# Patient Record
Sex: FEMALE | Race: WHITE | Employment: OTHER | URBAN - METROPOLITAN AREA
[De-identification: names, ages, dates, MRNs, and addresses within clinical notes are randomized per-mention and may not be internally consistent; named-entity substitution may affect disease eponyms.]

---

## 2017-01-19 ENCOUNTER — APPOINTMENT (OUTPATIENT)
Dept: PHYSICAL THERAPY | Age: 67
End: 2017-01-19

## 2017-01-19 ENCOUNTER — HOSPITAL ENCOUNTER (OUTPATIENT)
Dept: PHYSICAL THERAPY | Age: 67
Discharge: HOME | End: 2017-01-19
Payer: MEDICARE

## 2017-01-19 PROCEDURE — 97162 PT EVAL MOD COMPLEX 30 MIN: CPT | Performed by: PHYSICAL THERAPIST

## 2017-01-19 PROCEDURE — G8985 CARRY GOAL STATUS: HCPCS | Performed by: PHYSICAL THERAPIST

## 2017-01-19 PROCEDURE — G8984 CARRY CURRENT STATUS: HCPCS | Performed by: PHYSICAL THERAPIST

## 2017-01-19 PROCEDURE — 97140 MANUAL THERAPY 1/> REGIONS: CPT | Performed by: PHYSICAL THERAPIST

## 2017-01-19 NOTE — PROGRESS NOTES
PT INITIAL EVALUATION NOTE - [facility] 2-15    Patient Name: Jessica Milner  Date:2017  : 1950  [x]  Patient  Verified  Payor: [place] MEDICARE / Plan: [place] MEDICARE PART A & B / Product Type: Medicare /    In time:500  Out time:600  Total Treatment Time (min):60  Total Timed Codes (min): 60 (35 eval,25 timed see below)  1:1 Treatment Time ( [address] only): 61  Visit #: 1    Treatment Area: Lower back pain [M54.5]     SUBJECTIVE  Any medication changes, allergies to medications, adverse drug reactions, diagnosis change, or new procedure performed?: [] No    [x] Yes (see summary sheet for update)  Date of onset/injury: about 2 mo ago. Pain got so bad she had to go to ER where they did a CT scan, US, x-ray. All came back neg for sig pathology. ARELIS: likely from working out. Started with  in 2015, developed p! in Nov.   Pain:   10/10 max 3/10 min 3/10 now     Location of symptoms: mid- back pain reva  (pt points to thoracic region), headaches (have subsided some, now only getting them 2x/mo)  Description of symptoms: worse as day goes on secondary to job duties (see below)  Aggravated by: raising reva arms above head  Eased by: bending over touching floor stretching out the thoracic and lumbar region, aleve (\"i live off that right now\")  Prior tests/injections: after trip to ER. Helped greatly to dec pain. 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 Prior level of function/activity level: was going to the gym-doing elliptical and lifting weights. Started in  working with a  at [facility]. Weight lifting routine: upright rows, chest press, bent over rows, tricep kickback, planks (elbows).   Stopped in Dec secondary to pain. Patient goal: \"to find out what i can do to relieve pain\"  Living Situation: pt lives alone  Barriers: pt is a \"work-a-holic\" who always feels like she needs to be working  Fear-avoidance belief about physical activity: 50(13) -elevated    OBJECTIVE    Observation: no scoliotic curve noted. Rounded head forward shoulder posture. Increased upper cervical extension. Kyphosis  [x] Inc    [] Dec    AROM t-spine  ]WNL unless noted below   % decreased   Flexion  Relieves p! Extension  Relieves p! Right Sidebending    Left Sidebending    Right Rotation 10% friend, p! Left Rotation      AROM reva shoulders WNL and pain-free    Strength  3+/5 middle trap, lower trap, rhomboids  5/5 reva shoulders all planes    Tenderness to palpation:  reva UT, middle trap, rhomboid minor/major, thoracic paraspinals. Special Tests: all thoracic special tests neg for sig pathology  (-) hawkin's lia and empty can reva sh    Flexibility Deficits: dec pec major and minor, lats    Joint mobility:hypomobility noted t1-t9, slight p! With testing         Outcome Measure: Using standardized self-reported disability survey (Focus on Therapeutic Outcomes) the patient's perceived disability score is 50- zero is the most disabled and 100 is the least disabled. OBJECTIVE    [x] Skin assessment post-treatment:  [x]intact []redness- no adverse reaction    []redness  adverse reaction:     25 min Manual Therapy: MFR reva middle trap, rhomboid minor/major, thoracic paraspinals.  t1-t9 grade II-III. Rationale: decrease pain, increase ROM, increase tissue extensibility and decrease trigger points to improve the patients ability to work for prolonged periods of time.            With   [] TE   [] TA   [] neuro   [x] manual: Patient Education: [x] Review HEP    [] Progressed/Changed HEP based on:   [] positioning   [] body mechanics   [] transfers   [x] Ice application- pt advised to ice 10-15 min 1-2 x/day to area in order to dec inflammation  [x] other:  re: mechanism of injury/condition, role of physical therapy, prognosis for recovery, heat vs ice, activity modifications. Education re: proper posture in relation to low back pain. Demonstrated using visual and tactile cues. Advised pt to get fitted for properly fitting bra to facilitate proper posture and dec strain at neck and back. Pain Level (0-10 scale) post treatment: 0    ASSESSMENT/Changes in Function:     [x]  See Plan of Jarod.  STAN Holloway, UDAYT, CMTPT  PT License Number: [id number]   1/19/2017  4:55 PM

## 2017-01-19 NOTE — PROGRESS NOTES
[facility]  [address]  [place], [address]  Phone: [phone]  Fax: [phone]    Plan of Care/Statement of Necessity for Physical Therapy Services  2-15    Patient name: Leobardo Shine  : 1950  Provider#: [id number]  Referral source: Siobhan León MD      Medical/Treatment Diagnosis: Lower back pain [M54.5]     Prior Hospitalization: see medical history     Comorbidities: see evaluation  Prior Level of Function:see evaluation  Medications: Verified on Patient Summary List  Start of Care: 2017     Onset Date:see evaluation   The Plan of Care and following information is based on the information from the initial evaluation. Assessment/ key information: Patient present with signs and symptoms consistent with reva thoracic strain and will benefit from physical therapy to address deficits noted below in problem list.     Evaluation Complexity History MEDIUM  Complexity : 1-2 comorbidities / personal factors will impact the outcome/ POC ; Examination MEDIUM Complexity : 3 Standardized tests and measures addressing body structure, function, activity limitation and / or participation in recreation  ;Presentation MEDIUM Complexity : Evolving with changing characteristics  ; Clinical Decision Making MEDIUM Complexity : FOTO score of 26-74  Overall Complexity Rating: MEDIUM    Problem List: pain affecting function, decrease strength, decrease ADL/ functional abilitiies, decrease activity tolerance and decrease flexibility/ joint mobility   Treatment Plan may include any combination of the following: Therapeutic exercise, Therapeutic activities, Neuromuscular re-education, Physical agent/modality, Manual therapy, Patient education and Self Care training  Patient / Family readiness to learn indicated by: asking questions, trying to perform skills and interest  Persons(s) to be included in education: patient (P)  Barriers to Learning/Limitations: yes;  other job duties. Also pt lives alone so no help at home with heavy housework  Patient Goal (s): please see evaluation in Connect Care  Patient Self Reported Health Status: please see paper chart  Rehabilitation Potential: good    Short Term Goals: To be accomplished in 5 treatments:  -Independent in HEP as evidenced on ability to perform at least 5 exercises from HEP using proper form without verbal cuing.   -Pain less than or equal to 6/10 at worst to allow patient to perform ADL's with greater ease  -Demostrate proper posture in order to decrease thoracic pain    Long Term Goals: To be accomplished in 10 treatments:  -MMT 4+/5 all planes to allow patient to perform ADL's  -Pain 0/10 to allow patient to participate in work duties reaching overhead  -FOTO score greater than or equal to 60 to allow patient to perform a greater amount of ADLs. Frequency / Duration: Patient to be seen 2 times per week for 8-10 weeks. Patient/ Caregiver education and instruction: self care, activity modification and exercises    [x]  Plan of care has been reviewed with PTA    G-Codes (GP)  Carry   Current  CJ= 20-39%    Goal  CI= 1-19%    The severity rating is based on clinical judgment and the FOTO Score. Certification Period: 1/19/2017 -  4/17/17    Summer Ashford. Amie PT, DPT, CMTPT      5/75/2494 3:28 PM  PT License Number: [id number]  _____________________________________________________________________    I certify that the above Therapy Services are being furnished while the patient is under my care. I agree with the treatment plan and certify that this therapy is necessary.     [de-identified] Signature:____________________  Date:____________Time:_________

## 2017-01-24 ENCOUNTER — HOSPITAL ENCOUNTER (OUTPATIENT)
Dept: PHYSICAL THERAPY | Age: 67
Discharge: HOME | End: 2017-01-24
Payer: MEDICARE

## 2017-01-24 PROCEDURE — 97110 THERAPEUTIC EXERCISES: CPT | Performed by: PHYSICAL THERAPIST

## 2017-01-24 PROCEDURE — 97140 MANUAL THERAPY 1/> REGIONS: CPT | Performed by: PHYSICAL THERAPIST

## 2017-01-24 NOTE — PROGRESS NOTES
PT DAILY TREATMENT NOTE - [facility] 2-15    Patient Name: Gonzalo Montiel  Date:2017  : 1950  [x]  Patient  Verified  Payor: Delgado Colon / Plan: VA MEDICARE PART A & B / Product Type: Medicare /    In time:105  Out time:205  Total Treatment Time (min): 60  Total Timed Codes (min): 50  1:1 Treatment Time ( [address] only): 50   Visit #: 2     Treatment Area: Lower back pain [M54.5]    SUBJECTIVE  Pain Level (0-10 scale): 1  Any medication changes, allergies to medications, adverse drug reactions, diagnosis change, or new procedure performed?: [x] No    [] Yes (see summary sheet for update)  Subjective functional status/changes:     \"Much better after last time. I wasn't sore at all and I have been trying to watch my posture at work. \"    OBJECTIVE    Modality rationale: decrease edema, decrease inflammation, decrease pain and reduce soreness post therapy in order to improve the patients ability to perform ADL's. Min Type Additional Details    [x]  Ice     []  Heat   Position: supine, reva LE's supported    Location: thoracic spine     [x] Skin assessment post-treatment:  [x]intact []redness- no adverse reaction    []redness  adverse reaction:     35 min Therapeutic Exercise:  [x] See flow sheet :   Rationale: increase ROM, increase strength and improve functional mobility to improve the patients ability to stand for prolonged periods of time    15 min Manual Therapy: MFR reva thoracic paraspinals, rhomboids, middle trap. CPA grade III-IV.      Rationale: decrease pain, increase ROM, increase tissue extensibility, decrease trigger points and improve joint mobility to improve the patients ability to reach overhead    With   [x] TE   [] TA   [] neuro   [] manual: Patient Education: [x] Review HEP    [] Progressed/Changed HEP based on:   [] positioning   [] body mechanics   [] transfers   [] heat/ice application    [x] other: Reminded pt about getting fitted for proper  bra     Other Objective/Functional Measures: Pain Level (0-10 scale) post treatment: 0    ASSESSMENT    Patient will continue to benefit from skilled PT services to modify and progress therapeutic interventions, address functional mobility deficits, address ROM deficits, address strength deficits and analyze and address soft tissue restrictions to attain remaining goals. Progress towards goals / Updated goals: Yanira addition of postural ex's well. PLAN  []  Upgrade activities as tolerated     [x]  Continue plan of care  []  Update interventions per flow sheet       []  Discharge due to:_  []  Other:_      Inga Wray.  Amie PT, DPT, CMTPT    2/82/3190    PT License Number: [id number]

## 2017-01-31 ENCOUNTER — APPOINTMENT (OUTPATIENT)
Dept: PHYSICAL THERAPY | Age: 67
End: 2017-01-31
Payer: MEDICARE

## 2017-02-02 ENCOUNTER — HOSPITAL ENCOUNTER (OUTPATIENT)
Dept: PHYSICAL THERAPY | Age: 67
Discharge: HOME | End: 2017-02-02
Payer: MEDICARE

## 2017-02-02 PROCEDURE — 97140 MANUAL THERAPY 1/> REGIONS: CPT | Performed by: PHYSICAL THERAPIST

## 2017-02-02 NOTE — PROGRESS NOTES
PT DAILY TREATMENT NOTE - [facility] -15    Patient Name: David August  Date:2017  : 1950  [x]  Patient  Verified  Payor: Beth Veronica / Plan: [place] MEDICARE PART A & B / Product Type: Medicare /    In time:100  Out time:140  Total Treatment Time (min): 40  Total Timed Codes (min): 30  1:1 Treatment Time [facility] only):30  Visit #: 3    Treatment Area: Lower back pain [M54.5]    SUBJECTIVE  Pain Level (0-10 scale): 3  Any medication changes, allergies to medications, adverse drug reactions, diagnosis change, or new procedure performed?: [x] No    [] Yes (see summary sheet for update)  Subjective functional status/changes:     \"I've been doing much better overall. Not too good today just because I'm sick and don't feel well. The exercises really help. \"    OBJECTIVE    Modality rationale: decrease edema, decrease inflammation, decrease pain and reduce soreness post therapy in order to improve the patients ability to perform ADL's. Min Type Additional Details    [x]  Ice     []  Heat   Position: supine, reva LE's supported    Location: thoracic spine     [x] Skin assessment post-treatment:  [x]intact []redness- no adverse reaction    []redness  adverse reaction:     0 min Therapeutic Exercise:  [x] See flow sheet :held today as pt is sick and wants to go home   Rationale: increase ROM, increase strength and improve functional mobility to improve the patients ability to stand for prolonged periods of time    30 min Manual Therapy: MFR reva thoracic paraspinals, middle trap, levator, rhomboids, middle trap.   CPA and UPA reva grade III-IV t1-t9   Rationale: decrease pain, increase ROM, increase tissue extensibility, decrease trigger points and improve joint mobility to improve the patients ability to reach overhead    With   [x] TE   [] TA   [] neuro   [] manual: Patient Education: [x] Review HEP    [] Progressed/Changed HEP based on:   [] positioning   [] body mechanics   [] transfers   [] heat/ice application [x] other: Discussion about trying to have good posture when reading and sitting     Other Objective/Functional Measures:     Pain Level (0-10 scale) post treatment: 0    ASSESSMENT    Patient will continue to benefit from skilled PT services to modify and progress therapeutic interventions, address functional mobility deficits, address ROM deficits, address strength deficits and analyze and address soft tissue restrictions to attain remaining goals. Progress towards goals / Updated goals:      PLAN  []  Upgrade activities as tolerated     [x]  Continue plan of care  []  Update interventions per flow sheet       []  Discharge due to:_  []  Other:_      Tana Done.  Amie, PT, DPT, CMTPT    4/6/7846    PT License Number: [id number]

## 2017-02-09 ENCOUNTER — APPOINTMENT (OUTPATIENT)
Dept: PHYSICAL THERAPY | Age: 67
End: 2017-02-09
Payer: MEDICARE

## 2017-02-10 ENCOUNTER — HOSPITAL ENCOUNTER (OUTPATIENT)
Dept: PHYSICAL THERAPY | Age: 67
Discharge: HOME | End: 2017-02-10
Payer: MEDICARE

## 2017-02-10 PROCEDURE — 97140 MANUAL THERAPY 1/> REGIONS: CPT | Performed by: PHYSICAL THERAPIST

## 2017-02-10 PROCEDURE — G8986 CARRY D/C STATUS: HCPCS | Performed by: PHYSICAL THERAPIST

## 2017-02-10 PROCEDURE — G8985 CARRY GOAL STATUS: HCPCS | Performed by: PHYSICAL THERAPIST

## 2017-02-10 NOTE — ANCILLARY DISCHARGE INSTRUCTIONS
[facility]   [address] [facility], [address] [place] [address]  Phone: [phone] Fax: [phone]      Discharge Summary 2-15      Patient name: Niecy Wilson  : 1950    Provider#: [id number]  Referral source: Domenic Jones MD      Medical/Treatment Diagnosis: Lower back pain [M54.5]     Prior Hospitalization: see medical history     Comorbidities: see evaluation  Prior Level of Function:see evaluation  Medications: Verified on Patient Summary List    Start of Care: 17    Onset Date:see evaluation   Visits from Start of Care: 4   Missed Visits:see connect care  Reporting Period : 17  to 2/10/17    Goal Status     Short Term Goals: To be accomplished in 5 treatments:  -Independent in HEP as evidenced on ability to perform at least 5 exercises from HEP using proper form without verbal cuing. -MET  -Pain less than or equal to 6/10 at worst to allow patient to perform ADL's with greater ease -MET  -Demostrate proper posture in order to decrease thoracic pain -MET     Long Term Goals: To be accomplished in 10 treatments:  -MMT 4+/5 all planes to allow patient to perform ADL's -MET  -Pain 0/10 to allow patient to participate in work duties reaching overhead -MET  -FOTO score greater than or equal to 60 to allow patient to perform a greater amount of ADLs. -MET    Assessment/Summary of care:   Pt with 4 skilled PT visits at this time.  Pt has shown improvement in ROM, strength and ability to return to work activities as reviewed in clinic.  Pt has met 100% of goals.   Pt ready for D/C to HEP. G-Codes (GP)   Carry    Goal  CI= 1-19%   D/C  CI= 1-19%      RECOMMENDATIONS:  [x]Discontinue therapy:[x]Patient has reached or is progressing toward set goals     []Patient is non-compliant or has abdicated     []Due to lack of appreciable progress towards set goals    Valiant Sames.  Amie, PT, DPT, CMTPT  2/10/2017 8:34 AM      [

## 2017-02-10 NOTE — PROGRESS NOTES
PT DAILY TREATMENT NOTE - [facility] 2-15    Patient Name: Srinivasan Hawley  Date:2/10/2017  : 1950  [x]  Patient  Verified  Payor: Zahc Dutta / Plan: [place] MEDICARE PART A & B / Product Type: Medicare /    In time:805Out time 840  Total Treatment Time (min): 35  Total Timed Codes (min):25  1:1 Treatment Time [facility] only):25  Visit #: 4    Treatment Area: Lower back pain [M54.5]    SUBJECTIVE  Pain Level (0-10 scale):0  Any medication changes, allergies to medications, adverse drug reactions, diagnosis change, or new procedure performed?: [x] No    [] Yes (see summary sheet for update)  Subjective functional status/changes:     \"I feel you've helped me a lot and can self manage on my own. \"    OBJECTIVE    Modality rationale: decrease edema, decrease inflammation, decrease pain and reduce soreness post therapy in order to improve the patients ability to perform ADL's. Min Type Additional Details    [x]  Ice     []  Heat   Position: supine, reva LE's supported    Location: thoracic spine     [x] Skin assessment post-treatment:  [x]intact []redness- no adverse reaction    []redness  adverse reaction:     0 min Therapeutic Exercise:  [x] See flow sheet :   Rationale: increase ROM, increase strength and improve functional mobility to improve the patients ability to stand for prolonged periods of time    25 min Manual Therapy: MFR reva thoracic paraspinals, middle trap, levator, rhomboids, middle trap. CPA and UPA reva grade III-IV t1-t9   Rationale: decrease pain, increase ROM, increase tissue extensibility, decrease trigger points and improve joint mobility to improve the patients ability to reach overhead    With   [] TE   [] TA   [] neuro   [x] manual: Patient Education: [x] Review HEP    [] Progressed/Changed HEP based on:   [] positioning   [] body mechanics   [] transfers   [] heat/ice application    [x] other: HEP reviewed.   Pt given this therapist's contact phone and e-mail and advised to call with any questions or concerns in the future. Other Objective/Functional Measures:     Pain Level (0-10 scale) post treatment: 0    ASSESSMENT    Patient will continue to benefit from skilled PT services to modify and progress therapeutic interventions, address functional mobility deficits, address ROM deficits, address strength deficits and analyze and address soft tissue restrictions to attain remaining goals. Progress towards goals / Updated goals:      PLAN  []  Upgrade activities as tolerated     [x]  Continue plan of care  []  Update interventions per flow sheet       []  Discharge due to:_  []  Other:_      Tana Done.  Amie PT, DPT, CMTPT    7/37/2023    PT License Number: [id number]

## 2017-02-14 ENCOUNTER — APPOINTMENT (OUTPATIENT)
Dept: PHYSICAL THERAPY | Age: 67
End: 2017-02-14
Payer: MEDICARE

## 2017-02-16 ENCOUNTER — APPOINTMENT (OUTPATIENT)
Dept: PHYSICAL THERAPY | Age: 67
End: 2017-02-16
Payer: MEDICARE

## 2017-02-28 ENCOUNTER — APPOINTMENT (OUTPATIENT)
Dept: PHYSICAL THERAPY | Age: 67
End: 2017-02-28
Payer: MEDICARE

## 2017-03-02 ENCOUNTER — APPOINTMENT (OUTPATIENT)
Dept: PHYSICAL THERAPY | Age: 67
End: 2017-03-02

## 2017-03-15 RX ORDER — LORAZEPAM 1 MG/1
TABLET ORAL
Qty: 135 TAB | Refills: 0 | OUTPATIENT
Start: 2017-03-15 | End: 2017-08-01 | Stop reason: SDUPTHER

## 2017-04-20 RX ORDER — LEVOTHYROXINE SODIUM 200 UG/1
TABLET ORAL
Qty: 90 TAB | Refills: 3 | Status: SHIPPED | OUTPATIENT
Start: 2017-04-20 | End: 2017-08-01 | Stop reason: SDUPTHER

## 2017-08-01 RX ORDER — LORAZEPAM 1 MG/1
TABLET ORAL
Qty: 135 TAB | Refills: 0 | Status: SHIPPED | OUTPATIENT
Start: 2017-08-01 | End: 2017-10-30 | Stop reason: SDUPTHER

## 2017-08-01 RX ORDER — LEVOTHYROXINE SODIUM 200 UG/1
TABLET ORAL
Qty: 90 TAB | Refills: 3 | Status: SHIPPED | OUTPATIENT
Start: 2017-08-01 | End: 2020-01-31 | Stop reason: SDUPTHER

## 2017-08-01 NOTE — TELEPHONE ENCOUNTER
Last seen: 12/29/2016    Requested Prescriptions     Pending Prescriptions Disp Refills    levothyroxine (SYNTHROID) 200 mcg tablet 90 Tab 3    LORazepam (ATIVAN) 1 mg tablet 135 Tab 0     Sig: TAKE ONE AND ONE-HALF TABLETS BY MOUTH EVERY DAY AT NIGHT

## 2017-09-12 ENCOUNTER — OFFICE VISIT (OUTPATIENT)
Dept: INTERNAL MEDICINE CLINIC | Age: 67
End: 2017-09-12

## 2017-09-12 VITALS
OXYGEN SATURATION: 98 % | BODY MASS INDEX: 30.22 KG/M2 | TEMPERATURE: 97.8 F | SYSTOLIC BLOOD PRESSURE: 139 MMHG | HEART RATE: 61 BPM | RESPIRATION RATE: 20 BRPM | DIASTOLIC BLOOD PRESSURE: 62 MMHG | HEIGHT: 64 IN | WEIGHT: 177 LBS

## 2017-09-12 DIAGNOSIS — R51.9 SEVERE HEADACHE: Primary | ICD-10-CM

## 2017-09-12 RX ORDER — BUTALBITAL, ACETAMINOPHEN AND CAFFEINE 300; 40; 50 MG/1; MG/1; MG/1
1 CAPSULE ORAL
Qty: 12 CAP | Refills: 0 | Status: SHIPPED | OUTPATIENT
Start: 2017-09-12 | End: 2018-10-04

## 2017-09-12 NOTE — PROGRESS NOTES
HISTORY OF PRESENT ILLNESS  Paul Arango is a 78 y.o. female. HPI  Patient presents to the office for evaluation of headache. She reports Friday she started with an extremely bad headache 10 out of 10. She tried taking Excedrin but it did not help. She has been having some light sensitivity and some mild nausea. She denies noise sensitivity. She reports on Friday she kept trying to massage her headache away. This did seem to help some but once she stopped the massage the intensity returned. She has had headaches in the past but never this severe or for this length of time. She reports Saturday she felt like she had some neck stiffness as well. That has since resolved. She did have some increase stress at work and she was not able to go on her vacation this week. Review of Systems   Constitutional: Positive for malaise/fatigue. Eyes: Positive for photophobia. Negative for blurred vision and double vision. Gastrointestinal: Positive for heartburn and nausea. Negative for vomiting. Musculoskeletal: Positive for myalgias. Blood pressure 139/62, pulse 61, temperature 97.8 °F (36.6 °C), temperature source Oral, resp. rate 20, height 5' 3.5\" (1.613 m), weight 177 lb (80.3 kg), SpO2 98 %. Physical Exam   Constitutional:   Patient in tears/crying. Eyes: Pupils are equal, round, and reactive to light. Musculoskeletal: She exhibits tenderness. Patient is tender to palpation over the occipital area. Neurological: A cranial nerve deficit is present. Coordination normal.       ASSESSMENT and PLAN  Diagnoses and all orders for this visit:    1. Severe headache  -     butalbital-acetaminophen-caff (FIORICET) -40 mg per capsule; Take 1 Cap by mouth every four (4) hours as needed for Pain. Max Daily Amount: 6 Caps. -     CT HEAD WO CONT; Future    I would like for the patient to get a CT scan.  Although she has a history of headaches, this headache is the worse ever and it does not seem to be responding to medication. She has been given a trial of Fioricet to try. Advised her to try a thera cane to massage her neck over the areas that she seems to carry tension. I will contact her with the results of the CT. She may benefit from physical therapy if not able to get home thera- cane. All this was discussed with the patient and she understands and agrees.

## 2017-09-12 NOTE — PATIENT INSTRUCTIONS
Tetherball Activation    Thank you for requesting access to Tetherball. Please follow the instructions below to securely access and download your online medical record. Tetherball allows you to send messages to your doctor, view your test results, renew your prescriptions, schedule appointments, and more. How Do I Sign Up? 1. In your internet browser, go to [email or web address]  2. Click on the First Time User? Click Here link in the Sign In box. You will be redirect to the New Member Sign Up page. 3. Enter your Tetherball Access Code exactly as it appears below. You will not need to use this code after youve completed the sign-up process. If you do not sign up before the expiration date, you must request a new code. Tetherball Access Code: [email or web address]  Expires: 2017  1:52 PM (This is the date your Tetherball access code will )    4. Enter the last four digits of your Social Security Number (xxxx) and Date of Birth (mm/dd/yyyy) as indicated and click Submit. You will be taken to the next sign-up page. 5. Create a Tetherball ID. This will be your Tetherball login ID and cannot be changed, so think of one that is secure and easy to remember. 6. Create a Tetherball password. You can change your password at any time. 7. Enter your Password Reset Question and Answer. This can be used at a later time if you forget your password. 8. Enter your e-mail address. You will receive e-mail notification when new information is available in [address]. 9. Click Sign Up. You can now view and download portions of your medical record. 10. Click the Download Summary menu link to download a portable copy of your medical information. Additional Information    If you have questions, please visit the Frequently Asked Questions section of the Tetherball website at [email or web address] Remember, Tetherball is NOT to be used for urgent needs. For medical emergencies, dial 911.

## 2017-09-12 NOTE — PROGRESS NOTES
Reviewed record in preparation for visit and have obtained necessary documentation. Identified pt with two pt identifiers(name and ). Health Maintenance Due   Topic    Hepatitis C Screening     DTaP/Tdap/Td series (1 - Tdap)    ZOSTER VACCINE AGE 60>     GLAUCOMA SCREENING Q2Y     Pneumococcal 65+ Low/Medium Risk (2 of 2 - PCV13)    MEDICARE YEARLY EXAM     BREAST CANCER SCRN MAMMOGRAM     INFLUENZA AGE 9 TO ADULT          No chief complaint on file. Wt Readings from Last 3 Encounters:   17 177 lb (80.3 kg)   16 177 lb 9.6 oz (80.6 kg)   16 176 lb 8 oz (80.1 kg)     Temp Readings from Last 3 Encounters:   17 97.8 °F (36.6 °C) (Oral)   16 98 °F (36.7 °C) (Oral)   16 98.4 °F (36.9 °C)     BP Readings from Last 3 Encounters:   16 138/57   16 128/65   16 147/66     Pulse Readings from Last 3 Encounters:   16 67   16 (!) 56   16 62           Learning Assessment:  :     Learning Assessment 2016   PRIMARY LEARNER Patient Patient Patient   HIGHEST LEVEL OF EDUCATION - PRIMARY LEARNER  - GRADUATED HIGH SCHOOL OR GED -   BARRIERS PRIMARY LEARNER - NONE -   CO-LEARNER CAREGIVER - No -   PRIMARY LANGUAGE ENGLISH ENGLISH ENGLISH    NEED - No -   LEARNER PREFERENCE PRIMARY DEMONSTRATION PICTURES VIDEOS   ANSWERED BY self patient patient   RELATIONSHIP SELF SELF SELF       Depression Screening:  :     PHQ over the last two weeks 2016   Little interest or pleasure in doing things Not at all   Feeling down, depressed or hopeless Not at all   Total Score PHQ 2 0       Fall Risk Assessment:  :     Fall Risk Assessment, last 12 mths 2016   Able to walk? Yes   Fall in past 12 months? No   Fall with injury? -   Number of falls in past 12 months -   Fall Risk Score -       Abuse Screening:  :     Abuse Screening Questionnaire 2016 2015 3/4/2014   Do you ever feel afraid of your partner?  N N N Are you in a relationship with someone who physically or mentally threatens you? N N N   Is it safe for you to go home? Y Y Y       Coordination of Care Questionnaire:  :     1) Have you been to an emergency room, urgent care clinic since your last visit? no   Hospitalized since your last visit? no             2) Have you seen or consulted any other health care providers outside of [employer] since your last visit? no  (Include any pap smears or colon screenings in this section.)    3) Do you have an Advance Directive on file? yes    4) Are you interested in receiving information on Advance Directives? NO      Patient is accompanied by self I have received verbal consent from Steve He to discuss any/all medical information while they are present in the room.

## 2017-09-13 ENCOUNTER — HOSPITAL ENCOUNTER (OUTPATIENT)
Dept: CT IMAGING | Age: 67
Discharge: HOME | End: 2017-09-13
Attending: PHYSICIAN ASSISTANT
Payer: MEDICARE

## 2017-09-13 DIAGNOSIS — R51.9 SEVERE HEADACHE: ICD-10-CM

## 2017-09-13 PROCEDURE — 70450 CT HEAD/BRAIN W/O DYE: CPT

## 2017-09-14 NOTE — PROGRESS NOTES
Writer spoke with patient and informed her Per NIKKY Rios No acute process noted on CT, Patient is pleased with results

## 2017-09-25 RX ORDER — LISINOPRIL AND HYDROCHLOROTHIAZIDE 10; 12.5 MG/1; MG/1
TABLET ORAL
Qty: 90 TAB | Refills: 3 | Status: SHIPPED | OUTPATIENT
Start: 2017-09-25 | End: 2018-10-04

## 2018-09-17 DIAGNOSIS — F51.01 PRIMARY INSOMNIA: ICD-10-CM

## 2018-09-17 RX ORDER — LORAZEPAM 1 MG/1
TABLET ORAL
Qty: 45 TAB | Refills: 0 | Status: SHIPPED | OUTPATIENT
Start: 2018-09-17 | End: 2018-10-31 | Stop reason: SDUPTHER

## 2018-09-18 NOTE — TELEPHONE ENCOUNTER
Zhen Ruvalcaba and spoke with pt and informed her that her script has been phoned in to her pharmacy on file. Also informed pt that an office visit would be needed prior to any further refills. Pt verbalized understanding of this. Script has been called into pt's pharmacy.

## 2018-10-01 ENCOUNTER — OFFICE VISIT (OUTPATIENT)
Dept: INTERNAL MEDICINE CLINIC | Age: 68
End: 2018-10-01

## 2018-10-01 ENCOUNTER — TELEPHONE (OUTPATIENT)
Dept: INTERNAL MEDICINE CLINIC | Age: 68
End: 2018-10-01

## 2018-10-01 VITALS
BODY MASS INDEX: 34.15 KG/M2 | DIASTOLIC BLOOD PRESSURE: 83 MMHG | RESPIRATION RATE: 20 BRPM | TEMPERATURE: 97.5 F | HEART RATE: 65 BPM | WEIGHT: 200 LBS | SYSTOLIC BLOOD PRESSURE: 194 MMHG | HEIGHT: 64 IN | OXYGEN SATURATION: 97 %

## 2018-10-01 DIAGNOSIS — M25.50 MULTIPLE JOINT PAIN: ICD-10-CM

## 2018-10-01 DIAGNOSIS — F32.A DEPRESSION, UNSPECIFIED DEPRESSION TYPE: ICD-10-CM

## 2018-10-01 DIAGNOSIS — F43.21 GRIEVING: Primary | ICD-10-CM

## 2018-10-01 PROBLEM — F32.1 MODERATE MAJOR DEPRESSION (HCC): Status: ACTIVE | Noted: 2018-10-01

## 2018-10-01 NOTE — MR AVS SNAPSHOT
[address] [place] [address] [address] [facility] 
[phone] Patient: Arti Vale MRN: [id number] :1950 Visit Information Date & Time Provider Department Dept. Phone Encounter #  
 10/1/2018 10:30 AM Roz Farah [facility] [phone] [id number] Your Appointments 10/15/2018 11:20 AM  
ROUTINE CARE with Charlene Reed MD  
[facility]) Appt Note: medication refill and discuss a referrell ldm  
 [place] [address] [facility] [address]  
[address]  
  
    
 2018 10:40 AM  
COMPLETE 40 with Charlene Reed MD  
[facility]) Appt Note: cpe ldm 18  
 [place] [address] [facility] [address]  
[facility] [address] [facility] [address] [facility] [address] [address] Upcoming Health Maintenance Date Due Hepatitis C Screening 1950 DTaP/Tdap/Td series (1 - Tdap) 1970 Shingrix Vaccine Age 50> (1 of 2) 1999 GLAUCOMA SCREENING Q2Y 2014 Pneumococcal 65+ Low/Medium Risk (2 of 2 - PCV13) 2016 BREAST CANCER SCRN MAMMOGRAM 2017 MEDICARE YEARLY EXAM 3/14/2018 Influenza Age 5 to Adult 2018 COLONOSCOPY 2019 Allergies as of 10/1/2018  Review Complete On: 2017 By: Brian Calvillo, RT, R Severity Noted Reaction Type Reactions Keflex [Cephalexin]  2013    Rash Monistat 1 [Tioconazole]  2014    Swelling Sulfa (Sulfonamide Antibiotics)  2011    Rash Current Immunizations  Reviewed on 2015 Name Date Influenza Vaccine 10/14/2015, 10/10/2013 Influenza Vaccine Split 2012 Influenza Vaccine Whole 10/22/2011 Pneumococcal Polysaccharide (PPSV-23) 2015 Not reviewed this visit You Were Diagnosed With   
  
 Codes Comments Grieving    -  Primary ICD-10-CM: W11.45 ICD-9-CM: 309.0 Depression, unspecified depression type     ICD-10-CM: F32.9 ICD-9-CM: 406 Multiple joint pain     ICD-10-CM: M25.50 ICD-9-CM: 719.49 Vitals BP Pulse Temp Resp Height(growth percentile) Weight(growth percentile) 194/83 65 97.5 °F (36.4 °C) (Oral) 20 5' 3.5\" (1.613 m) 200 lb (90.7 kg) SpO2 BMI OB Status Smoking Status 97% 34.87 kg/m2 Postmenopausal Never Smoker Vitals History BMI and BSA Data Body Mass Index Body Surface Area 34.87 kg/m 2 2.02 m 2 Preferred Pharmacy Pharmacy Name Phone [address] [phone] Your Updated Medication List  
  
   
This list is accurate as of 10/1/18 11:40 AM.  Always use your most recent med list.  
  
  
  
  
 butalbital-acetaminophen-caff -40 mg per capsule Commonly known as:  [employer] Take 1 Cap by mouth every four (4) hours as needed for Pain. Max Daily Amount: 6 Caps. citalopram 40 mg tablet Commonly known as:  CELEXA  
TAKE ONE TABLET BY MOUTH DAILY FISH OIL 1,000 mg Cap Generic drug:  omega-3 fatty acids-vitamin e Take  by mouth daily. IMODIUM PO Take  by mouth. 2 QD  
  
 levothyroxine 200 mcg tablet Commonly known as:  SYNTHROID  
TAKE ONE TABLET BY MOUTH ONCE DAILY BEFORE BREAKFAST  
  
 lisinopril-hydroCHLOROthiazide 10-12.5 mg per tablet Commonly known as:  PRINZIDE, ZESTORETIC  
TAKE ONE TABLET BY MOUTH ONCE DAILY LORazepam 1 mg tablet Commonly known as:  ATIVAN  
TAKE 1 & 1/2 (ONE & ONE-HALF) TABLETS BY MOUTH ONCE DAILY AT NIGHT  
  
 medroxyPROGESTERone 5 mg tablet Commonly known as:  PROVERA TAKE ONE TABLET BY MOUTH ONCE DAILY potassium 99 mg tablet Take 99 mg by mouth daily. We Performed the Following REFERRAL TO PSYCHIATRY [REF91 Custom] Comments:  
 Please evaluate patient for depression Kylie Shah REFERRAL TO RHEUMATOLOGY [RTX47 Custom] Referral Information Referral ID Referred By Referred To  
  
 [id number] Isabela Crane MD   
   [address], [address] Phone: [phone] Fax: [phone] Visits Status Start Date End Date 1 New Request 10/1/18 10/1/19 If your referral has a status of pending review or denied, additional information will be sent to support the outcome of this decision. Referral ID Referred By Referred To  
 [id number] Jacqui ALTAMIRANO Not Available Visits Status Start Date End Date 1 New Request 10/1/18 10/1/19 If your referral has a status of pending review or denied, additional information will be sent to support the outcome of this decision. Patient Instructions careersmoret Activation Thank you for requesting access to [employer]. Please follow the instructions below to securely access and download your online medical record. [employer] allows you to send messages to your doctor, view your test results, renew your prescriptions, schedule appointments, and more. How Do I Sign Up? 1. In your internet browser, go to [email or web address] 
2. Click on the First Time User? Click Here link in the Sign In box. You will be redirect to the New Member Sign Up page. 3. Enter your Nexway Access Code exactly as it appears below. You will not need to use this code after youve completed the sign-up process. If you do not sign up before the expiration date, you must request a new code. Nexway Access Code: [email or web address] Expires: 2018 10:45 AM (This is the date your [employer]way access code will ) 4. Enter the last four digits of your Social Security Number (xxxx) and Date of Birth (mm/dd/yyyy) as indicated and click Submit. You will be taken to the next sign-up page. 5. Create a Bdayt ID. This will be your Liveset login ID and cannot be changed, so think of one that is secure and easy to remember. 6. Create a Liveset password. You can change your password at any time. 7. Enter your Password Reset Question and Answer. This can be used at a later time if you forget your password. 8. Enter your e-mail address. You will receive e-mail notification when new information is available in [address] 9. Click Sign Up. You can now view and download portions of your medical record. 10. Click the Download Summary menu link to download a portable copy of your medical information. Additional Information If you have questions, please visit the Frequently Asked Questions section of the Liveset website at [email or web address] Remember, Liveset is NOT to be used for urgent needs. For medical emergencies, dial 911. Introducing hospitals & HEALTH SERVICES! Wallace Tan introduces Liveset patient portal. Now you can access parts of your medical record, email your doctor's office, and request medication refills online. 1. In your internet browser, go to [email or web address] 2. Click on the First Time User? Click Here link in the Sign In box. You will see the New Member Sign Up page. 3. Enter your Liveset Access Code exactly as it appears below. You will not need to use this code after youve completed the sign-up process. If you do not sign up before the expiration date, you must request a new code. · Liveset Access Code: [email or web address] Expires: 12/30/2018 10:45 AM 
 
4. Enter the last four digits of your Social Security Number (xxxx) and Date of Birth (mm/dd/yyyy) as indicated and click Submit. You will be taken to the next sign-up page. 5. Create a Bdayt ID. This will be your Bdayt login ID and cannot be changed, so think of one that is secure and easy to remember. 6. Create a [facility] password. You can change your password at any time. 7. Enter your Password Reset Question and Answer. This can be used at a later time if you forget your password. 8. Enter your e-mail address. You will receive e-mail notification when new information is available in [address] 9. Click Sign Up. You can now view and download portions of your medical record. 10. Click the Download Summary menu link to download a portable copy of your medical information. If you have questions, please visit the Frequently Asked Questions section of the [facility] website. Remember, [facility] is NOT to be used for urgent needs. For medical emergencies, dial 911. Now available from your iPhone and Android! Please provide this summary of care documentation to your next provider. Your primary care clinician is listed as CASIMIRO DONAHUE. If you have any questions after today's visit, please call [phone].

## 2018-10-01 NOTE — PATIENT INSTRUCTIONS
[employer] Activation    Thank you for requesting access to [employer]. Please follow the instructions below to securely access and download your online medical record. [employer] allows you to send messages to your doctor, view your test results, renew your prescriptions, schedule appointments, and more. How Do I Sign Up? 1. In your internet browser, go to [email or web address]  2. Click on the First Time User? Click Here link in the Sign In box. You will be redirect to the New Member Sign Up page. 3. Enter your [employer] Access Code exactly as it appears below. You will not need to use this code after youve completed the sign-up process. If you do not sign up before the expiration date, you must request a new code. [employer] Access Code: [email or web address]  Expires: 2018 10:45 AM (This is the date your [employer] access code will )    4. Enter the last four digits of your Social Security Number (xxxx) and Date of Birth (mm/dd/yyyy) as indicated and click Submit. You will be taken to the next sign-up page. 5. Create a [employer] ID. This will be your [employer] login ID and cannot be changed, so think of one that is secure and easy to remember. 6. Create a [employer] password. You can change your password at any time. 7. Enter your Password Reset Question and Answer. This can be used at a later time if you forget your password. 8. Enter your e-mail address. You will receive e-mail notification when new information is available in [address] 9. Click Sign Up. You can now view and download portions of your medical record. 10. Click the Download Summary menu link to download a portable copy of your medical information. Additional Information    If you have questions, please visit the Frequently Asked Questions section of the [employer] website at [email or web address] Remember, [employer] is NOT to be used for urgent needs. For medical emergencies, dial 911.

## 2018-10-01 NOTE — PROGRESS NOTES
HISTORY OF PRESENT ILLNESS  Stella Duarte is a 75 y.o. female. HPI  Patient presents to the office for evaluation of depression and arthritic pain. She reports she recently lost her significant other in July. She states she she took care of him until the end of his life. This past weekend was the first time she has been out with her friends in over a year. She states she went to the [facility] this past weekend and she lost her significant other's ring. She states this was straw that broke the camel's back. She states the ring was not expensive but it had a lot of sentimental value. She states next week she is suppose to travel to spread his ashes. She reports she has been medicating with wine nightly for the past year. She first started drinking it because of the pain she has been having of her joints. She reports the joint pain is so severe that she was taking motrin and wine at night. Once her significant other passed she states she started drinking a little more wine. (4 glasses) She would like to see Dr. Rich Magallon for her joint pain. She has a friend that has seen him and it has made so much of a difference. She reports with the pain and her recent loss she has really been having a difficult time. She states she is not suicidal.   Review of Systems   Musculoskeletal: Positive for joint pain. Psychiatric/Behavioral: Positive for depression. Negative for suicidal ideas. Drinking wine nightly. Eating more than normal. Has gained back a significant amount of weight. Blood pressure 194/83, pulse 65, temperature 97.5 °F (36.4 °C), temperature source Oral, resp. rate 20, height 5' 3.5\" (1.613 m), weight 200 lb (90.7 kg), SpO2 97 %. Physical Exam   Psychiatric: She has a normal mood and affect. Her behavior is normal. Judgment and thought content normal.   Not suicidal or homicidal.       ASSESSMENT and PLAN  Diagnoses and all orders for this visit:    1.  Grieving  -     REFERRAL TO PSYCHIATRY    2. Depression, unspecified depression type  -     REFERRAL TO PSYCHIATRY    3. Multiple joint pain  -     REFERRAL TO RHEUMATOLOGY    I have spoken to Dr. Godwin Chamorro about this patient. Today I will not be refilling her lorazepam. I am concerned that she has been mixing this with her evening wine. We talked about having her to see a therapist. She was given the name of a company to call to make this appointment. [facility]) I am going to reach out to Dr. Ervin Holguin office to see if I can get her a sooner about than January. All this was discussed with the patient and she understands and agrees.

## 2018-10-01 NOTE — PROGRESS NOTES
Chief Complaint   Patient presents with    Depression     Depression Screening started (6), Patient feeling this way since July, loss of a loved one(SO), combination of things, work is a factor, patient is a     Joint Pain     needs referral to RA, taking all kinds of medication and drinking alcohol about 1 year ago, to help with the pain,      Reviewed record in preparation for visit and have obtained necessary documentation. Identified pt with two pt identifiers(name and ).       Health Maintenance Due   Topic    Hepatitis C Screening     DTaP/Tdap/Td series (1 - Tdap)    Shingrix Vaccine Age 49> (1 of 2)    GLAUCOMA SCREENING Q2Y     Pneumococcal 65+ Low/Medium Risk (2 of 2 - PCV13)    BREAST CANCER SCRN MAMMOGRAM     MEDICARE YEARLY EXAM     Influenza Age 5 to Adult          Chief Complaint   Patient presents with    Depression     Depression Screening started (6), Patient feeling this way since July, loss of a loved one(SO), combination of things, work is a factor, patient is a     Joint Pain     needs referral to RA, taking all kinds of medication and drinking alcohol about 1 year ago, to help with the pain,         Wt Readings from Last 3 Encounters:   10/01/18 200 lb (90.7 kg)   17 177 lb (80.3 kg)   16 177 lb 9.6 oz (80.6 kg)     Temp Readings from Last 3 Encounters:   10/01/18 97.5 °F (36.4 °C) (Oral)   17 97.8 °F (36.6 °C) (Oral)   16 98 °F (36.7 °C) (Oral)     BP Readings from Last 3 Encounters:   10/01/18 194/83   17 139/62   16 138/57     Pulse Readings from Last 3 Encounters:   10/01/18 65   17 61   16 67           Learning Assessment:  :     Learning Assessment 2016   PRIMARY LEARNER Patient Patient Patient   HIGHEST LEVEL OF EDUCATION - PRIMARY LEARNER  - GRADUATED HIGH SCHOOL OR GED -   BARRIERS PRIMARY LEARNER - NONE -   [address] CAREGIVER - No -   PRIMARY LANGUAGE ENGLISH ENGLISH ENGLISH    NEED - No -   LEARNER PREFERENCE PRIMARY DEMONSTRATION PICTURES VIDEOS   ANSWERED BY self patient patient   RELATIONSHIP SELF SELF SELF       Depression Screening:  :     PHQ over the last two weeks 10/1/2018   Little interest or pleasure in doing things Nearly every day   Feeling down, depressed, irritable, or hopeless Nearly every day   Total Score PHQ 2 6       Fall Risk Assessment:  :     Fall Risk Assessment, last 12 mths 9/12/2017   Able to walk? Yes   Fall in past 12 months? No   Fall with injury? -   Number of falls in past 12 months -   Fall Risk Score -       Abuse Screening:  :     Abuse Screening Questionnaire 6/16/2016 5/5/2015 3/4/2014   Do you ever feel afraid of your partner? N N N   Are you in a relationship with someone who physically or mentally threatens you? N N N   Is it safe for you to go home? Y Y Y       Coordination of Care Questionnaire:  :     1) Have you been to an emergency room, urgent care clinic since your last visit? no   Hospitalized since your last visit? no             2) Have you seen or consulted any other health care providers outside of [address] [place] [address] since your last visit? no  (Include any pap smears or colon screenings in this section.)    3) Do you have an Advance Directive on file? no    4) Are you interested in receiving information on Advance Directives? YES      Patient is accompanied by self I have received verbal consent from Arlys Najjar to discuss any/all medical information while they are present in the room.

## 2018-10-01 NOTE — TELEPHONE ENCOUNTER
Spoke with patient. Patient has an appointment today at 10:30am. Patient states that she is depressed and she is crying stating that she just need to speak to the doctor.  Patient is on the way and that she is 15 minutes away from the office

## 2018-10-02 ENCOUNTER — TELEPHONE (OUTPATIENT)
Dept: INTERNAL MEDICINE CLINIC | Age: 68
End: 2018-10-02

## 2018-10-02 NOTE — TELEPHONE ENCOUNTER
I have reached out to Dr. Ware Rising office and he actually has a cancellation for this week October 4 th at 11:00. I have made the patient and appointment. She is to arrive 30 minutes before for paperwork. I have left her a message on her cell phone to contact our office about this. As of yet I have not been able to reach her with this information.  thanks

## 2018-10-02 NOTE — TELEPHONE ENCOUNTER
Please refer to previous not from [facility]. [facility] has spoken with this patient and information has been given for appointment made.

## 2018-10-02 NOTE — TELEPHONE ENCOUNTER
The patient called me back and she was given the information about her appointment to see Dr. Adrián Rick on Thursday at 11:00. She is still in the process of getting an appointment with the counselor.  She has been advised to make a follow up appointment with with Dr. Ada Cannon to follow up everything/

## 2018-10-04 ENCOUNTER — HOSPITAL ENCOUNTER (OUTPATIENT)
Dept: LAB | Age: 68
Discharge: HOME | End: 2018-10-04
Payer: MEDICARE

## 2018-10-04 ENCOUNTER — OFFICE VISIT (OUTPATIENT)
Dept: RHEUMATOLOGY | Age: 68
End: 2018-10-04

## 2018-10-04 VITALS
HEART RATE: 80 BPM | BODY MASS INDEX: 34.91 KG/M2 | RESPIRATION RATE: 18 BRPM | TEMPERATURE: 98.4 F | DIASTOLIC BLOOD PRESSURE: 74 MMHG | SYSTOLIC BLOOD PRESSURE: 147 MMHG | WEIGHT: 197 LBS | HEIGHT: 63 IN

## 2018-10-04 DIAGNOSIS — M76.31 ILIOTIBIAL BAND SYNDROME OF BOTH SIDES: ICD-10-CM

## 2018-10-04 DIAGNOSIS — M89.9 DISORDER OF BONE: ICD-10-CM

## 2018-10-04 DIAGNOSIS — Z79.1 LONG TERM CURRENT USE OF NON-STEROIDAL ANTI-INFLAMMATORIES (NSAID): ICD-10-CM

## 2018-10-04 DIAGNOSIS — M06.9 RHEUMATOID ARTHRITIS WITH UNKNOWN RHEUMATOID FACTOR STATUS (HCC): Primary | ICD-10-CM

## 2018-10-04 DIAGNOSIS — M76.32 ILIOTIBIAL BAND SYNDROME OF BOTH SIDES: ICD-10-CM

## 2018-10-04 DIAGNOSIS — M35.3 PMR (POLYMYALGIA RHEUMATICA) (HCC): ICD-10-CM

## 2018-10-04 PROCEDURE — 85651 RBC SED RATE NONAUTOMATED: CPT

## 2018-10-04 PROCEDURE — 80053 COMPREHEN METABOLIC PANEL: CPT

## 2018-10-04 PROCEDURE — 85025 COMPLETE CBC W/AUTO DIFF WBC: CPT

## 2018-10-04 PROCEDURE — 82306 VITAMIN D 25 HYDROXY: CPT

## 2018-10-04 PROCEDURE — 84165 PROTEIN E-PHORESIS SERUM: CPT

## 2018-10-04 PROCEDURE — 86140 C-REACTIVE PROTEIN: CPT

## 2018-10-04 PROCEDURE — 84550 ASSAY OF BLOOD/URIC ACID: CPT

## 2018-10-04 PROCEDURE — 86480 TB TEST CELL IMMUN MEASURE: CPT

## 2018-10-04 PROCEDURE — 86334 IMMUNOFIX E-PHORESIS SERUM: CPT

## 2018-10-04 PROCEDURE — 86200 CCP ANTIBODY: CPT

## 2018-10-04 PROCEDURE — 86803 HEPATITIS C AB TEST: CPT

## 2018-10-04 PROCEDURE — 86431 RHEUMATOID FACTOR QUANT: CPT

## 2018-10-04 RX ORDER — LEVOTHYROXINE SODIUM 200 UG/1
TABLET ORAL
Qty: 90 TAB | Refills: 3 | Status: SHIPPED | OUTPATIENT
Start: 2018-10-04 | End: 2018-10-04 | Stop reason: SDUPTHER

## 2018-10-04 RX ORDER — PREDNISONE 5 MG/1
TABLET ORAL
Qty: 91 TAB | Refills: 0 | Status: SHIPPED | OUTPATIENT
Start: 2018-10-04 | End: 2018-11-03

## 2018-10-04 RX ORDER — IBUPROFEN 800 MG/1
800 TABLET ORAL 2 TIMES DAILY
COMMUNITY
End: 2019-01-29

## 2018-10-04 NOTE — PROGRESS NOTES
REASON FOR VISIT    This is the initial evaluation for Ms. Kimberly Jaimes a 75 y.o.  female for question of an inflammatory arthritis. The patient is referred to the [facility] at the request of her friend and my patient. HISTORY OF PRESENT ILLNESS      I have reviewed and summarized old records from [facility]    She complains of a long standing history of years of hand pain with difficulty using them, such as buttoning buttons. She also had pain in her groin when walking and pain in her shoulder when lifting. Today, she complains of pain in her hands mostly on the radial aspect, dull shoulder and outer hip pain that is chronic. 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 In the morning, she has pain a dull pain in her hands. Her pain improves with ibuprofen 800 mg and a bottle of wine night. There is swelling in her hands and stiffness lasting 15 minutes. She also feels that her shoulder and hip pain is worse in the afternoon but is present in the morning. She also has stiffness lasting around 15 to 30 minutes. Therapy History includes:    Current DMARD therapy includes: none  Prior DMARD therapy includes: none  The following DMARDs have been ineffective: none  The following DMARDs were stopped because of side effects: none    REVIEW OF SYSTEMS    A 15 point review of systems was performed and summarized below. The questionnaire was reviewed with the patient and scanned into the patient's medical record.     General: endorses 40 lbs recent weight gain over a year, fatigue, weakness, denies recent weight loss, fever, night sweats  Musculoskeletal: endorses joint pain, joint swelling, morning stiffness (lasting 15-30 minutes), muscle pain  Ears: endorses loss of hearing, denies ringing in ears, deafness  Eyes: endorses dryness, denies pain, redness, loss of vision, double vision, blurred vision, foreign body sensation  Mouth: denies sore tongue, oral ulcers, bleeding gums, loss of taste, dryness, increased dental caries  Nose: denies nosebleeds, loss of smell, nasal ulcers  Throat: denies frequent sore throats, hoarseness, difficulty in swallowing, pain in jaw while chewing  Neck: denies swollen glands, tender glands  Cardiopulmonary: denies pain in chest, irregular heart beat, sudden changes in heart beat, shortness of breath, difficulty breathing at night, dry cough, productive cough, coughing of blood, wheezing  Gastrointestinal: endorses persistent diarrhea, denies nausea, heartburn, stomach pain relieved by food, vomiting of blood/\"coffee grounds\", jaundice, increasing constipation, blood in stools, black stools  Genitourinary: endorses nocturia, frequent urination, denies difficult urination, pain or burning on urination, blood in urine, cloudy urine, pus in urine, genital discharge, vaginal dryness, rash/ulcers, sexual difficulties   Hematologic: denies anemia, bleeding tendency, blood clots  Skin: endorses easy bruising, hives, hair loss, denies sun sensitive, rash, redness, skin tightness, nodules/bumps,  color changes of hands or feet in the cold (Raynaud's)  Neurologic: endorses headaches, muscle weakness, memory loss, denies dizziness, numbness or tingling in hands/feet  Psychiatric: endorses depression, denies excessive worries, PTSD, Bipolar  Sleep: denies poor sleep (7 hours), snoring, apnea, daytime somnolence, difficulty falling asleep, difficulty staying asleep     PAST MEDICAL HISTORY    She has a past medical history of Depression; Goiter; Hearing loss; Hypertension; Hypothyroid; and IBS (irritable bowel syndrome). FAMILY HISTORY    Her family history includes Alcohol abuse in her brother; COPD in her mother; Cancer in her father and mother; Diabetes in her brother and brother; Heart Disease in her brother, brother, paternal grandmother, and paternal uncle; Liver Disease in her brother.     SOCIAL HISTORY    She reports that she has never smoked. She has never used smokeless tobacco. She reports that she drinks about 2.4 - 3.0 oz of alcohol per week  She reports that she does not use illicit drugs. GYNECOLOGIC HISTORY     2, Para 1, Living 1, Miscarriage 1 @ 8 weeks    She denies severe pre-eclampsia, eclampsia or placental insufficiency    HEALTH MAINTENANCE    Immunizations  Immunization History   Administered Date(s) Administered    Influenza Vaccine 10/10/2013, 10/14/2015    Influenza Vaccine Split 2012    Influenza Vaccine Whole 10/22/2011    Pneumococcal Polysaccharide (PPSV-23) 2015     MEDICATIONS    Current Outpatient Prescriptions   Medication Sig Dispense Refill    ibuprofen (MOTRIN) 800 mg tablet Take 800 mg by mouth two (2) times a day.  predniSONE (DELTASONE) 5 mg tablet 4 tabs daily for 7 days, 3 tabs for 7 days, 2 tabs for 14 days, 1 tab for 14 days 91 Tab 0    LORazepam (ATIVAN) 1 mg tablet TAKE 1 & 1/2 (ONE & ONE-HALF) TABLETS BY MOUTH ONCE DAILY AT NIGHT 45 Tab 0    citalopram (CELEXA) 40 mg tablet TAKE ONE TABLET BY MOUTH DAILY 90 Tab 3    levothyroxine (SYNTHROID) 200 mcg tablet TAKE ONE TABLET BY MOUTH ONCE DAILY BEFORE BREAKFAST 90 Tab 3       ALLERGIES    Allergies   Allergen Reactions    Keflex [Cephalexin] Rash    Monistat 1 [Tioconazole] Swelling    Sulfa (Sulfonamide Antibiotics) Rash       PHYSICAL EXAMINATION    Visit Vitals    /74    Pulse 80    Temp 98.4 °F (36.9 °C)    Resp 18    Ht 5' 3\" (1.6 m)    Wt 197 lb (89.4 kg)    BMI 34.9 kg/m2     Body mass index is 34.9 kg/(m^2). General: Patient is alert, oriented x 3, not in acute distress    HEENT:   Conjunctiva are not injected and appear moist, oral mucous membranes are moist, there are no ulcers present, there is no alopecia, neck is supple, there is no lymphadenopathy.  Salivary glands are normal    Cardiovascular:  Heart is regular rate and rhythm, no murmurs. Chest:  Lungs are clear to auscultation bilaterally. Extremities:  Free of clubbing, cyanosis, edema, extremities well perfused. Neurological exam:  No focal sensory deficits, muscle strength is full in upper and lower extremities. Skin exam:  There are no rashes, no tophi, no psoriasis, no active Raynaud's, no livedo reticularis, no periungual erythema. Musculoskeletal exam:  A comprehensive musculoskeletal exam was performed for all joints of each upper and lower extremity and assessed for swelling, tenderness and range of motion. Pertinent results are documented as below:    Bilateral Sebastián and [place] nodes. Bilateral trochanteric bursa tenderness. Bilateral iliotibial band syndrome.     Z-Deformities:   no  [place] Neck Deformities:  no  Boutonierre's Deformities:  no  Ulnar Deviation:   no  MCP Subluxation:  no    Joint Count 10/4/2018   Patient pain (0-100) 75   MHAQ 1   Left shoulder - Tender 1   Left shoulder - Swollen 0   Left 1st MCP - Tender 1   Left 1st MCP - Swollen 1   Left 2nd MCP - Tender 1   Left 2nd MCP - Swollen 1   Left 3rd MCP - Tender 1   Left 3rd MCP - Swollen 1   Left 5th MCP - Tender 1   Left 5th MCP - Swollen 1   Left 2nd PIP - Tender 1   Left 2nd PIP - Swollen 1   Left 3rd PIP - Tender 1   Left 3rd PIP - Swollen 1   Right shoulder - Tender 1   Right shoulder - Swollen 0   Right wrist- Tender 1   Right wrist- Swollen 1   Right 1st MCP - Tender 1   Right 1st MCP - Swollen 1   Right 2nd MCP - Tender 1   Right 2nd MCP - Swollen 1   Right thumb IP - Tender 1   Right thumb IP - Swollen 0   Right 2nd PIP - Tender 1   Right 2nd PIP - Swollen 1   Right 3rd PIP - Tender 1   Right 3rd PIP - Swollen 1   Right 4th PIP - Tender 1   Right 4th PIP - Swollen 1   Right 5th PIP - Tender 1   Right 5th PIP - Swollen 1   Tender Joint Count (Total) 16   Swollen Joint Count (Total) 13   Physician Assessment (0-10) 5   Patient Assessment (0-10) 7.5   CDAI Total (calculated) 41.5 DATA REVIEW    Prior medical records were reviewed and are summarized as below:    Laboratory data: summarized in the HPI    Imaging: summarized in the HPI. ASSESSMENT AND PLAN    1) Rheumatoid Arthritis with secondary Polymyalgia Rheumatica. She appears to have a long standing of inflammatory arthritis. She has been medicating herself with ibuprofen and wine. Her CDAI was 41.5 with 16 tender and 13 swollen joints, consistent with high disease activity. I ordered labs and radiographs today. I will start her on a short course of prednisone, called a prednisone taper, with 5 mg tablets. This regimen is to be taken as follows: 4 tabs (20 mg) for 7 days, 3 tabs (15 mg) for 7 days, 2 tabs (10 mg) for 14 days and then 1 tab (5 mg) for 14 days, and then stop. I asked her not to take NSAIDs while on prednisone. I will have her follow up in 2 weeks to discuss. 2) Polymyalgia Rheumatica. She has bilateral shoulder and pelvic girdle symptoms. See #1. 3) Bilateral iliotibial Band Syndrome. I will refer her to physical therapy after she improves from #1, 2. 4) Long Term Use of NSAIDs She is on ibuprofen chronically. The patient voiced understanding of the aforementioned assessment and plan. Summary of plan was provided in the After Visit Summary patient instructions. I also provided education about MyChart setup and utility.     TODAY'S ORDERS    Orders Placed This Encounter    QUANTIFERON-TB GOLD PLUS    XR FOOT LT MIN 3 V    XR FOOT RT MIN 3 V    XR HAND LT MIN 3 V    XR HAND RT MIN 3 V    XR SHOULDER LT AP/LAT MIN 2 V    XR SHOULDER RT AP/LAT MIN 2 V    CYCLIC CITRUL PEPTIDE AB, IGG    CBC WITH AUTOMATED DIFF    CHRONIC HEPATITIS PANEL    METABOLIC PANEL, COMPREHENSIVE    C REACTIVE PROTEIN, QT    SED RATE (ESR)    RHEUMATOID FACTOR, QL    PROTEIN ELECTROPHORESIS W/ REFLX ELIEL    URIC ACID    VITAMIN D, 25 HYDROXY    predniSONE (DELTASONE) 5 mg tablet     Future Appointments  Date Time Provider Department Center   10/15/2018 2:20 PM Ayo Nation MD [address] [facility] [place]   12/12/2018 10:40 AM Sarah Turner MD [address], [place], [address] [facility]    Adult Rheumatology   Rheumatology Ultrasound Certified  [facility]  A Part of [facility]  [address] [place] [address] Road   Phone [phone]  Fax [phone]

## 2018-10-04 NOTE — MR AVS SNAPSHOT
[address] [place] [address] Jhon G[facility] [address] 
[phone] Patient: Kermit Luke MRN: [id number] :1950 Visit Information Date & Time Provider Department Dept. Phone Encounter #  
 10/4/2018 11:00 AM Kalyani Marie, [facility] [phone] [id number] Follow-up Instructions Return in about 2 weeks (around 10/18/2018). Your Appointments 2018 10:40 AM  
COMPLETE 40 with Madonna Woody MD  
Formerly [facility]) Appt Note: cpe ldm 18  
 [place] [address] [facility] [id number] [place] [address] [address] [facility] [address] [phone] Upcoming Health Maintenance Date Due Hepatitis C Screening 1950 DTaP/Tdap/Td series (1 - Tdap) 1970 Shingrix Vaccine Age 50> (1 of 2) 1999 GLAUCOMA SCREENING Q2Y 2014 Pneumococcal 65+ Low/Medium Risk (2 of 2 - PCV13) 2016 BREAST CANCER SCRN MAMMOGRAM 2017 MEDICARE YEARLY EXAM 3/14/2018 Influenza Age 5 to Adult 2018 COLONOSCOPY 2019 Allergies as of 10/4/2018  Review Complete On: 10/4/2018 By: Kalyani Marie MD  
  
 Severity Noted Reaction Type Reactions Keflex [Cephalexin]  2013    Rash Monistat 1 [Tioconazole]  2014    Swelling Sulfa (Sulfonamide Antibiotics)  2011    Rash Current Immunizations  Reviewed on 2015 Name Date Influenza Vaccine 10/14/2015, 10/10/2013 Influenza Vaccine Split 2012 Influenza Vaccine Whole 10/22/2011 Pneumococcal Polysaccharide (PPSV-23) 2015 Not reviewed this visit You Were Diagnosed With   
  
 Codes Comments Rheumatoid arthritis with unknown rheumatoid factor status ([facility] [address]    -  Primary ICD-10-CM: M06.9 ICD-9-CM: 714.0  Iliotibial band syndrome of both sides     ICD-10-CM: M76.31, M76.32 
ICD-9-CM: 728.89   
 Disorder of bone     ICD-10-CM: M89.9 ICD-9-CM: 733.90 Vitals BP Pulse Temp Resp Height(growth percentile) Weight(growth percentile) 147/74 80 98.4 °F (36.9 °C) 18 5' 3\" (1.6 m) 197 lb (89.4 kg) BMI OB Status Smoking Status 34.9 kg/m2 Postmenopausal Never Smoker BMI and BSA Data Body Mass Index Body Surface Area 34.9 kg/m 2 1.99 m 2 Preferred Pharmacy Pharmacy Name Phone Job [address] [phone] Your Updated Medication List  
  
   
This list is accurate as of 10/4/18 11:31 AM.  Always use your most recent med list.  
  
  
  
  
 citalopram 40 mg tablet Commonly known as:  CELEXA  
TAKE ONE TABLET BY MOUTH DAILY  
  
 ibuprofen 800 mg tablet Commonly known as:  MOTRIN Take 800 mg by mouth two (2) times a day. levothyroxine 200 mcg tablet Commonly known as:  SYNTHROID  
TAKE ONE TABLET BY MOUTH ONCE DAILY BEFORE BREAKFAST LORazepam 1 mg tablet Commonly known as:  ATIVAN  
TAKE 1 & 1/2 (ONE & ONE-HALF) TABLETS BY MOUTH ONCE DAILY AT NIGHT  
  
 predniSONE 5 mg tablet Commonly known as:  DELTASONE  
4 tabs daily for 7 days, 3 tabs for 7 days, 2 tabs for 14 days, 1 tab for 14 days Prescriptions Sent to Pharmacy Refills  
 predniSONE (DELTASONE) 5 mg tablet 0 Si tabs daily for 7 days, 3 tabs for 7 days, 2 tabs for 14 days, 1 tab for 14 days Class: Normal  
 Pharmacy: [facility] DR YESSY BULLOCK [facility] [address], [address] [facility] Ph #: [phone] We Performed the Following C REACTIVE PROTEIN, QT [40765 CPT(R)] CBC WITH AUTOMATED DIFF [56343 CPT(R)] CHRONIC HEPATITIS PANEL [[id number] Custom] [address], IGG [id number] CPT(R)] METABOLIC PANEL, COMPREHENSIVE [69556 CPT(R)] PROTEIN ELECTROPHORESIS W/ REFLX ELIEL [[id number] Custom] QUANTIFERON-TB GOLD PLUS [id number] Custom] RHEUMATOID FACTOR, QL [id number] CPT(R)] SED RATE (ESR) [id number] CPT(R)] URIC ACID [id number] CPT(R)] VITAMIN D, 25 HYDROXY [id number] CPT(R)] Follow-up Instructions Return in about 2 weeks (around 10/18/2018). To-Do List   
 10/04/2018 Imaging:  XR FOOT LT MIN 3 V   
  
 10/04/2018 Imaging:  XR FOOT RT MIN 3 V   
  
 10/04/2018 Imaging:  XR HAND LT MIN 3 V   
  
 10/04/2018 Imaging:  XR HAND RT MIN 3 V Patient Instructions I will start you on a short course of prednisone, called a prednisone taper, with 5 mg tablets. This regimen is to be taken as follows:  
 
 - 4 tablets (20 mg), all at once, daily for 7 days - 3 tablets (15 mg), all at once, daily for 7 days - 2 tablets (10 mg), all at once, daily for 14 days - 1 tablet (5 mg), daily for 14 days - then STOP 
 
DO NOT take ibuprofen or naproxen while on prednisone Introducing [facility] SERVICES! [facility] introduces Lang-[employer] patient portal. Now you can access parts of your medical record, email your doctor's office, and request medication refills online. 1. In your internet browser, go to [email or web address] 2. Click on the First Time User? Click Here link in the Sign In box. You will see the New Member Sign Up page. 3. Enter your Lang-8 Access Code exactly as it appears below. You will not need to use this code after youve completed the sign-up process. If you do not sign up before the expiration date, you must request a new code. · Lang-8 Access Code: [email or web address] Expires: 12/30/2018 10:45 AM 
 
4. Enter the last four digits of your Social Security Number (xxxx) and Date of Birth (mm/dd/yyyy) as indicated and click Submit. You will be taken to the next sign-up page. 5. Create a Lang-8 ID. This will be your Lang-8 login ID and cannot be changed, so think of one that is secure and easy to remember. 6. Create a [facility] password. You can change your password at any time. 7. Enter your Password Reset Question and Answer. This can be used at a later time if you forget your password. 8. Enter your e-mail address. You will receive e-mail notification when new information is available in [address] 9. Click Sign Up. You can now view and download portions of your medical record. 10. Click the Download Summary menu link to download a portable copy of your medical information. If you have questions, please visit the Frequently Asked Questions section of the [facility] website. Remember, [facility] is NOT to be used for urgent needs. For medical emergencies, dial 911. Now available from your iPhone and Android! Please provide this summary of care documentation to your next provider. Your primary care clinician is listed as CASIMIRO DONAHUE. If you have any questions after today's visit, please call [phone].

## 2018-10-04 NOTE — PATIENT INSTRUCTIONS
I will start you on a short course of prednisone, called a prednisone taper, with 5 mg tablets.      This regimen is to be taken as follows:      - 4 tablets (20 mg), all at once, daily for 7 days   - 3 tablets (15 mg), all at once, daily for 7 days   - 2 tablets (10 mg), all at once, daily for 14 days   - 1 tablet (5 mg), daily for 14 days   - then STOP    DO NOT take ibuprofen or naproxen while on prednisone

## 2018-10-11 LAB
25(OH)D3+25(OH)D2 SERPL-MCNC: 21.7 NG/ML (ref 30–100)
ALBUMIN SERPL ELPH-MCNC: 3.8 G/DL (ref 2.9–4.4)
ALBUMIN SERPL-MCNC: 4.3 G/DL (ref 3.6–4.8)
ALBUMIN/GLOB SERPL: 1 {RATIO} (ref 0.7–1.7)
ALBUMIN/GLOB SERPL: 1.3 {RATIO} (ref 1.2–2.2)
ALP SERPL-CCNC: 118 IU/L (ref 39–117)
ALPHA1 GLOB SERPL ELPH-MCNC: 0.2 G/DL (ref 0–0.4)
ALPHA2 GLOB SERPL ELPH-MCNC: 0.6 G/DL (ref 0.4–1)
ALT SERPL-CCNC: 29 IU/L (ref 0–32)
AST SERPL-CCNC: 19 IU/L (ref 0–40)
B-GLOBULIN SERPL ELPH-MCNC: 1.4 G/DL (ref 0.7–1.3)
BASOPHILS # BLD AUTO: 0.1 X10E3/UL (ref 0–0.2)
BASOPHILS NFR BLD AUTO: 1 %
BILIRUB SERPL-MCNC: 0.3 MG/DL (ref 0–1.2)
BUN SERPL-MCNC: 18 MG/DL (ref 8–27)
BUN/CREAT SERPL: 22 (ref 12–28)
CALCIUM SERPL-MCNC: 9.4 MG/DL (ref 8.7–10.3)
CCP IGA+IGG SERPL IA-ACNC: 13 UNITS (ref 0–19)
CHLORIDE SERPL-SCNC: 103 MMOL/L (ref 96–106)
CO2 SERPL-SCNC: 23 MMOL/L (ref 20–29)
COMMENT, 144067: NORMAL
CREAT SERPL-MCNC: 0.83 MG/DL (ref 0.57–1)
CRP SERPL-MCNC: 1.8 MG/L (ref 0–4.9)
EOSINOPHIL # BLD AUTO: 0.1 X10E3/UL (ref 0–0.4)
EOSINOPHIL NFR BLD AUTO: 2 %
ERYTHROCYTE [DISTWIDTH] IN BLOOD BY AUTOMATED COUNT: 13.7 % (ref 12.3–15.4)
ERYTHROCYTE [SEDIMENTATION RATE] IN BLOOD BY WESTERGREN METHOD: 3 MM/HR (ref 0–40)
GAMMA GLOB SERPL ELPH-MCNC: 1.5 G/DL (ref 0.4–1.8)
GLOBULIN SER CALC-MCNC: 3.2 G/DL (ref 1.5–4.5)
GLOBULIN SER CALC-MCNC: 3.7 G/DL (ref 2.2–3.9)
GLUCOSE SERPL-MCNC: 91 MG/DL (ref 65–99)
HBV CORE AB SERPL QL IA: NEGATIVE
HBV CORE IGM SERPL QL IA: NEGATIVE
HBV E AB SERPL QL IA: NEGATIVE
HBV E AG SERPL QL IA: NEGATIVE
HBV SURFACE AB SER QL: REACTIVE
HBV SURFACE AG SERPL QL IA: NEGATIVE
HCT VFR BLD AUTO: 41.6 % (ref 34–46.6)
HCV AB S/CO SERPL IA: <0.1 S/CO RATIO (ref 0–0.9)
HGB BLD-MCNC: 14.1 G/DL (ref 11.1–15.9)
IGA SERPL-MCNC: 416 MG/DL (ref 87–352)
IGG SERPL-MCNC: 1561 MG/DL (ref 700–1600)
IGM SERPL-MCNC: 70 MG/DL (ref 26–217)
IMM GRANULOCYTES # BLD: 0.1 X10E3/UL (ref 0–0.1)
IMM GRANULOCYTES NFR BLD: 1 %
INTERPRETATION SERPL IEP-IMP: ABNORMAL
LYMPHOCYTES # BLD AUTO: 1.3 X10E3/UL (ref 0.7–3.1)
LYMPHOCYTES NFR BLD AUTO: 24 %
M PROTEIN SERPL ELPH-MCNC: 0.3 G/DL
MCH RBC QN AUTO: 30.7 PG (ref 26.6–33)
MCHC RBC AUTO-ENTMCNC: 33.9 G/DL (ref 31.5–35.7)
MCV RBC AUTO: 91 FL (ref 79–97)
MONOCYTES # BLD AUTO: 0.5 X10E3/UL (ref 0.1–0.9)
MONOCYTES NFR BLD AUTO: 10 %
NEUTROPHILS # BLD AUTO: 3.4 X10E3/UL (ref 1.4–7)
NEUTROPHILS NFR BLD AUTO: 62 %
PLATELET # BLD AUTO: 301 X10E3/UL (ref 150–379)
PLEASE NOTE, 011150: ABNORMAL
POTASSIUM SERPL-SCNC: 4.8 MMOL/L (ref 3.5–5.2)
PROT PATTERN SERPL ELPH-IMP: ABNORMAL
PROT SERPL-MCNC: 7.5 G/DL (ref 6–8.5)
RBC # BLD AUTO: 4.59 X10E6/UL (ref 3.77–5.28)
RHEUMATOID FACT SERPL-ACNC: <10 IU/ML (ref 0–13.9)
SODIUM SERPL-SCNC: 142 MMOL/L (ref 134–144)
URATE SERPL-MCNC: 6.9 MG/DL (ref 2.5–7.1)
WBC # BLD AUTO: 5.4 X10E3/UL (ref 3.4–10.8)

## 2018-10-14 LAB
GAMMA INTERFERON BACKGROUND BLD IA-ACNC: 0.17 IU/ML
M TB IFN-G BLD-IMP: NEGATIVE
M TB IFN-G CD4+ T-CELLS BLD-ACNC: 0.18 IU/ML
M TB IFN-G CD4+ T-CELLS BLD-ACNC: 0.33 IU/ML
MITOGEN IGNF BLD-ACNC: >10 IU/ML
QUANTIFERON INCUBATION, QF1T: NORMAL
SERVICE CMNT-IMP: NORMAL

## 2018-10-14 NOTE — PROGRESS NOTES
The results were reviewed and a letter was sent. 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 M-spike 0.3. All remaining labs are normal/negative.

## 2018-10-15 ENCOUNTER — OFFICE VISIT (OUTPATIENT)
Dept: RHEUMATOLOGY | Age: 68
End: 2018-10-15

## 2018-10-15 VITALS
TEMPERATURE: 98.4 F | SYSTOLIC BLOOD PRESSURE: 125 MMHG | RESPIRATION RATE: 18 BRPM | WEIGHT: 195 LBS | DIASTOLIC BLOOD PRESSURE: 70 MMHG | HEART RATE: 85 BPM | BODY MASS INDEX: 34.55 KG/M2 | HEIGHT: 63 IN

## 2018-10-15 DIAGNOSIS — M35.3 PMR (POLYMYALGIA RHEUMATICA) (HCC): ICD-10-CM

## 2018-10-15 DIAGNOSIS — E55.9 VITAMIN D DEFICIENCY: ICD-10-CM

## 2018-10-15 DIAGNOSIS — D47.2 MGUS (MONOCLONAL GAMMOPATHY OF UNKNOWN SIGNIFICANCE): ICD-10-CM

## 2018-10-15 DIAGNOSIS — M06.09 SERONEGATIVE RHEUMATOID ARTHRITIS OF MULTIPLE SITES (HCC): Primary | ICD-10-CM

## 2018-10-15 RX ORDER — METHOTREXATE 2.5 MG/1
15 TABLET ORAL
Qty: 72 TAB | Refills: 0 | Status: SHIPPED | OUTPATIENT
Start: 2018-10-16 | End: 2018-11-28 | Stop reason: SDUPTHER

## 2018-10-15 RX ORDER — ERGOCALCIFEROL 1.25 MG/1
50000 CAPSULE ORAL
Qty: 12 CAP | Refills: 3 | Status: SHIPPED | OUTPATIENT
Start: 2018-10-16 | End: 2020-02-18 | Stop reason: ALTCHOICE

## 2018-10-15 RX ORDER — FOLIC ACID 1 MG/1
1 TABLET ORAL DAILY
Qty: 90 TAB | Refills: 0 | Status: SHIPPED | OUTPATIENT
Start: 2018-10-15 | End: 2018-11-28 | Stop reason: SDUPTHER

## 2018-10-15 NOTE — MR AVS SNAPSHOT
[address] [facility] [place] [id number] [phone] Patient: Jennie Carter MRN: [id number] :1950 Visit Information Date & Time Provider Department Dept. Phone Encounter #  
 10/15/2018  2:20 PM Fior Starkey [facility] [phone] [id number] Follow-up Instructions Return in about 4 weeks (around 2018). Your Appointments 2018 10:40 AM  
COMPLETE 40 with Chad Camacho MD  
[facility] [address] [facility]) Appt Note: cpe ldm 18  
 [facility] [address] 66. [address]304 [address] 7 [address] Upcoming Health Maintenance Date Due DTaP/Tdap/Td series (1 - Tdap) 1970 Shingrix Vaccine Age 50> (1 of 2) 1999 GLAUCOMA SCREENING Q2Y 2014 Pneumococcal 65+ Low/Medium Risk (2 of 2 - PCV13) 2016 BREAST CANCER SCRN MAMMOGRAM 2017 MEDICARE YEARLY EXAM 3/14/2018 Influenza Age 5 to Adult 2018 COLONOSCOPY 2019 Allergies as of 10/15/2018  Review Complete On: 10/15/2018 By: Emelyn Malik RN Severity Noted Reaction Type Reactions Keflex [Cephalexin]  2013    Rash Monistat 1 [Tioconazole]  2014    Swelling Sulfa (Sulfonamide Antibiotics)  2011    Rash Current Immunizations  Reviewed on 2015 Name Date Influenza Vaccine 10/14/2015, 10/10/2013 Influenza Vaccine Split 2012 Influenza Vaccine Whole 10/22/2011 Pneumococcal Polysaccharide (PPSV-23) 2015 Not reviewed this visit You Were Diagnosed With   
  
 Codes Comments Seronegative rheumatoid arthritis of multiple sites [facility])    -  Primary ICD-10-CM: M06.09 
ICD-9-CM: 714.0 MGUS (monoclonal gammopathy of unknown significance)     ICD-10-CM: M84.9 ICD-9-CM: 273.1 Vitals BP Pulse Temp Resp Height(growth percentile) Weight(growth percentile) 125/70 85 98.4 °F (36.9 °C) 18 5' 3\" (1.6 m) 195 lb (88.5 kg) BMI OB Status Smoking Status 34.54 kg/m2 Postmenopausal Never Smoker BMI and BSA Data Body Mass Index Body Surface Area 34.54 kg/m 2 1.98 m 2 Preferred Pharmacy Pharmacy Name Phone [address] [place] [address] Rd. [phone] Your Updated Medication List  
  
   
This list is accurate as of 10/15/18  3:02 PM.  Always use your most recent med list.  
  
  
  
  
 citalopram 40 mg tablet Commonly known as:  CELEXA  
TAKE ONE TABLET BY MOUTH DAILY  
  
 ergocalciferol 50,000 unit capsule Commonly known as:  ERGOCALCIFEROL Take 1 Cap by mouth every Tuesday. Start taking on:  15/11/8810  
  
 folic acid 1 mg tablet Commonly known as:  Google Take 1 Tab by mouth daily. ibuprofen 800 mg tablet Commonly known as:  MOTRIN Take 800 mg by mouth two (2) times a day. levothyroxine 200 mcg tablet Commonly known as:  SYNTHROID  
TAKE ONE TABLET BY MOUTH ONCE DAILY BEFORE BREAKFAST LORazepam 1 mg tablet Commonly known as:  ATIVAN  
TAKE 1 & 1/2 (ONE & ONE-HALF) TABLETS BY MOUTH ONCE DAILY AT NIGHT  
  
 methotrexate 2.5 mg tablet Commonly known as:  Sharonda Peper Take 6 Tabs by mouth every Tuesday. Start taking on:  10/16/2018  
  
 predniSONE 5 mg tablet Commonly known as:  DELTASONE  
4 tabs daily for 7 days, 3 tabs for 7 days, 2 tabs for 14 days, 1 tab for 14 days Prescriptions Sent to Pharmacy Refills  
 methotrexate (RHEUMATREX) 2.5 mg tablet 0 Starting on: 10/16/2018 Sig: Take 6 Tabs by mouth every Tuesday. Class: Normal  
 Pharmacy: [address] [place] [address], [address] [facility] #: [phone] Route: Oral  
 folic acid (FOLVITE) 1 mg tablet 0 Sig: Take 1 Tab by mouth daily.   
 Class: Normal  
 Pharmacy: [facility] [address] [place] , [address] [place] [address] Ph #: [phone] Route: Oral  
 ergocalciferol (ERGOCALCIFEROL) 50,000 unit capsule 3 Starting on: 10/16/2018 Sig: Take 1 Cap by mouth every Tuesday. Class: Normal  
 Pharmacy: [facility] [address] [place] [address], [address] [place] [address] Ph #: [phone] Route: Oral  
  
We Performed the Following REFERRAL TO HEMATOLOGY [FUY93 Custom] Follow-up Instructions Return in about 4 weeks (around 11/12/2018). Referral Information Referral ID Referred By Referred To  
  
 [id number] Merla Pallas, MD   
   [address] [place] [address] Phone: [phone] Fax: [phone] Visits Status Start Date End Date 1 New Request 10/15/18 10/15/19 If your referral has a status of pending review or denied, additional information will be sent to support the outcome of this decision. Patient Instructions METHOTREXATE Methotrexate is a weekly regimen that is supplemented with daily folic acid to help counteract potential side effects. Potential Adverse Affects 
 
- Nausea - Vomiting - Upset stomach 
- Oral ulcers 
- Hair thinning - Infection - Liver function abnormalities - Blood count abnormalities - Rarely pneumonitis Medication Monitoring You will need routine blood counts and kidney and liver testing as a measure of monitoring for long term use of immunosuppressants. Things You Should Do You should avoid ill contacts You should get annual influenza vaccines and the pneumonia vaccines. You should apply SPF 50 sunscreen when out in the sun. You should avoid alcohol as it may hasten hepatotoxicity. You should avoid pregnancy due to risk for birth defects. You should contact me if you are sick. You should hold methotrexate if you are sick and resume after your sickness resolves. If you have any problems or side effects with this medication, please contact me immediately to inform me. Plan I prescribed methotrexate 15 mg (6 tablets) every TUESDAY at bedtime with DAILY folic acid 1 mg. If the folic acid 1 mg is not covered, then you may take two tablets of the over the counter Nature's Made folic acid 968 mcg (total of 800 mcg daily). Methotrexate may take 6 to 12 weeks to be effective. Introducing [facility]! [facility] introduces [employer] patient portal. Now you can access parts of your medical record, email your doctor's office, and request medication refills online. 1. In your internet browser, go to [email or web address] 2. Click on the First Time User? Click Here link in the Sign In box. You will see the New Member Sign Up page. 3. Enter your [employer] Access Code exactly as it appears below. You will not need to use this code after youve completed the sign-up process. If you do not sign up before the expiration date, you must request a new code. · [employer] Access Code: [email or web address] Expires: 12/30/2018 10:45 AM 
 
4. Enter the last four digits of your Social Security Number (xxxx) and Date of Birth (mm/dd/yyyy) as indicated and click Submit. You will be taken to the next sign-up page. 5. Create a [employer] ID. This will be your [employer] login ID and cannot be changed, so think of one that is secure and easy to remember. 6. Create a [employer] password. You can change your password at any time. 7. Enter your Password Reset Question and Answer. This can be used at a later time if you forget your password. 8. Enter your e-mail address. You will receive e-mail notification when new information is available in [address] 9. Click Sign Up. You can now view and download portions of your medical record. 10. Click the Download Summary menu link to download a portable copy of your medical information. If you have questions, please visit the Frequently Asked Questions section of the [employer] ([email or web address])t website. Remember, [employer] is NOT to be used for urgent needs. For medical emergencies, dial 911. Now available from your iPhone and Android! Please provide this summary of care documentation to your next provider. Your primary care clinician is listed as CASIMIRO DONAHUE. If you have any questions after today's visit, please call [phone].

## 2018-10-15 NOTE — PATIENT INSTRUCTIONS
METHOTREXATE Methotrexate is a weekly regimen that is supplemented with daily folic acid to help counteract potential side effects. Potential Adverse Affects 
 
- Nausea - Vomiting - Upset stomach 
- Oral ulcers 
- Hair thinning - Infection - Liver function abnormalities - Blood count abnormalities - Rarely pneumonitis Medication Monitoring You will need routine blood counts and kidney and liver testing as a measure of monitoring for long term use of immunosuppressants. Things You Should Do You should avoid ill contacts You should get annual influenza vaccines and the pneumonia vaccines. You should apply SPF 50 sunscreen when out in the sun. You should avoid alcohol as it may hasten hepatotoxicity. You should avoid pregnancy due to risk for birth defects. You should contact me if you are sick. You should hold methotrexate if you are sick and resume after your sickness resolves. If you have any problems or side effects with this medication, please contact me immediately to inform me. Plan I prescribed methotrexate 15 mg (6 tablets) every TUESDAY at bedtime with DAILY folic acid 1 mg. If the folic acid 1 mg is not covered, then you may take two tablets of the over the counter Nature's Made folic acid 300 mcg (total of 800 mcg daily). Methotrexate may take 6 to 12 weeks to be effective.

## 2018-10-15 NOTE — PROGRESS NOTES
REASON FOR VISIT This is a follow-up visit for Ms. Cortez for Seronegative Rheumatoid Arthritis. Inflammatory arthritis phenotype includes: Anti-CCP positive: no 
Rheumatoid factor positive: no 
Erosive disease: no 
Extra-articular manifestations include: MGUS Immunosuppression Screening (10/04/2018): Quantiferon TB: negative PPD:  Not performed Hepatitis B: negative Hepatitis C: negative Therapy History includes: 
Current DMARD therapy include: none Prior DMARD therapy include: none Discontinued DMARDs because of inefficacy: None Discontinued DMARDs because of side effects: None Immunization History Administered Date(s) Administered  Influenza Vaccine 10/10/2013, 10/14/2015  Influenza Vaccine Split 09/26/2012  Influenza Vaccine Whole 10/22/2011  Pneumococcal Polysaccharide (PPSV-23) 05/05/2015 Patient Active Problem List  
Diagnosis Code  Hypothyroidism E03.9  
 IBS (irritable bowel syndrome) K58.9  
 HTN (hypertension) I10  
 Graves disease E05.00  Moderate major depression (HCC) F32.1  PMR (polymyalgia rheumatica) (HCC) M35.3  Iliotibial band syndrome of both sides M76.31, M76.32  
 Long term current use of non-steroidal anti-inflammatories (NSAID) Z79.1 Lonnie Bernstein 
 
Ms. Ann Epps returns for a follow-up. On her last visit, I ordered labs and radiographs and prescribed a prednisone taper. I reviewed her labs with her which were essentially normal except for an MGUS. Today, she feels better by decreasing the pain and stiffness in her hands and shoulders from the prednisone. She has stiffness lasting 5 minutes. She is currently on 15 mg. She reports worsening pain in her hands with use after use. Ms. Ann Epps has continued her medications for arthritis and reports good tolerance without significant side effects.   
 
Last toxicity monitoring by blood work was done on 10/04/2018 and did not reveal any significant adverse effects, except . Most recent inflammatory markers from 10/04/2018 revealed a ESR 3 mm/hr (previously N/A mm/hr) and CRP 1.8 mg/L (previously N/A mg/L). The patient has not had any interval hospital admissions, infections, or surgeries. REVIEW OF SYSTEMS A comprehensive review of systems was performed and pertinent results are documented in the HPI, review of systems is otherwise non-contributory. PAST MEDICAL HISTORY She has a past medical history of Depression; Goiter; Hearing loss; Hypertension; Hypothyroid; and IBS (irritable bowel syndrome). FAMILY HISTORY Her family history includes Alcohol abuse in her brother; COPD in her mother; Cancer in her father and mother; Diabetes in her brother and brother; Heart Disease in her brother, brother, paternal grandmother, and paternal uncle; Liver Disease in her brother. SOCIAL HISTORY She reports that she has never smoked. She has never used smokeless tobacco. She reports that she drinks about 2.4 - 3.0 oz of alcohol per week  She reports that she does not use illicit drugs. MEDICATIONS Current Outpatient Prescriptions Medication Sig Dispense Refill  [START ON 10/16/2018] methotrexate (RHEUMATREX) 2.5 mg tablet Take 6 Tabs by mouth every Tuesday. 72 Tab 0  
 folic acid (FOLVITE) 1 mg tablet Take 1 Tab by mouth daily. 90 Tab 0  
 [START ON 10/16/2018] ergocalciferol (ERGOCALCIFEROL) 50,000 unit capsule Take 1 Cap by mouth every Tuesday. 12 Cap 3  predniSONE (DELTASONE) 5 mg tablet 4 tabs daily for 7 days, 3 tabs for 7 days, 2 tabs for 14 days, 1 tab for 14 days 91 Tab 0  
 LORazepam (ATIVAN) 1 mg tablet TAKE 1 & 1/2 (ONE & ONE-HALF) TABLETS BY MOUTH ONCE DAILY AT NIGHT 45 Tab 0  
 citalopram (CELEXA) 40 mg tablet TAKE ONE TABLET BY MOUTH DAILY 90 Tab 3  
 levothyroxine (SYNTHROID) 200 mcg tablet TAKE ONE TABLET BY MOUTH ONCE DAILY BEFORE BREAKFAST 90 Tab 3  
  ibuprofen (MOTRIN) 800 mg tablet Take 800 mg by mouth two (2) times a day. ALLERGIES Allergies Allergen Reactions  Keflex [Cephalexin] Rash  Monistat 1 [Tioconazole] Swelling  Sulfa (Sulfonamide Antibiotics) Rash PHYSICAL EXAMINATION Visit Vitals  /70  Pulse 85  Temp 98.4 °F (36.9 °C)  Resp 18  Ht 5' 3\" (1.6 m)  Wt 195 lb (88.5 kg)  BMI 34.54 kg/m2 Body mass index is 34.54 kg/(m^2). General: Patient is alert, oriented x 3, not in acute distress HEENT:  
Sclerae are not injected and appear moist. 
There is no alopecia. Neck is supple Chest: 
Breathing comfortably at room air. Extremities: 
Free of clubbing, cyanosis, edema Neurological exam: Muscle strength is full in upper and lower extremities Skin exam: 
There are no rashes, no alopecia, no discoid lesions, no active Raynaud's, no livedo reticularis, no periungual erythema. Musculoskeletal exam: A comprehensive musculoskeletal exam was NOT performed for all joints of each upper and lower extremity and assessed for swelling, tenderness and range of motion. Positive results are documented as below: 
 
Previous Exam 
 
Bilateral Sebastián and Heberden nodes. Bilateral trochanteric bursa tenderness. Bilateral iliotibial band syndrome. Z-Deformities:   no 
Felicity Neck Deformities:  no 
Boutonierre's Deformities:  no 
Ulnar Deviation:   no 
MCP Subluxation:  no 
  
Joint Count 10/4/2018 Patient pain (0-100) 75 MHAQ 1 Left shoulder - Tender 1 Left shoulder - Swollen 0 Left 1st MCP - Tender 1 Left 1st MCP - Swollen 1 Left 2nd MCP - Tender 1 Left 2nd MCP - Swollen 1 Left 3rd MCP - Tender 1 Left 3rd MCP - Swollen 1 Left 5th MCP - Tender 1 Left 5th MCP - Swollen 1 Left 2nd PIP - Tender 1 Left 2nd PIP - Swollen 1 Left 3rd PIP - Tender 1 Left 3rd PIP - Swollen 1 Right shoulder - Tender 1 Right shoulder - Swollen 0 Right wrist- Tender 1  
 Right wrist- Swollen 1 Right 1st MCP - Tender 1 Right 1st MCP - Swollen 1 Right 2nd MCP - Tender 1 Right 2nd MCP - Swollen 1 Right thumb IP - Tender 1 Right thumb IP - Swollen 0 Right 2nd PIP - Tender 1 Right 2nd PIP - Swollen 1 Right 3rd PIP - Tender 1 Right 3rd PIP - Swollen 1 Right 4th PIP - Tender 1 Right 4th PIP - Swollen 1 Right 5th PIP - Tender 1 Right 5th PIP - Swollen 1 Tender Joint Count (Total) 16 Swollen Joint Count (Total) 13 Physician Assessment (0-10) 5 Patient Assessment (0-10) 7.5 CDAI Total (calculated) 41.5 DATA REVIEW Laboratory Recent laboratory results were reviewed, summarized, and discussed with the patient. Imaging Musculoskeletal Ultrasound None Radiographs Bilateral Shoulder 10/04/2018: RIGHT: There is prominent AC joint degeneration with spurring and loss of joint space. There is some mild deformity, chronic in nature of the tuberosity. No fracture or dislocation, no bony erosion, the bone is normal in mineral contents. No soft tissue calcifications. LEFT: The bone is normal in mineral contents. There is some mild chronic deformity of the tuberosity and AC joint degeneration. There are no soft tissue calcification bony erosion fracture or dislocation. Bilateral Hand 10/04/2018: RIGHT: no acute fracture or dislocation. Alignment is anatomic. Mild degenerative changes are seen in the interphalangeal joint of the thumb. No erosions are seen. No abnormality seen in the soft tissues. LEFT: no acute fracture or dislocation. Alignment is anatomic. Moderate to severe degenerative changes are present in the first [facility] joint. A cystic lucency in the ace of the first metacarpal likely represents a subchondral cyst. No clear erosions are seen. No abnormality seen in the soft tissues. Bilateral Foot 10/04/2018: RIGHT:  no fracture or other acute osseous or articular abnormality. A small plantar calcaneal heel spur is noted.  No erosions or joint space narrowing are present. The soft tissues are within normal limits. LEFT: no acute fracture or dislocation. Alignment is anatomic. A small plantar calcaneal heel spur is noted. No erosions or joint space narrowing are present. . No abnormality is seen in the soft tissues.  
 
CT Imaging CT Head without contrast 9/13/2017: Prior right occipital craniectomy. Encephalomalacia in the right cerebellar region. . There is no significant white matter disease. There is no intracranial hemorrhage, extra-axial collection, mass, mass effect or midline shift. The basilar cisterns are open. No acute infarct is identified. The visualized portions of the paranasal sinuses and mastoid air cells are clear MR Imaging None DXA DXA 5/12/2015: (excluded distal radius) lumbar spine L1-L4 T score 0.4 (BMD 1.241 g/cm2), left femoral neck T score: 0.5 (1.109 g/cm2), left total hip T score: 1.0 (1.139 g/cm2), right femoral neck T score: 0.2 (1.062 g/cm2), right total hip T score: 1.1 (1.145 g/cm2). FRAX score N/A % probability in 10 years for major osteoporotic fracture and N/A % 10 year probability of hip fracture. ASSESSMENT AND PLAN This is a follow-up visit for Ms. Cortez. 1) Seronegative Rheumatoid Arthritis with secondary Polymyalgia Rheumatica. She appears to have a long standing of inflammatory arthritis. She has been medicating herself with ibuprofen and wine. Her CDAI was 41.5 with 16 tender and 13 swollen joints, consistent with high disease activity. We discussed initiation of DMARD therapy with methotrexate, which is first line therapy in Rheumatoid Arthritis. It is a weekly regimen that is supplemented with daily folic acid to help counteract potential side effects.  I discussed with the patient the potential adverse effects, which include: nausea, vomiting, dyspepsia, oral ulcers, hair thinning, infection, liver function abnormalities, blood count abnormalities, and rarely pneumonitis or melanoma. The patient understood these possible adverse effects. I informed the patient of the need for routine CBC and CMP as a measure for long term use of immunosuppressants. I also instructed the patient to avoid ill contacts and the needs for annual influenza vaccines and the pneumonia vaccines. I asked the patient to apply SPF 50 sunscreen when out in the sun. The patient was also instructed to avoid alcohol as it may hasten hepatotoxicity. In childbearing patients, pregnancy is contra-indicated while on methotrexate due to risk for birth defects and early termination. I prescribed methotrexate 15 mg every Tuesday with daily folic acid 1 mg. I informed the patient that methotrexate may take 6 to 12 weeks to be effective. Patient was informed to contact me if they develop any adverse reaction or infection. 2) Polymyalgia Rheumatica. She has bilateral shoulder and pelvic girdle symptoms. See #1. 3) Bilateral iliotibial Band Syndrome. This was not an active issue today. will refer her to physical therapy after she improves from #1, 2. 4) Long Term Use of NSAIDs She is holding ibuprofen for her back while on prednisone. 5) MGUS. Immunofixation shows IgA monoclonal protein with kappa light chain. M-spike 0.3. I referred her to hematology. 6) Vitamin D Deficiency. Her vitamin D level was 21.7. I prescribed weekly ergocalciferol 50,000. The patient voiced understanding of the aforementioned assessment and plan. Summary of plan was provided in the After Visit Summary patient instructions. TODAY'S ORDERS Orders Placed This Encounter  REFERRAL TO HEMATOLOGY  methotrexate (RHEUMATREX) 2.5 mg tablet  folic acid (FOLVITE) 1 mg tablet  ergocalciferol (ERGOCALCIFEROL) 50,000 unit capsule Future Appointments Date Time Provider Geoff Duggan 11/28/2018 3:40 PM Marybel New MD [id number] [facility]  
 12/12/2018 10:40 AM Sarah Banuelos MD [facility] Marky Ramos MD, [facility] Adult Rheumatology Rheumatology Ultrasound Certified [facility] [employer] [facility], [address] [facility] [address] [facility] [address] Phone [phone] Fax [phone]

## 2018-10-31 DIAGNOSIS — F51.01 PRIMARY INSOMNIA: ICD-10-CM

## 2018-10-31 RX ORDER — LORAZEPAM 1 MG/1
TABLET ORAL
Qty: 45 TAB | Refills: 1 | Status: SHIPPED | OUTPATIENT
Start: 2018-10-31 | End: 2019-02-10 | Stop reason: SDUPTHER

## 2018-10-31 NOTE — TELEPHONE ENCOUNTER
Pt is requesting a call back regarding Rx \"Lorazpam 1mg\".      Best [phone]       chrissy

## 2018-10-31 NOTE — TELEPHONE ENCOUNTER
Boyfriend passed away 3 months ago. Saw TPL on 10/2 and \"a mess\". Taking lorazepam 1.5mg qhs. Told TPL she had also been drinking. Seeing a counselor and working on her grief. Discussed how she cannot drink and talke the lorazepam.  She agrees to not drink.

## 2018-11-14 ENCOUNTER — HOSPITAL ENCOUNTER (OUTPATIENT)
Dept: MAMMOGRAPHY | Age: 68
Discharge: HOME | End: 2018-11-14
Attending: INTERNAL MEDICINE
Payer: MEDICARE

## 2018-11-14 DIAGNOSIS — Z12.31 VISIT FOR SCREENING MAMMOGRAM: ICD-10-CM

## 2018-11-14 PROCEDURE — 77067 SCR MAMMO BI INCL CAD: CPT

## 2018-11-21 ENCOUNTER — OFFICE VISIT (OUTPATIENT)
Dept: ONCOLOGY | Age: 68
End: 2018-11-21

## 2018-11-21 ENCOUNTER — HOSPITAL ENCOUNTER (OUTPATIENT)
Dept: LAB | Age: 68
Discharge: HOME | End: 2018-11-21
Payer: MEDICARE

## 2018-11-21 VITALS
OXYGEN SATURATION: 97 % | TEMPERATURE: 98.4 F | WEIGHT: 199.4 LBS | HEART RATE: 77 BPM | SYSTOLIC BLOOD PRESSURE: 120 MMHG | HEIGHT: 63 IN | DIASTOLIC BLOOD PRESSURE: 76 MMHG | RESPIRATION RATE: 20 BRPM | BODY MASS INDEX: 35.33 KG/M2

## 2018-11-21 DIAGNOSIS — E66.01 SEVERE OBESITY (HCC): ICD-10-CM

## 2018-11-21 DIAGNOSIS — M06.09 SERONEGATIVE RHEUMATOID ARTHRITIS OF MULTIPLE SITES (HCC): ICD-10-CM

## 2018-11-21 DIAGNOSIS — D47.2 MGUS (MONOCLONAL GAMMOPATHY OF UNKNOWN SIGNIFICANCE): Primary | ICD-10-CM

## 2018-11-21 PROCEDURE — 83615 LACTATE (LD) (LDH) ENZYME: CPT

## 2018-11-21 PROCEDURE — 36415 COLL VENOUS BLD VENIPUNCTURE: CPT

## 2018-11-21 PROCEDURE — 82232 ASSAY OF BETA-2 PROTEIN: CPT

## 2018-11-21 PROCEDURE — 84165 PROTEIN E-PHORESIS SERUM: CPT

## 2018-11-21 PROCEDURE — 85025 COMPLETE CBC W/AUTO DIFF WBC: CPT

## 2018-11-21 NOTE — PROGRESS NOTES
[facility] [place] [address] [address], [address]  W: [phone]  F: [phone]    Reason for Visit:   Delmar Jimenez is a 75 y.o. female who is seen in consultation at the request of Dr. Daren Granados for evaluation of Paraproteinemia    History of Present Illness:   Patient is a 75 y.o. with seronegative rheumatoid arthritis and secondary polymyalgia rheumatica seen for evaluation of an abnormal serum protein electrophoresis. On 10/10/18 she was noted to have an abnormal M spike of 0.3 g/dL with immunofixation  Showing a IgA monoclonal protein with kappa light chain   Specificity. No anemia or renal failure noted at the time. Changes now seen for further evaluation. She is in her usual state of health. She has ongoing joint pain from known arthritis. She has had some L breast pain - mammogram was incomplete and being repeated this week. She has had no fevers, chills, she has hot flashes. No lumps or bumps but has some axillary tenderness, no changes in weight or appetite. She has no diarrhea, bleeding, dysuria. No rashes. No HA or falls. Past Medical History:   Diagnosis Date    Depression     Goiter     Hearing loss     bilateral    Hypertension     Hypothyroid     IBS (irritable bowel syndrome)       Past Surgical History:   Procedure Laterality Date    HX CATARACT REMOVAL Bilateral     HX CHOLECYSTECTOMY  1997    NEUROLOGICAL PROCEDURE UNLISTED  7/97    brain lesion removed ? infectious      Social History     Tobacco Use    Smoking status: Never Smoker    Smokeless tobacco: Never Used   Substance Use Topics    Alcohol use:  Yes     Alcohol/week: 2.4 - 3.0 oz     Types: 4 Glasses of wine per week     Comment: 4 glasses of wine per night      Family History   Problem Relation Age of Onset    Cancer Mother         lung    COPD Mother     Cancer Father         lung    Heart Disease Brother         CABGx3    Diabetes Brother     Heart Disease Brother     Diabetes Brother     Liver Disease Brother         cirrhosis    Alcohol abuse Brother     Heart Disease Paternal Uncle         CAD    Heart Disease Paternal Grandmother         CAD     Current Outpatient Medications   Medication Sig    LORazepam (ATIVAN) 1 mg tablet TAKE 1 & 1/2 (ONE & ONE-HALF) TABLETS BY MOUTH ONCE DAILY AT NIGHT    methotrexate (RHEUMATREX) 2.5 mg tablet Take 6 Tabs by mouth every Tuesday.  folic acid (FOLVITE) 1 mg tablet Take 1 Tab by mouth daily.  ergocalciferol (ERGOCALCIFEROL) 50,000 unit capsule Take 1 Cap by mouth every Tuesday.  ibuprofen (MOTRIN) 800 mg tablet Take 800 mg by mouth two (2) times a day.  citalopram (CELEXA) 40 mg tablet TAKE ONE TABLET BY MOUTH DAILY    levothyroxine (SYNTHROID) 200 mcg tablet TAKE ONE TABLET BY MOUTH ONCE DAILY BEFORE BREAKFAST     No current facility-administered medications for this visit. Allergies   Allergen Reactions    Keflex [Cephalexin] Rash    Monistat 1 [Tioconazole] Swelling    Sulfa (Sulfonamide Antibiotics) Rash        Review of Systems: A complete review of systems was obtained, negative except as described above. Physical Exam:     Visit Vitals  /76 (BP 1 Location: Left arm, BP Patient Position: Sitting)   Pulse 77   Temp 98.4 °F (36.9 °C) (Oral)   Resp 20   Ht 5' 3\" (1.6 m)   Wt 199 lb 6.4 oz (90.4 kg)   SpO2 97%   BMI 35.32 kg/m²     ECOG PS: 1  General: No distress  Eyes: PERRLA, anicteric sclerae  HENT: Atraumatic, OP clear  Neck: Supple  Lymphatic: No cervical, supraclavicular, or inguinal adenopathy  Respiratory: CTAB, normal respiratory effort  Breast: No palpable lumps or adenopathy- tender L outer quadrant at 3 O clock  CV: Normal rate, regular rhythm, no murmurs, no peripheral edema  GI: Soft, nontender, nondistended, no masses, no hepatomegaly, no splenomegaly  MS: Normal gait and station. Digits without clubbing or cyanosis. Skin: No rashes, ecchymoses, or petechiae. Normal temperature, turgor, and texture. Psych: Alert, oriented, appropriate affect, normal judgment/insight    Results:     Lab Results   Component Value Date/Time    WBC 5.4 10/04/2018 11:45 AM    HGB 14.1 10/04/2018 11:45 AM    HCT 41.6 10/04/2018 11:45 AM    PLATELET 464 54/12/8860 11:45 AM    MCV 91 10/04/2018 11:45 AM    ABS. NEUTROPHILS 3.4 10/04/2018 11:45 AM     Lab Results   Component Value Date/Time    Sodium 142 10/04/2018 11:45 AM    Potassium 4.8 10/04/2018 11:45 AM    Chloride 103 10/04/2018 11:45 AM    CO2 23 10/04/2018 11:45 AM    Glucose 91 10/04/2018 11:45 AM    BUN 18 10/04/2018 11:45 AM    Creatinine 0.83 10/04/2018 11:45 AM    GFR est AA 84 10/04/2018 11:45 AM    GFR est non-AA 73 10/04/2018 11:45 AM    Calcium 9.4 10/04/2018 11:45 AM     Lab Results   Component Value Date/Time    Bilirubin, total 0.3 10/04/2018 11:45 AM    ALT (SGPT) 29 10/04/2018 11:45 AM    AST (SGOT) 19 10/04/2018 11:45 AM    Alk. phosphatase 118 (H) 10/04/2018 11:45 AM    Protein, total 7.5 10/04/2018 11:45 AM    Albumin 4.3 10/04/2018 11:45 AM    Globulin 3.7 12/23/2016 01:31 PM     Lab Results   Component Value Date/Time    Sed rate (ESR) 3 10/04/2018 11:45 AM    C-Reactive Protein, Qt 1.8 10/04/2018 11:45 AM    TSH 0.860 05/05/2015 12:39 PM    M-Jere 0.3 (H) 10/04/2018 11:45 AM    Lipase 204 12/23/2016 01:31 PM     Lab Results   Component Value Date/Time    D-dimer 0.21 12/23/2016 01:31 PM       Records reviewed and summarized above. Pathology report(s) reviewed above. Radiology report(s) reviewed above. Assessment:   1) Paraproteinemia    Small IgA M spike  No end organ damage  Discussed the spectrum of plasma cell disorders and the rare possibility of Lymphomas    Will proceed with further work up    2) Rheumatoid arthritis    Per Dr. Carmen Henry    3) L breast pain  Repeat mammogram this week.  Breast exam unremarkable    4) Depression  Per Dr. Bc River:     · Cbc diff, cmp, LD, Beta 2 microglobulin, Skeletal survey, CT Bone marrow bx  · Mammogram    RTC 4 weeks    I appreciate the opportunity to participate in Ms. Tomás Cortez's care.     Signed By: Zaid Haywood MD

## 2018-11-21 NOTE — PROGRESS NOTES
Eyad Lopez is a 75 y.o. female here today as a new patient. Patient reports discomfort under left arm, 3/10 at todays visit.

## 2018-11-23 ENCOUNTER — HOSPITAL ENCOUNTER (OUTPATIENT)
Dept: MAMMOGRAPHY | Age: 68
Discharge: HOME | End: 2018-11-23
Attending: INTERNAL MEDICINE
Payer: MEDICARE

## 2018-11-23 DIAGNOSIS — R92.8 ABNORMAL MAMMOGRAM OF LEFT BREAST: ICD-10-CM

## 2018-11-23 PROCEDURE — 76642 ULTRASOUND BREAST LIMITED: CPT

## 2018-11-23 PROCEDURE — 77061 BREAST TOMOSYNTHESIS UNI: CPT

## 2018-11-23 NOTE — PROGRESS NOTES
L/m for Dr. Nae Khanna nurse about left biopsy recommendation. They will call Monday 11/26 when they return to the office. Pt. Scheduled an appt before she left.

## 2018-11-26 LAB
ALBUMIN SERPL ELPH-MCNC: 3.8 G/DL (ref 2.9–4.4)
ALBUMIN/GLOB SERPL: 1.2 {RATIO} (ref 0.7–1.7)
ALPHA1 GLOB SERPL ELPH-MCNC: 0.2 G/DL (ref 0–0.4)
ALPHA2 GLOB SERPL ELPH-MCNC: 0.6 G/DL (ref 0.4–1)
B-GLOBULIN SERPL ELPH-MCNC: 1.4 G/DL (ref 0.7–1.3)
B2 MICROGLOB SERPL-MCNC: 1.8 MG/L (ref 0.6–2.4)
BASOPHILS # BLD AUTO: 0 X10E3/UL (ref 0–0.2)
BASOPHILS NFR BLD AUTO: 1 %
EOSINOPHIL # BLD AUTO: 0.1 X10E3/UL (ref 0–0.4)
EOSINOPHIL NFR BLD AUTO: 2 %
ERYTHROCYTE [DISTWIDTH] IN BLOOD BY AUTOMATED COUNT: 13.9 % (ref 12.3–15.4)
GAMMA GLOB SERPL ELPH-MCNC: 1.3 G/DL (ref 0.4–1.8)
GLOBULIN SER-MCNC: 3.4 G/DL (ref 2.2–3.9)
HCT VFR BLD AUTO: 39.7 % (ref 34–46.6)
HGB BLD-MCNC: 13 G/DL (ref 11.1–15.9)
IGA SERPL-MCNC: 423 MG/DL (ref 87–352)
IGG SERPL-MCNC: 1409 MG/DL (ref 700–1600)
IGM SERPL-MCNC: 66 MG/DL (ref 26–217)
IMM GRANULOCYTES # BLD: 0 X10E3/UL (ref 0–0.1)
IMM GRANULOCYTES NFR BLD: 0 %
INTERPRETATION SERPL IEP-IMP: ABNORMAL
KAPPA LC FREE SER-MCNC: 18.5 MG/L (ref 3.3–19.4)
KAPPA LC FREE/LAMBDA FREE SER: 1.8 {RATIO} (ref 0.26–1.65)
LAMBDA LC FREE SERPL-MCNC: 10.3 MG/L (ref 5.7–26.3)
LDH SERPL-CCNC: 184 IU/L (ref 119–226)
LYMPHOCYTES # BLD AUTO: 1.6 X10E3/UL (ref 0.7–3.1)
LYMPHOCYTES NFR BLD AUTO: 29 %
M PROTEIN SERPL ELPH-MCNC: 0.3 G/DL
MCH RBC QN AUTO: 30.2 PG (ref 26.6–33)
MCHC RBC AUTO-ENTMCNC: 32.7 G/DL (ref 31.5–35.7)
MCV RBC AUTO: 92 FL (ref 79–97)
MONOCYTES # BLD AUTO: 0.4 X10E3/UL (ref 0.1–0.9)
MONOCYTES NFR BLD AUTO: 7 %
NEUTROPHILS # BLD AUTO: 3.3 X10E3/UL (ref 1.4–7)
NEUTROPHILS NFR BLD AUTO: 61 %
PLATELET # BLD AUTO: 325 X10E3/UL (ref 150–379)
PLEASE NOTE:, 149534: ABNORMAL
PROT SERPL-MCNC: 7.2 G/DL (ref 6–8.5)
RBC # BLD AUTO: 4.3 X10E6/UL (ref 3.77–5.28)
WBC # BLD AUTO: 5.4 X10E3/UL (ref 3.4–10.8)

## 2018-11-28 ENCOUNTER — OFFICE VISIT (OUTPATIENT)
Dept: RHEUMATOLOGY | Age: 68
End: 2018-11-28

## 2018-11-28 ENCOUNTER — HOSPITAL ENCOUNTER (OUTPATIENT)
Dept: GENERAL RADIOLOGY | Age: 68
Discharge: HOME | End: 2018-11-28
Attending: INTERNAL MEDICINE
Payer: MEDICARE

## 2018-11-28 VITALS
RESPIRATION RATE: 18 BRPM | HEART RATE: 62 BPM | WEIGHT: 195 LBS | HEIGHT: 63 IN | DIASTOLIC BLOOD PRESSURE: 79 MMHG | BODY MASS INDEX: 34.55 KG/M2 | SYSTOLIC BLOOD PRESSURE: 127 MMHG | TEMPERATURE: 98.1 F

## 2018-11-28 DIAGNOSIS — M35.3 PMR (POLYMYALGIA RHEUMATICA) (HCC): ICD-10-CM

## 2018-11-28 DIAGNOSIS — D47.2 MGUS (MONOCLONAL GAMMOPATHY OF UNKNOWN SIGNIFICANCE): ICD-10-CM

## 2018-11-28 DIAGNOSIS — E55.9 VITAMIN D DEFICIENCY: ICD-10-CM

## 2018-11-28 DIAGNOSIS — Z79.60 LONG-TERM USE OF IMMUNOSUPPRESSANT MEDICATION: ICD-10-CM

## 2018-11-28 DIAGNOSIS — M06.09 SERONEGATIVE RHEUMATOID ARTHRITIS OF MULTIPLE SITES (HCC): Primary | ICD-10-CM

## 2018-11-28 DIAGNOSIS — D47.2 MONOCLONAL GAMMOPATHY OF UNKNOWN SIGNIFICANCE (MGUS): ICD-10-CM

## 2018-11-28 PROCEDURE — 77075 RADEX OSSEOUS SURVEY COMPL: CPT

## 2018-11-28 RX ORDER — FOLIC ACID 1 MG/1
1 TABLET ORAL DAILY
Qty: 90 TAB | Refills: 0 | Status: SHIPPED | OUTPATIENT
Start: 2018-11-28 | End: 2019-05-01 | Stop reason: SDUPTHER

## 2018-11-28 RX ORDER — METHOTREXATE 2.5 MG/1
20 TABLET ORAL
Qty: 96 TAB | Refills: 0 | Status: SHIPPED | OUTPATIENT
Start: 2018-11-28 | End: 2019-03-17 | Stop reason: SDUPTHER

## 2018-11-28 NOTE — PROGRESS NOTES
REASON FOR VISIT This is a follow-up visit for Ms. Cortez for Seronegative Rheumatoid Arthritis. Inflammatory arthritis phenotype includes: Anti-CCP positive: no 
Rheumatoid factor positive: no 
Erosive disease: no 
Extra-articular manifestations include: MGUS Immunosuppression Screening (10/04/2018): Quantiferon TB: negative PPD:  Not performed Hepatitis B: negative Hepatitis C: negative Therapy History includes: 
Current DMARD therapy include: methotrexate 15 mg every Tuesday (10/15/2018 to present) Prior DMARD therapy include: none Discontinued DMARDs because of inefficacy: None Discontinued DMARDs because of side effects: None Immunization History Administered Date(s) Administered  Influenza Vaccine 10/10/2013, 10/14/2015  Influenza Vaccine Split 09/26/2012  Influenza Vaccine Whole 10/22/2011  Pneumococcal Polysaccharide (PPSV-23) 05/05/2015 Patient Active Problem List  
Diagnosis Code  Hypothyroidism E03.9  
 IBS (irritable bowel syndrome) K58.9  
 HTN (hypertension) I10  
 Graves disease E05.00  Moderate major depression ([facility]) F32.1  PMR (polymyalgia rheumatica) ([facility]) M35.3  Iliotibial band syndrome of both sides M76.31, M76.32  
 Long term current use of non-steroidal anti-inflammatories (NSAID) Z79.1  Seronegative rheumatoid arthritis of multiple sites ([facility]) M06.09  
 Monoclonal gammopathy of unknown significance (MGUS) D47.2  Vitamin D deficiency E55.9  Severe obesity ([facility]) E66.01  
 Long-term use of immunosuppressant medication Z79.899 [facility] Session 
 
Ms. Leobardo Galindo returns for a follow-up. On her last visit, I prescribed methotrexate 15 mg every Tuesday with daily folic acid 1 mg, with good tolerance. I also started ergocalciferol. I referred her to hematology, Dr. Jessica Rivrea, who ordered a work up. Bone Scan showed Few small calvarial lucencies.  No fractures or lytic lesions at risk for fracture. Bone marrow biopsy scheduled. Her dog  today. Her brother is in ICU on MV. Today, she feels better on methotrexate. She has pain in her hands after using them but not in the morning. She has morningstiffness lasting minutes. She denies swelling. She has no pain in her shoulders but has morning stiffness lasting minutes. Her lower back pain continues to give her trouble. Ms. Wilma Mckeon has continued her medications for arthritis and reports good tolerance without significant side effects. Last toxicity monitoring by blood work was done on 10/04/2018 and did not reveal any significant adverse effects, except . Most recent inflammatory markers from 10/04/2018 revealed a ESR 3 mm/hr (previously N/A mm/hr) and CRP 1.8 mg/L (previously N/A mg/L). The patient has not had any interval hospital admissions, infections, or surgeries. REVIEW OF SYSTEMS A comprehensive review of systems was performed and pertinent results are documented in the HPI, review of systems is otherwise non-contributory. PAST MEDICAL HISTORY She has a past medical history of Depression, Goiter, Hearing loss, Hypertension, Hypothyroid, and IBS (irritable bowel syndrome). FAMILY HISTORY Her family history includes Alcohol abuse in her brother; COPD in her mother; Cancer in her father and mother; Diabetes in her brother and brother; Heart Disease in her brother, brother, paternal grandmother, and paternal uncle; Liver Disease in her brother. SOCIAL HISTORY She reports that  has never smoked. she has never used smokeless tobacco. She reports that she drinks about 2.4 - 3.0 oz of alcohol per week. She reports that she does not use drugs. MEDICATIONS Current Outpatient Medications Medication Sig Dispense Refill  folic acid (FOLVITE) 1 mg tablet Take 1 Tab by mouth daily. 90 Tab 0  
 methotrexate (RHEUMATREX) 2.5 mg tablet Take 8 Tabs by mouth every Wednesday.  96 Tab 0  
  LORazepam (ATIVAN) 1 mg tablet TAKE 1 & 1/2 (ONE & ONE-HALF) TABLETS BY MOUTH ONCE DAILY AT NIGHT 45 Tab 1  
 ergocalciferol (ERGOCALCIFEROL) 50,000 unit capsule Take 1 Cap by mouth every Tuesday. 12 Cap 3  ibuprofen (MOTRIN) 800 mg tablet Take 800 mg by mouth two (2) times a day.  citalopram (CELEXA) 40 mg tablet TAKE ONE TABLET BY MOUTH DAILY 90 Tab 3  
 levothyroxine (SYNTHROID) 200 mcg tablet TAKE ONE TABLET BY MOUTH ONCE DAILY BEFORE BREAKFAST 90 Tab 3 ALLERGIES Allergies Allergen Reactions  Keflex [Cephalexin] Rash  Monistat 1 [Tioconazole] Swelling  Sulfa (Sulfonamide Antibiotics) Rash PHYSICAL EXAMINATION Visit Vitals /79 Pulse 62 Temp 98.1 °F (36.7 °C) Resp 18 Ht 5' 3\" (1.6 m) Wt 195 lb (88.5 kg) BMI 34.54 kg/m² Body mass index is 34.54 kg/m². General: Patient is alert, oriented x 3, not in acute distress HEENT:  
Sclerae are not injected and appear moist. 
There is no alopecia. Neck is supple Cardiovascular: 
Heart is regular rate and rhythm, no murmurs. Chest: 
Lungs are clear to auscultation bilaterally. Extremities: 
Free of clubbing, cyanosis, edema Neurological exam: Muscle strength is full in upper and lower extremities Skin exam: 
There are no rashes, no alopecia, no discoid lesions, no active Raynaud's, no livedo reticularis, no periungual erythema. Musculoskeletal exam: A comprehensive musculoskeletal exam was performed for all joints of each upper and lower extremity and assessed for swelling, tenderness and range of motion. Positive results are documented as below: 
 
Bilateral Sebastián and Heberden nodes. Bilateral trochanteric bursa tenderness. Bilateral iliotibial band syndrome. Z-Deformities:   no 
Kimberly Neck Deformities:  no 
Boutonierre's Deformities:  no 
Ulnar Deviation:   no 
MCP Subluxation:  no 
  
Joint Count 11/28/2018 10/4/2018 Patient pain (0-100) 70 75 MHAQ 0 1  
 Left shoulder - Tender 1 1 Left shoulder - Swollen 1 0 Left wrist- Tender 1 - Left wrist- Swollen 1 - Left 1st MCP - Tender 1 1 Left 1st MCP - Swollen 1 1 Left 2nd MCP - Tender 1 1 Left 2nd MCP - Swollen 1 1 Left 3rd MCP - Tender - 1 Left 3rd MCP - Swollen - 1 Left 5th MCP - Tender - 1 Left 5th MCP - Swollen - 1 Left 2nd PIP - Tender 1 1 Left 2nd PIP - Swollen 1 1 Left 3rd PIP - Tender 1 1 Left 3rd PIP - Swollen 1 1 Right shoulder - Tender 1 1 Right shoulder - Swollen 0 0 Right wrist- Tender - 1 Right wrist- Swollen - 1 Right 1st MCP - Tender 1 1 Right 1st MCP - Swollen 1 1 Right 2nd MCP - Tender 1 1 Right 2nd MCP - Swollen 1 1 Right thumb IP - Tender 1 1 Right thumb IP - Swollen 1 0 Right 2nd PIP - Tender 1 1 Right 2nd PIP - Swollen 1 1 Right 3rd PIP - Tender - 1 Right 3rd PIP - Swollen - 1 Right 4th PIP - Tender - 1 Right 4th PIP - Swollen - 1 Right 5th PIP - Tender - 1 Right 5th PIP - Swollen - 1 Tender Joint Count (Total) 11 16 Swollen Joint Count (Total) 10 13 Physician Assessment (0-10) - 5 Patient Assessment (0-10) 5 7.5 CDAI Total (calculated) - 41.5 DATA REVIEW Laboratory Recent laboratory results were reviewed, summarized, and discussed with the patient. Imaging Musculoskeletal Ultrasound None Radiographs Skeletal Survey 11/28/2018: Few small calvarial lucencies. No fractures or lytic lesions at risk for fracture. Bilateral Shoulder 10/04/2018: RIGHT: There is prominent AC joint degeneration with spurring and loss of joint space. There is some mild deformity, chronic in nature of the tuberosity. No fracture or dislocation, no bony erosion, the bone is normal in mineral contents. No soft tissue calcifications. LEFT: The bone is normal in mineral contents. There is some mild chronic deformity of the tuberosity and AC joint degeneration.  There are no soft tissue calcification bony erosion fracture or dislocation. Bilateral Hand 10/04/2018: RIGHT: no acute fracture or dislocation. Alignment is anatomic. Mild degenerative changes are seen in the interphalangeal joint of the thumb. No erosions are seen. No abnormality seen in the soft tissues. LEFT: no acute fracture or dislocation. Alignment is anatomic. Moderate to severe degenerative changes are present in the first Aia 16 joint. A cystic lucency in the ace of the first metacarpal likely represents a subchondral cyst. No clear erosions are seen. No abnormality seen in the soft tissues. Bilateral Foot 10/04/2018: RIGHT:  no fracture or other acute osseous or articular abnormality. A small plantar calcaneal heel spur is noted. No erosions or joint space narrowing are present. The soft tissues are within normal limits. LEFT: no acute fracture or dislocation. Alignment is anatomic. A small plantar calcaneal heel spur is noted. No erosions or joint space narrowing are present. . No abnormality is seen in the soft tissues.  
 
CT Imaging CT Head without contrast 9/13/2017: Prior right occipital craniectomy. Encephalomalacia in the right cerebellar region. . There is no significant white matter disease. There is no intracranial hemorrhage, extra-axial collection, mass, mass effect or midline shift. The basilar cisterns are open. No acute infarct is identified. The visualized portions of the paranasal sinuses and mastoid air cells are clear MR Imaging None DXA DXA 5/12/2015: (excluded distal radius) lumbar spine L1-L4 T score 0.4 (BMD 1.241 g/cm2), left femoral neck T score: 0.5 (1.109 g/cm2), left total hip T score: 1.0 (1.139 g/cm2), right femoral neck T score: 0.2 (1.062 g/cm2), right total hip T score: 1.1 (1.145 g/cm2). FRAX score N/A % probability in 10 years for major osteoporotic fracture and N/A % 10 year probability of hip fracture. ASSESSMENT AND PLAN This is a follow-up visit for MsСветлана Dana. 1) Seronegative Rheumatoid Arthritis with secondary Polymyalgia Rheumatica. She appears to have a long standing of inflammatory arthritis. She has been medicating herself with ibuprofen and wine. She had improvement with prednisone and has been tolerating methotrexate 15 mg every Wednesday for the past 4 weeks with some improvement. Her CDAI was 30 (previously 41.5) with 11 tender and 10 swollen joints, consistent with high disease activity, with improvement. I asked her to increase her methotrexate incrementally over the next two doses to 20 mg weekly. Labs today. 2) Polymyalgia Rheumatica. She has bilateral shoulder and pelvic girdle symptoms. See #1. 3) Long Term Use of Immunosuppressants. The patient remains on immunomodulatory medications (methotrexate) and requires frequent toxicity monitoring by blood work. Respective labs were ordered (CBC and CMP). 4) Long Term Use of NSAIDs She is holding ibuprofen for her back while on prednisone. 5) MGUS. Immunofixation shows IgA monoclonal protein with kappa light chain. M-spike 0.3. She was evaluated by Dr. Marianna Daugherty who ordered a skeletal surgery and a bone marrow biopsy 6) Vitamin D Deficiency. Her vitamin D level was 21.7. I prescribed weekly ergocalciferol 50,000. 
 
7) Bilateral iliotibial Band Syndrome. This was not an active issue today. I will refer her to physical therapy after she improves from #1, 2. The patient voiced understanding of the aforementioned assessment and plan. Summary of plan was provided in the After Visit Summary patient instructions. TODAY'S ORDERS Orders Placed This Encounter  METABOLIC PANEL, COMPREHENSIVE  C REACTIVE PROTEIN, QT  
 SED RATE (ESR)  folic acid (FOLVITE) 1 mg tablet  methotrexate (RHEUMATREX) 2.5 mg tablet Future Appointments Date Time Provider Geoff Duggan 12/3/2018  9:00 AM [address] [place] [address] 1 [facility]  
12/5/2018 11:00 AM [address] [place] [address] [address]IN [address] [facility]  
 12/12/2018 10:40 AM MD SWETA SwiftA ODALIS SCHED  
1/10/2019 10:30 AM Acacia Graham MD [facility] [address]  
1/28/2019  8:20 AM Deborah Valle MD [address] [facility] Alfonso Green MD, [facility] Rhe[facility]ology Rheumatology Ultrasound Certified [facility], [address] [place] [address] Road Phone [phone] Fax [phone]

## 2018-11-28 NOTE — PATIENT INSTRUCTIONS
PLEASE INCREASE YOUR METHOTREXATE 1) THIS WEDNESDAY TO 7 tablets (17.5 mg) 2) AND THEN NEXT WEDNESDAY 8 tablets (20 mg) 3) AND THEN CONTINUE 8 TABLETS EVERY WEDNESDAY CONTINUE with daily Folic Acid 1 mg CONTACT ME IF YOU HAVE ANY SIDE EFFECTS OR HAVE AN INFECTION 
 
LABS TODAY FOLLOW UP IN 2 MONTHS

## 2018-11-29 LAB
ALBUMIN 24H MFR UR ELPH: 100 %
ALBUMIN SERPL-MCNC: 4.6 G/DL (ref 3.6–4.8)
ALBUMIN/GLOB SERPL: 1.6 {RATIO} (ref 1.2–2.2)
ALP SERPL-CCNC: 96 IU/L (ref 39–117)
ALPHA1 GLOB 24H MFR UR ELPH: 0 %
ALPHA2 GLOB 24H MFR UR ELPH: 0 %
ALT SERPL-CCNC: 16 IU/L (ref 0–32)
AST SERPL-CCNC: 17 IU/L (ref 0–40)
B-GLOBULIN MFR UR ELPH: 0 %
BILIRUB SERPL-MCNC: 0.3 MG/DL (ref 0–1.2)
BUN SERPL-MCNC: 22 MG/DL (ref 8–27)
BUN/CREAT SERPL: 27 (ref 12–28)
CALCIUM SERPL-MCNC: 9.9 MG/DL (ref 8.7–10.3)
CHLORIDE SERPL-SCNC: 105 MMOL/L (ref 96–106)
CO2 SERPL-SCNC: 26 MMOL/L (ref 20–29)
CREAT SERPL-MCNC: 0.83 MG/DL (ref 0.57–1)
CRP SERPL-MCNC: 2.7 MG/L (ref 0–4.9)
ERYTHROCYTE [SEDIMENTATION RATE] IN BLOOD BY WESTERGREN METHOD: 4 MM/HR (ref 0–40)
GAMMA GLOB 24H MFR UR ELPH: 0 %
GLOBULIN SER CALC-MCNC: 2.9 G/DL (ref 1.5–4.5)
GLUCOSE SERPL-MCNC: 87 MG/DL (ref 65–99)
INTERPRETATION UR IFE-IMP: NORMAL
M PROTEIN 24H MFR UR ELPH: NORMAL %
NOTE, 149533: NORMAL
POTASSIUM SERPL-SCNC: 4.6 MMOL/L (ref 3.5–5.2)
PROT 24H UR-MRATE: 101 MG/24 HR (ref 30–150)
PROT SERPL-MCNC: 7.5 G/DL (ref 6–8.5)
PROT UR-MCNC: 6.3 MG/DL
SODIUM SERPL-SCNC: 144 MMOL/L (ref 134–144)

## 2018-11-29 RX ORDER — LISINOPRIL AND HYDROCHLOROTHIAZIDE 10; 12.5 MG/1; MG/1
TABLET ORAL
Qty: 90 TAB | Refills: 3 | Status: SHIPPED | OUTPATIENT
Start: 2018-11-29 | End: 2020-01-22

## 2018-12-03 ENCOUNTER — HOSPITAL ENCOUNTER (OUTPATIENT)
Dept: MAMMOGRAPHY | Age: 68
Discharge: HOME | End: 2018-12-03
Attending: INTERNAL MEDICINE
Payer: MEDICARE

## 2018-12-03 DIAGNOSIS — R92.8 ABNORMAL MAMMOGRAM OF LEFT BREAST: ICD-10-CM

## 2018-12-03 PROCEDURE — 74011250636 HC RX REV CODE- 250/636: Performed by: RADIOLOGY

## 2018-12-03 PROCEDURE — 74011000250 HC RX REV CODE- 250: Performed by: RADIOLOGY

## 2018-12-03 PROCEDURE — 77065 DX MAMMO INCL CAD UNI: CPT

## 2018-12-03 PROCEDURE — 77030020004 US BX BREAST VAC LT 1ST LESION W/CLIP AND SPECIMEN

## 2018-12-03 PROCEDURE — 88360 TUMOR IMMUNOHISTOCHEM/MANUAL: CPT

## 2018-12-03 PROCEDURE — 88305 TISSUE EXAM BY PATHOLOGIST: CPT

## 2018-12-03 RX ORDER — LIDOCAINE HYDROCHLORIDE AND EPINEPHRINE 10; 10 MG/ML; UG/ML
10 INJECTION, SOLUTION INFILTRATION; PERINEURAL ONCE
Status: COMPLETED | OUTPATIENT
Start: 2018-12-03 | End: 2018-12-03

## 2018-12-03 RX ORDER — LIDOCAINE HYDROCHLORIDE 10 MG/ML
5 INJECTION INFILTRATION; PERINEURAL
Status: COMPLETED | OUTPATIENT
Start: 2018-12-03 | End: 2018-12-03

## 2018-12-03 RX ADMIN — LIDOCAINE HYDROCHLORIDE AND EPINEPHRINE 100 MG: 10; 10 INJECTION, SOLUTION INFILTRATION; PERINEURAL at 10:05

## 2018-12-03 RX ADMIN — LIDOCAINE HYDROCHLORIDE 5 ML: 10 INJECTION, SOLUTION INFILTRATION; PERINEURAL at 10:05

## 2018-12-03 NOTE — DISCHARGE INSTRUCTIONS
Breast Biopsy Discharge Instructions    PAIN CONTROL     You may have mild discomfort; take 1-2 Tylenol every 4-6 hours as needed.  Do not take aspirin containing products or anti-inflammatory medications (Advil, Aleve, Motrin, Ibuprofen, etc.) for 24 hours.  Wearing a comfortable bra for support may help with discomfort. WOUND CARE      A small amount of bleeding or bruising at the biopsy site is normal. Watch for signs and symptoms of infections: skin warm to touch, yellowish drainage from wound, fever or severe pain.  Place an ice pack over the site hourly, 20-30 at a time for a few hours today.  The clear dressing is water resistant (you may shower, but do not allow the dressing to become saturated). You may remove the dressing in 48 hours. The steri-strips (small pieces of tape covering the incision) will fall off on their own in a few days. If the clear dressing irritates your skin or begins to peel off, you may remove it. Remember, if you remove the clear dressing before 48 hours, you should not get the site wet.  If at any point you have EXCESSIVE BLEEDING (saturating the gauze under the clear dressing) OR pain please call:    Daytime 7:30am-5:00pm: [facility] [phone]    After Hours:    [phone] (ask to speak to a radiologist)              or [address] [place] [address] [phone]    ACTIVITY     Do not participate in any strenuous activities for 24 hours (exercise, sports, housework, etc.).  You may resume work (light duty only) for the first 24 hours.  No heavy lifting with the arm on the affected side of your body. ADDITIONAL INSTRUCTIONS    We will call you with results on Wednesday December 5 in the afternoon.        YOUR TREATMENT TEAM TODAY WAS    MD: Declan  RN: Cynthia Chaparro  Radiology Tech: Louis Hall

## 2018-12-03 NOTE — PROGRESS NOTES
Patient tolerated left breast biopsy well with minimal bleeding. Biopsy site was bandaged in the usual fashion and discharge instructions were reviewed with the patient. She was provided with a written copy as well. Advised patient that results should be available by Wednesday December 5 and that she will receive a phone call in the afternoon. Encouraged her to call with any questions or concerns.

## 2018-12-05 ENCOUNTER — TELEPHONE (OUTPATIENT)
Dept: INTERNAL MEDICINE CLINIC | Age: 68
End: 2018-12-05

## 2018-12-05 NOTE — PROGRESS NOTES
Dr. Nikia Noriega spoke with the patient this morning regarding pathology findings. Spoke with Geoff Wright to send to [facility]. Called patient with appointment information. Dr. Guillermina Sage 12/17 at 1130, 11am arrival time. Patient provided with office contact information. Patient does not report any concerns with biopsy site. Encouraged her to call with any questions or concerns.

## 2018-12-05 NOTE — TELEPHONE ENCOUNTER
Ms. Sondra Giles [facility]. John Paul Sampson from the center would like the okay to send her to [facility]. Please give her a call. .... Yojana Reyes Phone # [phone]

## 2018-12-12 ENCOUNTER — HOSPITAL ENCOUNTER (OUTPATIENT)
Dept: LAB | Age: 68
Discharge: HOME | End: 2018-12-12
Payer: MEDICARE

## 2018-12-12 ENCOUNTER — OFFICE VISIT (OUTPATIENT)
Dept: INTERNAL MEDICINE CLINIC | Age: 68
End: 2018-12-12

## 2018-12-12 VITALS
DIASTOLIC BLOOD PRESSURE: 88 MMHG | RESPIRATION RATE: 18 BRPM | SYSTOLIC BLOOD PRESSURE: 170 MMHG | HEART RATE: 62 BPM | HEIGHT: 63 IN | OXYGEN SATURATION: 99 % | TEMPERATURE: 98 F | BODY MASS INDEX: 35.61 KG/M2 | WEIGHT: 201 LBS

## 2018-12-12 DIAGNOSIS — E78.5 BORDERLINE HYPERLIPIDEMIA: ICD-10-CM

## 2018-12-12 DIAGNOSIS — C50.912 MALIGNANT NEOPLASM OF LEFT FEMALE BREAST, UNSPECIFIED ESTROGEN RECEPTOR STATUS, UNSPECIFIED SITE OF BREAST (HCC): ICD-10-CM

## 2018-12-12 DIAGNOSIS — E03.9 ACQUIRED HYPOTHYROIDISM: ICD-10-CM

## 2018-12-12 DIAGNOSIS — I10 ESSENTIAL HYPERTENSION: ICD-10-CM

## 2018-12-12 DIAGNOSIS — F43.21 GRIEVING: ICD-10-CM

## 2018-12-12 DIAGNOSIS — E55.9 VITAMIN D DEFICIENCY: ICD-10-CM

## 2018-12-12 DIAGNOSIS — Z00.00 MEDICARE ANNUAL WELLNESS VISIT, SUBSEQUENT: Primary | ICD-10-CM

## 2018-12-12 DIAGNOSIS — E66.01 SEVERE OBESITY (HCC): ICD-10-CM

## 2018-12-12 DIAGNOSIS — Z71.89 ADVANCE DIRECTIVE DISCUSSED WITH PATIENT: ICD-10-CM

## 2018-12-12 DIAGNOSIS — M06.09 SERONEGATIVE RHEUMATOID ARTHRITIS OF MULTIPLE SITES (HCC): ICD-10-CM

## 2018-12-12 DIAGNOSIS — F32.1 MODERATE MAJOR DEPRESSION (HCC): ICD-10-CM

## 2018-12-12 PROCEDURE — 82306 VITAMIN D 25 HYDROXY: CPT

## 2018-12-12 PROCEDURE — 81001 URINALYSIS AUTO W/SCOPE: CPT

## 2018-12-12 PROCEDURE — 84439 ASSAY OF FREE THYROXINE: CPT

## 2018-12-12 PROCEDURE — 80061 LIPID PANEL: CPT

## 2018-12-12 PROCEDURE — 84443 ASSAY THYROID STIM HORMONE: CPT

## 2018-12-12 NOTE — PATIENT INSTRUCTIONS
Medicare Wellness Visit, Female     The best way to live healthy is to have a lifestyle where you eat a well-balanced diet, exercise regularly, limit alcohol use, and quit all forms of tobacco/nicotine, if applicable. Regular preventive services are another way to keep healthy. Preventive services (vaccines, screening tests, monitoring & exams) can help personalize your care plan, which helps you manage your own care. Screening tests can find health problems at the earliest stages, when they are easiest to treat. [facility] follows the current, evidence-based guidelines published by the [facility] ([facility]) when recommending preventive services for our patients. Because we follow these guidelines, sometimes recommendations change over time as research supports it. (For example, mammograms used to be recommended annually. Even though Medicare will still pay for an annual mammogram, the newer guidelines recommend a mammogram every two years for women of average risk.)  Of course, you and your doctor may decide to screen more often for some diseases, based on your risk and your health status. Preventive services for you include:  - Medicare offers their members a free annual wellness visit, which is time for you and your primary care provider to discuss and plan for your preventive service needs. Take advantage of this benefit every year!  -All adults over the age of 72 should receive the recommended pneumonia vaccines. Current USPSTF guidelines recommend a series of two vaccines for the best pneumonia protection.   -All adults should have a flu vaccine yearly and a tetanus vaccine every 10 years. All adults age 61 and older should receive a shingles vaccine once in their lifetime.    -A bone mass density test is recommended when a woman turns 65 to screen for osteoporosis. This test is only recommended one time, as a screening.  Some providers will use this same test as a disease monitoring tool if you already have osteoporosis. -All adults age 38-68 who are overweight should have a diabetes screening test once every three years.   -Other screening tests and preventive services for persons with diabetes include: an eye exam to screen for diabetic retinopathy, a kidney function test, a foot exam, and stricter control over your cholesterol.   -Cardiovascular screening for adults with routine risk involves an electrocardiogram (ECG) at intervals determined by your doctor.   -Colorectal cancer screenings should be done for adults age 54-65 with no increased risk factors for colorectal cancer. There are a number of acceptable methods of screening for this type of cancer. Each test has its own benefits and drawbacks. Discuss with your doctor what is most appropriate for you during your annual wellness visit. The different tests include: colonoscopy (considered the best screening method), a fecal occult blood test, a fecal DNA test, and sigmoidoscopy. -Breast cancer screenings are recommended every other year for women of normal risk, age 54-69.  -Cervical cancer screenings for women over age 72 are only recommended with certain risk factors.   -All adults born between [facility] should be screened once for Hepatitis C.      Here is a list of your current Health Maintenance items (your personalized list of preventive services) with a due date:    Health Maintenance Due   Topic Date Due    DTaP/Tdap/Td series (1 - Tdap) Prescription given    Shingrix Vaccine Age 49> (1 of 2) Prescription given    GLAUCOMA SCREENING Q2Y  12/17/2014    Pneumococcal 65+ High/Highest Risk (2 of 2 - PCV13) Prescription given    MEDICARE YEARLY EXAM  12/12/2019    Influenza Age 5 to Adult  Up to date

## 2018-12-12 NOTE — PROGRESS NOTES
This is the Subsequent Medicare Annual Wellness Exam, performed 12 months or more after the Initial AWV or the last Subsequent AWV    I have reviewed the patient's medical history in detail and updated the computerized patient record. RA was acting up with increased pain. Saw new rheumatologist who was concerned with increased protien in blood. To have bone marrow biopsy in 1 week . MTX has helped with her joint pains. Taking ibuprofen approx a couple of times a week. Increased depressive symptoms. Denies SI/HI. Multiple deaths in family this year - brother  last week. Boyfriend, aunt and another brother in the last year. Saw therapist twice. Recent diagnosis of breast cancer. Trudy Schlatter, MD to discuss surgical options. History     Past Medical History:   Diagnosis Date    Breast cancer, left ([address]     Depression     Goiter     Hearing loss     bilateral    Hypertension     Hypothyroid     IBS (irritable bowel syndrome)     Rheumatoid arthritis ([address]       Past Surgical History:   Procedure Laterality Date    HX CATARACT REMOVAL Bilateral     HX CHOLECYSTECTOMY      NEUROLOGICAL PROCEDURE UNLISTED      brain lesion removed ? infectious     Current Outpatient Medications   Medication Sig Dispense Refill    pneumococcal 13 rita conj dip (PREVNAR 13, PF,) 0.5 mL syrg injection 0.5 mL by IntraMUSCular route once for 1 dose. 0.5 mL 0    varicella-zoster recombinant, PF, (SHINGRIX, PF,) 50 mcg/0.5 mL susr injection 0.5mL by IntraMUSCular route once now and then repeat in 2-6 months 0.5 mL 1    diph,pertuss,acel,,tetanus vac,PF, (ADACEL) 2 Lf-(2.5-5-3-5 mcg)-5Lf/0.5 mL syrg vaccine 0.5 mL by IntraMUSCular route once for 1 dose. 0.5 mL 0    lisinopril-hydroCHLOROthiazide (PRINZIDE, ZESTORETIC) 10-12.5 mg per tablet TAKE ONE TABLET BY MOUTH ONCE DAILY 90 Tab 3    folic acid (FOLVITE) 1 mg tablet Take 1 Tab by mouth daily.  90 Tab 0    methotrexate (RHEUMATREX) 2.5 mg tablet Take 8 Tabs by mouth every Wednesday. 96 Tab 0    LORazepam (ATIVAN) 1 mg tablet TAKE 1 & 1/2 (ONE & ONE-HALF) TABLETS BY MOUTH ONCE DAILY AT NIGHT 45 Tab 1    ergocalciferol (ERGOCALCIFEROL) 50,000 unit capsule Take 1 Cap by mouth every Tuesday. 12 Cap 3    ibuprofen (MOTRIN) 800 mg tablet Take 800 mg by mouth two (2) times a day.  citalopram (CELEXA) 40 mg tablet TAKE ONE TABLET BY MOUTH DAILY 90 Tab 3    levothyroxine (SYNTHROID) 200 mcg tablet TAKE ONE TABLET BY MOUTH ONCE DAILY BEFORE BREAKFAST 90 Tab 3     Allergies   Allergen Reactions    Keflex [Cephalexin] Rash    Monistat 1 [Tioconazole] Swelling    Sulfa (Sulfonamide Antibiotics) Rash     Family History   Problem Relation Age of Onset    Cancer Mother         lung    COPD Mother     Cancer Father         lung    Heart Disease Brother         CABGx3    Diabetes Brother     Heart Disease Brother     Diabetes Brother     COPD Brother     Liver Disease Brother         cirrhosis    Alcohol abuse Brother     Heart Disease Paternal Uncle         CAD    Heart Disease Paternal Grandmother         CAD     Social History     Tobacco Use    Smoking status: Never Smoker    Smokeless tobacco: Never Used   Substance Use Topics    Alcohol use:  Yes     Alcohol/week: 2.4 - 3.0 oz     Types: 4 Glasses of wine per week     Comment: 4 glasses of wine per night     Patient Active Problem List   Diagnosis Code    Hypothyroidism E03.9    IBS (irritable bowel syndrome) K58.9    HTN (hypertension) I10    Graves disease E05.00    Moderate major depression ([facility]) F32.1    PMR (polymyalgia rheumatica) ([facility]) M35.3    Iliotibial band syndrome of both sides M76.31, M76.32    Long term current use of non-steroidal anti-inflammatories (NSAID) Z79.1    Seronegative rheumatoid arthritis of multiple sites ([facility]) M06.09    Monoclonal gammopathy of unknown significance (MGUS) D47.2    Vitamin D deficiency E55.9    Severe obesity ([facility]) E66.01  Long-term use of immunosuppressant medication Z79.899    Breast cancer, left ([facility] C50.912    Rheumatoid arthritis ([facility] M06.9       Depression Risk Factor Screening:     PHQ over the last two weeks 10/1/2018   Little interest or pleasure in doing things Nearly every day   Feeling down, depressed, irritable, or hopeless Nearly every day   Total Score PHQ 2 6     Alcohol Risk Factor Screening: You average less than 7 drinks a week. Functional Ability and Level of Safety:   Hearing Loss  The patient wears hearing aids. Activities of Daily Living  The home contains: no safety equipment. Patient does total self care    Fall Risk  Fall Risk Assessment, last 12 mths 11/28/2018   Able to walk? Yes   Fall in past 12 months? Yes   Fall with injury? No   Number of falls in past 12 months 1   Fall Risk Score 1       Abuse Screen  Patient is not abused     Review of Systems :- negative except for:  Palpitations and hears heart beat at night  Chronic diarrhea secondary to IBS  Stress incontinence  Hand pain 2-3/10 from 11/10, shoulder and hip pain has improved as well. Migraine headaches - less frequent.       Physical Examination:  Visit Vitals  /88   Pulse 62   Temp 98 °F (36.7 °C) (Oral)   Resp 18   Ht 5' 3\" (1.6 m)   Wt 201 lb (91.2 kg)   SpO2 99%   BMI 35.61 kg/m²        General appearance - alert, well appearing, and in no distress and oriented to person, place, and time  Mental status - alert, oriented to person, place, and time  Eyes - pupils equal and reactive, extraocular eye movements intact  Ears - bilateral TM's and external ear canals normal  Nose - normal and patent, no erythema, discharge or polyps  Mouth - mucous membranes moist, pharynx normal without lesions  Neck - supple, no significant adenopathy, carotids upstroke normal bilaterally, no bruits, thyroid exam: thyroid is normal in size without nodules or tenderness  Lymphatics - no palpable lymphadenopathy, no hepatosplenomegaly  Chest - clear to auscultation, no wheezes, rales or rhonchi, symmetric air entry  Heart - normal rate, regular rhythm, normal S1, S2, no murmurs, rubs, clicks or gallops  Abdomen - soft, nontender, nondistended, no masses or organomegaly  Extremities - peripheral pulses normal, no pedal edema, no clubbing or cyanosis  Skin - normal coloration and turgor, no rashes, no suspicious skin lesions noted      Cognitive Screening   Evaluation of Cognitive Function:  Has your family/caregiver stated any concerns about your memory: no  Normal    Patient Care Team   Patient Care Team:  Zelalem Baird MD as PCP - General (Internal Medicine)  Elvira Egan MD (Gastroenterology)    Assessment/Plan   Education and counseling provided:  Are appropriate based on today's review and evaluation    Diagnoses and all orders for this visit:    1. Medicare annual wellness visit, subsequent  -     pneumococcal 13 rita conj dip (PREVNAR 13, PF,) 0.5 mL syrg injection; 0.5 mL by IntraMUSCular route once for 1 dose.  -     varicella-zoster recombinant, PF, (SHINGRIX, PF,) 50 mcg/0.5 mL susr injection; 0.5mL by IntraMUSCular route once now and then repeat in 2-6 months  -     diph,pertuss,acel,,tetanus vac,PF, (ADACEL) 2 Lf-(2.5-5-3-5 mcg)-5Lf/0.5 mL syrg vaccine; 0.5 mL by IntraMUSCular route once for 1 dose. 2. Moderate major depression ([address]    3. Essential hypertension  -     URINALYSIS W/ RFLX MICROSCOPIC    4. Acquired hypothyroidism  -     T4, FREE  -     TSH 3RD GENERATION    5. Vitamin D deficiency  -     VITAMIN D, 25 HYDROXY    6. Borderline hyperlipidemia  -     LIPID PANEL    7. Severe obesity ([address]    8. Seronegative rheumatoid arthritis of multiple sites ([address]    9. Malignant neoplasm of left female breast, unspecified estrogen receptor status, unspecified site of breast ([address]    10.  Grieving        Health Maintenance Due   Topic Date Due    DTaP/Tdap/Td series (1 - Tdap) Prescription given    Shingrix Vaccine Age 50> (1 of 2) Prescription given    GLAUCOMA SCREENING Q2Y  12/17/2014    Pneumococcal 65+ High/Highest Risk (2 of 2 - PCV13) Prescription given    MEDICARE YEARLY EXAM  12/12/2019    Influenza Age 5 to Adult  Up to date

## 2018-12-12 NOTE — PROGRESS NOTES
Fabrizio Onofre is a 75 y.o. female    Chief Complaint   Patient presents with    Physical     1. Have you been to the ER, urgent care clinic since your last visit? Hospitalized since your last visit? No    2. Have you seen or consulted any other health care providers outside of the [address] [place] [address] since your last visit? Include any pap smears or colon screening.  No     Health Maintenance Due   Topic Date Due    DTaP/Tdap/Td series (1 - Tdap) 12/17/1970    Shingrix Vaccine Age 50> (1 of 2) 12/17/1999    GLAUCOMA SCREENING Q2Y  12/17/2014    Pneumococcal 65+ High/Highest Risk (2 of 2 - PCV13) 05/05/2016    MEDICARE YEARLY EXAM  03/14/2018    Influenza Age 9 to Adult  08/01/2018     Visit Vitals  /82 (BP 1 Location: Right arm, BP Patient Position: Sitting)   Pulse 62   Temp 98 °F (36.7 °C) (Oral)   Resp 18   Ht 5' 3\" (1.6 m)   Wt 201 lb (91.2 kg)   SpO2 99%   BMI 35.61 kg/m²

## 2018-12-13 LAB
25(OH)D3+25(OH)D2 SERPL-MCNC: 32 NG/ML (ref 30–100)
APPEARANCE UR: CLEAR
BACTERIA #/AREA URNS HPF: ABNORMAL /[HPF]
BILIRUB UR QL STRIP: NEGATIVE
CASTS URNS QL MICRO: ABNORMAL /LPF
CHOLEST SERPL-MCNC: 187 MG/DL (ref 100–199)
COLOR UR: YELLOW
EPI CELLS #/AREA URNS HPF: ABNORMAL /HPF
GLUCOSE UR QL: NEGATIVE
HDLC SERPL-MCNC: 55 MG/DL
HGB UR QL STRIP: ABNORMAL
INTERPRETATION, 910389: NORMAL
KETONES UR QL STRIP: NEGATIVE
LDLC SERPL CALC-MCNC: 103 MG/DL (ref 0–99)
LEUKOCYTE ESTERASE UR QL STRIP: ABNORMAL
MICRO URNS: ABNORMAL
MUCOUS THREADS URNS QL MICRO: PRESENT
NITRITE UR QL STRIP: NEGATIVE
PH UR STRIP: 7 [PH] (ref 5–7.5)
PROT UR QL STRIP: NEGATIVE
RBC #/AREA URNS HPF: ABNORMAL /HPF
SP GR UR: 1.02 (ref 1–1.03)
T4 FREE SERPL-MCNC: 1.57 NG/DL (ref 0.82–1.77)
TRIGL SERPL-MCNC: 146 MG/DL (ref 0–149)
TSH SERPL DL<=0.005 MIU/L-ACNC: 0.68 UIU/ML (ref 0.45–4.5)
UROBILINOGEN UR STRIP-MCNC: 0.2 MG/DL (ref 0.2–1)
VLDLC SERPL CALC-MCNC: 29 MG/DL (ref 5–40)
WBC #/AREA URNS HPF: ABNORMAL /HPF

## 2018-12-17 ENCOUNTER — OFFICE VISIT (OUTPATIENT)
Dept: SURGERY | Age: 68
End: 2018-12-17

## 2018-12-17 VITALS
HEIGHT: 63 IN | DIASTOLIC BLOOD PRESSURE: 57 MMHG | HEART RATE: 74 BPM | BODY MASS INDEX: 35.61 KG/M2 | WEIGHT: 201 LBS | SYSTOLIC BLOOD PRESSURE: 149 MMHG

## 2018-12-17 DIAGNOSIS — Z85.3 HISTORY OF LEFT BREAST CANCER: Primary | ICD-10-CM

## 2018-12-17 DIAGNOSIS — C50.412 MALIGNANT NEOPLASM OF UPPER-OUTER QUADRANT OF LEFT BREAST IN FEMALE, ESTROGEN RECEPTOR POSITIVE (HCC): Primary | ICD-10-CM

## 2018-12-17 DIAGNOSIS — Z17.0 MALIGNANT NEOPLASM OF UPPER-OUTER QUADRANT OF LEFT BREAST IN FEMALE, ESTROGEN RECEPTOR POSITIVE (HCC): Primary | ICD-10-CM

## 2018-12-17 NOTE — LETTER
12/18/2018 9:56 AM 
 
Patient:  Selam Tarango YOB: 1950 Date of Visit: 12/17/2018 Dear Vlad Pena MD 
[id number] [id number] VIA In Basket 
 : Thank you for referring Ms. Steve Cortez to me for evaluation/treatment. Below are the relevant portions of my assessment and plan of care. HISTORY OF PRESENT ILLNESS 
Selam Tarango is a 70 y.o. female. HPI  
NEW patient here today referred for consultation at the request of Dr. Ada Cannon for newly diagnosed LEFT breast cancer. She had LEFT breast pain for a few months, which encouraged her to have her mammogram. She denies any breast lumps, nipple discharge/retraction or skin changes. She no longer feels breast pain. Mammogram led to LEFT breast biopsy. 12/3/18- LEFT breast biopsy-  
FINAL PATHOLOGIC DIAGNOSIS Breast, left, needle core biopsy:  
Invasive mammary carcinoma with ductal and lobular features Favor Falun histologic grade 1 Largest glass slide measurement of invasive carcinoma: 8.5 mm ER/WY positive, HER2 negative. No FH of breast or ovarian cancer. Recent imaging- 
 3D LEFT diagnostic mammogram and ultrasound on 11/23/18- BIRADS 4 
FINDINGS: Left digital diagnostic mammography was performed, and is interpreted 
in conjunction with a computer assisted detection (CAD) system. Left 
3-D/tomosynthesis was performed in ML projection. Additionally, 3-D spot 
compression views were performed. There is persistent architectural distortion within the middle third of the left 
breast upper outer quadrant at 1:00. No suspicious calcifications are 
identified. There is no skin thickening or nipple retraction. Left breast ultrasound was performed of the 1:00 location, 5 cm from the nipple. There is a sonographic correlate to the architectural distortion, with 
irregular hypoechoic shadowing tissue, measuring up to 9 mm. Ultrasound-guided 
biopsy is recommended. IMPRESSION: 
1. BI-RADS Assessment Category 4: Suspicious abnormality - Biopsy should be 
performed in the absence of clinical contraindication. Architectural distortion 
in the upper outer left breast, with a sonographic correlate. Ultrasound-guided 
core needle biopsy is recommended. The patient has been notified of these results. We will assist in setting up a 
biopsy appointment. Past Medical History:  
Diagnosis Date  Breast cancer, left ([address]  Depression  Goiter  Hearing loss   
 bilateral  
 Hypertension  Hypothyroid  IBS (irritable bowel syndrome)  Rheumatoid arthritis ([address] Past Surgical History:  
Procedure Laterality Date  HX CATARACT REMOVAL Bilateral   
 HX CHOLECYSTECTOMY  1997  
 NEUROLOGICAL PROCEDURE UNLISTED  7/97  
 brain lesion removed ? infectious Social History Socioeconomic History  Marital status:  Spouse name: Not on file  Number of children: Not on file  Years of education: Not on file  Highest education level: Not on file Social Needs  Financial resource strain: Not on file  Food insecurity - worry: Not on file  Food insecurity - inability: Not on file  Transportation needs - medical: Not on file  Transportation needs - non-medical: Not on file Occupational History  Not on file Tobacco Use  Smoking status: Never Smoker  Smokeless tobacco: Never Used Substance and Sexual Activity  Alcohol use: Yes Alcohol/week: 2.4 - 3.0 oz Types: 4 Glasses of wine per week Comment: 4 glasses of wine per night  Drug use: No  
 Sexual activity: Yes  
  Partners: Male Other Topics Concern  Not on file Social History Narrative  Not on file OB History Obstetric Comments  Menarche 14, LMP 46, # of children 0, no biological children, age of 1st delivery na, Hysterectomy/oophorectomy no/no, Breast bx yes, 12/2018 , history of breast feeding na, BCP no, Hormone therapy no Current Outpatient Medications:  
  lisinopril-hydroCHLOROthiazide (PRINZIDE, ZESTORETIC) 10-12.5 mg per tablet, TAKE ONE TABLET BY MOUTH ONCE DAILY, Disp: 90 Tab, Rfl: 3 
  folic acid (FOLVITE) 1 mg tablet, Take 1 Tab by mouth daily. , Disp: 90 Tab, Rfl: 0 
  methotrexate (RHEUMATREX) 2.5 mg tablet, Take 8 Tabs by mouth every Wednesday. , Disp: 96 Tab, Rfl: 0 
  LORazepam (ATIVAN) 1 mg tablet, TAKE 1 & 1/2 (ONE & ONE-HALF) TABLETS BY MOUTH ONCE DAILY AT NIGHT, Disp: 45 Tab, Rfl: 1   ergocalciferol (ERGOCALCIFEROL) 50,000 unit capsule, Take 1 Cap by mouth every Tuesday. , Disp: 12 Cap, Rfl: 3   citalopram (CELEXA) 40 mg tablet, TAKE ONE TABLET BY MOUTH DAILY, Disp: 90 Tab, Rfl: 3 
  levothyroxine (SYNTHROID) 200 mcg tablet, TAKE ONE TABLET BY MOUTH ONCE DAILY BEFORE BREAKFAST, Disp: 90 Tab, Rfl: 3 
  varicella-zoster recombinant, PF, (SHINGRIX, PF,) 50 mcg/0.5 mL susr injection, 0.5mL by IntraMUSCular route once now and then repeat in 2-6 months, Disp: 0.5 mL, Rfl: 1   ibuprofen (MOTRIN) 800 mg tablet, Take 800 mg by mouth two (2) times a day., Disp: , Rfl:  
Allergies Allergen Reactions  Keflex [Cephalexin] Rash  Monistat 1 [Tioconazole] Swelling  Sulfa (Sulfonamide Antibiotics) Rash Review of Systems Constitutional: Negative. HENT: Negative. Eyes: Negative. Respiratory: Negative. Cardiovascular: Negative. Gastrointestinal: Positive for diarrhea. Genitourinary: Positive for frequency. Musculoskeletal: Positive for back pain and joint pain. Skin: Negative. Neurological: Negative. Endo/Heme/Allergies: Negative. Psychiatric/Behavioral: Negative. All other systems reviewed and are negative. Physical Exam  
Pulmonary/Chest: Right breast exhibits no inverted nipple, no mass, no nipple discharge, no skin change and no tenderness.  Left breast exhibits no inverted nipple, no mass, no nipple discharge, no skin change and no tenderness. Breasts are symmetrical.  
Clip and lesion seen on us left breast 1:00 Lymphadenopathy:  
  She has no cervical adenopathy. She has no axillary adenopathy. Nursing note and vitals reviewed. ASSESSMENT and PLAN 
  ICD-10-CM ICD-9-CM 1. Malignant neoplasm of upper-outer quadrant of left breast in female, estrogen receptor positive ([facility] C50.412 174.4   
 Z17.0 V86.0   
 
71 yo female with left breast T1 N0 IDC Er/Pr+ Her 2 mignon negative. She is here with family I have reviewed the imaging and pathology with her and she was given copies of these reports. 90 minutes were spent face-to-face with the patient during this encounter and 90% of that time was spent on counseling and coordination of care. 1. Discussed lumpectomy and radiation vs mastectomy. Discussed reconstruction. MRI ordered to see if patient is a candidate for a lumpectomy. 2. Discussed sentinel lymph node biopsy. 3. Discussed external beam radiation. 4. Discussed hormone therapy. 5. Discussed the possibility of chemotherapy. She is good candidate for left breast us guided lumpectomy, sln biopsy Will schedule mri and call her with results . If you have questions, please do not hesitate to call me. I look forward to following Ms. Elena Bustos along with you. Sincerely, Cindy Polk MD

## 2018-12-17 NOTE — PATIENT INSTRUCTIONS

## 2018-12-17 NOTE — PROGRESS NOTES
HISTORY OF PRESENT ILLNESS  Yunior Reid is a 70 y.o. female. HPI   NEW patient here today referred for consultation at the request of Dr. Jyoti Silva for newly diagnosed LEFT breast cancer. She had LEFT breast pain for a few months, which encouraged her to have her mammogram. She denies any breast lumps, nipple discharge/retraction or skin changes. She no longer feels breast pain. Mammogram led to LEFT breast biopsy. 12/3/18- LEFT breast biopsy-   FINAL PATHOLOGIC DIAGNOSIS   Breast, left, needle core biopsy:   Invasive mammary carcinoma with ductal and lobular features   Favor [place] histologic grade 1   Largest glass slide measurement of invasive carcinoma: 8.5 mm   ER/MO positive, HER2 negative. No FH of breast or ovarian cancer. Recent imaging-   3D LEFT diagnostic mammogram and ultrasound on 11/23/18- BIRADS 4  FINDINGS: Left digital diagnostic mammography was performed, and is interpreted  in conjunction with a computer assisted detection (CAD) system. Left  3-D/tomosynthesis was performed in ML projection. Additionally, 3-D spot  compression views were performed. There is persistent architectural distortion within the middle third of the left  breast upper outer quadrant at 1:00. No suspicious calcifications are  identified. There is no skin thickening or nipple retraction. Left breast ultrasound was performed of the 1:00 location, 5 cm from the nipple. There is a sonographic correlate to the architectural distortion, with  irregular hypoechoic shadowing tissue, measuring up to 9 mm. Ultrasound-guided  biopsy is recommended. IMPRESSION:  1. BI-RADS Assessment Category 4: Suspicious abnormality - Biopsy should be  performed in the absence of clinical contraindication. Architectural distortion  in the upper outer left breast, with a sonographic correlate. Ultrasound-guided  core needle biopsy is recommended. The patient has been notified of these results.   We will assist in setting up a  biopsy appointment. Past Medical History:   Diagnosis Date    Breast cancer[facility] left ([facility] [address]     Depression     Goiter     Hearing loss     bilateral    Hypertension     Hypothyroid     IBS (irritable bowel syndrome)     Rheumatoid arthritis ([facility] [address]      Past Surgical History:   Procedure Laterality Date    HX CATARACT REMOVAL Bilateral     HX CHOLECYSTECTOMY  1997    NEUROLOGICAL PROCEDURE UNLISTED  7/97    brain lesion removed ? infectious     Social History     Socioeconomic History    Marital status:      Spouse name: Not on file    Number of children: Not on file    Years of education: Not on file    Highest education level: Not on file   Social Needs    Financial resource strain: Not on file    Food insecurity - worry: Not on file    Food insecurity - inability: Not on file    Transportation needs - medical: Not on file   [employer] needs - non-medical: Not on file   Occupational History    Not on file   Tobacco Use    Smoking status: Never Smoker    Smokeless tobacco: Never Used   Substance and Sexual Activity    Alcohol use: Yes     Alcohol/week: 2.4 - 3.0 oz     Types: 4 Glasses of wine per week     Comment: 4 glasses of wine per night    Drug use: No    Sexual activity: Yes     Partners: Male   Other Topics Concern    Not on file   Social History Narrative    Not on file     OB History     Obstetric Comments    Menarche 14, LMP 46, # of children 0, no biological children, age of 1st delivery na, Hysterectomy/oophorectomy no/no, Breast bx yes, 12/2018 , history of breast feeding na, BCP no, Hormone therapy no            Current Outpatient Medications:     lisinopril-hydroCHLOROthiazide (PRINZIDE, ZESTORETIC) 10-12.5 mg per tablet, TAKE ONE TABLET BY MOUTH ONCE DAILY, Disp: 90 Tab, Rfl: 3    folic acid (FOLVITE) 1 mg tablet, Take 1 Tab by mouth daily. , Disp: 90 Tab, Rfl: 0    methotrexate (RHEUMATREX) 2.5 mg tablet, Take 8 Tabs by mouth every Wednesday. , Disp: 96 Tab, Rfl: 0    LORazepam (ATIVAN) 1 mg tablet, TAKE 1 & 1/2 (ONE & ONE-HALF) TABLETS BY MOUTH ONCE DAILY AT NIGHT, Disp: 45 Tab, Rfl: 1    ergocalciferol (ERGOCALCIFEROL) 50,000 unit capsule, Take 1 Cap by mouth every Tuesday. , Disp: 12 Cap, Rfl: 3    citalopram (CELEXA) 40 mg tablet, TAKE ONE TABLET BY MOUTH DAILY, Disp: 90 Tab, Rfl: 3    levothyroxine (SYNTHROID) 200 mcg tablet, TAKE ONE TABLET BY MOUTH ONCE DAILY BEFORE BREAKFAST, Disp: 90 Tab, Rfl: 3    varicella-zoster recombinant, PF, (SHINGRIX, PF,) 50 mcg/0.5 mL susr injection, 0.5mL by IntraMUSCular route once now and then repeat in 2-6 months, Disp: 0.5 mL, Rfl: 1    ibuprofen (MOTRIN) 800 mg tablet, Take 800 mg by mouth two (2) times a day., Disp: , Rfl:   Allergies   Allergen Reactions    Keflex [Cephalexin] Rash    Monistat 1 [Tioconazole] Swelling    Sulfa (Sulfonamide Antibiotics) Rash         Review of Systems   Constitutional: Negative. HENT: Negative. Eyes: Negative. Respiratory: Negative. Cardiovascular: Negative. Gastrointestinal: Positive for diarrhea. Genitourinary: Positive for frequency. Musculoskeletal: Positive for back pain and joint pain. Skin: Negative. Neurological: Negative. Endo/Heme/Allergies: Negative. Psychiatric/Behavioral: Negative. All other systems reviewed and are negative. Physical Exam   Pulmonary/Chest: Right breast exhibits no inverted nipple, no mass, no nipple discharge, no skin change and no tenderness. Left breast exhibits no inverted nipple, no mass, no nipple discharge, no skin change and no tenderness. Breasts are symmetrical.   Clip and lesion seen on us left breast 1:00   Lymphadenopathy:     She has no cervical adenopathy. She has no axillary adenopathy. Nursing note and vitals reviewed.       ASSESSMENT and PLAN    ICD-10-CM ICD-9-CM    1. Malignant neoplasm of upper-outer quadrant of left breast in female, estrogen receptor positive ([facility] [address] C50.412 174.4     Z17.0 V86.0      71 yo female with left breast T1 N0 IDC Er/Pr+ Her 2 mignon negative. She is here with family  I have reviewed the imaging and pathology with her and she was given copies of these reports. 90 minutes were spent face-to-face with the patient during this encounter and 90% of that time was spent on counseling and coordination of care. 1. Discussed lumpectomy and radiation vs mastectomy. Discussed reconstruction. MRI ordered to see if patient is a candidate for a lumpectomy. 2. Discussed sentinel lymph node biopsy. 3. Discussed external beam radiation. 4. Discussed hormone therapy. 5. Discussed the possibility of chemotherapy. She is good candidate for left breast us guided lumpectomy, sln biopsy  Will schedule mri and call her with results .

## 2018-12-18 NOTE — COMMUNICATION BODY
HISTORY OF PRESENT ILLNESS  Fransisco Gomez is a 70 y.o. female. HPI   NEW patient here today referred for consultation at the request of Dr. Landen Palacio for newly diagnosed LEFT breast cancer. She had LEFT breast pain for a few months, which encouraged her to have her mammogram. She denies any breast lumps, nipple discharge/retraction or skin changes. She no longer feels breast pain. Mammogram led to LEFT breast biopsy. 12/3/18- LEFT breast biopsy-   FINAL PATHOLOGIC DIAGNOSIS   Breast, left, needle core biopsy:   Invasive mammary carcinoma with ductal and lobular features   Favor [place] histologic grade 1   Largest glass slide measurement of invasive carcinoma: 8.5 mm   ER/WA positive, HER2 negative. No FH of breast or ovarian cancer. Recent imaging-   3D LEFT diagnostic mammogram and ultrasound on 11/23/18- BIRADS 4  FINDINGS: Left digital diagnostic mammography was performed, and is interpreted  in conjunction with a computer assisted detection (CAD) system. Left  3-D/tomosynthesis was performed in ML projection. Additionally, 3-D spot  compression views were performed. There is persistent architectural distortion within the middle third of the left  breast upper outer quadrant at 1:00. No suspicious calcifications are  identified. There is no skin thickening or nipple retraction. Left breast ultrasound was performed of the 1:00 location, 5 cm from the nipple. There is a sonographic correlate to the architectural distortion, with  irregular hypoechoic shadowing tissue, measuring up to 9 mm. Ultrasound-guided  biopsy is recommended. IMPRESSION:  1. BI-RADS Assessment Category 4: Suspicious abnormality - Biopsy should be  performed in the absence of clinical contraindication. Architectural distortion  in the upper outer left breast, with a sonographic correlate. Ultrasound-guided  core needle biopsy is recommended. The patient has been notified of these results.   We will assist in setting up a  biopsy appointment. Past Medical History:   Diagnosis Date    Breast cancer, left ([facility] [address]     Depression     Goiter     Hearing loss     bilateral    Hypertension     Hypothyroid     IBS (irritable bowel syndrome)     Rheumatoid arthritis ([facility] [address]      Past Surgical History:   Procedure Laterality Date    HX CATARACT REMOVAL Bilateral     HX CHOLECYSTECTOMY  1997    NEUROLOGICAL PROCEDURE UNLISTED  7/97    brain lesion removed ? infectious     Social History     Socioeconomic History    Marital status:      Spouse name: Not on file    Number of children: Not on file    Years of education: Not on file    Highest education level: Not on file   Social Needs    Financial resource strain: Not on file    Food insecurity - worry: Not on file    Food insecurity - inability: Not on file    Transportation needs - medical: Not on file   [employer] needs - non-medical: Not on file   Occupational History    Not on file   Tobacco Use    Smoking status: Never Smoker    Smokeless tobacco: Never Used   Substance and Sexual Activity    Alcohol use: Yes     Alcohol/week: 2.4 - 3.0 oz     Types: 4 Glasses of wine per week     Comment: 4 glasses of wine per night    Drug use: No    Sexual activity: Yes     Partners: Male   Other Topics Concern    Not on file   Social History Narrative    Not on file     OB History     Obstetric Comments    Menarche 14, LMP 46, # of children 0, no biological children, age of 1st delivery na, Hysterectomy/oophorectomy no/no, Breast bx yes, 12/2018 , history of breast feeding na, BCP no, Hormone therapy no            Current Outpatient Medications:     lisinopril-hydroCHLOROthiazide (PRINZIDE, ZESTORETIC) 10-12.5 mg per tablet, TAKE ONE TABLET BY MOUTH ONCE DAILY, Disp: 90 Tab, Rfl: 3    folic acid (FOLVITE) 1 mg tablet, Take 1 Tab by mouth daily. , Disp: 90 Tab, Rfl: 0    methotrexate (RHEUMATREX) 2.5 mg tablet, Take 8 Tabs by mouth every Wednesday. , Disp: 96 Tab, Rfl: 0    LORazepam (ATIVAN) 1 mg tablet, TAKE 1 & 1/2 (ONE & ONE-HALF) TABLETS BY MOUTH ONCE DAILY AT NIGHT, Disp: 45 Tab, Rfl: 1    ergocalciferol (ERGOCALCIFEROL) 50,000 unit capsule, Take 1 Cap by mouth every Tuesday. , Disp: 12 Cap, Rfl: 3    citalopram (CELEXA) 40 mg tablet, TAKE ONE TABLET BY MOUTH DAILY, Disp: 90 Tab, Rfl: 3    levothyroxine (SYNTHROID) 200 mcg tablet, TAKE ONE TABLET BY MOUTH ONCE DAILY BEFORE BREAKFAST, Disp: 90 Tab, Rfl: 3    varicella-zoster recombinant, PF, (SHINGRIX, PF,) 50 mcg/0.5 mL susr injection, 0.5mL by IntraMUSCular route once now and then repeat in 2-6 months, Disp: 0.5 mL, Rfl: 1    ibuprofen (MOTRIN) 800 mg tablet, Take 800 mg by mouth two (2) times a day., Disp: , Rfl:   Allergies   Allergen Reactions    Keflex [Cephalexin] Rash    Monistat 1 [Tioconazole] Swelling    Sulfa (Sulfonamide Antibiotics) Rash         Review of Systems   Constitutional: Negative. HENT: Negative. Eyes: Negative. Respiratory: Negative. Cardiovascular: Negative. Gastrointestinal: Positive for diarrhea. Genitourinary: Positive for frequency. Musculoskeletal: Positive for back pain and joint pain. Skin: Negative. Neurological: Negative. Endo/Heme/Allergies: Negative. Psychiatric/Behavioral: Negative. All other systems reviewed and are negative. Physical Exam   Pulmonary/Chest: Right breast exhibits no inverted nipple, no mass, no nipple discharge, no skin change and no tenderness. Left breast exhibits no inverted nipple, no mass, no nipple discharge, no skin change and no tenderness. Breasts are symmetrical.   Clip and lesion seen on us left breast 1:00   Lymphadenopathy:     She has no cervical adenopathy. She has no axillary adenopathy. Nursing note and vitals reviewed.       ASSESSMENT and PLAN    ICD-10-CM ICD-9-CM    1. Malignant neoplasm of upper-outer quadrant of left breast in female, estrogen receptor positive ([facility] [address] C50.412 174.4     Z17.0 V86.0      71 yo female with left breast T1 N0 IDC Er/Pr+ Her 2 mignon negative. She is here with family  I have reviewed the imaging and pathology with her and she was given copies of these reports. 90 minutes were spent face-to-face with the patient during this encounter and 90% of that time was spent on counseling and coordination of care. 1. Discussed lumpectomy and radiation vs mastectomy. Discussed reconstruction. MRI ordered to see if patient is a candidate for a lumpectomy. 2. Discussed sentinel lymph node biopsy. 3. Discussed external beam radiation. 4. Discussed hormone therapy. 5. Discussed the possibility of chemotherapy. She is good candidate for left breast us guided lumpectomy, sln biopsy  Will schedule mri and call her with results .

## 2018-12-19 ENCOUNTER — HOSPITAL ENCOUNTER (OUTPATIENT)
Dept: CT IMAGING | Age: 68
Discharge: HOME | End: 2018-12-19
Attending: INTERNAL MEDICINE
Payer: MEDICARE

## 2018-12-19 VITALS
RESPIRATION RATE: 16 BRPM | SYSTOLIC BLOOD PRESSURE: 122 MMHG | HEART RATE: 65 BPM | HEIGHT: 63 IN | TEMPERATURE: 97.5 F | BODY MASS INDEX: 35.61 KG/M2 | DIASTOLIC BLOOD PRESSURE: 49 MMHG | WEIGHT: 201 LBS | OXYGEN SATURATION: 100 %

## 2018-12-19 DIAGNOSIS — D47.2 MGUS (MONOCLONAL GAMMOPATHY OF UNKNOWN SIGNIFICANCE): ICD-10-CM

## 2018-12-19 LAB
BASOPHILS # BLD: 0.1 K/UL (ref 0–0.1)
BASOPHILS NFR BLD: 2 % (ref 0–1)
DIFFERENTIAL METHOD BLD: ABNORMAL
EOSINOPHIL # BLD: 0.1 K/UL (ref 0–0.4)
EOSINOPHIL NFR BLD: 1 % (ref 0–7)
ERYTHROCYTE [DISTWIDTH] IN BLOOD BY AUTOMATED COUNT: 14 % (ref 11.5–14.5)
HCT VFR BLD AUTO: 40.9 % (ref 35–47)
HGB BLD-MCNC: 13.5 G/DL (ref 11.5–16)
IMM GRANULOCYTES # BLD: 0 K/UL
IMM GRANULOCYTES NFR BLD AUTO: 0 %
LYMPHOCYTES # BLD: 1.2 K/UL (ref 0.8–3.5)
LYMPHOCYTES NFR BLD: 24 % (ref 12–49)
MCH RBC QN AUTO: 30.3 PG (ref 26–34)
MCHC RBC AUTO-ENTMCNC: 33 G/DL (ref 30–36.5)
MCV RBC AUTO: 91.7 FL (ref 80–99)
MONOCYTES # BLD: 0.4 K/UL (ref 0–1)
MONOCYTES NFR BLD: 7 % (ref 5–13)
NEUTS SEG # BLD: 3.3 K/UL (ref 1.8–8)
NEUTS SEG NFR BLD: 66 % (ref 32–75)
NRBC # BLD: 0 K/UL (ref 0–0.01)
NRBC BLD-RTO: 0 PER 100 WBC
PLATELET # BLD AUTO: 326 K/UL (ref 150–400)
PLATELET COMMENTS,PCOM: ABNORMAL
RBC # BLD AUTO: 4.46 M/UL (ref 3.8–5.2)
RBC MORPH BLD: ABNORMAL
WBC # BLD AUTO: 5.1 K/UL (ref 3.6–11)
WBC MORPH BLD: ABNORMAL

## 2018-12-19 PROCEDURE — 88237 TISSUE CULTURE BONE MARROW: CPT

## 2018-12-19 PROCEDURE — 88342 IMHCHEM/IMCYTCHM 1ST ANTB: CPT

## 2018-12-19 PROCEDURE — 38221 DX BONE MARROW BIOPSIES: CPT

## 2018-12-19 PROCEDURE — 88184 FLOWCYTOMETRY/ TC 1 MARKER: CPT

## 2018-12-19 PROCEDURE — 88313 SPECIAL STAINS GROUP 2: CPT

## 2018-12-19 PROCEDURE — 36415 COLL VENOUS BLD VENIPUNCTURE: CPT

## 2018-12-19 PROCEDURE — 88305 TISSUE EXAM BY PATHOLOGIST: CPT

## 2018-12-19 PROCEDURE — 88365 INSITU HYBRIDIZATION (FISH): CPT

## 2018-12-19 PROCEDURE — 88364 INSITU HYBRIDIZATION (FISH): CPT

## 2018-12-19 PROCEDURE — 77030028872 HC BN BIOP NDL ON CNTRL TY TELE -C

## 2018-12-19 PROCEDURE — 88341 IMHCHEM/IMCYTCHM EA ADD ANTB: CPT

## 2018-12-19 PROCEDURE — 74011250636 HC RX REV CODE- 250/636: Performed by: RADIOLOGY

## 2018-12-19 PROCEDURE — 74011250636 HC RX REV CODE- 250/636

## 2018-12-19 PROCEDURE — 88311 DECALCIFY TISSUE: CPT

## 2018-12-19 PROCEDURE — 88185 FLOWCYTOMETRY/TC ADD-ON: CPT

## 2018-12-19 PROCEDURE — 88264 CHROMOSOME ANALYSIS 20-25: CPT

## 2018-12-19 PROCEDURE — 85025 COMPLETE CBC W/AUTO DIFF WBC: CPT

## 2018-12-19 PROCEDURE — 88374 M/PHMTRC ALYS ISHQUANT/SEMIQ: CPT

## 2018-12-19 RX ORDER — MIDAZOLAM HYDROCHLORIDE 1 MG/ML
5 INJECTION, SOLUTION INTRAMUSCULAR; INTRAVENOUS
Status: DISCONTINUED | OUTPATIENT
Start: 2018-12-19 | End: 2018-12-23 | Stop reason: HOSPADM

## 2018-12-19 RX ORDER — LIDOCAINE HYDROCHLORIDE 10 MG/ML
INJECTION, SOLUTION EPIDURAL; INFILTRATION; INTRACAUDAL; PERINEURAL
Status: COMPLETED
Start: 2018-12-19 | End: 2018-12-19

## 2018-12-19 RX ORDER — SODIUM CHLORIDE 0.9 % (FLUSH) 0.9 %
10 SYRINGE (ML) INJECTION AS NEEDED
Status: DISCONTINUED | OUTPATIENT
Start: 2018-12-19 | End: 2018-12-23 | Stop reason: HOSPADM

## 2018-12-19 RX ORDER — FENTANYL CITRATE 50 UG/ML
200 INJECTION, SOLUTION INTRAMUSCULAR; INTRAVENOUS
Status: DISCONTINUED | OUTPATIENT
Start: 2018-12-19 | End: 2018-12-23 | Stop reason: HOSPADM

## 2018-12-19 RX ORDER — SODIUM CHLORIDE 9 MG/ML
500 INJECTION, SOLUTION INTRAVENOUS CONTINUOUS
Status: DISCONTINUED | OUTPATIENT
Start: 2018-12-19 | End: 2018-12-23 | Stop reason: HOSPADM

## 2018-12-19 RX ADMIN — LIDOCAINE HYDROCHLORIDE 10 ML: 10 INJECTION, SOLUTION EPIDURAL; INFILTRATION; INTRACAUDAL; PERINEURAL at 13:07

## 2018-12-19 RX ADMIN — FENTANYL CITRATE 50 MCG: 50 INJECTION INTRAMUSCULAR; INTRAVENOUS at 13:02

## 2018-12-19 RX ADMIN — MIDAZOLAM 1.5 MG: 1 INJECTION INTRAMUSCULAR; INTRAVENOUS at 13:03

## 2018-12-19 RX ADMIN — FENTANYL CITRATE 50 MCG: 50 INJECTION INTRAMUSCULAR; INTRAVENOUS at 12:58

## 2018-12-19 RX ADMIN — MIDAZOLAM 1.5 MG: 1 INJECTION INTRAMUSCULAR; INTRAVENOUS at 12:57

## 2018-12-19 NOTE — H&P
Radiology History and Physical    Patient: Latoya Del Castillo 70 y.o. female       Chief Complaint: No chief complaint on file. History of Present Illness: for bone marrow    History:    Past Medical History:   Diagnosis Date    [facility]     Depression     Goiter     Hearing loss     bilateral    Hypertension     Hypothyroid     IBS (irritable bowel syndrome)     [facility] arthritis [facility]      Family History   Problem Relation Age of Onset    Cancer Mother         lung    COPD Mother     Cancer Father         lung    Heart Disease Brother         CABGx3    Diabetes Brother     Heart Disease Brother     Diabetes Brother     COPD Brother     Liver Disease Brother         cirrhosis    Alcohol abuse Brother     Heart Disease Paternal Uncle         CAD    Heart Disease Paternal Grandmother         CAD     Social History     Socioeconomic History    Marital status:      Spouse name: Not on file    Number of children: Not on file    Years of education: Not on file    Highest education level: Not on file   Social Needs    Financial resource strain: Not on file    Food insecurity - worry: Not on file    Food insecurity - inability: Not on file   [employer] needs - medical: Not on file   [employer] needs - non-medical: Not on file   Occupational History    Not on file   Tobacco Use    Smoking status: Never Smoker    Smokeless tobacco: Never Used   Substance and Sexual Activity    Alcohol use: Yes     Alcohol/week: 2.4 - 3.0 oz     Types: 4 Glasses of wine per week     Comment: 4 glasses of wine per night    Drug use: No    Sexual activity: Yes     Partners: Male   Other Topics Concern    Not on file   Social History Narrative    Not on file       Allergies:    Allergies   Allergen Reactions    Keflex [Cephalexin] Rash    Monistat 1 [Tioconazole] Swelling    Sulfa (Sulfonamide Antibiotics) Rash       Current Medications:  Current Outpatient Medications   Medication Sig    lisinopril-hydroCHLOROthiazide (PRINZIDE, ZESTORETIC) 10-12.5 mg per tablet TAKE ONE TABLET BY MOUTH ONCE DAILY    folic acid (FOLVITE) 1 mg tablet Take 1 Tab by mouth daily.  methotrexate (RHEUMATREX) 2.5 mg tablet Take 8 Tabs by mouth every Wednesday.  LORazepam (ATIVAN) 1 mg tablet TAKE 1 & 1/2 (ONE & ONE-HALF) TABLETS BY MOUTH ONCE DAILY AT NIGHT    ergocalciferol (ERGOCALCIFEROL) 50,000 unit capsule Take 1 Cap by mouth every Tuesday.  citalopram (CELEXA) 40 mg tablet TAKE ONE TABLET BY MOUTH DAILY    levothyroxine (SYNTHROID) 200 mcg tablet TAKE ONE TABLET BY MOUTH ONCE DAILY BEFORE BREAKFAST    varicella-zoster recombinant, PF, (SHINGRIX, PF,) 50 mcg/0.5 mL susr injection 0.5mL by IntraMUSCular route once now and then repeat in 2-6 months    ibuprofen (MOTRIN) 800 mg tablet Take 800 mg by mouth two (2) times a day. Current Facility-Administered Medications   Medication Dose Route Frequency    midazolam (VERSED) injection 5 mg  5 mg IntraVENous Multiple    fentaNYL citrate (PF) injection 200 mcg  200 mcg IntraVENous Multiple    0.9% sodium chloride infusion 500 mL  500 mL IntraVENous CONTINUOUS    saline peripheral flush soln 10 mL  10 mL InterCATHeter PRN        Physical Exam:  Blood pressure 125/41, pulse 61, temperature 97.5 °F (36.4 °C), resp. rate 16, height 5' 3\" (1.6 m), weight 91.2 kg (201 lb), SpO2 100 %. LUNG: clear to auscultation bilaterally, HEART: regular rate and rhythm      Alerts:    Hospital Problems  Date Reviewed: 12/18/2018    None          Laboratory:      Recent Labs     12/19/18  1200   HGB 13.5   HCT 40.9   WBC 5.1            Plan of Care/Planned Procedure:  Risks, benefits, and alternatives reviewed with patient and she agrees to proceed with the procedure.        Radha Ramirez MD

## 2018-12-19 NOTE — DISCHARGE INSTRUCTIONS
[address] [facility]  Department of Interventional Radiology  [facility]  [phone]  BIOPSY DISCHARGE INSTRUCTIONS    General Instructions:     A biopsy is the removal of a small piece of tissue for microscopic examination or testing. Healthy tissue can be obtained for the purpose of tissue-type matching for transplants. Unhealthy tissues are more commonly biopsied to diagnose disease. Spinal Biopsy: This test is sometimes done in conjunction with other procedures. Your back will be sore, as we are taking a small sample of bone, which is slightly more difficult to sample than tissue. General Biopsy:     A mass can grow in any area of the body, and we are taking a specimen as ordered by your doctor. The risks are the same. They include bleeding, pain, and infection. Home Care Instructions: You may resume your regular diet and medication regimen. Do not drink alcohol, drive, or make any important legal decisions in the next 24 hours. Do not lift anything heavier than a gallon of milk until the soreness goes away. You may use over the counter acetaminophen or ibuprofen for the soreness. You may apply an ice pack to the affected area for 20-30 minutes at time for the first 24 hours. After that, you may apply a heat pack. Call If: You should call your Physician and/or the Radiology Nurse if you have any questions or concerns about the biopsy site. Call if you should have increased pain, fever, redness, drainage, or bleeding more than a small spot on the bandage. Follow-Up Instructions: Please see your ordering doctor as he/she has requested. To Reach Us:    Side effects of sedation medications and other medications used today have been reviewed. Notify us of nausea, itching, hives, dizziness, or anything else out of the ordinary.        Should you experience any of these significant changes, please call [phone] between the hours of 7:30 am and 10 pm or [phone] after hours.  After hours, ask the  to page the [address] Technologist, and describe the problem to the technologist.     Patient Signature:  Date: 12/19/2018  Discharging Nurse: Kitty Coto RN

## 2019-01-07 ENCOUNTER — HOSPITAL ENCOUNTER (OUTPATIENT)
Dept: MRI IMAGING | Age: 69
Discharge: HOME | End: 2019-01-07
Attending: SURGERY
Payer: MEDICARE

## 2019-01-07 VITALS — WEIGHT: 200 LBS | BODY MASS INDEX: 35.43 KG/M2

## 2019-01-07 DIAGNOSIS — Z85.3 HISTORY OF LEFT BREAST CANCER: ICD-10-CM

## 2019-01-07 PROCEDURE — 74011000258 HC RX REV CODE- 258: Performed by: SURGERY

## 2019-01-07 PROCEDURE — A9585 GADOBUTROL INJECTION: HCPCS | Performed by: SURGERY

## 2019-01-07 PROCEDURE — 74011250636 HC RX REV CODE- 250/636: Performed by: SURGERY

## 2019-01-07 PROCEDURE — 77049 MRI BREAST C-+ W/CAD BI: CPT

## 2019-01-07 RX ADMIN — GADOBUTROL 9.5 ML: 604.72 INJECTION INTRAVENOUS at 07:50

## 2019-01-07 RX ADMIN — SODIUM CHLORIDE 100 ML: 900 INJECTION, SOLUTION INTRAVENOUS at 07:51

## 2019-01-08 RX ORDER — CITALOPRAM 40 MG/1
TABLET, FILM COATED ORAL
Qty: 90 TAB | Refills: 3 | Status: SHIPPED | OUTPATIENT
Start: 2019-01-08 | End: 2019-01-28

## 2019-01-10 ENCOUNTER — OFFICE VISIT (OUTPATIENT)
Dept: ONCOLOGY | Age: 69
End: 2019-01-10

## 2019-01-10 VITALS
BODY MASS INDEX: 35.26 KG/M2 | DIASTOLIC BLOOD PRESSURE: 77 MMHG | SYSTOLIC BLOOD PRESSURE: 134 MMHG | HEIGHT: 63 IN | RESPIRATION RATE: 20 BRPM | TEMPERATURE: 98 F | OXYGEN SATURATION: 96 % | HEART RATE: 65 BPM | WEIGHT: 199 LBS

## 2019-01-10 DIAGNOSIS — C90.00 MULTIPLE MYELOMA, REMISSION STATUS UNSPECIFIED (HCC): ICD-10-CM

## 2019-01-10 DIAGNOSIS — M35.3 PMR (POLYMYALGIA RHEUMATICA) (HCC): ICD-10-CM

## 2019-01-10 DIAGNOSIS — C50.912 MALIGNANT NEOPLASM OF LEFT FEMALE BREAST, UNSPECIFIED ESTROGEN RECEPTOR STATUS, UNSPECIFIED SITE OF BREAST (HCC): Primary | ICD-10-CM

## 2019-01-10 DIAGNOSIS — E66.01 SEVERE OBESITY (HCC): ICD-10-CM

## 2019-01-10 NOTE — PROGRESS NOTES
Staci Hayes is a 70 y.o. female here today for follow up, breast cancer. 1. Have you been to the ER, urgent care clinic since your last visit? Hospitalized since your last visit? No     2. Have you seen or consulted any other health care providers outside of the [address] [place] [address] since your last visit? Include any pap smears or colon screening.  No

## 2019-01-10 NOTE — PROGRESS NOTES
[facility] [place] [address] [phone] Anthony Ehrich: [phone]  F: [phone]    Reason for Visit:   Staci Hayes is a 70 y.o. female who is seen for follow up of    1) Paraproteinemia  2)  Left breast ER pos AL pos (weak) HER 2 neg stage I Breast cancer- 12/2018    History of Present Illness:   Patient is a 70 y.o. with seronegative rheumatoid arthritis and secondary polymyalgia rheumatica seen for follow up of abnormal serum protein electrophoresis. On 10/10/18 she was noted to have an abnormal M spike of 0.3 g/dL with immunofixation  Showing a IgA monoclonal protein with kappa light chain   Specificity. No anemia or renal failure noted at the time. Had additional labs and work up. Has also had an abnormal mammogram since and a breast biopsy which has revealed a new breast cancer. Slated for a lumpectomy with Dr. Lance Streeter on 1/29/19    Lots of recent events. She is coping well with this though. She has ongoing joint pain from known arthritis. No changes in health. No breast pain or skin changes. No fevers, chills, she has hot flashes. Past Medical History:   Diagnosis Date    Breast cancer, left ([address]     Depression     Goiter     Hearing loss     bilateral    Hypertension     Hypothyroid     IBS (irritable bowel syndrome)     Rheumatoid arthritis ([address]       Past Surgical History:   Procedure Laterality Date    HX CATARACT REMOVAL Bilateral     HX CHOLECYSTECTOMY  1997    NEUROLOGICAL PROCEDURE UNLISTED  7/97    brain lesion removed ? infectious      Social History     Tobacco Use    Smoking status: Never Smoker    Smokeless tobacco: Never Used   Substance Use Topics    Alcohol use:  Yes     Alcohol/week: 2.4 - 3.0 oz     Types: 4 Glasses of wine per week     Comment: 4 glasses of wine per night      Family History   Problem Relation Age of Onset    Cancer Mother         lung    COPD Mother     Cancer Father         lung  Heart Disease Brother         CABGx3    Diabetes Brother     Heart Disease Brother     Diabetes Brother     COPD Brother     Liver Disease Brother         cirrhosis    Alcohol abuse Brother     Heart Disease Paternal Uncle         CAD    Heart Disease Paternal Grandmother         CAD     Current Outpatient Medications   Medication Sig    citalopram (CELEXA) 40 mg tablet TAKE ONE TABLET BY MOUTH ONCE DAILY    lisinopril-hydroCHLOROthiazide (PRINZIDE, ZESTORETIC) 10-12.5 mg per tablet TAKE ONE TABLET BY MOUTH ONCE DAILY    folic acid (FOLVITE) 1 mg tablet Take 1 Tab by mouth daily.  methotrexate (RHEUMATREX) 2.5 mg tablet Take 8 Tabs by mouth every Wednesday.  LORazepam (ATIVAN) 1 mg tablet TAKE 1 & 1/2 (ONE & ONE-HALF) TABLETS BY MOUTH ONCE DAILY AT NIGHT    ergocalciferol (ERGOCALCIFEROL) 50,000 unit capsule Take 1 Cap by mouth every Tuesday.  ibuprofen (MOTRIN) 800 mg tablet Take 800 mg by mouth two (2) times a day.  levothyroxine (SYNTHROID) 200 mcg tablet TAKE ONE TABLET BY MOUTH ONCE DAILY BEFORE BREAKFAST    varicella-zoster recombinant, PF, (SHINGRIX, PF,) 50 mcg/0.5 mL susr injection 0.5mL by IntraMUSCular route once now and then repeat in 2-6 months     No current facility-administered medications for this visit. Allergies   Allergen Reactions    Keflex [Cephalexin] Rash    Monistat 1 [Tioconazole] Swelling    Sulfa (Sulfonamide Antibiotics) Rash        Review of Systems: A complete review of systems was obtained, negative except as described above.     Physical Exam:     Visit Vitals  /77 (BP 1 Location: Left arm, BP Patient Position: Sitting)   Pulse 65   Temp 98 °F (36.7 °C) (Oral)   Resp 20   Ht 5' 3\" (1.6 m)   Wt 199 lb (90.3 kg)   SpO2 96%   BMI 35.25 kg/m²     ECOG PS: 1  General: No distress  Eyes: PERRLA, anicteric sclerae  HENT: Atraumatic, OP clear  Neck: Supple  Lymphatic: No cervical, supraclavicular, or inguinal adenopathy  Respiratory: CTAB, normal respiratory effort  CV: Normal rate, regular rhythm, no murmurs, no peripheral edema  GI: Soft, nontender, nondistended, no masses, no hepatomegaly, no splenomegaly  MS: Normal gait and station. Digits without clubbing or cyanosis. Skin: No rashes, ecchymoses, or petechiae. Normal temperature, turgor, and texture. Psych: Alert, oriented, appropriate affect, normal judgment/insight    Results:     Lab Results   Component Value Date/Time    WBC 5.1 12/19/2018 12:00 PM    HGB 13.5 12/19/2018 12:00 PM    HCT 40.9 12/19/2018 12:00 PM    PLATELET 668 13/83/9024 12:00 PM    MCV 91.7 12/19/2018 12:00 PM    ABS. NEUTROPHILS 3.3 12/19/2018 12:00 PM     Lab Results   Component Value Date/Time    Sodium 144 11/28/2018 04:26 PM    Potassium 4.6 11/28/2018 04:26 PM    Chloride 105 11/28/2018 04:26 PM    CO2 26 11/28/2018 04:26 PM    Glucose 87 11/28/2018 04:26 PM    BUN 22 11/28/2018 04:26 PM    Creatinine 0.83 11/28/2018 04:26 PM    GFR est AA 84 11/28/2018 04:26 PM    GFR est non-AA 73 11/28/2018 04:26 PM    Calcium 9.9 11/28/2018 04:26 PM     Lab Results   Component Value Date/Time    Bilirubin, total 0.3 11/28/2018 04:26 PM    ALT (SGPT) 16 11/28/2018 04:26 PM    AST (SGOT) 17 11/28/2018 04:26 PM    Alk. phosphatase 96 11/28/2018 04:26 PM    Protein, total 7.5 11/28/2018 04:26 PM    Albumin 4.6 11/28/2018 04:26 PM    Globulin 3.7 12/23/2016 01:31 PM     Lab Results   Component Value Date/Time     11/21/2018 02:47 PM    Beta-2 Microglobulin, serum 1.8 11/21/2018 02:47 PM    Sed rate (ESR) 4 11/28/2018 04:26 PM    C-Reactive Protein, Qt 2.7 11/28/2018 04:26 PM    TSH 0.681 12/12/2018 12:00 AM    M-Jere 0.3 (H) 11/21/2018 02:47 PM    M-Jere 0.3 (H) 10/04/2018 11:45 AM    Lipase 204 12/23/2016 01:31 PM     Lab Results   Component Value Date/Time    D-dimer 0.21 12/23/2016 01:31 PM       Records reviewed and summarized above.       Pathology report(s) reviewed    Bone marrow: 12/19/18  Variably cellular marrow with mild monoclonal plasmacytosis. Trilineage hematopoiesis with maturation. See comment. Peripheral blood:   Unremarkable peripheral blood. See CBC data. Comment   The bone marrow is variably cellular ranging <10% to 60% to reveal mild monoclonal, kappa restricted plasmacytosis (10%). Trilineage hematopoiesis with adequate maturation is identified      Interpretation:   Del(1p): Not Detected   Dup(1q): Not Detected   Gains(5, 9, 15): DETECTED   Del(13q): Not Detected   Del(17p)(TP53): Not Detected (See Below)   IGH(Rearrangement): Not Detected   Testing performed by [facility] and interpreted by Socorro Velásquez M.D. Please see scanned outside report in [address] [place] [address] for complete details. Breast biopsy 12/3/18  Breast, left, needle core biopsy:   Invasive mammary carcinoma with ductal and lobular features   Favor Weatherford histologic grade 1   Largest glass slide measurement of invasive carcinoma: 8.5 mm       Radiology report(s) reviewed     Skeletal survey 11/28/18  IMPRESSION  IMPRESSION: Few small calvarial lucencies. No fractures or lytic lesions at risk  for fracture.     Mammogram 11/18    There is persistent architectural distortion within the middle third of the left  breast upper outer quadrant at 1:00. No suspicious calcifications are  identified. There is no skin thickening or nipple retraction.      Left breast ultrasound was performed of the 1:00 location, 5 cm from the nipple. There is a sonographic correlate to the architectural distortion, with  irregular hypoechoic shadowing tissue, measuring up to 9 mm. Ultrasound-guided  biopsy is recommended.     IMPRESSION  IMPRESSION:     1.  BI-RADS Assessment Category 4: Suspicious abnormality - Biopsy should be  performed in the absence of clinical contraindication. Architectural distortion  in the upper outer left breast, with a sonographic correlate. Ultrasound-guided  core needle biopsy is recommended.     Assessment:   1) Paraproteinemia- Smoldering Myeloma? Small IgA M spike  No end organ damage- Bone survey suggests few calvarial lucencies- this needs further evaluation with a PET CT  BM bx shows 10% plasma cells    Together either smoldering myeloma or Active Myeloma if the calvarial lesions are myelomatous    Reviewed the spectrum of plasma cell disorders and implications of diagnosis. In light of her recent breast cancer diagnosis would wait to address treatment/ observation of her plasma cell dyscrasia. She agrees    2) Left breast cancer    9 mm lesion on Mammo/USG  %, OK 3% HER2 mignon negative    [id number]    Pathology, imaging were reviewed. Dr. Tonia Parham notes were reviewed. She is to have a lumpectomy and sentinel lymph node biopsy on 1/29/19  I discussed the natural history of hormone receptor positive early stage breast cancer. She understands that there is a low risk of recurrence despite surgery. We discussed adjuvant radiation, adjuvant endocrine therapy. She may or may not require adjuvant chemotherapy depending on final pathology and characteristics. She is having a PET scan done for #1. This will address the calvarial lucencies and I will follow the results. Plan to see her after her surgery assuming the PET scan is otherwise unremarkable. 3)  Rheumatoid arthritis    Per Dr. Ty Ibarra    4) Depression  Per Dr. Eleno Haywood:     · PET CT  · Lumpectomy as planned then RTC 2-3 weeks post op    I spent more than 50% of this 40-minute visit in counseling  I appreciate the opportunity to participate in Ms. Kwadwo Cortez's care.     Signed By: Briseida James MD

## 2019-01-14 PROBLEM — C90.00 MULTIPLE MYELOMA (HCC): Status: ACTIVE | Noted: 2019-01-14

## 2019-01-21 ENCOUNTER — HOSPITAL ENCOUNTER (OUTPATIENT)
Dept: PREADMISSION TESTING | Age: 69
Discharge: HOME | End: 2019-01-21
Payer: MEDICARE

## 2019-01-21 VITALS
DIASTOLIC BLOOD PRESSURE: 72 MMHG | HEIGHT: 65 IN | RESPIRATION RATE: 16 BRPM | BODY MASS INDEX: 33.05 KG/M2 | WEIGHT: 198.4 LBS | SYSTOLIC BLOOD PRESSURE: 150 MMHG | HEART RATE: 69 BPM | TEMPERATURE: 97.6 F

## 2019-01-21 LAB
ANION GAP SERPL CALC-SCNC: 6 MMOL/L (ref 5–15)
ATRIAL RATE: 63 BPM
BUN SERPL-MCNC: 18 MG/DL (ref 6–20)
BUN/CREAT SERPL: 23 (ref 12–20)
CALCIUM SERPL-MCNC: 8.9 MG/DL (ref 8.5–10.1)
CALCULATED P AXIS, ECG09: 49 DEGREES
CALCULATED R AXIS, ECG10: 74 DEGREES
CALCULATED T AXIS, ECG11: 53 DEGREES
CHLORIDE SERPL-SCNC: 107 MMOL/L (ref 97–108)
CO2 SERPL-SCNC: 29 MMOL/L (ref 21–32)
CREAT SERPL-MCNC: 0.79 MG/DL (ref 0.55–1.02)
DIAGNOSIS, 93000: NORMAL
GLUCOSE SERPL-MCNC: 124 MG/DL (ref 65–100)
P-R INTERVAL, ECG05: 172 MS
POTASSIUM SERPL-SCNC: 4 MMOL/L (ref 3.5–5.1)
Q-T INTERVAL, ECG07: 454 MS
QRS DURATION, ECG06: 98 MS
QTC CALCULATION (BEZET), ECG08: 464 MS
SODIUM SERPL-SCNC: 142 MMOL/L (ref 136–145)
VENTRICULAR RATE, ECG03: 63 BPM

## 2019-01-21 PROCEDURE — 80048 BASIC METABOLIC PNL TOTAL CA: CPT

## 2019-01-21 PROCEDURE — 93005 ELECTROCARDIOGRAM TRACING: CPT

## 2019-01-21 PROCEDURE — 36415 COLL VENOUS BLD VENIPUNCTURE: CPT

## 2019-01-21 NOTE — PERIOP NOTES
OFFICE CALLED FOR ORDERS DURING PAT APPOINTMENT. DO NOT EAT OR DRINK ANYTHING AFTER MIDNIGHT, except as instructed THE NIGHT BEFORE SURGERY. PT GIVEN OPPORTUNITY TO ASK ADDITIONAL QUESTIONS. Patient given CHG wipes and instruction sheet, instructions for use reviewed with patient. Patient given surgical site infection information FAQs handout and hand hygiene tips sheet. Pre-operative instructions reviewed and patient verbalizes understanding of instructions. Patient has been given the opportunity to ask additional questions.

## 2019-01-24 ENCOUNTER — HOSPITAL ENCOUNTER (OUTPATIENT)
Dept: PET IMAGING | Age: 69
Discharge: HOME | End: 2019-01-24
Attending: INTERNAL MEDICINE
Payer: MEDICARE

## 2019-01-24 VITALS — BODY MASS INDEX: 34.15 KG/M2 | HEIGHT: 64 IN | WEIGHT: 200 LBS

## 2019-01-24 DIAGNOSIS — C90.00 MULTIPLE MYELOMA, REMISSION STATUS UNSPECIFIED (HCC): ICD-10-CM

## 2019-01-24 PROCEDURE — A9552 F18 FDG: HCPCS

## 2019-01-24 RX ORDER — SODIUM CHLORIDE 0.9 % (FLUSH) 0.9 %
10 SYRINGE (ML) INJECTION
Status: COMPLETED | OUTPATIENT
Start: 2019-01-24 | End: 2019-01-24

## 2019-01-24 RX ADMIN — Medication 10 ML: at 08:41

## 2019-01-25 NOTE — PROGRESS NOTES
Call to patient, HIPAA verified. Advised of PET scan results per provider note, patient to follow up following breast surgery, verbalized understanding and thanked for call.

## 2019-01-28 ENCOUNTER — HOSPITAL ENCOUNTER (OUTPATIENT)
Dept: LAB | Age: 69
Discharge: HOME | End: 2019-01-28
Payer: MEDICARE

## 2019-01-28 ENCOUNTER — OFFICE VISIT (OUTPATIENT)
Dept: RHEUMATOLOGY | Age: 69
End: 2019-01-28

## 2019-01-28 VITALS
RESPIRATION RATE: 16 BRPM | HEIGHT: 64 IN | OXYGEN SATURATION: 94 % | BODY MASS INDEX: 33.84 KG/M2 | HEART RATE: 73 BPM | SYSTOLIC BLOOD PRESSURE: 114 MMHG | TEMPERATURE: 97.9 F | WEIGHT: 198.2 LBS | DIASTOLIC BLOOD PRESSURE: 68 MMHG

## 2019-01-28 DIAGNOSIS — M35.3 PMR (POLYMYALGIA RHEUMATICA) (HCC): ICD-10-CM

## 2019-01-28 DIAGNOSIS — Z79.60 LONG-TERM USE OF IMMUNOSUPPRESSANT MEDICATION: ICD-10-CM

## 2019-01-28 DIAGNOSIS — C50.412 MALIGNANT NEOPLASM OF UPPER-OUTER QUADRANT OF LEFT BREAST IN FEMALE, ESTROGEN RECEPTOR POSITIVE (HCC): Primary | ICD-10-CM

## 2019-01-28 DIAGNOSIS — E55.9 VITAMIN D DEFICIENCY: ICD-10-CM

## 2019-01-28 DIAGNOSIS — Z17.0 MALIGNANT NEOPLASM OF UPPER-OUTER QUADRANT OF LEFT BREAST IN FEMALE, ESTROGEN RECEPTOR POSITIVE (HCC): Primary | ICD-10-CM

## 2019-01-28 DIAGNOSIS — M06.09 SERONEGATIVE RHEUMATOID ARTHRITIS OF MULTIPLE SITES (HCC): Primary | ICD-10-CM

## 2019-01-28 DIAGNOSIS — C90.00 MULTIPLE MYELOMA, REMISSION STATUS UNSPECIFIED (HCC): ICD-10-CM

## 2019-01-28 PROCEDURE — 80053 COMPREHEN METABOLIC PANEL: CPT

## 2019-01-28 PROCEDURE — 36415 COLL VENOUS BLD VENIPUNCTURE: CPT

## 2019-01-28 NOTE — PROGRESS NOTES
REASON FOR VISIT This is a follow-up visit for Ms. Lucrecia Lucio for Seronegative Rheumatoid Arthritis. Inflammatory arthritis phenotype includes: Anti-CCP positive: no 
Rheumatoid factor positive: no 
Erosive disease: no 
Extra-articular manifestations include: smoldering myeloma Immunosuppression Screening (10/04/2018): Quantiferon TB: negative PPD:  Not performed Hepatitis B: negative Hepatitis C: negative Therapy History includes: 
Current DMARD therapy include: methotrexate 20 mg every Tuesday (10/15/2018 to present) Prior DMARD therapy include: none Discontinued DMARDs because of inefficacy: None Discontinued DMARDs because of side effects: None Immunization History Administered Date(s) Administered  Influenza Vaccine 10/10/2013, 10/14/2015, 10/15/2018  Influenza Vaccine Split 09/26/2012  Influenza Vaccine Whole 10/22/2011  Pneumococcal Polysaccharide (PPSV-23) 05/05/2015 Patient Active Problem List  
Diagnosis Code  Hypothyroidism E03.9  
 IBS (irritable bowel syndrome) K58.9  
 HTN (hypertension) I10  
 Graves disease E05.00  Moderate major depression (HCC) F32.1  PMR (polymyalgia rheumatica) ([facility]) M35.3  Iliotibial band syndrome of both sides M76.31, M76.32  
 Long term current use of non-steroidal anti-inflammatories (NSAID) Z79.1  Seronegative rheumatoid arthritis of multiple sites ([facility]) M06.09  
 Monoclonal gammopathy of unknown significance (MGUS) D47.2  Vitamin D deficiency E55.9  Severe obesity ([facility]) E66.01  
 Long-term use of immunosuppressant medication Z79.899  Malignant neoplasm of left female breast ([facility] C50.912  Multiple myeloma (HCC) C90.00 Jessica Haley 
 
Ms. Lucrecia Lucio returns for a follow-up. On her last visit, I increased methotrexate to 20 mg every Tuesday, which she has taken with good tolerance.  She had interval left breast cancer diagnosis and will undergo lumpectomy tomorrow so is holding methotrexate. Bone marrow biopsy suggested smoldering myeloma. Today, she feels good. She denies pain, swelling, or stiffness in her joints while on the higher dose of methotrexate. Her brother passed away. She has two doggies left. She denies fever, weight loss, blurred vision, vision loss, oral ulcers, ankle swelling, dry cough, dyspnea, nausea, vomiting, dysphagia, abdominal pain, black or bloody stool, fall since last visit, rash, easy bruising and increased thirst. 
 
Ms. Good Curiel has continued her medications for arthritis and reports good tolerance without significant side effects. Last toxicity monitoring by blood work was done on 11/28/2018 and did not reveal any significant adverse effects, except . Most recent inflammatory markers from 11/28/2018 revealed a ESR 3 mm/hr (previously 4 mm/hr) and CRP 2.7 mg/L (previously 1.8 mg/L). The patient has not had any interval hospital admissions, infections, except BMB and left breast biopsy. REVIEW OF SYSTEMS A comprehensive review of systems was performed and pertinent results are documented in the HPI, review of systems is otherwise non-contributory. PAST MEDICAL HISTORY She has a past medical history of Breast cancer, left ([address] Depression, Goiter, Hearing loss, Hypertension, Hypothyroid, IBS (irritable bowel syndrome), and Rheumatoid arthritis ([address] FAMILY HISTORY Her family history includes Alcohol abuse in her brother; COPD in her brother and mother; Cancer in her father and mother; Diabetes in her brother and brother; Heart Disease in her brother, brother, paternal grandmother, and paternal uncle; Liver Disease in her brother. SOCIAL HISTORY She reports that  has never smoked. she has never used smokeless tobacco. She reports that she drinks about 3.0 - 3.6 oz of alcohol per week. She reports that she does not use drugs. MEDICATIONS Current Outpatient Medications Medication Sig Dispense Refill  folic acid (FOLVITE) 1 mg tablet Take 1 Tab by mouth daily. 90 Tab 0  
 LORazepam (ATIVAN) 1 mg tablet TAKE 1 & 1/2 (ONE & ONE-HALF) TABLETS BY MOUTH ONCE DAILY AT NIGHT (Patient taking differently: Take 1.5 mg by mouth nightly. TAKE 1 & 1/2 (ONE & ONE-HALF) TABLETS BY MOUTH ONCE DAILY AT NIGHT) 45 Tab 1  
 ergocalciferol (ERGOCALCIFEROL) 50,000 unit capsule Take 1 Cap by mouth every Tuesday. 12 Cap 3  varicella-zoster recombinant, PF, (SHINGRIX, PF,) 50 mcg/0.5 mL susr injection 0.5mL by IntraMUSCular route once now and then repeat in 2-6 months 0.5 mL 1  
 lisinopril-hydroCHLOROthiazide (PRINZIDE, ZESTORETIC) 10-12.5 mg per tablet TAKE ONE TABLET BY MOUTH ONCE DAILY 90 Tab 3  
 methotrexate (RHEUMATREX) 2.5 mg tablet Take 8 Tabs by mouth every Wednesday. 96 Tab 0  ibuprofen (MOTRIN) 800 mg tablet Take 800 mg by mouth two (2) times a day.  levothyroxine (SYNTHROID) 200 mcg tablet TAKE ONE TABLET BY MOUTH ONCE DAILY BEFORE BREAKFAST 90 Tab 3 ALLERGIES Allergies Allergen Reactions  Keflex [Cephalexin] Rash  Monistat 1 [Tioconazole] Swelling  Sulfa (Sulfonamide Antibiotics) Rash PHYSICAL EXAMINATION Visit Vitals /68 (BP 1 Location: Left arm, BP Patient Position: Sitting) Pulse 73 Temp 97.9 °F (36.6 °C) (Oral) Resp 16 Ht 5' 4\" (1.626 m) Wt 198 lb 3.2 oz (89.9 kg) SpO2 94% BMI 34.02 kg/m² Body mass index is 34.02 kg/m². General: Patient is alert, oriented x 3, not in acute distress HEENT:  
Sclerae are not injected and appear moist. 
There is no alopecia. Neck is supple Cardiovascular: 
Heart is regular rate and rhythm, no murmurs. Chest: 
Lungs are clear to auscultation bilaterally. Extremities: 
Free of clubbing, cyanosis, edema Neurological exam: Muscle strength is full in upper and lower extremities Skin exam: There are no rashes, no alopecia, no discoid lesions, no active Raynaud's, no livedo reticularis, no periungual erythema. Musculoskeletal exam: A comprehensive musculoskeletal exam was performed for all joints of each upper and lower extremity and assessed for swelling, tenderness and range of motion. Positive results are documented as below: 
 
Bilateral Sebastián and [place] nodes. Bilateral trochanteric bursa tenderness. Z-Deformities:   no 
[place]ck Deformities:  no 
Boutonierre's Deformities:  no 
Ulnar Deviation:   no 
MCP Subluxation:  no 
  
Joint Count 1/28/2019 11/28/2018 10/4/2018 Patient pain (0-100) 40 70 75 MHAQ 0.5 0 1 Left shoulder - Tender - 1 1 Left shoulder - Swollen - 1 0 Left wrist- Tender 0 1 - Left wrist- Swollen 0 1 - Left 1st MCP - Tender - 1 1 Left 1st MCP - Swollen - 1 1 Left 2nd MCP - Tender - 1 1 Left 2nd MCP - Swollen - 1 1 Left 3rd MCP - Tender - - 1 Left 3rd MCP - Swollen - - 1 Left 5th MCP - Tender - - 1 Left 5th MCP - Swollen - - 1 Left 2nd PIP - Tender - 1 1 Left 2nd PIP - Swollen - 1 1 Left 3rd PIP - Tender - 1 1 Left 3rd PIP - Swollen - 1 1 Right shoulder - Tender - 1 1 Right shoulder - Swollen - 0 0 Right wrist- Tender - - 1 Right wrist- Swollen - - 1 Right 1st MCP - Tender - 1 1 Right 1st MCP - Swollen - 1 1 Right 2nd MCP - Tender - 1 1 Right 2nd MCP - Swollen - 1 1 Right thumb IP - Tender - 1 1 Right thumb IP - Swollen - 1 0 Right 2nd PIP - Tender - 1 1 Right 2nd PIP - Swollen - 1 1 Right 3rd PIP - Tender - - 1 Right 3rd PIP - Swollen - - 1 Right 4th PIP - Tender - - 1 Right 4th PIP - Swollen - - 1 Right 5th PIP - Tender - - 1 Right 5th PIP - Swollen - - 1 Tender Joint Count (Total) 0 11 16 Swollen Joint Count (Total) 0 10 13 Physician Assessment (0-10) 0 4 5 Patient Assessment (0-10) 2.5 5 7.5 CDAI Total (calculated) 2.5 30 41.5 DATA REVIEW Laboratory Recent laboratory results were reviewed, summarized, and discussed with the patient. Imaging Musculoskeletal Ultrasound None Radiographs Skeletal Survey 11/28/2018: Few small calvarial lucencies. No fractures or lytic lesions at risk for fracture. Bilateral Shoulder 10/04/2018: RIGHT: There is prominent AC joint degeneration with spurring and loss of joint space. There is some mild deformity, chronic in nature of the tuberosity. No fracture or dislocation, no bony erosion, the bone is normal in mineral contents. No soft tissue calcifications. LEFT: The bone is normal in mineral contents. There is some mild chronic deformity of the tuberosity and AC joint degeneration. There are no soft tissue calcification bony erosion fracture or dislocation. Bilateral Hand 10/04/2018: RIGHT: no acute fracture or dislocation. Alignment is anatomic. Mild degenerative changes are seen in the interphalangeal joint of the thumb. No erosions are seen. No abnormality seen in the soft tissues. LEFT: no acute fracture or dislocation. Alignment is anatomic. Moderate to severe degenerative changes are present in the first Aia 16 joint. A cystic lucency in the ace of the first metacarpal likely represents a subchondral cyst. No clear erosions are seen. No abnormality seen in the soft tissues. Bilateral Foot 10/04/2018: RIGHT:  no fracture or other acute osseous or articular abnormality. A small plantar calcaneal heel spur is noted. No erosions or joint space narrowing are present. The soft tissues are within normal limits. LEFT: no acute fracture or dislocation. Alignment is anatomic. A small plantar calcaneal heel spur is noted. No erosions or joint space narrowing are present. . No abnormality is seen in the soft tissues.  
 
CT Imaging PET/CT Scan 1/24/2019: HEAD/NECK: No apparent foci of abnormal hypermetabolism.  Cerebral evaluation is limited by normal intense activity. CHEST: No foci of abnormal hypermetabolism. The known small breast cancer at 12:00 in the left breast demonstrates no increased metabolic activity. ABDOMEN/PELVIS: No foci of abnormal hypermetabolism. SKELETON: No foci of abnormal hypermetabolism in the axial and visualized appendicular skeleton. Lower extremities:. Imaging of the lower extremities is unremarkable. There is muscular activity noted. CT Head without contrast 9/13/2017: Prior right occipital craniectomy. Encephalomalacia in the right cerebellar region. . There is no significant white matter disease. There is no intracranial hemorrhage, extra-axial collection, mass, mass effect or midline shift. The basilar cisterns are open. No acute infarct is identified. The visualized portions of the paranasal sinuses and mastoid air cells are clear MR Imaging MRI Breasts with and without contrast 1/07/2019: There is minimal background parenchymal enhancement and the breasts are almost entirely fat. A few sub-5 mm foci of enhancement are scattered bilaterally. Right breast: No suspicious enhancing foci. No axillary or internal mammary chain lymphadenopathy. Left breast: A vague area of enhancement around the biopsy clip in the anterior third at 12:00 does not reach threshold enhancement. This measures approximately 9 x 9 mm, and correlates well with the small area of architectural distortion on mammography and subtle architectural distortion and shadowing on ultrasound. No axillary or internal mammary chain lymphadenopathy. DXA DXA 5/12/2015: (excluded distal radius) lumbar spine L1-L4 T score 0.4 (BMD 1.241 g/cm2), left femoral neck T score: 0.5 (1.109 g/cm2), left total hip T score: 1.0 (1.139 g/cm2), right femoral neck T score: 0.2 (1.062 g/cm2), right total hip T score: 1.1 (1.145 g/cm2). FRAX score N/A % probability in 10 years for major osteoporotic fracture and N/A % 10 year probability of hip fracture. PATHOLOGY Bone Marrow Biopsy 12/19/2018: Bone marrow: Variably cellular marrow with mild monoclonal plasmacytosis. Trilineage hematopoiesis with maturation. Breast, left, needle core biopsy 12/03/2018: Invasive mammary carcinoma with ductal and lobular features [place] histologic grade 1. Largest glass slide measurement of invasive carcinoma: 8.5 mm. ER pos AR pos (weak) HER 2 neg. ASSESSMENT AND PLAN This is a follow-up visit for Ms. Claudell Acton. 1) Seronegative Rheumatoid Arthritis with secondary Polymyalgia Rheumatica. She is maintaind on methotrexate 20 mg every Wednesday with clinical improvement and good tolerance but has held treatment now due upcoming lumpectomy, which is unnecessary. Her CDAI was 2.5 (previously 30, 41.5) with 0 tender and 0 swollen joints, consistent with remission. I asked her to resume her methotrexate after her procedure. Labs today. 2) Polymyalgia Rheumatica. She had bilateral shoulder and pelvic girdle symptoms. See #1. 3) Long Term Use of Immunosuppressants. The patient remains on immunomodulatory medications (methotrexate) and requires frequent toxicity monitoring by blood work. Respective labs were ordered (CBC and CMP). 4) Long Term Use of NSAIDs She is holding ibuprofen for her back while on prednisone. 5) MGUS. Immunofixation shows IgA monoclonal protein with kappa light chain. M-spike 0.3. She was evaluated by Dr. Ramiro Smith. Bone marrow biopsy showed smoldering myeloma. 6) Vitamin D Deficiency. Her vitamin D level was 32.0 (previously 21.7). She is on weekly ergocalciferol 50,000. I will check her level on follow up. 7) Bilateral iliotibial Band Syndrome. This was not an active issue today. I will refer her to physical therapy if she is symptomatic. 8) Left Breast Cancer Stage 1. She is scheduled for lumpectomy.   
 
The patient voiced understanding of the aforementioned assessment and plan. Summary of plan was provided in the After Visit Summary patient instructions. TODAY'S ORDERS Orders Placed This Encounter  METABOLIC PANEL, COMPREHENSIVE Future Appointments Date Time Provider Geoff Olga Lidia 1/29/2019 11:00 AM [facility] RM 3 [facility]   
1/29/2019  1:00 PM Rcihie Singh MD [address] [place] [address] Way  
2/11/2019 10:00 AM Richie Singh MD [facility]  
2/14/2019  9:00 AM Rose Mary Ojeda MD [place] [address]  
4/29/2019  8:20 AM Zen Hernandez MD [address] [place] [address] Jazmin Nails MD, [facility] Adult Rheumatology Rheumatology Ultrasound Certified [facility] Part of [address], [address] [place] [address] Road Phone [phone] Fax [phone]

## 2019-01-28 NOTE — PROGRESS NOTES
Identified pt with two pt identifiers(name and ). Reviewed record in preparation for visit and have obtained necessary documentation. Chief Complaint Patient presents with  Arthritis 2 month f/u Health Maintenance Due Topic  DTaP/Tdap/Td series (1 - Tdap)  Shingrix Vaccine Age 50> (1 of 2)  GLAUCOMA SCREENING Q2Y  Pneumococcal 65+ High/Highest Risk (2 of 2 - PCV13) Visit Vitals /68 (BP 1 Location: Left arm, BP Patient Position: Sitting) Pulse 73 Temp 97.9 °F (36.6 °C) (Oral) Resp 16 Ht 5' 4\" (1.626 m) Wt 198 lb 3.2 oz (89.9 kg) SpO2 94% BMI 34.02 kg/m² Coordination of Care Questionnaire: 
:  
1) Have you been to an emergency room, urgent care, or hospitalized since your last visit?   no If yes, where when, and reason for visit? 2. Have seen or consulted any other health care provider since your last visit? YES If yes, where when, and reason for visit? PET Scan, checked for bone cancer (negative). Dr. Nelson Laguna 2019 3) Do you have an Advanced Directive/ Living Will in place? YES If yes, do we have a copy on file NO If no, would you like information NO Patient is accompanied by self I have received verbal consent from Lisset Medina to discuss any/all medical information while they are present in the room.

## 2019-01-29 ENCOUNTER — APPOINTMENT (OUTPATIENT)
Dept: NUCLEAR MEDICINE | Age: 69
End: 2019-01-29
Attending: SURGERY
Payer: MEDICARE

## 2019-01-29 ENCOUNTER — ANESTHESIA (OUTPATIENT)
Dept: MEDSURG UNIT | Age: 69
End: 2019-01-29
Payer: MEDICARE

## 2019-01-29 ENCOUNTER — ANESTHESIA EVENT (OUTPATIENT)
Dept: MEDSURG UNIT | Age: 69
End: 2019-01-29
Payer: MEDICARE

## 2019-01-29 ENCOUNTER — HOSPITAL ENCOUNTER (OUTPATIENT)
Age: 69
Setting detail: OUTPATIENT SURGERY
Discharge: HOME | End: 2019-01-29
Attending: SURGERY | Admitting: SURGERY
Payer: MEDICARE

## 2019-01-29 VITALS
RESPIRATION RATE: 14 BRPM | BODY MASS INDEX: 33.8 KG/M2 | TEMPERATURE: 97.4 F | HEIGHT: 64 IN | OXYGEN SATURATION: 95 % | DIASTOLIC BLOOD PRESSURE: 73 MMHG | WEIGHT: 198 LBS | SYSTOLIC BLOOD PRESSURE: 153 MMHG | HEART RATE: 67 BPM

## 2019-01-29 DIAGNOSIS — C50.412 MALIGNANT NEOPLASM OF UPPER-OUTER QUADRANT OF LEFT BREAST IN FEMALE, ESTROGEN RECEPTOR POSITIVE (HCC): ICD-10-CM

## 2019-01-29 DIAGNOSIS — C50.919 BREAST CANCER (HCC): ICD-10-CM

## 2019-01-29 DIAGNOSIS — Z17.0 MALIGNANT NEOPLASM OF UPPER-OUTER QUADRANT OF LEFT BREAST IN FEMALE, ESTROGEN RECEPTOR POSITIVE (HCC): ICD-10-CM

## 2019-01-29 DIAGNOSIS — C50.912 MALIGNANT NEOPLASM OF LEFT FEMALE BREAST, UNSPECIFIED ESTROGEN RECEPTOR STATUS, UNSPECIFIED SITE OF BREAST (HCC): Primary | ICD-10-CM

## 2019-01-29 LAB
ALBUMIN SERPL-MCNC: 4.3 G/DL (ref 3.6–4.8)
ALBUMIN/GLOB SERPL: 1.3 {RATIO} (ref 1.2–2.2)
ALP SERPL-CCNC: 101 IU/L (ref 39–117)
ALT SERPL-CCNC: 23 IU/L (ref 0–32)
AST SERPL-CCNC: 20 IU/L (ref 0–40)
BILIRUB SERPL-MCNC: 0.6 MG/DL (ref 0–1.2)
BUN SERPL-MCNC: 23 MG/DL (ref 8–27)
BUN/CREAT SERPL: 34 (ref 12–28)
CALCIUM SERPL-MCNC: 9.4 MG/DL (ref 8.7–10.3)
CHLORIDE SERPL-SCNC: 103 MMOL/L (ref 96–106)
CO2 SERPL-SCNC: 24 MMOL/L (ref 20–29)
CREAT SERPL-MCNC: 0.68 MG/DL (ref 0.57–1)
GLOBULIN SER CALC-MCNC: 3.3 G/DL (ref 1.5–4.5)
GLUCOSE SERPL-MCNC: 85 MG/DL (ref 65–99)
POTASSIUM SERPL-SCNC: 4.1 MMOL/L (ref 3.5–5.2)
PROT SERPL-MCNC: 7.6 G/DL (ref 6–8.5)
SODIUM SERPL-SCNC: 140 MMOL/L (ref 134–144)

## 2019-01-29 PROCEDURE — 77030010509 HC AIRWY LMA MSK TELE -A: Performed by: ANESTHESIOLOGY

## 2019-01-29 PROCEDURE — 74011000250 HC RX REV CODE- 250: Performed by: SURGERY

## 2019-01-29 PROCEDURE — 77030002996 HC SUT SLK J&J -A: Performed by: SURGERY

## 2019-01-29 PROCEDURE — 88331 PATH CONSLTJ SURG 1 BLK 1SPC: CPT

## 2019-01-29 PROCEDURE — 76060000062 HC AMB SURG ANES 1 TO 1.5 HR: Performed by: SURGERY

## 2019-01-29 PROCEDURE — 77030011640 HC PAD GRND REM COVD -A: Performed by: SURGERY

## 2019-01-29 PROCEDURE — 77030039266 HC ADH SKN EXOFIN S2SG -A: Performed by: SURGERY

## 2019-01-29 PROCEDURE — 74011250636 HC RX REV CODE- 250/636

## 2019-01-29 PROCEDURE — 74011000250 HC RX REV CODE- 250

## 2019-01-29 PROCEDURE — 77030031139 HC SUT VCRL2 J&J -A: Performed by: SURGERY

## 2019-01-29 PROCEDURE — 74011250637 HC RX REV CODE- 250/637: Performed by: SURGERY

## 2019-01-29 PROCEDURE — 77030002933 HC SUT MCRYL J&J -A: Performed by: SURGERY

## 2019-01-29 PROCEDURE — 77030018836 HC SOL IRR NACL ICUM -A: Performed by: SURGERY

## 2019-01-29 PROCEDURE — C9290 INJ, BUPIVACAINE LIPOSOME: HCPCS | Performed by: SURGERY

## 2019-01-29 PROCEDURE — A4648 IMPLANTABLE TISSUE MARKER: HCPCS | Performed by: SURGERY

## 2019-01-29 PROCEDURE — 88342 IMHCHEM/IMCYTCHM 1ST ANTB: CPT

## 2019-01-29 PROCEDURE — 76030000001 HC AMB SURG OR TIME 1 TO 1.5: Performed by: SURGERY

## 2019-01-29 PROCEDURE — 77030011267 HC ELECTRD BLD COVD -A: Performed by: SURGERY

## 2019-01-29 PROCEDURE — A9520 TC99 TILMANOCEPT DIAG 0.5MCI: HCPCS

## 2019-01-29 PROCEDURE — 77030038213 HC SUPP BRA COMPRSS PSTOP COND -B: Performed by: SURGERY

## 2019-01-29 PROCEDURE — 77030018673: Performed by: SURGERY

## 2019-01-29 PROCEDURE — 74011250636 HC RX REV CODE- 250/636: Performed by: SURGERY

## 2019-01-29 PROCEDURE — 77030034626 HC LIGASURE SM JAW SEAL OPN SURG COVD -E: Performed by: SURGERY

## 2019-01-29 PROCEDURE — 76210000037 HC AMBSU PH I REC 2 TO 2.5 HR: Performed by: SURGERY

## 2019-01-29 PROCEDURE — 88307 TISSUE EXAM BY PATHOLOGIST: CPT

## 2019-01-29 PROCEDURE — 77030032490 HC SLV COMPR SCD KNE COVD -B: Performed by: SURGERY

## 2019-01-29 PROCEDURE — 77030034556 HC PRB MARGN DETECT LUMPECTMY DISP DUNE -G: Performed by: SURGERY

## 2019-01-29 PROCEDURE — 74011250636 HC RX REV CODE- 250/636: Performed by: ANESTHESIOLOGY

## 2019-01-29 RX ORDER — FENTANYL CITRATE 50 UG/ML
50 INJECTION, SOLUTION INTRAMUSCULAR; INTRAVENOUS AS NEEDED
Status: DISCONTINUED | OUTPATIENT
Start: 2019-01-29 | End: 2019-01-29 | Stop reason: HOSPADM

## 2019-01-29 RX ORDER — MIDAZOLAM HYDROCHLORIDE 1 MG/ML
INJECTION, SOLUTION INTRAMUSCULAR; INTRAVENOUS AS NEEDED
Status: DISCONTINUED | OUTPATIENT
Start: 2019-01-29 | End: 2019-01-29 | Stop reason: HOSPADM

## 2019-01-29 RX ORDER — ROPIVACAINE HYDROCHLORIDE 5 MG/ML
150 INJECTION, SOLUTION EPIDURAL; INFILTRATION; PERINEURAL AS NEEDED
Status: DISCONTINUED | OUTPATIENT
Start: 2019-01-29 | End: 2019-01-29 | Stop reason: HOSPADM

## 2019-01-29 RX ORDER — SODIUM CHLORIDE 9 MG/ML
25 INJECTION, SOLUTION INTRAVENOUS CONTINUOUS
Status: DISCONTINUED | OUTPATIENT
Start: 2019-01-29 | End: 2019-01-29 | Stop reason: HOSPADM

## 2019-01-29 RX ORDER — ROCURONIUM BROMIDE 10 MG/ML
INJECTION, SOLUTION INTRAVENOUS AS NEEDED
Status: DISCONTINUED | OUTPATIENT
Start: 2019-01-29 | End: 2019-01-29 | Stop reason: HOSPADM

## 2019-01-29 RX ORDER — SODIUM CHLORIDE, SODIUM LACTATE, POTASSIUM CHLORIDE, CALCIUM CHLORIDE 600; 310; 30; 20 MG/100ML; MG/100ML; MG/100ML; MG/100ML
1000 INJECTION, SOLUTION INTRAVENOUS CONTINUOUS
Status: DISCONTINUED | OUTPATIENT
Start: 2019-01-29 | End: 2019-01-29 | Stop reason: HOSPADM

## 2019-01-29 RX ORDER — LIDOCAINE HYDROCHLORIDE 10 MG/ML
0.1 INJECTION, SOLUTION EPIDURAL; INFILTRATION; INTRACAUDAL; PERINEURAL AS NEEDED
Status: DISCONTINUED | OUTPATIENT
Start: 2019-01-29 | End: 2019-01-29 | Stop reason: HOSPADM

## 2019-01-29 RX ORDER — LIDOCAINE HYDROCHLORIDE 20 MG/ML
INJECTION, SOLUTION EPIDURAL; INFILTRATION; INTRACAUDAL; PERINEURAL AS NEEDED
Status: DISCONTINUED | OUTPATIENT
Start: 2019-01-29 | End: 2019-01-29 | Stop reason: HOSPADM

## 2019-01-29 RX ORDER — CLINDAMYCIN PHOSPHATE 600 MG/50ML
INJECTION INTRAVENOUS AS NEEDED
Status: DISCONTINUED | OUTPATIENT
Start: 2019-01-29 | End: 2019-01-29 | Stop reason: HOSPADM

## 2019-01-29 RX ORDER — DEXMEDETOMIDINE HYDROCHLORIDE 4 UG/ML
INJECTION, SOLUTION INTRAVENOUS AS NEEDED
Status: DISCONTINUED | OUTPATIENT
Start: 2019-01-29 | End: 2019-01-29 | Stop reason: HOSPADM

## 2019-01-29 RX ORDER — SODIUM CHLORIDE, SODIUM LACTATE, POTASSIUM CHLORIDE, CALCIUM CHLORIDE 600; 310; 30; 20 MG/100ML; MG/100ML; MG/100ML; MG/100ML
100 INJECTION, SOLUTION INTRAVENOUS CONTINUOUS
Status: DISCONTINUED | OUTPATIENT
Start: 2019-01-29 | End: 2019-01-29 | Stop reason: HOSPADM

## 2019-01-29 RX ORDER — MIDAZOLAM HYDROCHLORIDE 1 MG/ML
0.5 INJECTION, SOLUTION INTRAMUSCULAR; INTRAVENOUS
Status: DISCONTINUED | OUTPATIENT
Start: 2019-01-29 | End: 2019-01-29 | Stop reason: HOSPADM

## 2019-01-29 RX ORDER — FENTANYL CITRATE 50 UG/ML
25 INJECTION, SOLUTION INTRAMUSCULAR; INTRAVENOUS
Status: DISCONTINUED | OUTPATIENT
Start: 2019-01-29 | End: 2019-01-29 | Stop reason: HOSPADM

## 2019-01-29 RX ORDER — OXYCODONE AND ACETAMINOPHEN 5; 325 MG/1; MG/1
1 TABLET ORAL
Qty: 30 TAB | Refills: 0 | Status: SHIPPED | OUTPATIENT
Start: 2019-01-29 | End: 2019-02-20

## 2019-01-29 RX ORDER — MIDAZOLAM HYDROCHLORIDE 1 MG/ML
1 INJECTION, SOLUTION INTRAMUSCULAR; INTRAVENOUS AS NEEDED
Status: DISCONTINUED | OUTPATIENT
Start: 2019-01-29 | End: 2019-01-29 | Stop reason: HOSPADM

## 2019-01-29 RX ORDER — DEXAMETHASONE SODIUM PHOSPHATE 4 MG/ML
INJECTION, SOLUTION INTRA-ARTICULAR; INTRALESIONAL; INTRAMUSCULAR; INTRAVENOUS; SOFT TISSUE AS NEEDED
Status: DISCONTINUED | OUTPATIENT
Start: 2019-01-29 | End: 2019-01-29 | Stop reason: HOSPADM

## 2019-01-29 RX ORDER — PROPOFOL 10 MG/ML
INJECTION, EMULSION INTRAVENOUS AS NEEDED
Status: DISCONTINUED | OUTPATIENT
Start: 2019-01-29 | End: 2019-01-29 | Stop reason: HOSPADM

## 2019-01-29 RX ORDER — MORPHINE SULFATE 10 MG/ML
2 INJECTION, SOLUTION INTRAMUSCULAR; INTRAVENOUS
Status: DISCONTINUED | OUTPATIENT
Start: 2019-01-29 | End: 2019-01-29 | Stop reason: HOSPADM

## 2019-01-29 RX ORDER — ONDANSETRON 4 MG/1
4 TABLET, ORALLY DISINTEGRATING ORAL
Qty: 5 TAB | Refills: 1 | Status: SHIPPED | OUTPATIENT
Start: 2019-01-29 | End: 2019-02-20

## 2019-01-29 RX ORDER — FENTANYL CITRATE 50 UG/ML
INJECTION, SOLUTION INTRAMUSCULAR; INTRAVENOUS AS NEEDED
Status: DISCONTINUED | OUTPATIENT
Start: 2019-01-29 | End: 2019-01-29 | Stop reason: HOSPADM

## 2019-01-29 RX ORDER — OXYCODONE HYDROCHLORIDE 5 MG/1
5 TABLET ORAL AS NEEDED
Status: DISCONTINUED | OUTPATIENT
Start: 2019-01-29 | End: 2019-01-29 | Stop reason: HOSPADM

## 2019-01-29 RX ORDER — SODIUM CHLORIDE, SODIUM LACTATE, POTASSIUM CHLORIDE, CALCIUM CHLORIDE 600; 310; 30; 20 MG/100ML; MG/100ML; MG/100ML; MG/100ML
INJECTION, SOLUTION INTRAVENOUS
Status: DISCONTINUED | OUTPATIENT
Start: 2019-01-29 | End: 2019-01-29 | Stop reason: HOSPADM

## 2019-01-29 RX ORDER — ONDANSETRON 2 MG/ML
INJECTION INTRAMUSCULAR; INTRAVENOUS AS NEEDED
Status: DISCONTINUED | OUTPATIENT
Start: 2019-01-29 | End: 2019-01-29 | Stop reason: HOSPADM

## 2019-01-29 RX ORDER — OXYCODONE AND ACETAMINOPHEN 5; 325 MG/1; MG/1
1 TABLET ORAL ONCE
Status: COMPLETED | OUTPATIENT
Start: 2019-01-29 | End: 2019-01-29

## 2019-01-29 RX ORDER — PHENYLEPHRINE HCL IN 0.9% NACL 0.4MG/10ML
SYRINGE (ML) INTRAVENOUS AS NEEDED
Status: DISCONTINUED | OUTPATIENT
Start: 2019-01-29 | End: 2019-01-29 | Stop reason: HOSPADM

## 2019-01-29 RX ORDER — ONDANSETRON 2 MG/ML
4 INJECTION INTRAMUSCULAR; INTRAVENOUS AS NEEDED
Status: DISCONTINUED | OUTPATIENT
Start: 2019-01-29 | End: 2019-01-29 | Stop reason: HOSPADM

## 2019-01-29 RX ADMIN — FENTANYL CITRATE 50 MCG: 50 INJECTION, SOLUTION INTRAMUSCULAR; INTRAVENOUS at 13:14

## 2019-01-29 RX ADMIN — PROPOFOL 20 MG: 10 INJECTION, EMULSION INTRAVENOUS at 14:14

## 2019-01-29 RX ADMIN — MIDAZOLAM HYDROCHLORIDE 2 MG: 1 INJECTION, SOLUTION INTRAMUSCULAR; INTRAVENOUS at 13:03

## 2019-01-29 RX ADMIN — Medication 40 MCG: at 13:46

## 2019-01-29 RX ADMIN — SODIUM CHLORIDE, SODIUM LACTATE, POTASSIUM CHLORIDE, CALCIUM CHLORIDE: 600; 310; 30; 20 INJECTION, SOLUTION INTRAVENOUS at 13:03

## 2019-01-29 RX ADMIN — LIDOCAINE HYDROCHLORIDE 60 MG: 20 INJECTION, SOLUTION EPIDURAL; INFILTRATION; INTRACAUDAL; PERINEURAL at 13:11

## 2019-01-29 RX ADMIN — Medication 40 MCG: at 13:29

## 2019-01-29 RX ADMIN — ONDANSETRON 4 MG: 2 INJECTION INTRAMUSCULAR; INTRAVENOUS at 14:11

## 2019-01-29 RX ADMIN — SODIUM CHLORIDE, SODIUM LACTATE, POTASSIUM CHLORIDE, AND CALCIUM CHLORIDE 1000 ML: 600; 310; 30; 20 INJECTION, SOLUTION INTRAVENOUS at 12:57

## 2019-01-29 RX ADMIN — Medication 80 MCG: at 13:56

## 2019-01-29 RX ADMIN — Medication 40 MCG: at 13:43

## 2019-01-29 RX ADMIN — FENTANYL CITRATE 25 MCG: 50 INJECTION, SOLUTION INTRAMUSCULAR; INTRAVENOUS at 15:47

## 2019-01-29 RX ADMIN — Medication 40 MCG: at 13:28

## 2019-01-29 RX ADMIN — PROPOFOL 100 MG: 10 INJECTION, EMULSION INTRAVENOUS at 13:12

## 2019-01-29 RX ADMIN — CLINDAMYCIN PHOSPHATE 600 MG: 600 INJECTION INTRAVENOUS at 13:14

## 2019-01-29 RX ADMIN — PROPOFOL 20 MG: 10 INJECTION, EMULSION INTRAVENOUS at 14:10

## 2019-01-29 RX ADMIN — Medication 80 MCG: at 14:10

## 2019-01-29 RX ADMIN — Medication 80 MCG: at 13:53

## 2019-01-29 RX ADMIN — FENTANYL CITRATE 25 MCG: 50 INJECTION, SOLUTION INTRAMUSCULAR; INTRAVENOUS at 16:06

## 2019-01-29 RX ADMIN — DEXMEDETOMIDINE HYDROCHLORIDE 4 MCG: 4 INJECTION, SOLUTION INTRAVENOUS at 14:01

## 2019-01-29 RX ADMIN — FENTANYL CITRATE 50 MCG: 50 INJECTION, SOLUTION INTRAMUSCULAR; INTRAVENOUS at 13:18

## 2019-01-29 RX ADMIN — PROPOFOL 200 MG: 10 INJECTION, EMULSION INTRAVENOUS at 13:11

## 2019-01-29 RX ADMIN — Medication 40 MCG: at 13:30

## 2019-01-29 RX ADMIN — DEXAMETHASONE SODIUM PHOSPHATE 8 MG: 4 INJECTION, SOLUTION INTRA-ARTICULAR; INTRALESIONAL; INTRAMUSCULAR; INTRAVENOUS; SOFT TISSUE at 13:12

## 2019-01-29 RX ADMIN — OXYCODONE AND ACETAMINOPHEN 1 TABLET: 5; 325 TABLET ORAL at 15:40

## 2019-01-29 NOTE — ANESTHESIA PREPROCEDURE EVALUATION
Anesthetic History No history of anesthetic complications Review of Systems / Medical History Patient summary reviewed, nursing notes reviewed and pertinent labs reviewed Pulmonary Within defined limits Neuro/Psych Psychiatric history Cardiovascular Hypertension GI/Hepatic/Renal 
Within defined limits Endo/Other Hypothyroidism Obesity, arthritis and cancer Other Findings Physical Exam 
 
Airway Mallampati: II 
TM Distance: > 6 cm Neck ROM: normal range of motion Mouth opening: Normal 
 
 Cardiovascular Regular rate and rhythm,  S1 and S2 normal,  no murmur, click, rub, or gallop Dental 
No notable dental hx Pulmonary Breath sounds clear to auscultation Abdominal 
GI exam deferred Other Findings Anesthetic Plan ASA: 3 Anesthesia type: general 
 
 
 
 
Induction: Intravenous Anesthetic plan and risks discussed with: Patient

## 2019-01-29 NOTE — DISCHARGE INSTRUCTIONS
Discharge Instructions from Dr. Ramy Wolfe    · I will call you with the pathology results, typically within 1 week from today. · You may shower, but no hot tubs, swimming pools, or baths until your incision is healed. · No heavy lifting with the affected extremity (nothing greater than 5 pounds), and limit its use for the next 4-5 days. · You may use an ice pack for comfort for the next couple of days, but do not place ice directly on the skin. Rather, use a towel or clothing to serve as a barrier between skin and ice to prevent injury. · If I placed a drain, follow the drain instructions provided, especially as you keep a record of the drain output. · Follow medication instructions carefully. No aspirin, ibuprofen or aleve. May take tylenol instead of narcotic. · Wear surgical bra for 24 hours, then remove. Wear supportive bra at all times. · You will have bruising and swelling  · Watch for signs of infection as listed below. · Redness  · Swelling  · Drainage from the incision or from your nipple that appears infected  · Fever over 101.5 degrees for consecutive readings, or over 99.5 if you are currently undergoing chemotherapy. · Call our office (number is below) for a follow-up appointment. · If you have any problems, our phone number is [phone]      DISCHARGE SUMMARY from Nurse    PATIENT INSTRUCTIONS:    After general anesthesia or intravenous sedation, for 24 hours or while taking prescription Narcotics:  · Limit your activities  · Do not drive and operate hazardous machinery  · Do not make important personal or business decisions  · Do  not drink alcoholic beverages  · If you have not urinated within 8 hours after discharge, please contact your surgeon on call.     Report the following to your surgeon:  · Excessive pain, swelling, redness or odor of or around the surgical area  · Temperature over 100.5  · Nausea and vomiting lasting longer than 4 hours or if unable to take medications  · Any signs of decreased circulation or nerve impairment to extremity: change in color, persistent  numbness, tingling, coldness or increase pain  · Any questions    What to do at Home:  Recommended activity: Activity as tolerated. If you experience any of the following symptoms bleeding, swelling, please follow up with . *  Please give a list of your current medications to your Primary Care Provider. *  Please update this list whenever your medications are discontinued, doses are      changed, or new medications (including over-the-counter products) are added. *  Please carry medication information at all times in case of emergency situations. These are general instructions for a healthy lifestyle:    No smoking/ No tobacco products/ Avoid exposure to second hand smoke  Surgeon General's Warning:  Quitting smoking now greatly reduces serious risk to your health. Obesity, smoking, and sedentary lifestyle greatly increases your risk for illness    A healthy diet, regular physical exercise & weight monitoring are important for maintaining a healthy lifestyle    You may be retaining fluid if you have a history of heart failure or if you experience any of the following symptoms:  Weight gain of 3 pounds or more overnight or 5 pounds in a week, increased swelling in our hands or feet or shortness of breath while lying flat in bed. Please call your doctor as soon as you notice any of these symptoms; do not wait until your next office visit. Recognize signs and symptoms of STROKE:    F-face looks uneven    A-arms unable to move or move unevenly    S-speech slurred or non-existent    T-time-call 911 as soon as signs and symptoms begin-DO NOT go       Back to bed or wait to see if you get better-TIME IS BRAIN. Warning Signs of HEART ATTACK     Call 911 if you have these symptoms:   Chest discomfort.  Most heart attacks involve discomfort in the center of the chest that lasts more than a few minutes, or that goes away and comes back. It can feel like uncomfortable pressure, squeezing, fullness, or pain.  Discomfort in other areas of the upper body. Symptoms can include pain or discomfort in one or both arms, the back, neck, jaw, or stomach.  Shortness of breath with or without chest discomfort.  Other signs may include breaking out in a cold sweat, nausea, or lightheadedness. Don't wait more than five minutes to call 911 - MINUTES MATTER! Fast action can save your life. Calling 911 is almost always the fastest way to get lifesaving treatment. Emergency Medical Services staff can begin treatment when they arrive -- up to an hour sooner than if someone gets to the hospital by car. The discharge information has been reviewed with the patient and caregiver. The patient and caregiver verbalized understanding. Discharge medications reviewed with the patient and caregiver and appropriate educational materials and side effects teaching were provided. Smarterer Activation    Thank you for requesting access to Smarterer. Please follow the instructions below to securely access and download your online medical record. Smarterer allows you to send messages to your doctor, view your test results, renew your prescriptions, schedule appointments, and more. How Do I Sign Up? 1. In your internet browser, go to [email or web address]  2. Click on the First Time User? Click Here link in the Sign In box. You will be redirect to the New Member Sign Up page. 3. Enter your Smarterer Access Code exactly as it appears below. You will not need to use this code after youve completed the sign-up process. If you do not sign up before the expiration date, you must request a new code. Smarterer Access Code: [email or web address]  Expires: 2019  4:21 PM (This is the date your Smarterer access code will )    4.  Enter the last four digits of your Social Security Number (xxxx) and Date of Birth (mm/dd/yyyy) as indicated and click Submit. You will be taken to the next sign-up page. 5. Create a Edicyt ID. This will be your Blooie login ID and cannot be changed, so think of one that is secure and easy to remember. 6. Create a Blooie password. You can change your password at any time. 7. Enter your Password Reset Question and Answer. This can be used at a later time if you forget your password. 8. Enter your e-mail address. You will receive e-mail notification when new information is available in [address] 9. Click Sign Up. You can now view and download portions of your medical record. 10. Click the Download Summary menu link to download a portable copy of your medical information. Additional Information    If you have questions, please visit the Frequently Asked Questions section of the Blooie website at [email or web address] Remember, Blooie is NOT to be used for urgent needs.  For medical emergencies, dial 911.      ___________________________________________________________________________________________________________________________________

## 2019-01-29 NOTE — ANESTHESIA POSTPROCEDURE EVALUATION
Procedure(s): LEFT BREAST LUMPECTOMY WITH ULTRASOUND, LEFT BREAST SENTINEL NODE BIOPSY (11a) SENTINEL NODE BIOPSY. <BSHSIANPOST> Visit Vitals /54 Pulse 61 Temp 36.3 °C (97.4 °F) Resp 15 Ht 5' 4\" (1.626 m) Wt 89.8 kg (198 lb) SpO2 100% BMI 33.99 kg/m²

## 2019-01-29 NOTE — H&P
HISTORY OF PRESENT ILLNESS 
Shukri Lane is a 70 y.o. female. HPI  
NEW patient here today referred for consultation at the request of Dr. Rosina Strong for newly diagnosed LEFT breast cancer. She had LEFT breast pain for a few months, which encouraged her to have her mammogram. She denies any breast lumps, nipple discharge/retraction or skin changes. She no longer feels breast pain. Mammogram led to LEFT breast biopsy.  
  
12/3/18- LEFT breast biopsy-  
FINAL PATHOLOGIC DIAGNOSIS Breast, left, needle core biopsy:  
Invasive mammary carcinoma with ductal and lobular features Favor Deedee histologic grade 1 Largest glass slide measurement of invasive carcinoma: 8.5 mm ER/MD positive, HER2 negative.  
  
No FH of breast or ovarian cancer.  
  
Recent imaging- 
 3D LEFT diagnostic mammogram and ultrasound on 11/23/18- BIRADS 4 
FINDINGS: Left digital diagnostic mammography was performed, and is interpreted 
in conjunction with a computer assisted detection (CAD) system.  Left 
3-D/tomosynthesis was performed in ML projection. Additionally, 3-D spot 
compression views were performed. There is persistent architectural distortion within the middle third of the left 
breast upper outer quadrant at 1:00. No suspicious calcifications are 
identified. There is no skin thickening or nipple retraction. Left breast ultrasound was performed of the 1:00 location, 5 cm from the nipple. 
Marialuisa Oliveiratz is a sonographic correlate to the architectural distortion, with 
irregular hypoechoic shadowing tissue, measuring up to 9 mm.  Ultrasound-guided 
biopsy is recommended. IMPRESSION: 
1.  BI-RADS Assessment Category 4: Suspicious abnormality - Biopsy should be 
performed in the absence of clinical contraindication.  Architectural distortion 
in the upper outer left breast, with a sonographic correlate.  Ultrasound-guided 
core needle biopsy is recommended. The patient has been notified of these results. Berta Denny will assist in setting up a 
biopsy appointment. 
  
Past Medical History:  
Diagnosis Date  Breast cancer, left ([facility] [address]    
 Depression    
 Goiter    
 Hearing loss    
  bilateral  
 Hypertension    
 Hypothyroid    
 IBS (irritable bowel syndrome)    
 Rheumatoid arthritis ([facility] [address]    
  
     
Past Surgical History:  
Procedure Laterality Date  HX CATARACT REMOVAL Bilateral    
 HX CHOLECYSTECTOMY   1997  
 NEUROLOGICAL PROCEDURE UNLISTED   7/97  
  brain lesion removed ? infectious  
  
Social History  
  
     
Socioeconomic History  Marital status:   
    Spouse name: Not on file  Number of children: Not on file  Years of education: Not on file  Highest education level: Not on file Social Needs  Financial resource strain: Not on file  Food insecurity - worry: Not on file  Food insecurity - inability: Not on file  Transportation needs - medical: Not on file  Transportation needs - non-medical: Not on file Occupational History  Not on file Tobacco Use  Smoking status: Never Smoker  Smokeless tobacco: Never Used Substance and Sexual Activity  Alcohol use: Yes  
    Alcohol/week: 2.4 - 3.0 oz  
    Types: 4 Glasses of wine per week  
    Comment: 4 glasses of wine per night  Drug use: No  
 Sexual activity: Yes  
    Partners: Male Other Topics Concern  Not on file Social History Narrative  Not on file  
  
   
OB History   
  Obstetric Comments  
  Menarche 14, LMP 46, # of children 0, no biological children, age of 1st delivery na, Hysterectomy/oophorectomy no/no, Breast bx yes, 12/2018 , history of breast feeding na, BCP no, Hormone therapy no 
   
   
  
  
Current Outpatient Medications:  
  lisinopril-hydroCHLOROthiazide (PRINZIDE, ZESTORETIC) 10-12.5 mg per tablet, TAKE ONE TABLET BY MOUTH ONCE DAILY, Disp: 90 Tab, Rfl: 3   folic acid (FOLVITE) 1 mg tablet, Take 1 Tab by mouth daily. , Disp: 90 Tab, Rfl: 0 
  methotrexate (RHEUMATREX) 2.5 mg tablet, Take 8 Tabs by mouth every Wednesday. , Disp: 96 Tab, Rfl: 0 
  LORazepam (ATIVAN) 1 mg tablet, TAKE 1 & 1/2 (ONE & ONE-HALF) TABLETS BY MOUTH ONCE DAILY AT NIGHT, Disp: 45 Tab, Rfl: 1   ergocalciferol (ERGOCALCIFEROL) 50,000 unit capsule, Take 1 Cap by mouth every Tuesday. , Disp: 12 Cap, Rfl: 3   citalopram (CELEXA) 40 mg tablet, TAKE ONE TABLET BY MOUTH DAILY, Disp: 90 Tab, Rfl: 3 
  levothyroxine (SYNTHROID) 200 mcg tablet, TAKE ONE TABLET BY MOUTH ONCE DAILY BEFORE BREAKFAST, Disp: 90 Tab, Rfl: 3 
  varicella-zoster recombinant, PF, (SHINGRIX, PF,) 50 mcg/0.5 mL susr injection, 0.5mL by IntraMUSCular route once now and then repeat in 2-6 months, Disp: 0.5 mL, Rfl: 1   ibuprofen (MOTRIN) 800 mg tablet, Take 800 mg by mouth two (2) times a day., Disp: , Rfl:  
    
Allergies Allergen Reactions  Keflex [Cephalexin] Rash  Monistat 1 [Tioconazole] Swelling  Sulfa (Sulfonamide Antibiotics) Rash  
  
  
  
Review of Systems Constitutional: Negative. HENT: Negative. Eyes: Negative. Respiratory: Negative. Cardiovascular: Negative. Gastrointestinal: Positive for diarrhea. Genitourinary: Positive for frequency. Musculoskeletal: Positive for back pain and joint pain. Skin: Negative. Neurological: Negative. Endo/Heme/Allergies: Negative. Psychiatric/Behavioral: Negative. All other systems reviewed and are negative. 
  
  
Physical Exam  
Pulmonary/Chest: Right breast exhibits no inverted nipple, no mass, no nipple discharge, no skin change and no tenderness. Left breast exhibits no inverted nipple, no mass, no nipple discharge, no skin change and no tenderness. Breasts are symmetrical.  
Clip and lesion seen on us left breast 1:00 Lymphadenopathy:  
  She has no cervical adenopathy. She has no axillary adenopathy. Nursing note and vitals reviewed. 
  
  
ASSESSMENT and PLAN 
    ICD-10-CM ICD-9-CM    
1. Malignant neoplasm of upper-outer quadrant of left breast in female, estrogen receptor positive ([facility] [address] C50.412 174. 4    
  Z17.0 V86.0    
  
71 yo female with left breast T1 N0 IDC Er/Pr+ Her 2 mignon negative. She is here with family I have reviewed the imaging and pathology with her and she was given copies of these reports. 
  
90 minutes were spent face-to-face with the patient during this encounter and 90% of that time was spent on counseling and coordination of care. 1. Discussed lumpectomy and radiation vs mastectomy. Discussed reconstruction. MRI ordered to see if patient is a candidate for a lumpectomy. 2. Discussed sentinel lymph node biopsy. 3. Discussed external beam radiation. 4. Discussed hormone therapy. 5. Discussed the possibility of chemotherapy.  
  
She is good candidate for left breast us guided lumpectomy, sln biopsy

## 2019-01-29 NOTE — OP NOTES
[address]  OPERATIVE REPORT    Jayson Santana  MR#: [id number]  : 1950  ACCOUNT #: [de-identified]   DATE OF SERVICE: 2019    PREOPERATIVE DIAGNOSIS:  Left breast cancer, upper outer quadrant. POSTOPERATIVE DIAGNOSIS:  Left breast cancer, upper outer quadrant. PROCEDURE PERFORMED:  Left breast ultrasound-guided lumpectomy, left sentinel lymph node biopsy, intraoperative margin assessment using RF spectroscopy BioZorb placement. SURGEON:  Lory Gregory MD    ASSISTANT:  Maribel Cooper    ANESTHESIA:  General.    ESTIMATED BLOOD LOSS:  10 mL. SPECIMENS REMOVED:  1. Left axillary sentinel node #1.  2.  Left breast lumpectomy. 3.  Left breast anterior margin. FINDINGS:  [place] lymph nodes negative. COMPLICATIONS:  None. IMPLANTS:  1 x 2 low profile BioZorb. INDICATIONS FOR PROCEDURE:  This 78-year-old female who had a left breast invasive carcinoma. She wished to have a lumpectomy, sentinel lymph node biopsy. PROCEDURE IN DETAIL:  The patient initially went to nuclear medicine where technetium 99 was injected left breast.  She tolerated this well. She then went to the preop holding area where surgical site was marked by surgeon. Informed consent was obtained. She was taken to the operating room and laid in supine position where LMA anesthesia was induced. Left breast and axilla prepped and draped in the usual fashion. A timeout was performed. Attention was turned to the left axilla. An inferior axillary hairline incision was made with a 10 blade. Bovie cautery was used to dissect through the axillary fascia. One sentinel lymph node was identified using Neoprobe guidance, the count was 432. This was excised using LigaSure device and sent for frozen pathology found to be negative. The cavity was irrigated. 20 mL of Exparel mixed with 0.25% Marcaine was injected in the axilla and the skin.  Axillary fascia was closed with interrupted 3-0 Vicryl and skin with 4-0 subcuticular Monocryl. Attention was turned to the left breast at 1 o'clock. The clip and lesion were identified using ultrasound guidance. Curvilinear incision was made with a 10 blade. Bovie cautery was used to dissect down around the lesion all the way to the chest wall. This was excised and marked short superior, long stitch lateral.  The margins were marked out using a marking pen. The margin probe device was applied. We then calibrated. All 6 margins were assessed. For additional margins, see above. Total intraoperative time was 2 minutes. Next, the cavity was irrigated. 30 mL of Exparel and 0.25% Marcaine mixture was injected in the breast and skin. One low profile device was secured at 4 points with a 3-0 PDS. The deeper tissues were closed with interrupted 2-0 Vicryl, the skin with interrupted 3-0 Vicryl and 4-0 subcuticular Monocryl. Skin glue was placed on the incisions as well as fluffs and dressed in a bra. All sponge, needle and instrument counts were correct. The patient went to recovery in stable condition.       MD STEPHANIE Arvizu / ORESTES  D: 01/29/2019 14:33     T: 01/29/2019 18:31  JOB #: [id number]

## 2019-01-29 NOTE — BRIEF OP NOTE
BRIEF OPERATIVE NOTE Date of Procedure: 1/29/2019 Preoperative Diagnosis: LEFT BREAST CANCER UPPER OUTER QUADRANT Postoperative Diagnosis: SAME Procedure(s): LEFT BREAST LUMPECTOMY WITH ULTRASOUND, LEFT BREAST SENTINEL NODE BIOPSY (11a) SENTINEL NODE BIOPSY INTRAOP MARGIN ASSESSMENT USING RF SPECTROSCOPY 
BIOSORB PLACEMENT Surgeon(s) and Role: Crysatl Carrillo MD - Primary Surgical Assistant: Edna Alicea Surgical Staff: 
Circ-1: Cece Breen RN Registered Nurse Assistant: Hi Castaneda RN Scrub Tech-1: Jenny Jara Event Time In Time Out Incision Start 1332 Incision Close 1422 Anesthesia: General  
Estimated Blood Loss: 10 ml Specimens:  
ID Type Source Tests Collected by Time Destination 1 : LEFT AXILLALLY SENTINEL NODE #1 Frozen Section Lymph Node  Corey Leblanc MD 1/29/2019 1337 Pathology 2 : LEFT BREAST LUMPECTOMY Fresh Breast  Corey Leblanc MD 1/29/2019 1356 Pathology 3 : LEFT BREAST ANTERIOR MARGIN Fresh Breast  Corey Leblanc MD 1/29/2019 1400 Pathology Findings: sln negative Complications: none Implants:  
Implant Name Type Inv. Item Serial No.  Lot No. LRB No. Used [employer]    [id number]  [id number] Left 1 Implanted Dictated [id number]

## 2019-01-29 NOTE — PERIOP NOTES
Patient: Matthew Alves MRN: [id number]  SSN: [id number] YOB: 1950  Age: 70 y.o. Sex: female Patient is status post Procedure(s): LEFT BREAST LUMPECTOMY WITH ULTRASOUND, LEFT BREAST SENTINEL NODE BIOPSY (11a) SENTINEL NODE BIOPSY. Surgeon(s) and Role: Denisa Gregory MD - Primary Local/Dose/Irrigation:  SEE MAR Peripheral IV 01/29/19 Right Wrist (Active) Site Assessment Clean, dry, & intact 1/29/2019 12:55 PM  
Phlebitis Assessment 0 1/29/2019 12:55 PM  
Infiltration Assessment 0 1/29/2019 12:55 PM  
Dressing Status Clean, dry, & intact 1/29/2019 12:55 PM  
Dressing Type Transparent 1/29/2019 12:55 PM  
Hub Color/Line Status Blue 1/29/2019 12:55 PM  
                 
 
 
 
Dressing/Packing:    
Splint/Cast:  ] Other:

## 2019-01-30 ENCOUNTER — TELEPHONE (OUTPATIENT)
Dept: SURGERY | Age: 69
End: 2019-01-30

## 2019-01-30 NOTE — TELEPHONE ENCOUNTER
Called patient to check on her. She is doing well. Issue with pain med was resolved. Has post op appt 2/11/19.

## 2019-02-11 ENCOUNTER — OFFICE VISIT (OUTPATIENT)
Dept: SURGERY | Age: 69
End: 2019-02-11

## 2019-02-11 VITALS
DIASTOLIC BLOOD PRESSURE: 57 MMHG | HEIGHT: 64 IN | BODY MASS INDEX: 33.8 KG/M2 | WEIGHT: 198 LBS | HEART RATE: 65 BPM | SYSTOLIC BLOOD PRESSURE: 130 MMHG

## 2019-02-11 DIAGNOSIS — Z17.0 MALIGNANT NEOPLASM OF UPPER-OUTER QUADRANT OF LEFT BREAST IN FEMALE, ESTROGEN RECEPTOR POSITIVE (HCC): Primary | ICD-10-CM

## 2019-02-11 DIAGNOSIS — Z98.890 S/P BREAST LUMPECTOMY: ICD-10-CM

## 2019-02-11 DIAGNOSIS — C50.412 MALIGNANT NEOPLASM OF UPPER-OUTER QUADRANT OF LEFT BREAST IN FEMALE, ESTROGEN RECEPTOR POSITIVE (HCC): Primary | ICD-10-CM

## 2019-02-11 NOTE — PROGRESS NOTES
HISTORY OF PRESENT ILLNESS  Carly Sanon is a 70 y.o. female. HPI   ESTABLISHED patient here today for post op follow up. S/p LEFT breast lumpectomy, SLNB on 1/29/19. She is sore, but denies any pain or incisional problems. FINAL PATHOLOGIC DIAGNOSIS   1. Lymph node, left axilla, #1, sentinel node excision:   No evidence for malignancy in one node (0/1). See comment. 2. Breast, left, lumpectomy:   Invasive lobular carcinoma. See synoptic report. Lobular carcinoma in situ. Synoptic Report - Invasive Carcinoma of the Breast   SPECIMEN   Procedure: Excision without wire-guided localization   Specimen Laterality: Left   TUMOR   Tumor Size: 1.8   Histologic Type: Invasive lobular carcinoma   Histologic Grade (1-3)   Glandular (Acinar)/Tubular Differentiation: 3   Nuclear Pleomorphism: 1   Mitotic Rate: 1   Overall ndGndrndanddndend:nd nd2nd Ductal Carcinoma In Situ: Not identified   Tumor Extension: Not identified   Accessory findings   Treatment Effect in breast: Not identified   Treatment Effect in lymph nodes: Not applicable   MARGINS   Invasive Carcinoma:   Distance from closest margin: 0.1 cm   Specify closest margin: Anterior   LYMPH NODES   Lymph Nodes with Macrometastases (>2 mm): 0   Lymph Nodes with Micrometastases (>0.2 mm to 2 mm and/or >200 cells): 0 Number of Lymph Nodes with Isolated Tumor Cells (?0.2 mm and ?200 cells)#: 0   Total Number of Lymph Nodes Examined: 1   Number of [place] Nodes Examined: 1   3. Breast, left, anterior margin, excision:   Atypical lobular hyperplasia. Pathologic Stage - Breast   Primary Tumor (Invasive Carcinoma): pT1c   Regional Lymph Nodes: pN0     Review of Systems   All other systems reviewed and are negative. Physical Exam   Pulmonary/Chest:       Left breast and axillary incisions c/d/i. Nursing note and vitals reviewed.       ASSESSMENT and PLAN    ICD-10-CM ICD-9-CM    1. Malignant neoplasm of upper-outer quadrant of left breast in female, estrogen receptor positive (HCC) C50.412 174.4     Z17.0 V86.0    2.  S/P breast lumpectomy Z98.890 V45.89      - healing well  - refer to med onc, rad onc  - f/u with NP in 6 months

## 2019-02-11 NOTE — PATIENT INSTRUCTIONS

## 2019-02-14 ENCOUNTER — OFFICE VISIT (OUTPATIENT)
Dept: ONCOLOGY | Age: 69
End: 2019-02-14

## 2019-02-14 VITALS
RESPIRATION RATE: 16 BRPM | HEART RATE: 66 BPM | WEIGHT: 202.8 LBS | TEMPERATURE: 97.6 F | HEIGHT: 64 IN | DIASTOLIC BLOOD PRESSURE: 65 MMHG | OXYGEN SATURATION: 95 % | SYSTOLIC BLOOD PRESSURE: 125 MMHG | BODY MASS INDEX: 34.62 KG/M2

## 2019-02-14 DIAGNOSIS — Z17.0 MALIGNANT NEOPLASM OF UPPER-OUTER QUADRANT OF LEFT BREAST IN FEMALE, ESTROGEN RECEPTOR POSITIVE (HCC): Primary | ICD-10-CM

## 2019-02-14 DIAGNOSIS — C50.912 MALIGNANT NEOPLASM OF LEFT FEMALE BREAST, UNSPECIFIED ESTROGEN RECEPTOR STATUS, UNSPECIFIED SITE OF BREAST (HCC): ICD-10-CM

## 2019-02-14 DIAGNOSIS — C90.00 MULTIPLE MYELOMA, REMISSION STATUS UNSPECIFIED (HCC): Primary | ICD-10-CM

## 2019-02-14 DIAGNOSIS — E66.01 SEVERE OBESITY (HCC): ICD-10-CM

## 2019-02-14 DIAGNOSIS — C50.412 MALIGNANT NEOPLASM OF UPPER-OUTER QUADRANT OF LEFT BREAST IN FEMALE, ESTROGEN RECEPTOR POSITIVE (HCC): Primary | ICD-10-CM

## 2019-02-14 RX ORDER — NAPROXEN SODIUM 220 MG
220 TABLET ORAL 2 TIMES DAILY WITH MEALS
COMMUNITY
End: 2019-04-29

## 2019-02-14 NOTE — PROGRESS NOTES
[facility] [place] [facility]  [address], [address], [place]: [phone]  F: [phone]    Reason for Visit:   Perri Pereyra is a 70 y.o. female who is seen for follow up of    1) Paraproteinemia  2)  Left breast ER pos MA pos (weak) HER 2 neg stage I Breast cancer- 12/2018    TREATMENT HISTORY  1/29/19-left lumpectomy with sentinel lymph node biopsy. 1.8 cm invasive lobular carcinoma, grade 1, 1 of one negative lymph nodes. History of Present Illness:   Patient is a 70 y.o. with seronegative rheumatoid arthritis and secondary polymyalgia rheumatica who is seen for follow-up of smoldering myeloma and lobular breast cancer    She presents for follow-up after hepatectomy and sentinel lymph node biopsy on 1/29/19. She is healing pretty well. She has some tenderness and heaviness in her left breast and specifically the axilla. Minimal pain. No discharge. She denies any other aches or pains. No fevers nausea vomiting. In good spirits and comes with a supportive friend. Past Medical History:   Diagnosis Date    Breast cancer, left ([address]     Depression     Goiter     Hearing loss     bilateral    Hypertension     Hypothyroid     IBS (irritable bowel syndrome)     Rheumatoid arthritis ([address]       Past Surgical History:   Procedure Laterality Date    HX BREAST LUMPECTOMY Left 1/29/2019    LEFT BREAST LUMPECTOMY WITH ULTRASOUND, LEFT BREAST SENTINEL NODE BIOPSY (11a) performed by Stephany Talbert MD at [address] HX CATARACT REMOVAL Bilateral     HX CHOLECYSTECTOMY  1997    NEUROLOGICAL PROCEDURE UNLISTED  7/97    brain lesion removed ? infectious      Social History     Tobacco Use    Smoking status: Never Smoker    Smokeless tobacco: Never Used   Substance Use Topics    Alcohol use:  Yes     Alcohol/week: 3.0 - 3.6 oz     Types: 5 Glasses of wine per week      Family History   Problem Relation Age of Onset    Cancer Mother lung    COPD Mother     Cancer Father         lung    Heart Disease Brother         CABGx3    Diabetes Brother     Heart Disease Brother     Diabetes Brother     COPD Brother     Liver Disease Brother         cirrhosis    Alcohol abuse Brother     Heart Disease Paternal Uncle         CAD    Heart Disease Paternal Grandmother         CAD    Anesth Problems Neg Hx      Current Outpatient Medications   Medication Sig    naproxen sodium (ALEVE) 220 mg tablet Take 220 mg by mouth two (2) times daily (with meals).  LORazepam (ATIVAN) 1 mg tablet TAKE 1 & 1/2 (ONE & ONE-HALF) TABLETS BY MOUTH ONCE DAILY AT  NIGHT    ondansetron (ZOFRAN ODT) 4 mg disintegrating tablet Take 1 Tab by mouth every eight (8) hours as needed for Nausea.  lisinopril-hydroCHLOROthiazide (PRINZIDE, ZESTORETIC) 10-12.5 mg per tablet TAKE ONE TABLET BY MOUTH ONCE DAILY    folic acid (FOLVITE) 1 mg tablet Take 1 Tab by mouth daily.  methotrexate (RHEUMATREX) 2.5 mg tablet Take 8 Tabs by mouth every Wednesday.  ergocalciferol (ERGOCALCIFEROL) 50,000 unit capsule Take 1 Cap by mouth every Tuesday.  levothyroxine (SYNTHROID) 200 mcg tablet TAKE ONE TABLET BY MOUTH ONCE DAILY BEFORE BREAKFAST    oxyCODONE-acetaminophen (PERCOCET) 5-325 mg per tablet Take 1 Tab by mouth every four (4) hours as needed for Pain. Max Daily Amount: 6 Tabs.  varicella-zoster recombinant, PF, (SHINGRIX, PF,) 50 mcg/0.5 mL susr injection 0.5mL by IntraMUSCular route once now and then repeat in 2-6 months     No current facility-administered medications for this visit. Allergies   Allergen Reactions    Keflex [Cephalexin] Rash    Monistat 1 [Tioconazole] Swelling    Sulfa (Sulfonamide Antibiotics) Rash        Review of Systems: A complete review of systems was obtained, negative except as described above.     Physical Exam:     Visit Vitals  /65 (BP 1 Location: Right arm, BP Patient Position: Sitting)   Pulse 66   Temp 97.6 °F (36.4 °C) (Oral)   Resp 16   Ht 5' 4\" (1.626 m)   Wt 202 lb 12.8 oz (92 kg)   SpO2 95%   BMI 34.81 kg/m²     ECOG PS: 1  General: No distress  Eyes: PERRLA, anicteric sclerae  HENT: Atraumatic, OP clear  Neck: Supple  Breast: Left lumpectomy incision healing well. Axilla looks unremarkable. CV: Normal rate, regular rhythm, no murmurs, no peripheral edema  GI: Soft, nontender, nondistended, no masses, no hepatomegaly, no splenomegaly  MS: Normal gait and station. Digits without clubbing or cyanosis. Skin: No rashes, ecchymoses, or petechiae. Normal temperature, turgor, and texture. Psych: Alert, oriented, appropriate affect, normal judgment/insight    Results:     Lab Results   Component Value Date/Time    WBC 5.1 12/19/2018 12:00 PM    HGB 13.5 12/19/2018 12:00 PM    HCT 40.9 12/19/2018 12:00 PM    PLATELET 401 99/92/7463 12:00 PM    MCV 91.7 12/19/2018 12:00 PM    ABS. NEUTROPHILS 3.3 12/19/2018 12:00 PM     Lab Results   Component Value Date/Time    Sodium 140 01/28/2019 09:22 AM    Potassium 4.1 01/28/2019 09:22 AM    Chloride 103 01/28/2019 09:22 AM    CO2 24 01/28/2019 09:22 AM    Glucose 85 01/28/2019 09:22 AM    BUN 23 01/28/2019 09:22 AM    Creatinine 0.68 01/28/2019 09:22 AM    GFR est  01/28/2019 09:22 AM    GFR est non-AA 90 01/28/2019 09:22 AM    Calcium 9.4 01/28/2019 09:22 AM     Lab Results   Component Value Date/Time    Bilirubin, total 0.6 01/28/2019 09:22 AM    ALT (SGPT) 23 01/28/2019 09:22 AM    AST (SGOT) 20 01/28/2019 09:22 AM    Alk.  phosphatase 101 01/28/2019 09:22 AM    Protein, total 7.6 01/28/2019 09:22 AM    Albumin 4.3 01/28/2019 09:22 AM    Globulin 3.7 12/23/2016 01:31 PM     Lab Results   Component Value Date/Time     11/21/2018 02:47 PM    Beta-2 Microglobulin, serum 1.8 11/21/2018 02:47 PM    Sed rate (ESR) 4 11/28/2018 04:26 PM    C-Reactive Protein, Qt 2.7 11/28/2018 04:26 PM    TSH 0.681 12/12/2018 12:00 AM    M-Jere 0.3 (H) 11/21/2018 02:47 PM    M-Jere 0.3 (H) 10/04/2018 11:45 AM    Lipase 204 12/23/2016 01:31 PM     Lab Results   Component Value Date/Time    D-dimer 0.21 12/23/2016 01:31 PM       Records reviewed and summarized above. Pathology report(s) reviewed    Bone marrow: 12/19/18  Variably cellular marrow with mild monoclonal plasmacytosis. Trilineage hematopoiesis with maturation. See comment. Peripheral blood:   Unremarkable peripheral blood. See CBC data. Comment   The bone marrow is variably cellular ranging <10% to 60% to reveal mild monoclonal, kappa restricted plasmacytosis (10%). Trilineage hematopoiesis with adequate maturation is identified      Interpretation:   Del(1p): Not Detected   Dup(1q): Not Detected   Gains(5, 9, 15): DETECTED   Del(13q): Not Detected   Del(17p)(TP53): Not Detected (See Below)   IGH(Rearrangement): Not Detected   Testing performed by [employer] and interpreted by Jaylon Dewitt M.D. Please see scanned outside report in [facility] for complete details. Breast biopsy 12/3/18  Breast, left, needle core biopsy:   Invasive mammary carcinoma with ductal and lobular features   Favor [place] histologic grade 1   Largest glass slide measurement of invasive carcinoma: 8.5 mm     1/29/19  Lumpectomy and SLN     TUMOR   Tumor Size: 1.8   Histologic Type: Invasive lobular carcinoma   Histologic Grade (1-3)   Glandular (Acinar)/Tubular Differentiation: 3   Nuclear Pleomorphism: 1   Mitotic Rate: 1   Overall ndGndrndanddndend:nd nd2nd Lymph Nodes with Macrometastases (>2 mm): 0   Lymph Nodes with Micrometastases (>0.2 mm to 2 mm and/or >200 cells): 0 Number of Lymph Nodes with Isolated Tumor Cells (?0.2 mm and ?200 cells)#: 0   Total Number of Lymph Nodes Examined: 1   Number of Datto Nodes Examined: 1   3. Breast, left, anterior margin, excision:   Atypical lobular hyperplasia     Radiology report(s) reviewed       Skeletal survey 11/28/18  IMPRESSION  IMPRESSION: Few small calvarial lucencies.  No fractures or lytic lesions at risk  for fracture.     Mammogram 11/18    There is persistent architectural distortion within the middle third of the left  breast upper outer quadrant at 1:00. No suspicious calcifications are  identified. There is no skin thickening or nipple retraction.      Left breast ultrasound was performed of the 1:00 location, 5 cm from the nipple. There is a sonographic correlate to the architectural distortion, with  irregular hypoechoic shadowing tissue, measuring up to 9 mm. Ultrasound-guided  biopsy is recommended.     IMPRESSION  IMPRESSION:     1.  BI-RADS Assessment Category 4: Suspicious abnormality - Biopsy should be  performed in the absence of clinical contraindication. Architectural distortion  in the upper outer left breast, with a sonographic correlate. Ultrasound-guided  core needle biopsy is recommended. PET CT  Negative for any lytic lesions or uptake. Assessment:   1) Paraproteinemia- Smoldering Myeloma    Small IgA M spike  No end organ damage- Bone survey suggests few calvarial lucencies-but there are no corresponding lesions on the PET/CT. Together she likely has smoldering myeloma     Reviewed the spectrum of plasma cell disorders and implications of diagnosis. She understands that smoldering myeloma has about a > 40% risk of transforming into myeloma in the ensuing years. At this time we will continue with observation      2) Left breast cancer    9 mm lesion on Mammo/USG  %, AL 3% HER2 mignon negative  Status post lumpectomy and sentinel lymph node biopsy on 1/29/19  T1cN0 invasive lobular carcinoma-stage I  Tumor is 1.8 cm  Grade 1  Margins with atypical lobular hyper    Pathology, imaging were reviewed. Reviewed the natural history of stage I ER positive lobular breast cancer. Most will follow with persistent risk of distant metastases or recurrence in the next 20 years.   Adjuvant radiation therapy decreases the chances of local recurrence by about 50% and saves 1 and 4 lives. Adjuvant endocrine therapy reduces the risk of local distant recurrence at 30-40% and saves 1 in 3 lives. We also discussed the pros and cons of genomic testing in order to further understand the biology of her breast cancer. She has clinically low risk disease and hence I suggest that if she would like to proceed with genetic testing then will obtain an Oncotype DX    Test very well thought out questions and decided to proceed with this testing. It is likely to reveal a low risk disease. I will give her a call with results and at this point I would suggest she go ahead and keep her appointment with radiation oncology assuming that all would be fine. We then discussed the risks and benefits of adjuvant letrozole or Arimidex. Patients may experience fatigue, hot flashes, vaginal dryness or joint aches. Long-term effects of bone health were reviewed as well. 3)  Rheumatoid arthritis    Per Dr. Jason Moreno    4) Depression  Per Dr. Jacobsen Odor:     · Oncotype if insurance approves it  · Plan to proceed with RT with low risk  · Will call her with results at which time will prescribe letrozole to start after completion or RT  · Will need a DEXA  · If all is well, then see me in 3 months with cbc, cmp and gammopathy evaluation      I spent more than 50% of this 40-minute visit in counseling  I appreciate the opportunity to participate in Ms. Nell Cortez's care.     Signed By: Lori Napoles MD

## 2019-02-14 NOTE — PROGRESS NOTES
Gideon Singh is a 70 y.o. female here today for follow up, multiple myeloma. 1. Have you been to the ER, urgent care clinic since your last visit? Hospitalized since your last visit? No     2. Have you seen or consulted any other health care providers outside of the [address] [place] [address] since your last visit? Include any pap smears or colon screening.   No

## 2019-02-20 ENCOUNTER — OFFICE VISIT (OUTPATIENT)
Dept: INTERNAL MEDICINE CLINIC | Age: 69
End: 2019-02-20

## 2019-02-20 ENCOUNTER — TELEPHONE (OUTPATIENT)
Dept: ONCOLOGY | Age: 69
End: 2019-02-20

## 2019-02-20 ENCOUNTER — DOCUMENTATION ONLY (OUTPATIENT)
Dept: ONCOLOGY | Age: 69
End: 2019-02-20

## 2019-02-20 VITALS
DIASTOLIC BLOOD PRESSURE: 62 MMHG | WEIGHT: 205 LBS | HEART RATE: 65 BPM | TEMPERATURE: 98.2 F | HEIGHT: 64 IN | SYSTOLIC BLOOD PRESSURE: 122 MMHG | RESPIRATION RATE: 18 BRPM | BODY MASS INDEX: 35 KG/M2 | OXYGEN SATURATION: 98 %

## 2019-02-20 DIAGNOSIS — L01.00 IMPETIGO: Primary | ICD-10-CM

## 2019-02-20 RX ORDER — LEVOFLOXACIN 500 MG/1
500 TABLET, FILM COATED ORAL DAILY
Qty: 10 TAB | Refills: 0 | Status: SHIPPED | OUTPATIENT
Start: 2019-02-20 | End: 2019-03-02

## 2019-02-20 RX ORDER — MUPIROCIN 20 MG/G
OINTMENT TOPICAL DAILY
Qty: 22 G | Refills: 0 | Status: SHIPPED | OUTPATIENT
Start: 2019-02-20 | End: 2019-04-29

## 2019-02-20 RX ORDER — METHYLPREDNISOLONE 4 MG/1
TABLET ORAL
Qty: 1 DOSE PACK | Refills: 0 | Status: SHIPPED | OUTPATIENT
Start: 2019-02-20 | End: 2019-04-29

## 2019-02-20 NOTE — PROGRESS NOTES
Andria Kocher is a 70 y.o. female Chief Complaint Patient presents with  Facial Swelling  
  on right side started saturday night. area is itchy Health Maintenance Due Topic Date Due  
 DTaP/Tdap/Td series (1 - Tdap) 12/17/1970  Shingrix Vaccine Age 50> (1 of 2) 12/17/1999  GLAUCOMA SCREENING Q2Y  12/17/2014  Pneumococcal 65+ High/Highest Risk (2 of 2 - PCV13) 05/05/2016 HPI 
R sided Facial itching and swelling x 5 days. No change in foods/meds/lotions/exposures Using [employer] since breast surgery 3 weeks ago yesterday. Using alcohol and cortisone on a \"cut\" above eyebrow on R. Has one red spot on R chest, R back and L hand. B eyes are itchy \"I want to claw at them they're so itchy\". Has tried using Dial soap to help with that. Hasn't tried anything oral OTC. BP uncontrolled at first check:  
BP Readings from Last 1 Encounters:  
02/20/19 154/71 Controlled at recheck: 
BP Readings from Last 3 Encounters:  
02/20/19 154/71  
02/14/19 125/65  
02/11/19 130/57 Review of Systems Constitutional: Negative for fever. Respiratory: Negative for shortness of breath. Cardiovascular: Negative for chest pain and palpitations. Skin: Positive for itching and rash. Neurological: Negative for dizziness, loss of consciousness and weakness. Physical Exam  
Constitutional: She is oriented to person, place, and time. She appears well-developed and well-nourished. No distress. HENT:  
Head: Normocephalic and atraumatic. Eyes: Conjunctivae and EOM are normal. Pupils are equal, round, and reactive to light. Upper and lower R eyelids are slightly inflamed. No eye discharge. Neck: Neck supple. No JVD present. No bruit bilateral carotid arteries. Cardiovascular: Normal rate, regular rhythm and normal heart sounds. Pulmonary/Chest: Effort normal and breath sounds normal. No respiratory distress. Musculoskeletal: She exhibits no edema. Neurological: She is alert and oriented to person, place, and time. Skin: Skin is warm and dry. Skin throughout is dry. Small pencil-eraser sized, flat, round areas of erythema R medial eyebrow, R temple, R chest, R upper back. Area above eyebrow is excoriated and has a small amount of honey-colored crusting. Psychiatric: She has a normal mood and affect. Her behavior is normal. Judgment and thought content normal.  
Nursing note and vitals reviewed. Diagnoses and all orders for this visit: 
 
1. Impetigo 
-     mupirocin (BACTROBAN) 2 % ointment; Apply  to affected area daily. -     methylPREDNISolone (MEDROL, NANCI,) 4 mg tablet; Take as directed on dose pack. -     Fill only if symptoms are no better after 1 week or if they worsen during above tx: levoFLOXacin (LEVAQUIN) 500 mg tablet; Take 1 Tab by mouth daily for 10 days. Finish entire course. Stop using Dial soap everywhere except surgical site - continue using Dial there as directed by surgeon. Moisturizing discussed.

## 2019-02-20 NOTE — PROGRESS NOTES
Called Medicare and secondary coverage through [facility] to determine if Oncotype DX testing was covered by pateint's insurance, and cost.  Oncotype DX testing costs about $4,000. According to Medicare, they cover this testing. The 20% leftover would be about $800. [employer] supplement advises that provided she has no deductible, and Medicare covers, they should  the rest.  According to Adam, patient's plan shows she is responsible for her Part D coverage-which pertains to medications.

## 2019-02-20 NOTE — PROGRESS NOTES
1. Have you been to the ER, urgent care clinic since your last visit? Hospitalized since your last visit? No 
 
2. Have you seen or consulted any other health care providers outside of the [address] [place] [address] since your last visit? Include any pap smears or colon screening. No  
Chief Complaint Patient presents with  Facial Swelling  
  on right side started saturday night. area is itchy Not fasting

## 2019-02-20 NOTE — TELEPHONE ENCOUNTER
Message   Received:  Today   Message Contents   Otto RUTHANN Yepez             oncotype Dx costs about $4,000.  So Medicare should cover all but $800.  Secondary should pick that up

## 2019-02-21 ENCOUNTER — HOSPITAL ENCOUNTER (OUTPATIENT)
Dept: RADIATION THERAPY | Age: 69
Discharge: HOME | End: 2019-02-21

## 2019-02-26 ENCOUNTER — DOCUMENTATION ONLY (OUTPATIENT)
Dept: ONCOLOGY | Age: 69
End: 2019-02-26

## 2019-02-26 NOTE — PROGRESS NOTES
Called patient to go over cost of Oncotype DX testing, and insurance coverage. Patient agreed to testing, and was appreciative of call and assistance.   Information forwarded to Dr Keshawn Cespedes and nursing staff

## 2019-02-26 NOTE — PROGRESS NOTES
Nii Baugh MD Cc: Ya Vaughan RN; Pietro Chandra with patient, and went over cost and insurance information. Sarbjit Bartholomew wants to have test done.  Appreciative of call and assistance

## 2019-03-04 ENCOUNTER — HOSPITAL ENCOUNTER (OUTPATIENT)
Dept: RADIATION THERAPY | Age: 69
Discharge: HOME | End: 2019-03-04
Payer: MEDICARE

## 2019-03-04 PROCEDURE — 77300 RADIATION THERAPY DOSE PLAN: CPT

## 2019-03-04 PROCEDURE — 77293 RESPIRATOR MOTION MGMT SIMUL: CPT

## 2019-03-04 PROCEDURE — 77334 RADIATION TREATMENT AID(S): CPT

## 2019-03-04 PROCEDURE — 77295 3-D RADIOTHERAPY PLAN: CPT

## 2019-03-05 ENCOUNTER — TELEPHONE (OUTPATIENT)
Dept: ONCOLOGY | Age: 69
End: 2019-03-05

## 2019-03-07 ENCOUNTER — HOSPITAL ENCOUNTER (OUTPATIENT)
Dept: RADIATION THERAPY | Age: 69
Discharge: HOME | End: 2019-03-07
Payer: MEDICARE

## 2019-03-07 PROCEDURE — 77417 THER RADIOLOGY PORT IMAGE(S): CPT

## 2019-03-07 PROCEDURE — 77412 RADIATION TX DELIVERY LVL 3: CPT

## 2019-03-08 ENCOUNTER — HOSPITAL ENCOUNTER (OUTPATIENT)
Dept: RADIATION THERAPY | Age: 69
Discharge: HOME | End: 2019-03-08
Payer: MEDICARE

## 2019-03-08 PROCEDURE — 77412 RADIATION TX DELIVERY LVL 3: CPT

## 2019-03-11 ENCOUNTER — DOCUMENTATION ONLY (OUTPATIENT)
Dept: ONCOLOGY | Age: 69
End: 2019-03-11

## 2019-03-11 ENCOUNTER — HOSPITAL ENCOUNTER (OUTPATIENT)
Dept: RADIATION THERAPY | Age: 69
Discharge: HOME | End: 2019-03-11
Payer: MEDICARE

## 2019-03-11 DIAGNOSIS — C50.912 MALIGNANT NEOPLASM OF LEFT FEMALE BREAST, UNSPECIFIED ESTROGEN RECEPTOR STATUS, UNSPECIFIED SITE OF BREAST (HCC): Primary | ICD-10-CM

## 2019-03-11 PROCEDURE — 77417 THER RADIOLOGY PORT IMAGE(S): CPT

## 2019-03-11 PROCEDURE — 77412 RADIATION TX DELIVERY LVL 3: CPT

## 2019-03-11 RX ORDER — LETROZOLE 2.5 MG/1
2.5 TABLET, FILM COATED ORAL DAILY
Qty: 30 TAB | Refills: 3 | Status: SHIPPED | OUTPATIENT
Start: 2019-04-18 | End: 2019-05-15 | Stop reason: SDUPTHER

## 2019-03-11 NOTE — PROGRESS NOTES
Oncotype DX score is low risk    No benefit from chemotherapy- this is good news.     Please let her know that she is ok to proceed with radiation    I have sent Letrozole to her pharmacy which she may start after completion of RT and see me in about 2 months

## 2019-03-11 NOTE — PROGRESS NOTES
Call to patient. HIPAA verified. Advised below. She feels like she is blessed. Verbalized understanding. Encouraged to call with any questions/concerns.   RTC in May

## 2019-03-12 ENCOUNTER — HOSPITAL ENCOUNTER (OUTPATIENT)
Dept: RADIATION THERAPY | Age: 69
Discharge: HOME | End: 2019-03-12
Payer: MEDICARE

## 2019-03-12 PROCEDURE — 77412 RADIATION TX DELIVERY LVL 3: CPT

## 2019-03-13 ENCOUNTER — HOSPITAL ENCOUNTER (OUTPATIENT)
Dept: RADIATION THERAPY | Age: 69
Discharge: HOME | End: 2019-03-13
Payer: MEDICARE

## 2019-03-13 PROCEDURE — 77336 RADIATION PHYSICS CONSULT: CPT

## 2019-03-13 PROCEDURE — 77412 RADIATION TX DELIVERY LVL 3: CPT

## 2019-03-13 PROCEDURE — 77334 RADIATION TREATMENT AID(S): CPT

## 2019-03-13 PROCEDURE — 77307 TELETHX ISODOSE PLAN CPLX: CPT

## 2019-03-14 ENCOUNTER — HOSPITAL ENCOUNTER (OUTPATIENT)
Dept: RADIATION THERAPY | Age: 69
Discharge: HOME | End: 2019-03-14
Payer: MEDICARE

## 2019-03-14 PROCEDURE — 77412 RADIATION TX DELIVERY LVL 3: CPT

## 2019-03-15 ENCOUNTER — HOSPITAL ENCOUNTER (OUTPATIENT)
Dept: RADIATION THERAPY | Age: 69
Discharge: HOME | End: 2019-03-15
Payer: MEDICARE

## 2019-03-15 PROCEDURE — 77412 RADIATION TX DELIVERY LVL 3: CPT

## 2019-03-17 DIAGNOSIS — Z79.60 LONG-TERM USE OF IMMUNOSUPPRESSANT MEDICATION: ICD-10-CM

## 2019-03-17 DIAGNOSIS — M06.09 SERONEGATIVE RHEUMATOID ARTHRITIS OF MULTIPLE SITES (HCC): ICD-10-CM

## 2019-03-17 RX ORDER — METHOTREXATE 2.5 MG/1
TABLET ORAL
Qty: 96 TAB | Refills: 0 | Status: SHIPPED | OUTPATIENT
Start: 2019-03-17 | End: 2019-04-29 | Stop reason: ALTCHOICE

## 2019-03-18 ENCOUNTER — HOSPITAL ENCOUNTER (OUTPATIENT)
Dept: RADIATION THERAPY | Age: 69
Discharge: HOME | End: 2019-03-18
Payer: MEDICARE

## 2019-03-18 PROCEDURE — 77417 THER RADIOLOGY PORT IMAGE(S): CPT

## 2019-03-18 PROCEDURE — 77412 RADIATION TX DELIVERY LVL 3: CPT

## 2019-03-19 ENCOUNTER — HOSPITAL ENCOUNTER (OUTPATIENT)
Dept: RADIATION THERAPY | Age: 69
Discharge: HOME | End: 2019-03-19
Payer: MEDICARE

## 2019-03-19 PROCEDURE — 77412 RADIATION TX DELIVERY LVL 3: CPT

## 2019-03-20 ENCOUNTER — HOSPITAL ENCOUNTER (OUTPATIENT)
Dept: RADIATION THERAPY | Age: 69
Discharge: HOME | End: 2019-03-20
Payer: MEDICARE

## 2019-03-20 PROCEDURE — 77412 RADIATION TX DELIVERY LVL 3: CPT

## 2019-03-20 PROCEDURE — 77336 RADIATION PHYSICS CONSULT: CPT

## 2019-03-21 ENCOUNTER — HOSPITAL ENCOUNTER (OUTPATIENT)
Dept: RADIATION THERAPY | Age: 69
Discharge: HOME | End: 2019-03-21
Payer: MEDICARE

## 2019-03-21 PROCEDURE — 77412 RADIATION TX DELIVERY LVL 3: CPT

## 2019-03-22 ENCOUNTER — HOSPITAL ENCOUNTER (OUTPATIENT)
Dept: RADIATION THERAPY | Age: 69
Discharge: HOME | End: 2019-03-22
Payer: MEDICARE

## 2019-03-22 PROCEDURE — 77412 RADIATION TX DELIVERY LVL 3: CPT

## 2019-03-25 ENCOUNTER — HOSPITAL ENCOUNTER (OUTPATIENT)
Dept: RADIATION THERAPY | Age: 69
Discharge: HOME | End: 2019-03-25
Payer: MEDICARE

## 2019-03-25 PROCEDURE — 77412 RADIATION TX DELIVERY LVL 3: CPT

## 2019-03-25 PROCEDURE — 77417 THER RADIOLOGY PORT IMAGE(S): CPT

## 2019-03-26 ENCOUNTER — HOSPITAL ENCOUNTER (OUTPATIENT)
Dept: RADIATION THERAPY | Age: 69
Discharge: HOME | End: 2019-03-26
Payer: MEDICARE

## 2019-03-26 PROCEDURE — 77412 RADIATION TX DELIVERY LVL 3: CPT

## 2019-03-27 ENCOUNTER — HOSPITAL ENCOUNTER (OUTPATIENT)
Dept: RADIATION THERAPY | Age: 69
Discharge: HOME | End: 2019-03-27
Payer: MEDICARE

## 2019-03-27 PROCEDURE — 77336 RADIATION PHYSICS CONSULT: CPT

## 2019-03-27 PROCEDURE — 77412 RADIATION TX DELIVERY LVL 3: CPT

## 2019-03-28 ENCOUNTER — HOSPITAL ENCOUNTER (OUTPATIENT)
Dept: RADIATION THERAPY | Age: 69
Discharge: HOME | End: 2019-03-28
Payer: MEDICARE

## 2019-03-28 PROCEDURE — 77412 RADIATION TX DELIVERY LVL 3: CPT

## 2019-03-29 ENCOUNTER — HOSPITAL ENCOUNTER (OUTPATIENT)
Dept: RADIATION THERAPY | Age: 69
Discharge: HOME | End: 2019-03-29
Payer: MEDICARE

## 2019-03-29 PROCEDURE — 77387 GUIDANCE FOR RADJ TX DLVR: CPT

## 2019-03-29 PROCEDURE — 77412 RADIATION TX DELIVERY LVL 3: CPT

## 2019-04-01 ENCOUNTER — HOSPITAL ENCOUNTER (OUTPATIENT)
Dept: RADIATION THERAPY | Age: 69
Discharge: HOME | End: 2019-04-01
Payer: MEDICARE

## 2019-04-01 PROCEDURE — 77387 GUIDANCE FOR RADJ TX DLVR: CPT

## 2019-04-01 PROCEDURE — 77412 RADIATION TX DELIVERY LVL 3: CPT

## 2019-04-02 ENCOUNTER — HOSPITAL ENCOUNTER (OUTPATIENT)
Dept: RADIATION THERAPY | Age: 69
Discharge: HOME | End: 2019-04-02
Payer: MEDICARE

## 2019-04-02 PROCEDURE — 77412 RADIATION TX DELIVERY LVL 3: CPT

## 2019-04-02 PROCEDURE — 77387 GUIDANCE FOR RADJ TX DLVR: CPT

## 2019-04-03 ENCOUNTER — HOSPITAL ENCOUNTER (OUTPATIENT)
Dept: RADIATION THERAPY | Age: 69
Discharge: HOME | End: 2019-04-03
Payer: MEDICARE

## 2019-04-03 PROCEDURE — 77387 GUIDANCE FOR RADJ TX DLVR: CPT

## 2019-04-03 PROCEDURE — 77412 RADIATION TX DELIVERY LVL 3: CPT

## 2019-04-03 PROCEDURE — 77336 RADIATION PHYSICS CONSULT: CPT

## 2019-04-04 ENCOUNTER — HOSPITAL ENCOUNTER (OUTPATIENT)
Dept: RADIATION THERAPY | Age: 69
Discharge: HOME | End: 2019-04-04
Payer: MEDICARE

## 2019-04-04 PROCEDURE — 77412 RADIATION TX DELIVERY LVL 3: CPT

## 2019-04-04 PROCEDURE — 77387 GUIDANCE FOR RADJ TX DLVR: CPT

## 2019-04-05 ENCOUNTER — HOSPITAL ENCOUNTER (OUTPATIENT)
Dept: RADIATION THERAPY | Age: 69
Discharge: HOME | End: 2019-04-05
Payer: MEDICARE

## 2019-04-10 ENCOUNTER — HOSPITAL ENCOUNTER (OUTPATIENT)
Dept: RADIATION THERAPY | Age: 69
Discharge: HOME | End: 2019-04-10
Payer: MEDICARE

## 2019-04-29 ENCOUNTER — OFFICE VISIT (OUTPATIENT)
Dept: RHEUMATOLOGY | Age: 69
End: 2019-04-29

## 2019-04-29 ENCOUNTER — HOSPITAL ENCOUNTER (OUTPATIENT)
Dept: LAB | Age: 69
Discharge: HOME | End: 2019-04-29
Payer: MEDICARE

## 2019-04-29 VITALS
DIASTOLIC BLOOD PRESSURE: 75 MMHG | WEIGHT: 203 LBS | BODY MASS INDEX: 34.66 KG/M2 | RESPIRATION RATE: 18 BRPM | TEMPERATURE: 98.1 F | HEIGHT: 64 IN | SYSTOLIC BLOOD PRESSURE: 126 MMHG | HEART RATE: 65 BPM

## 2019-04-29 DIAGNOSIS — Z79.60 LONG-TERM USE OF IMMUNOSUPPRESSANT MEDICATION: ICD-10-CM

## 2019-04-29 DIAGNOSIS — M35.3 PMR (POLYMYALGIA RHEUMATICA) (HCC): ICD-10-CM

## 2019-04-29 DIAGNOSIS — M06.09 SERONEGATIVE RHEUMATOID ARTHRITIS OF MULTIPLE SITES (HCC): Primary | ICD-10-CM

## 2019-04-29 DIAGNOSIS — D47.2 MGUS (MONOCLONAL GAMMOPATHY OF UNKNOWN SIGNIFICANCE): ICD-10-CM

## 2019-04-29 DIAGNOSIS — E55.9 VITAMIN D DEFICIENCY: ICD-10-CM

## 2019-04-29 PROCEDURE — 85651 RBC SED RATE NONAUTOMATED: CPT

## 2019-04-29 PROCEDURE — 86140 C-REACTIVE PROTEIN: CPT

## 2019-04-29 PROCEDURE — 84165 PROTEIN E-PHORESIS SERUM: CPT

## 2019-04-29 PROCEDURE — 86334 IMMUNOFIX E-PHORESIS SERUM: CPT

## 2019-04-29 PROCEDURE — 82306 VITAMIN D 25 HYDROXY: CPT

## 2019-04-29 PROCEDURE — 80053 COMPREHEN METABOLIC PANEL: CPT

## 2019-04-29 PROCEDURE — 85025 COMPLETE CBC W/AUTO DIFF WBC: CPT

## 2019-04-29 RX ORDER — IBUPROFEN 200 MG
800 TABLET ORAL
COMMUNITY
End: 2020-09-26

## 2019-04-29 RX ORDER — SYRINGE-NEEDLE,INSULIN,0.5 ML 30 GX5/16"
1 SYRINGE, EMPTY DISPOSABLE MISCELLANEOUS
Qty: 12 SYRINGE | Refills: 3 | Status: SHIPPED | OUTPATIENT
Start: 2019-05-04 | End: 2019-07-21

## 2019-04-29 RX ORDER — METHOTREXATE 25 MG/ML
25 INJECTION INTRA-ARTERIAL; INTRAMUSCULAR; INTRATHECAL; INTRAVENOUS
Qty: 10 ML | Refills: 0 | Status: SHIPPED | OUTPATIENT
Start: 2019-05-01 | End: 2019-07-04 | Stop reason: SDUPTHER

## 2019-04-29 NOTE — PROGRESS NOTES
REASON FOR VISIT This is a follow-up visit for Ms. Naila Prieto for Seronegative Rheumatoid Arthritis. Inflammatory arthritis phenotype includes: Anti-CCP positive: no 
Rheumatoid factor positive: no 
Erosive disease: no 
Extra-articular manifestations include: smoldering myeloma Immunosuppression Screening (10/04/2018): Quantiferon TB: negative PPD:  Not performed Hepatitis B: negative Hepatitis C: negative Therapy History includes: 
Current DMARD therapy include: methotrexate 20 mg every Wednesday(10/15/2018 to present) Prior DMARD therapy include: none Discontinued DMARDs because of inefficacy: None Discontinued DMARDs because of side effects: None Immunization History Administered Date(s) Administered  Influenza Vaccine 10/10/2013, 10/14/2015, 10/15/2018  Influenza Vaccine Split 09/26/2012  Influenza Vaccine Whole 10/22/2011  Pneumococcal Polysaccharide (PPSV-23) 05/05/2015 Patient Active Problem List  
Diagnosis Code  Hypothyroidism E03.9  
 IBS (irritable bowel syndrome) K58.9  
 HTN (hypertension) I10  
 Graves disease E05.00  Moderate major depression (HCC) F32.1  PMR (polymyalgia rheumatica) ([facility]) M35.3  Iliotibial band syndrome of both sides M76.31, M76.32  
 Long term current use of non-steroidal anti-inflammatories (NSAID) Z79.1  Seronegative rheumatoid arthritis of multiple sites ([facility]) M06.09  
 Monoclonal gammopathy of unknown significance (MGUS) D47.2  Vitamin D deficiency E55.9  Severe obesity (HCC) E66.01  
 Long-term use of immunosuppressant medication Z79.899  Malignant neoplasm of left female breast ([facility] C50.912  Multiple myeloma ([facility]) C90.00 [address] [place] [address] 
 
Ms. Naila Prieto returns for a follow-up. On her last visit, I asked her to resume methotrexate to 20 mg every Wednesday, which she has taken with good tolerance. Today, she feels better. She has soreness in her hands due to working them more. She feels is mostly early in the morning that improves when she massages them. She has morning stiffness lasting minutes that improves with massages. She denies swelling. She endorses dry cough from allergies, easy bruising. She denies fever, weight loss, blurred vision, vision loss, oral ulcers, ankle swelling, dyspnea, nausea, vomiting, dysphagia, abdominal pain, black or bloody stool, fall since last visit, rash and increased thirst. 
 
Ms. Maurilio Bravo has continued her medications for arthritis and reports good tolerance without significant side effects. Last toxicity monitoring by blood work was done on 1/28/2019 and did not reveal any significant adverse effects. Most recent inflammatory markers from 11/28/2018 revealed a ESR 3 mm/hr (previously 4 mm/hr) and CRP 2.7 mg/L (previously 1.8 mg/L). The patient has not had any interval hospital admissions, infections, or surgeries. REVIEW OF SYSTEMS A comprehensive review of systems was performed and pertinent results are documented in the HPI, review of systems is otherwise non-contributory. PAST MEDICAL HISTORY She has a past medical history of Breast cancer, left ([address] Depression, Goiter, Hearing loss, Hypertension, Hypothyroid, IBS (irritable bowel syndrome), and Rheumatoid arthritis ([address] FAMILY HISTORY Her family history includes Alcohol abuse in her brother; COPD in her brother and mother; Cancer in her father and mother; Diabetes in her brother and brother; Heart Disease in her brother, brother, paternal grandmother, and paternal uncle; Liver Disease in her brother. SOCIAL HISTORY She reports that she has never smoked. She has never used smokeless tobacco. She reports that she drinks about 3.0 - 3.6 oz of alcohol per week. She reports that she does not use drugs. MEDICATIONS Current Outpatient Medications Medication Sig Dispense Refill  ibuprofen (ADVIL) 200 mg tablet Take 800 mg by mouth every six (6) hours as needed for Pain.  [START ON 5/1/2019] methotrexate, PF, 25 mg/mL injection 1 mL by SubCUTAneous route every Wednesday. 10 mL 0  
 [START ON 5/4/2019] Insulin Syringe-Needle U-100 1 mL 30 gauge x 5/16 syrg 1 Each by Does Not Apply route Every Saturday for 12 doses. To be used for methotrexate solution 12 Syringe 3  
 LORazepam (ATIVAN) 1 mg tablet TAKE 1 & 1/2 (ONE & ONE-HALF) TABLETS BY MOUTH ONCE DAILY NIGHTLY 45 Tab 1  
 letrozole (FEMARA) 2.5 mg tablet Take 1 Tab by mouth daily. 30 Tab 3  
 lisinopril-hydroCHLOROthiazide (PRINZIDE, ZESTORETIC) 10-12.5 mg per tablet TAKE ONE TABLET BY MOUTH ONCE DAILY 90 Tab 3  
 folic acid (FOLVITE) 1 mg tablet Take 1 Tab by mouth daily. 90 Tab 0  
 ergocalciferol (ERGOCALCIFEROL) 50,000 unit capsule Take 1 Cap by mouth every Tuesday. 12 Cap 3  
 levothyroxine (SYNTHROID) 200 mcg tablet TAKE ONE TABLET BY MOUTH ONCE DAILY BEFORE BREAKFAST 90 Tab 3 ALLERGIES Allergies Allergen Reactions  Keflex [Cephalexin] Rash  Monistat 1 [Tioconazole] Swelling  Sulfa (Sulfonamide Antibiotics) Rash PHYSICAL EXAMINATION Visit Vitals /75 Pulse 65 Temp 98.1 °F (36.7 °C) Resp 18 Ht 5' 4\" (1.626 m) Wt 203 lb (92.1 kg) BMI 34.84 kg/m² Body mass index is 34.84 kg/m². General: Patient is alert, oriented x 3, not in acute distress HEENT:  
Sclerae are not injected and appear moist. 
There is no alopecia. Neck is supple Cardiovascular: 
Heart is regular rate and rhythm, no murmurs. Chest: 
Lungs are clear to auscultation bilaterally. Extremities: 
Free of clubbing, cyanosis, edema Neurological exam: Muscle strength is full in upper and lower extremities Skin exam: 
There are no rashes, no alopecia, no discoid lesions, no active Raynaud's, no livedo reticularis, no periungual erythema. Musculoskeletal exam: A comprehensive musculoskeletal exam was performed for all joints of each upper and lower extremity and assessed for swelling, tenderness and range of motion. Positive results are documented as below: 
 
Bilateral Sebastián and [place] nodes. Bilateral trochanteric bursa tenderness. Z-Deformities:   no 
Grottoes Neck Deformities:  no 
Boutonierre's Deformities:  no 
Ulnar Deviation:   no 
MCP Subluxation:  no 
  
Joint Count 4/29/2019 1/28/2019 11/28/2018 10/4/2018 Patient pain (0-100) 15 40 70 75 MHAQ 1 0.5 0 1 Left shoulder - Tender - - 1 1 Left shoulder - Swollen - - 1 0 Left wrist- Tender 1 0 1 - Left wrist- Swollen 1 0 1 - Left 1st MCP - Tender 1 - 1 1 Left 1st MCP - Swollen 1 - 1 1 Left 2nd MCP - Tender 1 - 1 1 Left 2nd MCP - Swollen 1 - 1 1 Left 3rd MCP - Tender 1 - - 1 Left 3rd MCP - Swollen 1 - - 1 Left 4th MCP - Tender 1 - - - Left 4th MCP - Swollen 0 - - - Left 5th MCP - Tender 1 - - 1 Left 5th MCP - Swollen 0 - - 1 Left 2nd PIP - Tender - - 1 1 Left 2nd PIP - Swollen - - 1 1 Left 3rd PIP - Tender - - 1 1 Left 3rd PIP - Swollen 1 - 1 1 Left 4th PIP - Tender 1 - - - Right shoulder - Tender - - 1 1 Right shoulder - Swollen - - 0 0 Right wrist- Tender 1 - - 1 Right wrist- Swollen 1 - - 1 Right 1st MCP - Tender 1 - 1 1 Right 1st MCP - Swollen 1 - 1 1 Right 2nd MCP - Tender - - 1 1 Right 2nd MCP - Swollen - - 1 1 Right 3rd MCP - Tender 0 - - - Right 3rd MCP - Swollen 1 - - - Right 4th MCP - Tender 1 - - - Right 4th MCP - Swollen 1 - - - Right thumb IP - Tender - - 1 1 Right thumb IP - Swollen - - 1 0 Right 2nd PIP - Tender 1 - 1 1 Right 2nd PIP - Swollen 1 - 1 1 Right 3rd PIP - Tender - - - 1 Right 3rd PIP - Swollen - - - 1 Right 4th PIP - Tender 1 - - 1 Right 4th PIP - Swollen 0 - - 1 Right 5th PIP - Tender 1 - - 1 Right 5th PIP - Swollen 1 - - 1 Tender Joint Count (Total) 13 0 11 16 Swollen Joint Count (Total) 11 0 10 13 Physician Assessment (0-10) 4 0 4 5 Patient Assessment (0-10) 1.5 2.5 5 7.5 CDAI Total (calculated) 29.5 2.5 30 41.5 DATA REVIEW Laboratory Recent laboratory results were reviewed, summarized, and discussed with the patient. Imaging Musculoskeletal Ultrasound None Radiographs Skeletal Survey 11/28/2018: Few small calvarial lucencies. No fractures or lytic lesions at risk for fracture. Bilateral Shoulder 10/04/2018: RIGHT: There is prominent AC joint degeneration with spurring and loss of joint space. There is some mild deformity, chronic in nature of the tuberosity. No fracture or dislocation, no bony erosion, the bone is normal in mineral contents. No soft tissue calcifications. LEFT: The bone is normal in mineral contents. There is some mild chronic deformity of the tuberosity and AC joint degeneration. There are no soft tissue calcification bony erosion fracture or dislocation. Bilateral Hand 10/04/2018: RIGHT: no acute fracture or dislocation. Alignment is anatomic. Mild degenerative changes are seen in the interphalangeal joint of the thumb. No erosions are seen. No abnormality seen in the soft tissues. LEFT: no acute fracture or dislocation. Alignment is anatomic. Moderate to severe degenerative changes are present in the first [facility] joint. A cystic lucency in the ace of the first metacarpal likely represents a subchondral cyst. No clear erosions are seen. No abnormality seen in the soft tissues. Bilateral Foot 10/04/2018: RIGHT:  no fracture or other acute osseous or articular abnormality. A small plantar calcaneal heel spur is noted. No erosions or joint space narrowing are present. The soft tissues are within normal limits. LEFT: no acute fracture or dislocation. Alignment is anatomic. A small plantar calcaneal heel spur is noted. No erosions or joint space narrowing are present. . No abnormality is seen in the soft tissues.  
 
 CT Imaging PET/CT Scan 1/24/2019: HEAD/NECK: No apparent foci of abnormal hypermetabolism. Cerebral evaluation is limited by normal intense activity. CHEST: No foci of abnormal hypermetabolism. The known small breast cancer at 12:00 in the left breast demonstrates no increased metabolic activity. ABDOMEN/PELVIS: No foci of abnormal hypermetabolism. SKELETON: No foci of abnormal hypermetabolism in the axial and visualized appendicular skeleton. Lower extremities:. Imaging of the lower extremities is unremarkable. There is muscular activity noted. CT Head without contrast 9/13/2017: Prior right occipital craniectomy. Encephalomalacia in the right cerebellar region. . There is no significant white matter disease. There is no intracranial hemorrhage, extra-axial collection, mass, mass effect or midline shift. The basilar cisterns are open. No acute infarct is identified. The visualized portions of the paranasal sinuses and mastoid air cells are clear MR Imaging MRI Breasts with and without contrast 1/07/2019: There is minimal background parenchymal enhancement and the breasts are almost entirely fat. A few sub-5 mm foci of enhancement are scattered bilaterally. Right breast: No suspicious enhancing foci. No axillary or internal mammary chain lymphadenopathy. Left breast: A vague area of enhancement around the biopsy clip in the anterior third at 12:00 does not reach threshold enhancement. This measures approximately 9 x 9 mm, and correlates well with the small area of architectural distortion on mammography and subtle architectural distortion and shadowing on ultrasound. No axillary or internal mammary chain lymphadenopathy. DXA DXA 5/12/2015: (excluded distal radius) lumbar spine L1-L4 T score 0.4 (BMD 1.241 g/cm2), left femoral neck T score: 0.5 (1.109 g/cm2), left total hip T score: 1.0 (1.139 g/cm2), right femoral neck T score: 0.2 (1.062 g/cm2), right total hip T score: 1.1 (1.145 g/cm2). FRAX score N/A % probability in 10 years for major osteoporotic fracture and N/A % 10 year probability of hip fracture. PATHOLOGY Lymph node, left axilla, #1, sentinel node excision 1/29/2019: No evidence for malignancy in one node (0/1). Breast, left, lumpectomy 1/29/2019:  Invasive lobular carcinoma. Lobular carcinoma in situ Bone Marrow Biopsy 12/19/2018: Bone marrow: Variably cellular marrow with mild monoclonal plasmacytosis. Trilineage hematopoiesis with maturation. Breast, left, needle core biopsy 12/03/2018: Invasive mammary carcinoma with ductal and lobular features [place] histologic grade 1. Largest glass slide measurement of invasive carcinoma: 8.5 mm. ER pos IL pos (weak) HER 2 neg. ASSESSMENT AND PLAN This is a follow-up visit for Ms. Gayla Brooks. 1) Seronegative Rheumatoid Arthritis with secondary Polymyalgia Rheumatica. She is maintaind on methotrexate 20 mg every Wednesday with clinical improvement and good tolerance but continues to have active disease. Her CDAI was 29.5 (previously 2.5, 30, 41.5) with 13 tender and 11 swollen joints, consistent with high disease activity. She was in remission on her last visit so I am uncertain why she is worse. She reports using her hands more. She is now on letrozole for breast cancer. I will change oral to subcutaneous with target dose of 25 mg (1 mL) every Wednesday. Labs today. 2) Polymyalgia Rheumatica. She had bilateral shoulder and pelvic girdle symptoms. This resolved. 3) Long Term Use of Immunosuppressants. The patient remains on immunomodulatory medications (methotrexate) and requires frequent toxicity monitoring by blood work. Respective labs were ordered (CBC and CMP). 4) Long Term Use of NSAIDs She is holding ibuprofen for her back while on prednisone. 5) MGUS.  Immunofixation shows IgA monoclonal protein with kappa light chain. M-spike 0.3. She was evaluated by Dr. Marybeth Hudson. Bone marrow biopsy showed smoldering myeloma. 6) Vitamin D Deficiency. Her vitamin D level was 32.0 (previously 21.7). She is on weekly ergocalciferol 50,000. I will check her level. 7) Bilateral iliotibial Band Syndrome. This was not an active issue today. I will refer her to physical therapy if she is symptomatic. 8) Left Breast Cancer Stage 1. She is scheduled for lumpectomy. The patient voiced understanding of the aforementioned assessment and plan. Summary of plan was provided in the After Visit Summary patient instructions. TODAY'S ORDERS Orders Placed This Encounter  CBC WITH AUTOMATED DIFF  
 METABOLIC PANEL, COMPREHENSIVE  C REACTIVE PROTEIN, QT  
 SED RATE (ESR)  VITAMIN D, 25 HYDROXY  GAMMOPATHY EVAL, SPEP/ELEIL, IG QT/FLC  PROTEIN ELECTROPHORESIS W/ REFLX ELIEL  methotrexate, PF, 25 mg/mL injection  Insulin Syringe-Needle U-100 1 mL 30 gauge x 5/16 syrg Future Appointments Date Time Provider Geoff Duggan 5/15/2019  9:30 AM Meagan León MD [facility] [address]  
7/29/2019  8:20 AM Duane Mojica MD [address] [place] [address]  
8/13/2019  8:30 AM Louis Gonzales NP [facility] Alma Rosa Paiz MD, [facility] Adult Rheumatology Rheumatology Ultrasound Certified [facility] A Part of [facility] 
[id number] [place] [address], [address] [facility], [address] Phone [phone] Fax [phone]

## 2019-04-29 NOTE — PROGRESS NOTES
Chief Complaint Patient presents with  Arthritis 1. Have you been to the ER, urgent care clinic since your last visit? Hospitalized since your last visit? No 
 
2. Have you seen or consulted any other health care providers outside of the [address] [place] [address] since your last visit? Include any pap smears or colon screening.  No

## 2019-04-29 NOTE — PATIENT INSTRUCTIONS
I will replace oral methotrexate for subcutaneous injections of methotrexate Please draw up 0.8 mL of methotrexate in the syringe this Wednesday. Then, place an ice cube or ice pack on your injection site (thigh or abdomen) for about one to two minutes to numb your skin - if you are concerned for the pinch sensation from the small needle Then pinch your skin to life it up (tent it) and insert the needle under the skin and inject the methotrexate. Continue daily folic acid Stop methotrexate tablets If you tolerate the 0.8 mL dose, increase to 1 mL the following Wednesday and continue 1 mL every Wednesday

## 2019-04-30 ENCOUNTER — PATIENT OUTREACH (OUTPATIENT)
Dept: ONCOLOGY | Age: 69
End: 2019-04-30

## 2019-04-30 NOTE — PROGRESS NOTES
This Survivorship Care Plan is a cancer treatment summary and follow up plan and is provided to you to keep with your health plan records and to share with your primary care provider or any of your doctors and nurses. This summary is a brief record of major aspects of your cancer treatment and not a detailed or comprehensive record of your care. You should review this with your cancer provider. General Information   Patient Name: Suzi Sparks   Patient :1950   Health Care Providers (Including Names, Institution)   Primary Care Provider:Sarah Quintana MD   Surgeon:  Haley Marshall MD, [facility]     Radiation Oncologist:  David Bernal. Taco Calderón MD, [facility] Radiation Oncology     Medical Oncologist:  King Bekah JACK     [address] [facility]  at [facility]     Other Providers:   Treatment Summary   Diagnosis   Cancer Type/Histology Subtype:  left Breast Cancer invasive lobular carcinoma      Receptors:   Estrogen Positive  Progesterone Positive  HER2 Negative   Diagnosis Date (year):     Stage:   IA  (T1c, N0, M0)   Treatment Completed   Surgery: Yes Surgery Date(s) (year):     Surgical procedure/findings: Left breast ultrasound-guided lumpectomy, left sentinel lymph node biopsy    Lymph node removal: sentinel node biopsy    Radiation: Yes   Body area treated:  Left breast End Date (year):     Systemic Therapy (chemotherapy, hormonal therapy, other): No None     Treatment Ongoing   Additional treatment name Planned duration Possible Side effects   Aromatase Inhibitors (anastrozole, exemestane and letrozole)  Hot flashes joint/muscle aches vaginal dryness and bone loss (common) hair thinning (rare)  Other rare side effects may occur. Persistent symptoms or side effects at completion of treatment:    Fatigue:  Yes                                                                   Menopausal symptoms: No  Numbness:No Pain:No  Psychosocial/Depression: No                                               Familial Cancer Risk Assessment   Breast and or ovarian cancer in 1st or 2nd degree relatives: No   Received Genetic counseling: No   Genetic testing:  No        Follow-up Care Plan   Your follow-up care plan is design to inform you and primary care providers regarding the recommended and required follow-up, cancer screening and routine health maintenance that is needed to maintain optimal health. Possible late- and long-term effects that someone with this type of cancer and treatment may experience:  Weakening of the heart presenting as shortness of breath and swelling of legs (rare < 5%); and bones become weak and at risk for fracture (osteoporosis). It is important to remember that these symptoms can be due to other causes like diabetes or with normal aging. If these or any other new symptoms occur bring these to attention of your health care provider. These symptoms should be brought to the attention of your provider:   1. Anything that represents a brand new symptom;  2. Anything that represents a persistent symptom;  3. Anything you are worried about that might be related to the cancer coming back. Please continue to see your primary care provider for all general health care recommended for a woman your age such as routine immunizations, and routine non-breast cancer screening like colonoscopy or bone density exams. Consult with your health care provider about prevention and screening for bone loss using bone density tests.      Schedule for Clinical Visits   Coordinating Provider When/How often   Dr. Daniel Burgess  5/23/19 and as directed by her   Dr. Armand Pelayo 5/15/19 and as directed by her   Dr. Noris Mane  8/13/19 and as directed by her   [facility] (NCCN) guidelines recommend patients have a  history and physical exam 1-4 times a year as clinically appropriate for 5 years, then annually. Coordinating Provider TEST How often   Dr. Monique Rene Annually    MRI breast As indicated by provider   PCP/Gyn Pap/pelvic exam As indicated by provider   PCP/GI Colonoscopy As indicated by provider   Dr. Enrique Mcclain Every 2 years if on an aromatase inhibitor or as indicated by your provider   Breast cancer survivors may experience issues with the areas listed below. If you have any concerns in these or other areas, please speak with your doctors or nurses to find out how you can get help with them. Anxiety or depression   Insurance     Sexual Functioning  Emotional and mental health  Memory or concentration loss         Stopping Smoking   Fatigue    Parenting     Weight changes   Fertility    Physical functioning      Financial advice or assistance  School/work         A number of lifestyle/behaviors can affect your ongoing health, including the risk for the cancer coming back or developing another cancer. Discuss these recommendations with your doctor or nurse:    Alcohol use    Physical activity                Diet     Sun screen use      Management of my medications  Tobacco use/cessation       Management of my other illnesses  Weight management (loss/gain)                                Resources you may be interested in:      www.cancer. net   Other: Cancercare. org     Other comments:   Prepared by:   Shani Carter RN , OCN                                                                                      Delivered on: 5/1/19

## 2019-04-30 NOTE — LETTER
4/30/2019 3:42 PM 
 
Ms. Rina Naylor [address] [place] [phone] Dear Rina Naylor: 
 
 
 
The [address] [place] [address] Team is committed to your health and well-being. The Cancer Survivorship Program offers support to patients who have completed or are nearing completion of their cancer treatment. The program provides patients with their plan of care and resources to help support patients as they transition into the survivorship phase of their care. A survivorship care plan is a record of your cancer and treatment history, as well as any checkups or follow-up tests you need in the future. It will also include a list of possible long-term effects of your treatments and ideas for staying healthy. Your plan will give you recommendations of when you need to get follow-up tests, and which doctors are responsible for your care. Keep your plan and refer to it so you can be sure you get the tests you need. Your plan includes important information about your cancer and treatment, which helps you and your doctors understand each other. Bring it with you whenever you go to the doctor. Please review your enclosed survivorship care plan and contact me so I may answer any questions and provide additional resources. Radha Coronado RN, OCN Cancer Survivorship  [employer] [phone] [email or web address]

## 2019-05-01 DIAGNOSIS — Z79.60 LONG-TERM USE OF IMMUNOSUPPRESSANT MEDICATION: ICD-10-CM

## 2019-05-01 DIAGNOSIS — M06.09 SERONEGATIVE RHEUMATOID ARTHRITIS OF MULTIPLE SITES (HCC): ICD-10-CM

## 2019-05-01 RX ORDER — FOLIC ACID 1 MG/1
TABLET ORAL
Qty: 90 TAB | Refills: 0 | Status: SHIPPED | OUTPATIENT
Start: 2019-05-01 | End: 2019-07-29 | Stop reason: SDUPTHER

## 2019-05-02 NOTE — PROGRESS NOTES
Spoke with patient. Introduced self, role, and program. Reviewed information included in SCP and benefits of program. Discussed I would mail a copy of the SCP and encouraged patient to contact me if they would like to schedule a consultation in person or by phone. Patient reports she has been struggling with fatigue and depression. She feels she is making some strides since she met with [facility] [place]) team. Patient has signed up for a class on mindfulness and self care and felt she learned a lot in her first class. She reports she does not go out with her friends when they ask right now because she feels she is too sad and not good company. I encouraged to connect with her PCP for to discuss this. She reports she has to connect with her PCP for a medical clearance for ZG. She feels she is moving in the right direction. We agreed I would send her some information for meditation with CRC and other supportive therapies. Patient agrees to contact me if I can provide resources or support.

## 2019-05-03 LAB
25(OH)D3+25(OH)D2 SERPL-MCNC: 40 NG/ML (ref 30–100)
ALBUMIN SERPL ELPH-MCNC: 3.8 G/DL (ref 2.9–4.4)
ALBUMIN SERPL-MCNC: 4.2 G/DL (ref 3.6–4.8)
ALBUMIN/GLOB SERPL: 1.2 {RATIO} (ref 0.7–1.7)
ALBUMIN/GLOB SERPL: 1.4 {RATIO} (ref 1.2–2.2)
ALP SERPL-CCNC: 118 IU/L (ref 39–117)
ALPHA1 GLOB SERPL ELPH-MCNC: 0.2 G/DL (ref 0–0.4)
ALPHA2 GLOB SERPL ELPH-MCNC: 0.5 G/DL (ref 0.4–1)
ALT SERPL-CCNC: 15 IU/L (ref 0–32)
AST SERPL-CCNC: 16 IU/L (ref 0–40)
B-GLOBULIN SERPL ELPH-MCNC: 1.3 G/DL (ref 0.7–1.3)
BASOPHILS # BLD AUTO: 0.1 X10E3/UL (ref 0–0.2)
BASOPHILS NFR BLD AUTO: 1 %
BILIRUB SERPL-MCNC: 0.5 MG/DL (ref 0–1.2)
BUN SERPL-MCNC: 22 MG/DL (ref 8–27)
BUN/CREAT SERPL: 29 (ref 12–28)
CALCIUM SERPL-MCNC: 9.7 MG/DL (ref 8.7–10.3)
CHLORIDE SERPL-SCNC: 108 MMOL/L (ref 96–106)
CO2 SERPL-SCNC: 22 MMOL/L (ref 20–29)
CREAT SERPL-MCNC: 0.75 MG/DL (ref 0.57–1)
CRP SERPL-MCNC: 1.4 MG/L (ref 0–4.9)
EOSINOPHIL # BLD AUTO: 0.1 X10E3/UL (ref 0–0.4)
EOSINOPHIL NFR BLD AUTO: 2 %
ERYTHROCYTE [DISTWIDTH] IN BLOOD BY AUTOMATED COUNT: 14.2 % (ref 12.3–15.4)
ERYTHROCYTE [SEDIMENTATION RATE] IN BLOOD BY WESTERGREN METHOD: 2 MM/HR (ref 0–40)
GAMMA GLOB SERPL ELPH-MCNC: 1.2 G/DL (ref 0.4–1.8)
GLOBULIN SER CALC-MCNC: 2.9 G/DL (ref 1.5–4.5)
GLOBULIN SER CALC-MCNC: 3.3 G/DL (ref 2.2–3.9)
GLUCOSE SERPL-MCNC: 104 MG/DL (ref 65–99)
HCT VFR BLD AUTO: 40.5 % (ref 34–46.6)
HGB BLD-MCNC: 13.1 G/DL (ref 11.1–15.9)
IGA SERPL-MCNC: 444 MG/DL (ref 87–352)
IGG SERPL-MCNC: 1519 MG/DL (ref 700–1600)
IGM SERPL-MCNC: 51 MG/DL (ref 26–217)
IMM GRANULOCYTES # BLD AUTO: 0 X10E3/UL (ref 0–0.1)
IMM GRANULOCYTES NFR BLD AUTO: 1 %
INTERPRETATION SERPL IEP-IMP: ABNORMAL
LYMPHOCYTES # BLD AUTO: 0.5 X10E3/UL (ref 0.7–3.1)
LYMPHOCYTES NFR BLD AUTO: 12 %
M PROTEIN SERPL ELPH-MCNC: 0.2 G/DL
MCH RBC QN AUTO: 30.3 PG (ref 26.6–33)
MCHC RBC AUTO-ENTMCNC: 32.3 G/DL (ref 31.5–35.7)
MCV RBC AUTO: 94 FL (ref 79–97)
MONOCYTES # BLD AUTO: 0.4 X10E3/UL (ref 0.1–0.9)
MONOCYTES NFR BLD AUTO: 9 %
NEUTROPHILS # BLD AUTO: 3.3 X10E3/UL (ref 1.4–7)
NEUTROPHILS NFR BLD AUTO: 75 %
PLATELET # BLD AUTO: 335 X10E3/UL (ref 150–379)
PLEASE NOTE, 011150: ABNORMAL
POTASSIUM SERPL-SCNC: 5.2 MMOL/L (ref 3.5–5.2)
PROT PATTERN SERPL ELPH-IMP: ABNORMAL
PROT SERPL-MCNC: 7.1 G/DL (ref 6–8.5)
RBC # BLD AUTO: 4.33 X10E6/UL (ref 3.77–5.28)
SODIUM SERPL-SCNC: 144 MMOL/L (ref 134–144)
WBC # BLD AUTO: 4.3 X10E3/UL (ref 3.4–10.8)

## 2019-05-04 DIAGNOSIS — M06.09 SERONEGATIVE RHEUMATOID ARTHRITIS OF MULTIPLE SITES (HCC): ICD-10-CM

## 2019-05-04 DIAGNOSIS — Z79.60 LONG-TERM USE OF IMMUNOSUPPRESSANT MEDICATION: ICD-10-CM

## 2019-05-06 LAB
ALBUMIN SERPL ELPH-MCNC: 4 G/DL (ref 2.9–4.4)
ALBUMIN/GLOB SERPL: 1.3 {RATIO} (ref 0.7–1.7)
ALPHA1 GLOB SERPL ELPH-MCNC: 0.2 G/DL (ref 0–0.4)
ALPHA2 GLOB SERPL ELPH-MCNC: 0.6 G/DL (ref 0.4–1)
B-GLOBULIN SERPL ELPH-MCNC: 1.3 G/DL (ref 0.7–1.3)
GAMMA GLOB SERPL ELPH-MCNC: 1.2 G/DL (ref 0.4–1.8)
GLOBULIN SER-MCNC: 3.3 G/DL (ref 2.2–3.9)
IGA SERPL-MCNC: 405 MG/DL (ref 87–352)
IGG SERPL-MCNC: 1471 MG/DL (ref 700–1600)
IGM SERPL-MCNC: 58 MG/DL (ref 26–217)
INTERPRETATION SERPL IEP-IMP: ABNORMAL
KAPPA LC FREE SER-MCNC: 22.1 MG/L (ref 3.3–19.4)
KAPPA LC FREE/LAMBDA FREE SER: 1.65 {RATIO} (ref 0.26–1.65)
LAMBDA LC FREE SERPL-MCNC: 13.4 MG/L (ref 5.7–26.3)
M PROTEIN SERPL ELPH-MCNC: 0.3 G/DL
PLEASE NOTE:, 149534: ABNORMAL
PROT SERPL-MCNC: 7.3 G/DL (ref 6–8.5)

## 2019-05-08 ENCOUNTER — DOCUMENTATION ONLY (OUTPATIENT)
Dept: INTERNAL MEDICINE CLINIC | Age: 69
End: 2019-05-08

## 2019-05-08 NOTE — PROGRESS NOTES
Informed patient Dr. Filomena Avery completed the clearance form to [facility]. Faxed form to [phone]. Patient understood.

## 2019-05-14 DIAGNOSIS — C90.00 MULTIPLE MYELOMA, REMISSION STATUS UNSPECIFIED (HCC): ICD-10-CM

## 2019-05-14 LAB
ALBUMIN SERPL ELPH-MCNC: 3.7 G/DL (ref 2.9–4.4)
ALBUMIN SERPL-MCNC: 4.3 G/DL (ref 3.6–4.8)
ALBUMIN/GLOB SERPL: 1.1 {RATIO} (ref 0.7–1.7)
ALBUMIN/GLOB SERPL: 1.4 {RATIO} (ref 1.2–2.2)
ALP SERPL-CCNC: 113 IU/L (ref 39–117)
ALPHA1 GLOB SERPL ELPH-MCNC: 0.2 G/DL (ref 0–0.4)
ALPHA2 GLOB SERPL ELPH-MCNC: 0.6 G/DL (ref 0.4–1)
ALT SERPL-CCNC: 13 IU/L (ref 0–32)
AST SERPL-CCNC: 14 IU/L (ref 0–40)
B-GLOBULIN SERPL ELPH-MCNC: 1.4 G/DL (ref 0.7–1.3)
BASOPHILS # BLD AUTO: 0.1 X10E3/UL (ref 0–0.2)
BASOPHILS NFR BLD AUTO: 2 %
BILIRUB SERPL-MCNC: 0.4 MG/DL (ref 0–1.2)
BUN SERPL-MCNC: 18 MG/DL (ref 8–27)
BUN/CREAT SERPL: 21 (ref 12–28)
CALCIUM SERPL-MCNC: 9.7 MG/DL (ref 8.7–10.3)
CHLORIDE SERPL-SCNC: 104 MMOL/L (ref 96–106)
CO2 SERPL-SCNC: 21 MMOL/L (ref 20–29)
CREAT SERPL-MCNC: 0.85 MG/DL (ref 0.57–1)
EOSINOPHIL # BLD AUTO: 0.1 X10E3/UL (ref 0–0.4)
EOSINOPHIL NFR BLD AUTO: 2 %
ERYTHROCYTE [DISTWIDTH] IN BLOOD BY AUTOMATED COUNT: 14.3 % (ref 12.3–15.4)
GAMMA GLOB SERPL ELPH-MCNC: 1.3 G/DL (ref 0.4–1.8)
GLOBULIN SER CALC-MCNC: 3 G/DL (ref 1.5–4.5)
GLOBULIN SER-MCNC: 3.6 G/DL (ref 2.2–3.9)
GLUCOSE SERPL-MCNC: 115 MG/DL (ref 65–99)
HCT VFR BLD AUTO: 42.3 % (ref 34–46.6)
HGB BLD-MCNC: 14 G/DL (ref 11.1–15.9)
IGA SERPL-MCNC: 444 MG/DL (ref 87–352)
IGG SERPL-MCNC: 1518 MG/DL (ref 700–1600)
IGM SERPL-MCNC: 57 MG/DL (ref 26–217)
IMM GRANULOCYTES # BLD AUTO: 0 X10E3/UL (ref 0–0.1)
IMM GRANULOCYTES NFR BLD AUTO: 0 %
INTERPRETATION SERPL IEP-IMP: ABNORMAL
KAPPA LC FREE SER-MCNC: 22.7 MG/L (ref 3.3–19.4)
KAPPA LC FREE/LAMBDA FREE SER: 2.1 {RATIO} (ref 0.26–1.65)
LAMBDA LC FREE SERPL-MCNC: 10.8 MG/L (ref 5.7–26.3)
LYMPHOCYTES # BLD AUTO: 0.7 X10E3/UL (ref 0.7–3.1)
LYMPHOCYTES NFR BLD AUTO: 14 %
M PROTEIN SERPL ELPH-MCNC: 0.3 G/DL
MCH RBC QN AUTO: 30.5 PG (ref 26.6–33)
MCHC RBC AUTO-ENTMCNC: 33.1 G/DL (ref 31.5–35.7)
MCV RBC AUTO: 92 FL (ref 79–97)
MONOCYTES # BLD AUTO: 0.3 X10E3/UL (ref 0.1–0.9)
MONOCYTES NFR BLD AUTO: 6 %
NEUTROPHILS # BLD AUTO: 3.6 X10E3/UL (ref 1.4–7)
NEUTROPHILS NFR BLD AUTO: 76 %
PLATELET # BLD AUTO: 339 X10E3/UL (ref 150–379)
PLEASE NOTE:, 149534: ABNORMAL
POTASSIUM SERPL-SCNC: 4.4 MMOL/L (ref 3.5–5.2)
PROT SERPL-MCNC: 7.3 G/DL (ref 6–8.5)
RBC # BLD AUTO: 4.59 X10E6/UL (ref 3.77–5.28)
SODIUM SERPL-SCNC: 139 MMOL/L (ref 134–144)
WBC # BLD AUTO: 4.7 X10E3/UL (ref 3.4–10.8)

## 2019-05-15 ENCOUNTER — OFFICE VISIT (OUTPATIENT)
Dept: ONCOLOGY | Age: 69
End: 2019-05-15

## 2019-05-15 VITALS
SYSTOLIC BLOOD PRESSURE: 118 MMHG | OXYGEN SATURATION: 96 % | DIASTOLIC BLOOD PRESSURE: 73 MMHG | TEMPERATURE: 97.8 F | HEIGHT: 64 IN | WEIGHT: 197.6 LBS | HEART RATE: 68 BPM | RESPIRATION RATE: 18 BRPM | BODY MASS INDEX: 33.73 KG/M2

## 2019-05-15 DIAGNOSIS — C50.912 MALIGNANT NEOPLASM OF LEFT FEMALE BREAST, UNSPECIFIED ESTROGEN RECEPTOR STATUS, UNSPECIFIED SITE OF BREAST (HCC): ICD-10-CM

## 2019-05-15 DIAGNOSIS — Z79.811 AROMATASE INHIBITOR USE: Primary | ICD-10-CM

## 2019-05-15 RX ORDER — LETROZOLE 2.5 MG/1
2.5 TABLET, FILM COATED ORAL DAILY
Qty: 30 TAB | Refills: 3 | Status: SHIPPED | OUTPATIENT
Start: 2019-05-15 | End: 2020-01-23 | Stop reason: SDUPTHER

## 2019-05-15 NOTE — PROGRESS NOTES
Reviewed record in preparation for visit and have obtained necessary documentation. Identified pt with two pt identifiers(name and ). Health Maintenance Due Topic  DTaP/Tdap/Td series (1 - Tdap)  Shingrix Vaccine Age 50> (1 of 2)  GLAUCOMA SCREENING Q2Y  Pneumococcal 65+ years (2 of 2 - PCV13)  COLONOSCOPY Chief Complaint Patient presents with  Follow-up 3 mth Multiple Myeloma Wt Readings from Last 3 Encounters:  
05/15/19 197 lb 9.6 oz (89.6 kg) 19 203 lb (92.1 kg) 19 205 lb (93 kg) Temp Readings from Last 3 Encounters:  
19 98.1 °F (36.7 °C)  
19 98.2 °F (36.8 °C) (Oral) 19 97.6 °F (36.4 °C) (Oral) BP Readings from Last 3 Encounters:  
19 126/75  
19 122/62  
19 125/65 Pulse Readings from Last 3 Encounters:  
19 65  
19 65  
19 66 Learning Assessment: 
:  
 
Learning Assessment 2019 2019 2018 2018 10/15/2018 10/4/2018 2016 PRIMARY LEARNER Patient Patient Patient Patient Patient Patient Patient HIGHEST LEVEL OF EDUCATION - PRIMARY LEARNER  - - - - - - -  
BARRIERS PRIMARY LEARNER - - - - - - -  
CO-LEARNER CAREGIVER No - - No No No - PRIMARY LANGUAGE ENGLISH ENGLISH ENGLISH ENGLISH ENGLISH ENGLISH ENGLISH  NEED - - - - - - - LEARNER PREFERENCE PRIMARY DEMONSTRATION DEMONSTRATION DEMONSTRATION DEMONSTRATION DEMONSTRATION DEMONSTRATION DEMONSTRATION  
ANSWERED BY patient patient patient patient patient patient self RELATIONSHIP SELF SELF SELF SELF SELF SELF SELF Depression Screening: 
:  
 
3 most recent PHQ Screens 5/15/2019 Little interest or pleasure in doing things Not at all Feeling down, depressed, irritable, or hopeless Not at all Total Score PHQ 2 0 Fall Risk Assessment: 
:  
 
Fall Risk Assessment, last 12 mths 5/15/2019 Able to walk? Yes Fall in past 12 months? No  
Fall with injury?  -  
 Number of falls in past 12 months - Fall Risk Score -  
 
 
Abuse Screening: 
:  
 
Abuse Screening Questionnaire 5/15/2019 1/28/2019 12/12/2018 6/16/2016 5/5/2015 3/4/2014 Do you ever feel afraid of your partner? N N N N N N Are you in a relationship with someone who physically or mentally threatens you? N N N N N N Is it safe for you to go home? Ross Carmona Coordination of Care Questionnaire: 
:  
 
1) Have you been to an emergency room, urgent care clinic since your last visit? no  
Hospitalized since your last visit? no          
 
2) Have you seen or consulted any other health care providers outside of [address] [place] [address] since your last visit? no  (Include any pap smears or colon screenings in this section.) 3) Do you have an Advance Directive on file? no 
 
4) Are you interested in receiving information on Advance Directives? NO Patient is accompanied by self I have received verbal consent from Cynthia Mariscal to discuss any/all medical information while they are present in the room.

## 2019-05-15 NOTE — PROGRESS NOTES
[facility] [place] [address] [address] W: [phone]  F: [phone] Reason for Visit:  
Suzi Sparks is a 70 y.o. female who is seen for follow up of 1)  Paraproteinemia- Smoldering Myeloma 2)  Left breast ER pos [place] pos (weak) HER 2 neg stage I Breast cancer- 12/2018 TREATMENT HISTORY 
1/29/19-left lumpectomy with sentinel lymph node biopsy. 1.8 cm invasive lobular carcinoma, grade 1, 1 of one negative lymph nodes. Oncotype low risk. Completed RT 
4/19 started Letrozole History of Present Illness:  
Patient is a 70 y.o. with seronegative rheumatoid arthritis and secondary polymyalgia rheumatica who is seen for follow-up of smoldering myeloma and lobular breast cancer. She presents for follow-up on adjuvant Letrozole. She still has some soreness in her Left breast. She has depression, seeing Dr. Cara Larkin, she is practicing mindfulness. She is feeling better. Had much fatigue after RT. She has no problems on the Letrozole and has been on it for 30 days, she has no aches. Does have some hot flashes. No fevers nausea vomiting. In good spirits and comes with a supportive friend. Past Medical History:  
Diagnosis Date  Breast cancer, left ([address]  Depression  Goiter  Hearing loss   
 bilateral  
 Hypertension  Hypothyroid  IBS (irritable bowel syndrome)  Rheumatoid arthritis ([address] Past Surgical History:  
Procedure Laterality Date  HX BREAST LUMPECTOMY Left 1/29/2019 LEFT BREAST LUMPECTOMY WITH ULTRASOUND, LEFT BREAST SENTINEL NODE BIOPSY (11a) performed by Marilia Bernardo MD at [facility] HX CATARACT REMOVAL Bilateral   
 HX [facility]  7/97  
 brain lesion removed ? infectious Social History Tobacco Use  Smoking status: Never Smoker  Smokeless tobacco: Never Used Substance Use Topics  Alcohol use:  Yes  
 Alcohol/week: 3.0 - 3.6 oz Types: 5 Glasses of wine per week Family History Problem Relation Age of Onset  Cancer Mother   
     lung  COPD Mother  Cancer Father   
     lung  Heart Disease Brother CABGx3  
 Diabetes Brother  Heart Disease Brother  Diabetes Brother  COPD Brother  Liver Disease Brother   
     cirrhosis  Alcohol abuse Brother  Heart Disease Paternal Uncle CAD  Heart Disease Paternal Grandmother CAD  Anesth Problems Neg Hx Current Outpatient Medications Medication Sig  
 folic acid (FOLVITE) 1 mg tablet TAKE 1 TABLET BY MOUTH ONCE DAILY  ibuprofen (ADVIL) 200 mg tablet Take 800 mg by mouth every six (6) hours as needed for Pain.  methotrexate, PF, 25 mg/mL injection 1 mL by SubCUTAneous route every Wednesday.  Insulin Syringe-Needle U-100 1 mL 30 gauge x 5/16 syrg 1 Each by Does Not Apply route Every Saturday for 12 doses. To be used for methotrexate solution  LORazepam (ATIVAN) 1 mg tablet TAKE 1 & 1/2 (ONE & ONE-HALF) TABLETS BY MOUTH ONCE DAILY NIGHTLY  letrozole (FEMARA) 2.5 mg tablet Take 1 Tab by mouth daily.  lisinopril-hydroCHLOROthiazide (PRINZIDE, ZESTORETIC) 10-12.5 mg per tablet TAKE ONE TABLET BY MOUTH ONCE DAILY  ergocalciferol (ERGOCALCIFEROL) 50,000 unit capsule Take 1 Cap by mouth every Tuesday.  levothyroxine (SYNTHROID) 200 mcg tablet TAKE ONE TABLET BY MOUTH ONCE DAILY BEFORE BREAKFAST No current facility-administered medications for this visit. Allergies Allergen Reactions  Keflex [Cephalexin] Rash  Monistat 1 [Tioconazole] Swelling  Sulfa (Sulfonamide Antibiotics) Rash Review of Systems: A complete review of systems was obtained, negative except as described above. Physical Exam:  
 
Visit Vitals Ht 5' 4\" (1.626 m) Wt 197 lb 9.6 oz (89.6 kg) BMI 33.92 kg/m² ECOG PS: 1 General: No distress Eyes: PERRLA, anicteric sclerae HENT: Atraumatic, OP clear Neck: Supple Breast: Exam deferred CV: Normal rate, regular rhythm, no murmurs, no peripheral edema GI: Soft, nontender, nondistended, no masses, no hepatomegaly, no splenomegaly MS: Normal gait and station. Digits without clubbing or cyanosis. Skin: No rashes, ecchymoses, or petechiae. Normal temperature, turgor, and texture. Psych: Alert, oriented, appropriate affect, normal judgment/insight Results:  
 
Lab Results Component Value Date/Time WBC 4.3 04/29/2019 09:12 AM  
 HGB 13.1 04/29/2019 09:12 AM  
 HCT 40.5 04/29/2019 09:12 AM  
 PLATELET 855 64/19/0887 09:12 AM  
 MCV 94 04/29/2019 09:12 AM  
 ABS. NEUTROPHILS 3.3 04/29/2019 09:12 AM  
 
Lab Results Component Value Date/Time Sodium 144 04/29/2019 09:12 AM  
 Potassium 5.2 04/29/2019 09:12 AM  
 Chloride 108 (H) 04/29/2019 09:12 AM  
 CO2 22 04/29/2019 09:12 AM  
 Glucose 104 (H) 04/29/2019 09:12 AM  
 BUN 22 04/29/2019 09:12 AM  
 Creatinine 0.75 04/29/2019 09:12 AM  
 GFR est AA 94 04/29/2019 09:12 AM  
 GFR est non-AA 82 04/29/2019 09:12 AM  
 Calcium 9.7 04/29/2019 09:12 AM  
 
Lab Results Component Value Date/Time Bilirubin, total 0.5 04/29/2019 09:12 AM  
 ALT (SGPT) 15 04/29/2019 09:12 AM  
 AST (SGOT) 16 04/29/2019 09:12 AM  
 Alk. phosphatase 118 (H) 04/29/2019 09:12 AM  
 Protein, total 7.3 04/29/2019 09:25 AM  
 Albumin 4.2 04/29/2019 09:12 AM  
 Globulin 3.7 12/23/2016 01:31 PM  
 
Lab Results Component Value Date/Time  11/21/2018 02:47 PM  
 Beta-2 Microglobulin, serum 1.8 11/21/2018 02:47 PM  
 Sed rate (ESR) 2 04/29/2019 09:12 AM  
 C-Reactive Protein, Qt 1.4 04/29/2019 09:12 AM  
 TSH 0.681 12/12/2018 12:00 AM  
 M-Jere 0.3 (H) 04/29/2019 09:25 AM  
 M-Jere 0.2 (H) 04/29/2019 09:12 AM  
 Lipase 204 12/23/2016 01:31 PM  
 
Lab Results Component Value Date/Time D-dimer 0.21 12/23/2016 01:31 PM  
 
Component Latest Ref Rng & Units 4/29/2019 4/29/2019 4/29/2019 1/28/2019 9:25 AM  9:12 AM  9:12 AM  9:22 AM  
Kappa/Lambda ratio, serum 
    0.26 - 1.65 1.65 Component Latest Ref Rng & Units 11/28/2018 11/21/2018  
 
      4:26 PM  2:47 PM  
Kappa/Lambda ratio, serum 
    0.26 - 1.65  1.80 (H) Records reviewed and summarized above. Pathology report(s) reviewed Bone marrow: 12/19/18 Variably cellular marrow with mild monoclonal plasmacytosis. Trilineage hematopoiesis with maturation. See comment. Peripheral blood:  
Unremarkable peripheral blood. See CBC data. Comment The bone marrow is variably cellular ranging <10% to 60% to reveal mild monoclonal, kappa restricted plasmacytosis (10%). Trilineage hematopoiesis with adequate maturation is identified Interpretation:  
Del(1p): Not Detected  
Dup(1q): Not Detected Gains(5, 9, 15): DETECTED Del(13q): Not Detected Del(17p)(TP53): Not Detected (See Below) IGH(Rearrangement): Not Detected Testing performed by [facility] and interpreted by Garfield Ross M.D. Please see scanned outside report in [facility] for complete details. Breast biopsy 12/3/18 Breast, left, needle core biopsy:  
Invasive mammary carcinoma with ductal and lobular features Favor Deedee histologic grade 1 Largest glass slide measurement of invasive carcinoma: 8.5 mm  
 
1/29/19 Lumpectomy and SLN 
 
 TUMOR Tumor Size: 1.8 Histologic Type: Invasive lobular carcinoma Histologic Grade (1-3) Glandular (Acinar)/Tubular Differentiation: 3 Nuclear Pleomorphism: 1 Mitotic Rate: 1 Overall ndGndrndanddndend:nd nd2nd Lymph Nodes with Macrometastases (>2 mm): 0 Lymph Nodes with Micrometastases (>0.2 mm to 2 mm and/or >200 cells): 0 Number of Lymph Nodes with Isolated Tumor Cells (?0.2 mm and ?200 cells)#: 0 Total Number of Lymph Nodes Examined: 1 Number of [place] Nodes Examined: 1 3. Breast, left, anterior margin, excision:  
Atypical lobular hyperplasia Radiology report(s) reviewed Skeletal survey 11/28/18 IMPRESSION IMPRESSION: Few small calvarial lucencies. No fractures or lytic lesions at risk 
for fracture. 
  
Mammogram 11/18 There is persistent architectural distortion within the middle third of the left 
breast upper outer quadrant at 1:00. No suspicious calcifications are 
identified. There is no skin thickening or nipple retraction.  
  
Left breast ultrasound was performed of the 1:00 location, 5 cm from the nipple. There is a sonographic correlate to the architectural distortion, with 
irregular hypoechoic shadowing tissue, measuring up to 9 mm. Ultrasound-guided 
biopsy is recommended. 
  
IMPRESSION IMPRESSION: 
  
1.  BI-RADS Assessment Category 4: Suspicious abnormality - Biopsy should be 
performed in the absence of clinical contraindication. Architectural distortion 
in the upper outer left breast, with a sonographic correlate. Ultrasound-guided 
core needle biopsy is recommended. PET CT Negative for any lytic lesions or uptake. Assessment:  
1) Paraproteinemia- Smoldering Myeloma Small IgA M spike No end organ damage- Bone survey suggests few calvarial lucencies-but there are no corresponding lesions on the PET/CT. Together she likely has smoldering myeloma Reviewed the spectrum of plasma cell disorders and implications of diagnosis. She understands that smoldering myeloma has about a > 40% risk of transforming into myeloma in the ensuing years. At this time we will continue with observation 2) Left breast cancer 9 mm lesion on Mammo/USG 
%, AL 3% HER2 mignon negative Status post lumpectomy and sentinel lymph node biopsy on 1/29/19 T1cN0 invasive lobular carcinoma-stage I Tumor is 1.8 cm Grade 1 Margins with atypical lobular hyper Oncotype DX low risk' Completed adjuvant RT Tolerating adjuvant Letrozole since 4/19 Planning 5 years Discussed lifestyle, she is practicing weight bearing Discussed DEXA and VD 
 Reassured her Pathology, imaging were reviewed. 3)  Rheumatoid arthritis Per Dr. Mary Hernández 4) Depression Per Dr. Ontiveros [facility] Plan:  
 
· Continue letrozole · Will need a DEXA · If all is well, then see me in 3 months with cbc, cmp and gammopathy evaluation I spent more than 50% of this 25 -minute visit in counseling I appreciate the opportunity to participate in Ms. Mireya Cortez's care.  
 
Signed By: Anthony Barraza MD

## 2019-05-20 RX ORDER — FOLIC ACID 1 MG/1
TABLET ORAL
Qty: 90 TAB | Refills: 0 | Status: SHIPPED | OUTPATIENT
Start: 2019-05-20 | End: 2019-07-16 | Stop reason: ALTCHOICE

## 2019-05-24 DIAGNOSIS — Z79.60 LONG-TERM USE OF IMMUNOSUPPRESSANT MEDICATION: ICD-10-CM

## 2019-05-24 DIAGNOSIS — M06.09 SERONEGATIVE RHEUMATOID ARTHRITIS OF MULTIPLE SITES (HCC): ICD-10-CM

## 2019-05-27 RX ORDER — FOLIC ACID 1 MG/1
TABLET ORAL
Qty: 90 TAB | Refills: 0 | Status: SHIPPED | OUTPATIENT
Start: 2019-05-27 | End: 2019-07-16 | Stop reason: SDUPTHER

## 2019-06-04 ENCOUNTER — HOSPITAL ENCOUNTER (OUTPATIENT)
Dept: MAMMOGRAPHY | Age: 69
Discharge: HOME | End: 2019-06-04
Attending: INTERNAL MEDICINE
Payer: MEDICARE

## 2019-06-04 DIAGNOSIS — Z79.811 AROMATASE INHIBITOR USE: ICD-10-CM

## 2019-06-04 PROCEDURE — 77080 DXA BONE DENSITY AXIAL: CPT

## 2019-07-04 DIAGNOSIS — M06.09 SERONEGATIVE RHEUMATOID ARTHRITIS OF MULTIPLE SITES (HCC): ICD-10-CM

## 2019-07-05 RX ORDER — METHOTREXATE 25 MG/ML
INJECTION, SOLUTION INTRA-ARTERIAL; INTRAMUSCULAR; INTRAVENOUS
Qty: 12 ML | Refills: 0 | Status: SHIPPED | OUTPATIENT
Start: 2019-07-05 | End: 2019-07-29 | Stop reason: SDUPTHER

## 2019-07-15 ENCOUNTER — TELEPHONE (OUTPATIENT)
Dept: INTERNAL MEDICINE CLINIC | Age: 69
End: 2019-07-15

## 2019-07-15 NOTE — TELEPHONE ENCOUNTER
Patient states the muscles in her left leg feels like it is pulling really tight. Patient states she has been doing exercises she was given in the past but she is not better. Patient mentioned Sherron prescribed a muscle relaxer in the past for her neck, request for a muscle relaxer. Offered patient an appointment with another provider today since Dr. Audrey Pino is out of the office, patient declines.

## 2019-07-15 NOTE — TELEPHONE ENCOUNTER
Details to clarify the request:   Legs, knees, hamstring discomfort/cramps since Thursday, 07/11. PT stated horrible cramps causing her to hop around. PT does not want to make appt. If possible.        [facility]

## 2019-07-16 ENCOUNTER — OFFICE VISIT (OUTPATIENT)
Dept: INTERNAL MEDICINE CLINIC | Age: 69
End: 2019-07-16

## 2019-07-16 ENCOUNTER — HOSPITAL ENCOUNTER (OUTPATIENT)
Dept: ULTRASOUND IMAGING | Age: 69
Discharge: HOME | End: 2019-07-16
Attending: INTERNAL MEDICINE
Payer: MEDICARE

## 2019-07-16 VITALS
OXYGEN SATURATION: 99 % | DIASTOLIC BLOOD PRESSURE: 64 MMHG | TEMPERATURE: 98.8 F | SYSTOLIC BLOOD PRESSURE: 141 MMHG | HEART RATE: 58 BPM

## 2019-07-16 DIAGNOSIS — M79.662 PAIN OF LEFT CALF: Primary | ICD-10-CM

## 2019-07-16 DIAGNOSIS — M79.662 PAIN OF LEFT CALF: ICD-10-CM

## 2019-07-16 DIAGNOSIS — F51.01 PRIMARY INSOMNIA: ICD-10-CM

## 2019-07-16 PROCEDURE — 93970 EXTREMITY STUDY: CPT

## 2019-07-16 RX ORDER — DICLOFENAC SODIUM 75 MG/1
75 TABLET, DELAYED RELEASE ORAL 2 TIMES DAILY
Qty: 30 TAB | Refills: 1 | Status: SHIPPED | OUTPATIENT
Start: 2019-07-16 | End: 2020-06-09

## 2019-07-16 RX ORDER — TIZANIDINE 4 MG/1
4 TABLET ORAL
Qty: 20 TAB | Refills: 0 | Status: SHIPPED | OUTPATIENT
Start: 2019-07-16 | End: 2019-08-13 | Stop reason: ALTCHOICE

## 2019-07-16 NOTE — PROGRESS NOTES
HISTORY OF PRESENT ILLNESS  Chilango Boyd is a 70 y.o. female. Rubber band tightness feeling in her left thigh and calf which started Thursday night. Using Bengay and Advil without improvement. Several falls secondary to pain. Leg is swelling. No previous injury but she was painting and was up and down a ladder last week. No long trips or periods of  Inactivity. No sob or chest pain. Current Outpatient Medications:     methotrexate 25 mg/mL chemo injection, INJECT 1 ML UNDER SKIN ONCE A WEEK ON WEDNESDAY, Disp: 12 mL, Rfl: 0    LORazepam (ATIVAN) 1 mg tablet, TAKE 1 & 1/2 (ONE & ONE-HALF) TABLETS BY MOUTH NIGHTLY, Disp: 45 Tab, Rfl: 1    letrozole (FEMARA) 2.5 mg tablet, Take 1 Tab by mouth daily. Indications: Hormone Receptor Positive Postmenopausal Early Breast Cancer After Adjuvant Tamoxifen, Disp: 30 Tab, Rfl: 3    folic acid (FOLVITE) 1 mg tablet, TAKE 1 TABLET BY MOUTH ONCE DAILY, Disp: 90 Tab, Rfl: 0    ibuprofen (ADVIL) 200 mg tablet, Take 800 mg by mouth every six (6) hours as needed for Pain., Disp: , Rfl:     Insulin Syringe-Needle U-100 1 mL 30 gauge x 5/16 syrg, 1 Each by Does Not Apply route Every Saturday for 12 doses. To be used for methotrexate solution, Disp: 12 Syringe, Rfl: 3    lisinopril-hydroCHLOROthiazide (PRINZIDE, ZESTORETIC) 10-12.5 mg per tablet, TAKE ONE TABLET BY MOUTH ONCE DAILY, Disp: 90 Tab, Rfl: 3    ergocalciferol (ERGOCALCIFEROL) 50,000 unit capsule, Take 1 Cap by mouth every Tuesday. , Disp: 12 Cap, Rfl: 3    levothyroxine (SYNTHROID) 200 mcg tablet, TAKE ONE TABLET BY MOUTH ONCE DAILY BEFORE BREAKFAST, Disp: 90 Tab, Rfl: 3    Visit Vitals  /64   Pulse (!) 58   Temp 98.8 °F (37.1 °C) (Oral)   SpO2 99%       ROS  See above  Physical Exam   Constitutional: She appears well-developed and well-nourished. HENT:   Head: Normocephalic and atraumatic. Musculoskeletal:   Left calf, posterior knee and posterior thigh tenderness. + homans, no cording. Mild edema left lower leg. Vitals reviewed. ASSESSMENT and PLAN  Left posterior calf and thigh tenderness - most likely muscular strain from ascending and descending ladder but need to r/o DVT. Stat le doppler ordered . If negative start diclofenac 75mg bid and tizanidine  Gentle stretching exercises.     Orders Placed This Encounter    diclofenac EC (VOLTAREN) 75 mg EC tablet    tiZANidine (ZANAFLEX) 4 mg tablet

## 2019-07-17 ENCOUNTER — TELEPHONE (OUTPATIENT)
Dept: INTERNAL MEDICINE CLINIC | Age: 69
End: 2019-07-17

## 2019-07-17 NOTE — TELEPHONE ENCOUNTER
Leg was painful over night. Took diclofenac and tizanidine last night and it has helped this am but made her very sedated. REviewed negative results of doppler with pt. Continue to stretch. Recommended 1/2 tizanidine instead of full tablet for less sedation.

## 2019-07-29 ENCOUNTER — HOSPITAL ENCOUNTER (OUTPATIENT)
Dept: LAB | Age: 69
Discharge: HOME | End: 2019-07-29
Payer: MEDICARE

## 2019-07-29 ENCOUNTER — OFFICE VISIT (OUTPATIENT)
Dept: RHEUMATOLOGY | Age: 69
End: 2019-07-29

## 2019-07-29 VITALS
SYSTOLIC BLOOD PRESSURE: 151 MMHG | RESPIRATION RATE: 18 BRPM | BODY MASS INDEX: 32.61 KG/M2 | DIASTOLIC BLOOD PRESSURE: 75 MMHG | HEART RATE: 64 BPM | TEMPERATURE: 98 F | WEIGHT: 191 LBS | HEIGHT: 64 IN

## 2019-07-29 DIAGNOSIS — M35.3 PMR (POLYMYALGIA RHEUMATICA) (HCC): ICD-10-CM

## 2019-07-29 DIAGNOSIS — M06.09 SERONEGATIVE RHEUMATOID ARTHRITIS OF MULTIPLE SITES (HCC): Primary | ICD-10-CM

## 2019-07-29 DIAGNOSIS — Z79.60 LONG-TERM USE OF IMMUNOSUPPRESSANT MEDICATION: ICD-10-CM

## 2019-07-29 DIAGNOSIS — E55.9 VITAMIN D DEFICIENCY: ICD-10-CM

## 2019-07-29 DIAGNOSIS — D47.2 MGUS (MONOCLONAL GAMMOPATHY OF UNKNOWN SIGNIFICANCE): ICD-10-CM

## 2019-07-29 PROCEDURE — 85651 RBC SED RATE NONAUTOMATED: CPT

## 2019-07-29 PROCEDURE — 86140 C-REACTIVE PROTEIN: CPT

## 2019-07-29 PROCEDURE — 80053 COMPREHEN METABOLIC PANEL: CPT

## 2019-07-29 PROCEDURE — 36415 COLL VENOUS BLD VENIPUNCTURE: CPT

## 2019-07-29 PROCEDURE — 85025 COMPLETE CBC W/AUTO DIFF WBC: CPT

## 2019-07-29 PROCEDURE — 82306 VITAMIN D 25 HYDROXY: CPT

## 2019-07-29 RX ORDER — FOLIC ACID 1 MG/1
TABLET ORAL
Qty: 90 TAB | Refills: 1 | Status: SHIPPED | OUTPATIENT
Start: 2019-07-29 | End: 2020-01-24 | Stop reason: SDUPTHER

## 2019-07-29 RX ORDER — METHOTREXATE 25 MG/ML
INJECTION, SOLUTION INTRA-ARTERIAL; INTRAMUSCULAR; INTRAVENOUS
Qty: 12 VIAL | Refills: 1 | Status: SHIPPED | OUTPATIENT
Start: 2019-07-29 | End: 2019-09-04 | Stop reason: SDUPTHER

## 2019-07-29 NOTE — PROGRESS NOTES
Chief Complaint   Patient presents with    Arthritis     1. Have you been to the ER, urgent care clinic since your last visit? Hospitalized since your last visit? No    2. Have you seen or consulted any other health care providers outside of the [address] [place] [address] since your last visit? Include any pap smears or colon screening.  Yes PCP for calf pain

## 2019-07-29 NOTE — LETTER
7/29/19 Patient: Angel Holguin YOB: 1950 Date of Visit: 7/29/2019 Esthela Omalley MD 
[address] [facility] [address] VIA In Basket Dear Esthela Omalley MD, Thank you for referring Ms. Rubio Cortez to [facility] for evaluation. My notes for this consultation are attached. If you have questions, please do not hesitate to call me. I look forward to following your patient along with you.  
 
 
Sincerely, 
 
Bret Sheikh MD

## 2019-07-29 NOTE — PROGRESS NOTES
REASON FOR VISIT    This is a follow-up visit for Ms. Louisa Carranza for Seronegative Rheumatoid Arthritis. Inflammatory arthritis phenotype includes:  Anti-CCP positive: no  Rheumatoid factor positive: no  Erosive disease: no  Extra-articular manifestations include: smoldering myeloma    Immunosuppression Screening (10/04/2018): Quantiferon TB: negative  PPD:  Not performed  Hepatitis B: negative  Hepatitis C: negative    Therapy History includes:  Current DMARD therapy include: methotrexate 25 mg SubQ every Wednesday (4/29/2019 to present)  Prior DMARD therapy include: methotrexate 20 mg every Wednesday (10/15/2018 to 4/29/2019)  Discontinued DMARDs because of inefficacy: None  Discontinued DMARDs because of side effects: None    Immunization History   Administered Date(s) Administered    Influenza Vaccine 10/10/2013, 10/14/2015, 10/15/2018    Influenza Vaccine Split 09/26/2012    Influenza Vaccine Whole 10/22/2011    Pneumococcal Polysaccharide (PPSV-23) 05/05/2015       Patient Active Problem List   Diagnosis Code    Hypothyroidism E03.9    IBS (irritable bowel syndrome) K58.9    HTN (hypertension) I10    Graves disease E05.00    Moderate major depression ([facility] F32.1    PMR (polymyalgia rheumatica) ([facility]) M35.3    Iliotibial band syndrome of both sides M76.31, M76.32    Long term current use of non-steroidal anti-inflammatories (NSAID) Z79.1    Seronegative rheumatoid arthritis of multiple sites ([facility] [address] [facility] M06.09    Monoclonal gammopathy of unknown significance (MGUS) D47.2    Vitamin D deficiency E55.9    Severe obesity ([facility]) E66.01    Long-term use of immunosuppressant medication Z79.899    Malignant neoplasm of left female breast ([facility] [address] [facility] C50.912    Multiple myeloma ([facility] [address] [facility] C90.00       HISTORY OF PRESENT ILLNESS    Ms. Louisa Carranza returns for a follow-up.     On her last visit, I changed oral methotrexate to subcutaneous with target dose of 25 mg (1 mL) every Wednesday, which she has taken with good tolerance. Today, she feels much better. She denies pain, swelling or stiffness in her joints. She denies shoulder or pelvic girdle pain or stiffness. She endorses ankle swelling due to overuse, interval fall without fracture injuring her right elbow. She denies fever, weight loss, blurred vision, vision loss, oral ulcers, dry cough, dyspnea, nausea, vomiting, dysphagia, abdominal pain, black or bloody stool, rash, easy bruising and increased thirst.    Ms. Walt Saeed has continued her medications for arthritis and reports good tolerance without significant side effects. Last toxicity monitoring by blood work was done on 5/09/2019 and did not reveal any significant adverse effects. Most recent inflammatory markers from 4/29/2019 revealed a ESR 2 mm/hr (previously 3, 4 mm/hr) and CRP 1.4 mg/L (previously 2.7, 1.8 mg/L). The patient has not had any interval hospital admissions, infections, or surgeries. REVIEW OF SYSTEMS    A comprehensive review of systems was performed and pertinent results are documented in the HPI, review of systems is otherwise non-contributory. PAST MEDICAL HISTORY    She has a past medical history of Breast cancer, left ([address] Depression, Goiter, Hearing loss, Hypertension, Hypothyroid, IBS (irritable bowel syndrome), and Rheumatoid arthritis ([address] FAMILY HISTORY    Her family history includes Alcohol abuse in her brother; COPD in her brother and mother; Cancer in her father and mother; Diabetes in her brother and brother; Heart Disease in her brother, brother, paternal grandmother, and paternal uncle; Liver Disease in her brother. SOCIAL HISTORY    She reports that she has never smoked. She has never used smokeless tobacco. She reports that she drinks about 5.0 - 6.0 standard drinks of alcohol per week. She reports that she does not use drugs.     MEDICATIONS    Current Outpatient Medications   Medication Sig Dispense Refill    folic acid (FOLVITE) 1 mg tablet TAKE 1 TABLET BY MOUTH ONCE DAILY 90 Tab 1    methotrexate 25 mg/mL chemo injection INJECT 1 ML UNDER SKIN ONCE A WEEK ON WEDNESDAY 12 Vial 1    diclofenac EC (VOLTAREN) 75 mg EC tablet Take 1 Tab by mouth two (2) times a day. 30 Tab 1    tiZANidine (ZANAFLEX) 4 mg tablet Take 1 Tab by mouth three (3) times daily as needed (calf pain). 20 Tab 0    LORazepam (ATIVAN) 1 mg tablet TAKE 1 & 1/2 (ONE & ONE-HALF) TABLETS BY MOUTH NIGHTLY 45 Tab 1    letrozole (FEMARA) 2.5 mg tablet Take 1 Tab by mouth daily. Indications: Hormone Receptor Positive Postmenopausal Early Breast Cancer After Adjuvant Tamoxifen 30 Tab 3    ibuprofen (ADVIL) 200 mg tablet Take 800 mg by mouth every six (6) hours as needed for Pain.  lisinopril-hydroCHLOROthiazide (PRINZIDE, ZESTORETIC) 10-12.5 mg per tablet TAKE ONE TABLET BY MOUTH ONCE DAILY 90 Tab 3    ergocalciferol (ERGOCALCIFEROL) 50,000 unit capsule Take 1 Cap by mouth every Tuesday. 12 Cap 3    levothyroxine (SYNTHROID) 200 mcg tablet TAKE ONE TABLET BY MOUTH ONCE DAILY BEFORE BREAKFAST 90 Tab 3        ALLERGIES    Allergies   Allergen Reactions    Keflex [Cephalexin] Rash    Monistat 1 [Tioconazole] Swelling    Sulfa (Sulfonamide Antibiotics) Rash       PHYSICAL EXAMINATION    Visit Vitals  /75   Pulse 64   Temp 98 °F (36.7 °C)   Resp 18   Ht 5' 4\" (1.626 m)   Wt 191 lb (86.6 kg)   BMI 32.79 kg/m²     Body mass index is 32.79 kg/m². General: Patient is alert, oriented x 3, not in acute distress    HEENT:   Sclerae are not injected and appear moist.  There is no alopecia. Neck is supple     Cardiovascular:  Heart is regular rate and rhythm, no murmurs. Chest:  Lungs are clear to auscultation bilaterally.     Extremities:  Free of clubbing, cyanosis, edema    Neurological exam:  Muscle strength is full in upper and lower extremities     Skin exam:  There are no rashes, no alopecia, no discoid lesions, no active Raynaud's, no livedo reticularis, no periungual erythema. Musculoskeletal exam:  A comprehensive musculoskeletal exam was performed for all joints of each upper and lower extremity and assessed for swelling, tenderness and range of motion. Positive results are documented as below:    Bilateral Sebastián and [place] nodes. Bilateral trochanteric bursa tenderness.     Z-Deformities:   no  [place] Neck Deformities:  no  Boutonierre's Deformities:  no  Ulnar Deviation:   no  MCP Subluxation:  no     Joint Count 7/29/2019 4/29/2019 1/28/2019 11/28/2018 10/4/2018   Patient pain (0-100) 5 15 40 70 75   MHAQ 0.375 1 0.5 0 1   Left shoulder - Tender - - - 1 1   Left shoulder - Swollen - - - 1 0   Left wrist- Tender 0 1 0 1 -   Left wrist- Swollen 0 1 0 1 -   Left 1st MCP - Tender - 1 - 1 1   Left 1st MCP - Swollen - 1 - 1 1   Left 2nd MCP - Tender - 1 - 1 1   Left 2nd MCP - Swollen - 1 - 1 1   Left 3rd MCP - Tender - 1 - - 1   Left 3rd MCP - Swollen - 1 - - 1   Left 4th MCP - Tender - 1 - - -   Left 4th MCP - Swollen - 0 - - -   Left 5th MCP - Tender - 1 - - 1   Left 5th MCP - Swollen - 0 - - 1   Left 2nd PIP - Tender - - - 1 1   Left 2nd PIP - Swollen - - - 1 1   Left 3rd PIP - Tender - - - 1 1   Left 3rd PIP - Swollen - 1 - 1 1   Left 4th PIP - Tender - 1 - - -   Right shoulder - Tender - - - 1 1   Right shoulder - Swollen - - - 0 0   Right wrist- Tender - 1 - - 1   Right wrist- Swollen - 1 - - 1   Right 1st MCP - Tender - 1 - 1 1   Right 1st MCP - Swollen - 1 - 1 1   Right 2nd MCP - Tender - - - 1 1   Right 2nd MCP - Swollen - - - 1 1   Right 3rd MCP - Tender - 0 - - -   Right 3rd MCP - Swollen - 1 - - -   Right 4th MCP - Tender - 1 - - -   Right 4th MCP - Swollen - 1 - - -   Right thumb IP - Tender - - - 1 1   Right thumb IP - Swollen - - - 1 0   Right 2nd PIP - Tender - 1 - 1 1   Right 2nd PIP - Swollen - 1 - 1 1   Right 3rd PIP - Tender - - - - 1   Right 3rd PIP - Swollen - - - - 1   Right 4th PIP - Tender - 1 - - 1   Right 4th PIP - Swollen - 0 - - 1   Right 5th PIP - Tender - 1 - - 1   Right 5th PIP - Swollen - 1 - - 1   Tender Joint Count (Total) 0 13 0 11 16   Swollen Joint Count (Total) 0 11 0 10 13   Physician Assessment (0-10) 0 4 0 4 5   Patient Assessment (0-10) 1 1.5 2.5 5 7.5   CDAI Total (calculated) 1 29.5 2.5 30 41.5       DATA REVIEW    Laboratory     Recent laboratory results were reviewed, summarized, and discussed with the patient. Imaging    Musculoskeletal Ultrasound    None    Radiographs    Skeletal Survey 11/28/2018: Few small calvarial lucencies. No fractures or lytic lesions at risk for fracture. Bilateral Shoulder 10/04/2018: RIGHT: There is prominent AC joint degeneration with spurring and loss of joint space. There is some mild deformity, chronic in nature of the tuberosity. No fracture or dislocation, no bony erosion, the bone is normal in mineral contents. No soft tissue calcifications. LEFT: The bone is normal in mineral contents. There is some mild chronic deformity of the tuberosity and AC joint degeneration. There are no soft tissue calcification bony erosion fracture or dislocation. Bilateral Hand 10/04/2018: RIGHT: no acute fracture or dislocation. Alignment is anatomic. Mild degenerative changes are seen in the interphalangeal joint of the thumb. No erosions are seen. No abnormality seen in the soft tissues. LEFT: no acute fracture or dislocation. Alignment is anatomic. Moderate to severe degenerative changes are present in the first [address] 16 joint. A cystic lucency in the ace of the first metacarpal likely represents a subchondral cyst. No clear erosions are seen. No abnormality seen in the soft tissues. Bilateral Foot 10/04/2018: RIGHT:  no fracture or other acute osseous or articular abnormality. A small plantar calcaneal heel spur is noted. No erosions or joint space narrowing are present. The soft tissues are within normal limits. LEFT: no acute fracture or dislocation. Alignment is anatomic.  A small plantar calcaneal heel spur is noted. No erosions or joint space narrowing are present. . No abnormality is seen in the soft tissues.     CT Imaging    PET/CT Scan 1/24/2019: HEAD/NECK: No apparent foci of abnormal hypermetabolism. Cerebral evaluation is limited by normal intense activity. CHEST: No foci of abnormal hypermetabolism. The known small breast cancer at 12:00 in the left breast demonstrates no increased metabolic activity. ABDOMEN/PELVIS: No foci of abnormal hypermetabolism. SKELETON: No foci of abnormal hypermetabolism in the axial and visualized appendicular skeleton. Lower extremities:. Imaging of the lower extremities is unremarkable. There is muscular activity noted. CT Head without contrast 9/13/2017: Prior right occipital craniectomy. Encephalomalacia in the right cerebellar region. . There is no significant white matter disease. There is no intracranial hemorrhage, extra-axial collection, mass, mass effect or midline shift. The basilar cisterns are open. No acute infarct is identified. The visualized portions of the paranasal sinuses and mastoid air cells are clear    MR Imaging    MRI Breasts with and without contrast 1/07/2019: There is minimal background parenchymal enhancement and the breasts are almost entirely fat. A few sub-5 mm foci of enhancement are scattered bilaterally. Right breast: No suspicious enhancing foci. No axillary or internal mammary chain lymphadenopathy. Left breast: A vague area of enhancement around the biopsy clip in the anterior third at 12:00 does not reach threshold enhancement. This measures approximately 9 x 9 mm, and correlates well with the small area of architectural distortion on mammography and subtle architectural distortion and shadowing on ultrasound. No axillary or internal mammary chain lymphadenopathy.      DXA     DXA 5/12/2015: (excluded distal radius) lumbar spine L1-L4 T score 0.4 (BMD 1.241 g/cm2), left femoral neck T score: 0.5 (1.109 g/cm2), left total hip T score: 1.0 (1.139 g/cm2), right femoral neck T score: 0.2 (1.062 g/cm2), right total hip T score: 1.1 (1.145 g/cm2). FRAX score N/A % probability in 10 years for major osteoporotic fracture and N/A % 10 year probability of hip fracture. PATHOLOGY    Lymph node, left axilla, #1, sentinel node excision 1/29/2019: No evidence for malignancy in one node (0/1). Breast, left, lumpectomy 1/29/2019:  Invasive lobular carcinoma. Lobular carcinoma in situ     Bone Marrow Biopsy 12/19/2018: Bone marrow: Variably cellular marrow with mild monoclonal plasmacytosis. Trilineage hematopoiesis with maturation. Breast, left, needle core biopsy 12/03/2018: Invasive mammary carcinoma with ductal and lobular features Favor Deedee histologic grade 1. Largest glass slide measurement of invasive carcinoma: 8.5 mm. ER pos MA pos (weak) HER 2 neg. ASSESSMENT AND PLAN    This is a follow-up visit for Ms. Tristan Barragan. 1) Seronegative Rheumatoid Arthritis with secondary Polymyalgia Rheumatica. She is maintaind on methotrexate 25 mg SubQ every Wednesday with good tolerance and clinical improvement. She feels better than when she was on oral methotrexate. Her CDAI was 1 (previously 29.5, 2.5, 30, 41.5) with 0 tender and 0 swollen joints, consistent with remission. I will continue treatment. I refilled it. Follow up in 6 months labs today    2) Polymyalgia Rheumatica. She had bilateral shoulder and pelvic girdle symptoms. This resolved. 3) Long Term Use of Immunosuppressants. The patient remains on immunomodulatory medications (methotrexate) and requires frequent toxicity monitoring by blood work. Respective labs were ordered (CBC and CMP). 4) Long Term Use of NSAIDs She is holding ibuprofen for her back while on prednisone. 5) Smoldering Myeloma/MGUS. Immunofixation shows IgA monoclonal protein with kappa light chain. M-spike 0.3. She was evaluated by Dr. Lizbet Wallace.  Bone marrow biopsy showed smoldering myeloma. 6) Vitamin D Deficiency. Her vitamin D level was 40.0 (previously 32.0, 21.7). She is on weekly ergocalciferol 50,000. I will check her level. 7) Bilateral iliotibial Band Syndrome. This was not an active issue today. I will refer her to physical therapy if she is symptomatic. 8) Left Breast Cancer Stage 1. She is scheduled for lumpectomy. The patient voiced understanding of the aforementioned assessment and plan. Summary of plan was provided in the After Visit Summary patient instructions.      TODAY'S ORDERS    Orders Placed This Encounter    CBC WITH AUTOMATED DIFF    METABOLIC PANEL, COMPREHENSIVE    C REACTIVE PROTEIN, QT    SED RATE (ESR)    VITAMIN D, 25 HYDROXY    folic acid (FOLVITE) 1 mg tablet    methotrexate 25 mg/mL chemo injection     Future Appointments   Date Time Provider Gefof Duggan   8/13/2019  8:30 AM Josefa Kinsey NP [facility] ODALIS SCHED   8/14/2019  9:00 AM Attila Forbes MD Replaced by [facility] [id number]   1/30/2020  8:20 AM MD [place] MD, [address]    Adult Rheumatology   Rheumatology Ultrasound Certified  [facility]  A Part of [facility], [address] [place] [address] Road   Phone [phone]  Fax [phone]

## 2019-07-30 DIAGNOSIS — M06.09 SERONEGATIVE RHEUMATOID ARTHRITIS OF MULTIPLE SITES (HCC): Primary | ICD-10-CM

## 2019-07-30 LAB
25(OH)D3+25(OH)D2 SERPL-MCNC: 32.7 NG/ML (ref 30–100)
ALBUMIN SERPL-MCNC: 4.3 G/DL (ref 3.6–4.8)
ALBUMIN/GLOB SERPL: 1.7 {RATIO} (ref 1.2–2.2)
ALP SERPL-CCNC: 130 IU/L (ref 39–117)
ALT SERPL-CCNC: 42 IU/L (ref 0–32)
AST SERPL-CCNC: 25 IU/L (ref 0–40)
BASOPHILS # BLD AUTO: 0.1 X10E3/UL (ref 0–0.2)
BASOPHILS NFR BLD AUTO: 1 %
BILIRUB SERPL-MCNC: 0.6 MG/DL (ref 0–1.2)
BUN SERPL-MCNC: 17 MG/DL (ref 8–27)
BUN/CREAT SERPL: 24 (ref 12–28)
CALCIUM SERPL-MCNC: 9.1 MG/DL (ref 8.7–10.3)
CHLORIDE SERPL-SCNC: 106 MMOL/L (ref 96–106)
CO2 SERPL-SCNC: 22 MMOL/L (ref 20–29)
CREAT SERPL-MCNC: 0.7 MG/DL (ref 0.57–1)
CRP SERPL-MCNC: 4 MG/L (ref 0–10)
EOSINOPHIL # BLD AUTO: 0.1 X10E3/UL (ref 0–0.4)
EOSINOPHIL NFR BLD AUTO: 2 %
ERYTHROCYTE [DISTWIDTH] IN BLOOD BY AUTOMATED COUNT: 14.3 % (ref 12.3–15.4)
ERYTHROCYTE [SEDIMENTATION RATE] IN BLOOD BY WESTERGREN METHOD: 14 MM/HR (ref 0–40)
GLOBULIN SER CALC-MCNC: 2.5 G/DL (ref 1.5–4.5)
GLUCOSE SERPL-MCNC: 95 MG/DL (ref 65–99)
HCT VFR BLD AUTO: 37.4 % (ref 34–46.6)
HGB BLD-MCNC: 12.3 G/DL (ref 11.1–15.9)
IMM GRANULOCYTES # BLD AUTO: 0 X10E3/UL (ref 0–0.1)
IMM GRANULOCYTES NFR BLD AUTO: 1 %
LYMPHOCYTES # BLD AUTO: 0.5 X10E3/UL (ref 0.7–3.1)
LYMPHOCYTES NFR BLD AUTO: 9 %
MCH RBC QN AUTO: 30.8 PG (ref 26.6–33)
MCHC RBC AUTO-ENTMCNC: 32.9 G/DL (ref 31.5–35.7)
MCV RBC AUTO: 94 FL (ref 79–97)
MONOCYTES # BLD AUTO: 0.3 X10E3/UL (ref 0.1–0.9)
MONOCYTES NFR BLD AUTO: 5 %
NEUTROPHILS # BLD AUTO: 4.6 X10E3/UL (ref 1.4–7)
NEUTROPHILS NFR BLD AUTO: 82 %
PLATELET # BLD AUTO: 331 X10E3/UL (ref 150–450)
POTASSIUM SERPL-SCNC: 4.2 MMOL/L (ref 3.5–5.2)
PROT SERPL-MCNC: 6.8 G/DL (ref 6–8.5)
RBC # BLD AUTO: 4 X10E6/UL (ref 3.77–5.28)
SODIUM SERPL-SCNC: 139 MMOL/L (ref 134–144)
WBC # BLD AUTO: 5.6 X10E3/UL (ref 3.4–10.8)

## 2019-07-30 NOTE — PROGRESS NOTES
Pt notified of results and MD recommendations. Pt informed that her liver enzymes were elevated and that we need to repeat labs in 2 weeks to see if any treatment changes are needed. She stated an understanding.

## 2019-08-08 ENCOUNTER — HOSPITAL ENCOUNTER (OUTPATIENT)
Dept: LAB | Age: 69
Discharge: HOME | End: 2019-08-08
Payer: MEDICARE

## 2019-08-08 PROCEDURE — 80053 COMPREHEN METABOLIC PANEL: CPT

## 2019-08-08 PROCEDURE — 36415 COLL VENOUS BLD VENIPUNCTURE: CPT

## 2019-08-08 PROCEDURE — 84165 PROTEIN E-PHORESIS SERUM: CPT

## 2019-08-08 PROCEDURE — 85025 COMPLETE CBC W/AUTO DIFF WBC: CPT

## 2019-08-12 LAB
ALBUMIN SERPL ELPH-MCNC: 3.8 G/DL (ref 2.9–4.4)
ALBUMIN SERPL-MCNC: 4.2 G/DL (ref 3.6–4.8)
ALBUMIN/GLOB SERPL: 1.2 {RATIO} (ref 0.7–1.7)
ALBUMIN/GLOB SERPL: 1.4 {RATIO} (ref 1.2–2.2)
ALP SERPL-CCNC: 134 IU/L (ref 39–117)
ALPHA1 GLOB SERPL ELPH-MCNC: 0.2 G/DL (ref 0–0.4)
ALPHA2 GLOB SERPL ELPH-MCNC: 0.6 G/DL (ref 0.4–1)
ALT SERPL-CCNC: 14 IU/L (ref 0–32)
AST SERPL-CCNC: 14 IU/L (ref 0–40)
B-GLOBULIN SERPL ELPH-MCNC: 1.4 G/DL (ref 0.7–1.3)
BASOPHILS # BLD AUTO: 0.1 X10E3/UL (ref 0–0.2)
BASOPHILS NFR BLD AUTO: 2 %
BILIRUB SERPL-MCNC: 0.3 MG/DL (ref 0–1.2)
BUN SERPL-MCNC: 15 MG/DL (ref 8–27)
BUN/CREAT SERPL: 19 (ref 12–28)
CALCIUM SERPL-MCNC: 9.3 MG/DL (ref 8.7–10.3)
CHLORIDE SERPL-SCNC: 105 MMOL/L (ref 96–106)
CO2 SERPL-SCNC: 21 MMOL/L (ref 20–29)
CREAT SERPL-MCNC: 0.78 MG/DL (ref 0.57–1)
EOSINOPHIL # BLD AUTO: 0.1 X10E3/UL (ref 0–0.4)
EOSINOPHIL NFR BLD AUTO: 2 %
ERYTHROCYTE [DISTWIDTH] IN BLOOD BY AUTOMATED COUNT: 14.7 % (ref 12.3–15.4)
GAMMA GLOB SERPL ELPH-MCNC: 1.3 G/DL (ref 0.4–1.8)
GLOBULIN SER CALC-MCNC: 3 G/DL (ref 1.5–4.5)
GLOBULIN SER-MCNC: 3.4 G/DL (ref 2.2–3.9)
GLUCOSE SERPL-MCNC: 97 MG/DL (ref 65–99)
HCT VFR BLD AUTO: 41 % (ref 34–46.6)
HGB BLD-MCNC: 13.6 G/DL (ref 11.1–15.9)
IGA SERPL-MCNC: 450 MG/DL (ref 87–352)
IGG SERPL-MCNC: 1515 MG/DL (ref 700–1600)
IGM SERPL-MCNC: 48 MG/DL (ref 26–217)
IMM GRANULOCYTES # BLD AUTO: 0 X10E3/UL (ref 0–0.1)
IMM GRANULOCYTES NFR BLD AUTO: 1 %
INTERPRETATION SERPL IEP-IMP: ABNORMAL
KAPPA LC FREE SER-MCNC: 27.5 MG/L (ref 3.3–19.4)
KAPPA LC FREE/LAMBDA FREE SER: 2.39 {RATIO} (ref 0.26–1.65)
LAMBDA LC FREE SERPL-MCNC: 11.5 MG/L (ref 5.7–26.3)
LYMPHOCYTES # BLD AUTO: 0.8 X10E3/UL (ref 0.7–3.1)
LYMPHOCYTES NFR BLD AUTO: 17 %
M PROTEIN SERPL ELPH-MCNC: 0.4 G/DL
MCH RBC QN AUTO: 31.5 PG (ref 26.6–33)
MCHC RBC AUTO-ENTMCNC: 33.2 G/DL (ref 31.5–35.7)
MCV RBC AUTO: 95 FL (ref 79–97)
MONOCYTES # BLD AUTO: 0.3 X10E3/UL (ref 0.1–0.9)
MONOCYTES NFR BLD AUTO: 6 %
NEUTROPHILS # BLD AUTO: 3.3 X10E3/UL (ref 1.4–7)
NEUTROPHILS NFR BLD AUTO: 72 %
PLATELET # BLD AUTO: 337 X10E3/UL (ref 150–450)
PLEASE NOTE:, 149534: ABNORMAL
POTASSIUM SERPL-SCNC: 4.6 MMOL/L (ref 3.5–5.2)
PROT SERPL-MCNC: 7.2 G/DL (ref 6–8.5)
RBC # BLD AUTO: 4.32 X10E6/UL (ref 3.77–5.28)
SODIUM SERPL-SCNC: 140 MMOL/L (ref 134–144)
WBC # BLD AUTO: 4.6 X10E3/UL (ref 3.4–10.8)

## 2019-08-13 ENCOUNTER — OFFICE VISIT (OUTPATIENT)
Dept: SURGERY | Age: 69
End: 2019-08-13

## 2019-08-13 VITALS
DIASTOLIC BLOOD PRESSURE: 50 MMHG | BODY MASS INDEX: 32.44 KG/M2 | HEIGHT: 64 IN | SYSTOLIC BLOOD PRESSURE: 139 MMHG | WEIGHT: 190 LBS | HEART RATE: 57 BPM

## 2019-08-13 DIAGNOSIS — Z98.890 S/P BREAST LUMPECTOMY: ICD-10-CM

## 2019-08-13 DIAGNOSIS — Z92.3 S/P RADIATION THERAPY: ICD-10-CM

## 2019-08-13 DIAGNOSIS — C50.412 MALIGNANT NEOPLASM OF UPPER-OUTER QUADRANT OF LEFT BREAST IN FEMALE, ESTROGEN RECEPTOR POSITIVE (HCC): Primary | ICD-10-CM

## 2019-08-13 DIAGNOSIS — Z17.0 MALIGNANT NEOPLASM OF UPPER-OUTER QUADRANT OF LEFT BREAST IN FEMALE, ESTROGEN RECEPTOR POSITIVE (HCC): Primary | ICD-10-CM

## 2019-08-13 NOTE — PROGRESS NOTES
HISTORY OF PRESENT ILLNESS  Jennifer Zheng is a 70 y.o. female. HPI   ESTABLISHED patient here for follow-up of LEFT breast cancer, S/P lumpectomy about seven months ago. She is doing well. Only complaint is of some heaviness of the LEFT breast.  Is on letrozole without problems. Breast cancer -   Referring - Dr. Maria Fernanda Wallace  1/29/19-left lumpectomy with sentinel lymph node biopsy. Dr Kerry Pablo    1.8 cm invasive lobular carcinoma, grade 1, 1/1 negative lymph nodes. Completed XRT (Dr. Miguel Fontenot) early April of 2019. Currently on letrozole (Dr. Stiles December)  Oncotype DX low risk    OB History    None      Obstetric Comments   Menarche 14, LMP 46, # of children 0, no biological children, age of 1st delivery na, Hysterectomy/oophorectomy no/no, Breast bx yes, 12/2018 , history of breast feeding na, BCP no, Hormone therapy no             Past Surgical History:   Procedure Laterality Date    HX BREAST LUMPECTOMY Left 1/29/2019    LEFT BREAST LUMPECTOMY WITH ULTRASOUND, LEFT BREAST SENTINEL NODE BIOPSY (11a) performed by Don Rios MD at [address] HX CATARACT REMOVAL Bilateral     [address]  7/97    brain lesion removed ? infectious     No breast imaging since breast cancer treatment    ROS    Physical Exam   Constitutional: She appears well-developed and well-nourished. Pulmonary/Chest: Right breast exhibits no inverted nipple, no mass, no nipple discharge, no skin change and no tenderness. Left breast exhibits no inverted nipple, no mass, no nipple discharge, no skin change (well healed surgical incision to breast and axilla; mild skin thickening s/p XRT) and no tenderness. Breasts are symmetrical.   Musculoskeletal: Normal range of motion. UE x 2   Lymphadenopathy:     She has no axillary adenopathy. Right: No supraclavicular adenopathy present. Left: No supraclavicular adenopathy present. Skin: Skin is warm, dry and intact. Chest and breasts examined   Psychiatric: She has a normal mood and affect. Her speech is normal and behavior is normal.     Visit Vitals  /50   Pulse (!) 57   Ht 5' 4\" (1.626 m)   Wt 190 lb (86.2 kg)   BMI 32.61 kg/m²     ASSESSMENT and PLAN  Encounter Diagnoses   Name Primary?  Malignant neoplasm of upper-outer quadrant of left breast in female, estrogen receptor positive ([address] Yes    S/P breast lumpectomy     S/P radiation therapy      Normal exam with no evidence of local recurrence. Taking AI without issues and has follow-up with Dr. Eve Mora. BDmammogram 3D in 11/2019. RTC in 6 months or sooner PRN. She is comfortable with this plan. All questions answered and she stated understanding.

## 2019-08-14 ENCOUNTER — OFFICE VISIT (OUTPATIENT)
Dept: ONCOLOGY | Age: 69
End: 2019-08-14

## 2019-08-14 VITALS
OXYGEN SATURATION: 98 % | SYSTOLIC BLOOD PRESSURE: 125 MMHG | BODY MASS INDEX: 32.66 KG/M2 | DIASTOLIC BLOOD PRESSURE: 72 MMHG | WEIGHT: 191.3 LBS | HEIGHT: 64 IN | TEMPERATURE: 97.7 F | HEART RATE: 67 BPM

## 2019-08-14 DIAGNOSIS — E66.01 SEVERE OBESITY (HCC): ICD-10-CM

## 2019-08-14 DIAGNOSIS — M06.09 SERONEGATIVE RHEUMATOID ARTHRITIS OF MULTIPLE SITES (HCC): ICD-10-CM

## 2019-08-14 DIAGNOSIS — D47.2 SMOLDERING MYELOMA: ICD-10-CM

## 2019-08-14 DIAGNOSIS — C50.912 MALIGNANT NEOPLASM OF LEFT FEMALE BREAST, UNSPECIFIED ESTROGEN RECEPTOR STATUS, UNSPECIFIED SITE OF BREAST (HCC): ICD-10-CM

## 2019-08-14 DIAGNOSIS — D47.2 MGUS (MONOCLONAL GAMMOPATHY OF UNKNOWN SIGNIFICANCE): Primary | ICD-10-CM

## 2019-08-14 NOTE — PROGRESS NOTES
Helen Peña is a 70 y.o. female  Chief Complaint   Patient presents with    Follow-up      Paraproteinemia- Smoldering Myeloma     1. Have you been to the ER, urgent care clinic since your last visit? Hospitalized since your last visit? No.    2. Have you seen or consulted any other health care providers outside of the [address] [place] [address] since your last visit? Include any pap smears or colon screening. Yes, went to PCP for damaging knee and was sent to ortho on call.

## 2019-08-14 NOTE — PROGRESS NOTES
[facility] [place] [place] [address] [address], [place]: [phone]  F: [phone]    Reason for Visit:   Deirdre Scott is a 70 y.o. female who is seen for follow up of    1)  Paraproteinemia- Smoldering Myeloma  2)  Left breast ER pos VA pos (weak) HER 2 neg stage I Breast cancer- 12/2018    TREATMENT HISTORY  1/29/19-left lumpectomy with sentinel lymph node biopsy. 1.8 cm invasive lobular carcinoma, grade 1, 1 of one negative lymph nodes. Oncotype low risk. Completed RT  4/19 started Letrozole    History of Present Illness:   Patient is a 70 y.o. with seronegative rheumatoid arthritis and secondary polymyalgia rheumatica who is seen for follow-up of smoldering myeloma and lobular breast cancer. She presents for follow-up on adjuvant Letrozole. She is tolerating this. She has no hot flashes, no joint aches. She has some L breast tenderness. She has no new lumps or bumps. She has depression and follows with Dr. Parviz Barry, she is practicing mindfulness and this is helping. She is exercising         Past Medical History:   Diagnosis Date    Breast cancer, left ([address]     Depression     Goiter     Hearing loss     bilateral    Hypertension     Hypothyroid     IBS (irritable bowel syndrome)     Rheumatoid arthritis ([address]       Past Surgical History:   Procedure Laterality Date    HX BREAST LUMPECTOMY Left 1/29/2019    LEFT BREAST LUMPECTOMY WITH ULTRASOUND, LEFT BREAST SENTINEL NODE BIOPSY (11a) performed by Sebastián Cruz MD at [address] HX CATARACT REMOVAL Bilateral     HX CHOLECYSTECTOMY  1997    NEUROLOGICAL PROCEDURE UNLISTED  7/97    brain lesion removed ? infectious      Social History     Tobacco Use    Smoking status: Never Smoker    Smokeless tobacco: Never Used   Substance Use Topics    Alcohol use:  Yes     Alcohol/week: 5.0 - 6.0 standard drinks     Types: 5 Glasses of wine per week      Family History   Problem Relation Age of Onset    Cancer Mother         lung    COPD Mother     Cancer Father         lung    Heart Disease Brother         CABGx3    Diabetes Brother     Heart Disease Brother     Diabetes Brother     COPD Brother     Liver Disease Brother         cirrhosis    Alcohol abuse Brother     Heart Disease Paternal Uncle         CAD    Heart Disease Paternal Grandmother         CAD    Anesth Problems Neg Hx      Current Outpatient Medications   Medication Sig    folic acid (FOLVITE) 1 mg tablet TAKE 1 TABLET BY MOUTH ONCE DAILY    methotrexate 25 mg/mL chemo injection INJECT 1 ML UNDER SKIN ONCE A WEEK ON WEDNESDAY    diclofenac EC (VOLTAREN) 75 mg EC tablet Take 1 Tab by mouth two (2) times a day.  LORazepam (ATIVAN) 1 mg tablet TAKE 1 & 1/2 (ONE & ONE-HALF) TABLETS BY MOUTH NIGHTLY    letrozole (FEMARA) 2.5 mg tablet Take 1 Tab by mouth daily. Indications: Hormone Receptor Positive Postmenopausal Early Breast Cancer After Adjuvant Tamoxifen    ibuprofen (ADVIL) 200 mg tablet Take 800 mg by mouth every six (6) hours as needed for Pain.  lisinopril-hydroCHLOROthiazide (PRINZIDE, ZESTORETIC) 10-12.5 mg per tablet TAKE ONE TABLET BY MOUTH ONCE DAILY    ergocalciferol (ERGOCALCIFEROL) 50,000 unit capsule Take 1 Cap by mouth every Tuesday.  levothyroxine (SYNTHROID) 200 mcg tablet TAKE ONE TABLET BY MOUTH ONCE DAILY BEFORE BREAKFAST     No current facility-administered medications for this visit. Allergies   Allergen Reactions    Keflex [Cephalexin] Rash    Monistat 1 [Tioconazole] Swelling    Sulfa (Sulfonamide Antibiotics) Rash        Review of Systems: A complete review of systems was obtained, negative except as described above.     Physical Exam:     Visit Vitals  /72 (BP 1 Location: Right arm, BP Patient Position: Sitting)   Pulse 67   Temp 97.7 °F (36.5 °C) (Oral)   Ht 5' 4\" (1.626 m)   Wt 191 lb 4.8 oz (86.8 kg)   SpO2 98%   BMI 32.84 kg/m²     ECOG PS: 1  General: No distress  Eyes: PERRLA, anicteric sclerae  HENT: Atraumatic, OP clear  Neck: Supple  Breast: No palpable lumps or adenopathy, no skin changes. She has some generalized tenderness in the L breast  CV: Normal rate, regular rhythm, no murmurs, no peripheral edema  GI: Soft, nontender, nondistended, no masses, no hepatomegaly, no splenomegaly  MS: Normal gait and station. Digits without clubbing or cyanosis. Skin: No rashes, ecchymoses, or petechiae. Normal temperature, turgor, and texture. Psych: Alert, oriented, appropriate affect, normal judgment/insight    Results:     Lab Results   Component Value Date/Time    WBC 4.6 08/08/2019 08:10 AM    HGB 13.6 08/08/2019 08:10 AM    HCT 41.0 08/08/2019 08:10 AM    PLATELET 294 28/16/8092 08:10 AM    MCV 95 08/08/2019 08:10 AM    ABS. NEUTROPHILS 3.3 08/08/2019 08:10 AM     Lab Results   Component Value Date/Time    Sodium 140 08/08/2019 08:10 AM    Potassium 4.6 08/08/2019 08:10 AM    Chloride 105 08/08/2019 08:10 AM    CO2 21 08/08/2019 08:10 AM    Glucose 97 08/08/2019 08:10 AM    BUN 15 08/08/2019 08:10 AM    Creatinine 0.78 08/08/2019 08:10 AM    GFR est AA 90 08/08/2019 08:10 AM    GFR est non-AA 78 08/08/2019 08:10 AM    Calcium 9.3 08/08/2019 08:10 AM     Lab Results   Component Value Date/Time    Bilirubin, total 0.3 08/08/2019 08:10 AM    ALT (SGPT) 14 08/08/2019 08:10 AM    AST (SGOT) 14 08/08/2019 08:10 AM    Alk.  phosphatase 134 (H) 08/08/2019 08:10 AM    Protein, total 7.2 08/08/2019 08:10 AM    Albumin 4.2 08/08/2019 08:10 AM    Globulin 3.7 12/23/2016 01:31 PM     Lab Results   Component Value Date/Time     11/21/2018 02:47 PM    Beta-2 Microglobulin, serum 1.8 11/21/2018 02:47 PM    Sed rate (ESR) 14 07/29/2019 08:56 AM    C-Reactive Protein, Qt 4 07/29/2019 08:56 AM    TSH 0.681 12/12/2018 12:00 AM    M-Jere 0.4 (H) 08/08/2019 08:10 AM    M-Jere 0.2 (H) 04/29/2019 09:12 AM    Lipase 204 12/23/2016 01:31 PM     Lab Results   Component Value Date/Time D-dimer 0.21 12/23/2016 01:31 PM     Records reviewed and summarized above. Pathology report(s) reviewed    Bone marrow: 12/19/18  Variably cellular marrow with mild monoclonal plasmacytosis. Trilineage hematopoiesis with maturation. See comment. Peripheral blood:   Unremarkable peripheral blood. See CBC data. Comment   The bone marrow is variably cellular ranging <10% to 60% to reveal mild monoclonal, kappa restricted plasmacytosis (10%). Trilineage hematopoiesis with adequate maturation is identified      Interpretation:   Del(1p): Not Detected   Dup(1q): Not Detected   Gains(5, 9, 15): DETECTED   Del(13q): Not Detected   Del(17p)(TP53): Not Detected (See Below)   IGH(Rearrangement): Not Detected   Testing performed by [facility] and interpreted by Cheryl Styles M.D. Please see scanned outside report in [facility] for complete details. Breast biopsy 12/3/18  Breast, left, needle core biopsy:   Invasive mammary carcinoma with ductal and lobular features   Favor [place] histologic grade 1   Largest glass slide measurement of invasive carcinoma: 8.5 mm     1/29/19  Lumpectomy and SLN     TUMOR   Tumor Size: 1.8   Histologic Type: Invasive lobular carcinoma   Histologic Grade (1-3)   Glandular (Acinar)/Tubular Differentiation: 3   Nuclear Pleomorphism: 1   Mitotic Rate: 1   Overall ndGndrndanddndend:nd nd2nd Lymph Nodes with Macrometastases (>2 mm): 0   Lymph Nodes with Micrometastases (>0.2 mm to 2 mm and/or >200 cells): 0 Number of Lymph Nodes with Isolated Tumor Cells (?0.2 mm and ?200 cells)#: 0   Total Number of Lymph Nodes Examined: 1   Number of Cochrane Nodes Examined: 1   3. Breast, left, anterior margin, excision:   Atypical lobular hyperplasia     Radiology report(s) reviewed       Skeletal survey 11/28/18  IMPRESSION  IMPRESSION: Few small calvarial lucencies.  No fractures or lytic lesions at risk  for fracture.     Mammogram 11/18    There is persistent architectural distortion within the middle third of the left  breast upper outer quadrant at 1:00. No suspicious calcifications are  identified. There is no skin thickening or nipple retraction.      Left breast ultrasound was performed of the 1:00 location, 5 cm from the nipple. There is a sonographic correlate to the architectural distortion, with  irregular hypoechoic shadowing tissue, measuring up to 9 mm. Ultrasound-guided  biopsy is recommended.     IMPRESSION  IMPRESSION:     1.  BI-RADS Assessment Category 4: Suspicious abnormality - Biopsy should be  performed in the absence of clinical contraindication. Architectural distortion  in the upper outer left breast, with a sonographic correlate. Ultrasound-guided  core needle biopsy is recommended. PET CT  Negative for any lytic lesions or uptake. Assessment:   1) Paraproteinemia- Smoldering Myeloma    Small IgA M spike  No end organ damage- Bone survey suggests few calvarial lucencies-but there are no corresponding lesions on the PET/CT. Together she likely has smoldering myeloma     Reviewed the spectrum of plasma cell disorders and implications of diagnosis. She understands that smoldering myeloma has about a > 40% risk of transforming into myeloma in the ensuing years.   At this time we will continue with observation    Labs from 8/8/19 were reviewed and are essentially stable      2) Left breast cancer    9 mm lesion on Mammo/USG  %, DC 3% HER2 mignon negative  Status post lumpectomy and sentinel lymph node biopsy on 1/29/19  T1cN0 invasive lobular carcinoma-stage I  Tumor is 1.8 cm  Grade 1  Margins with atypical lobular hyper  Oncotype DX low risk'    Completed adjuvant RT  Tolerating adjuvant Letrozole since 4/19  Planning 5 years    Discussed lifestyle, she is practicing weight bearing  DEXA 6/19 reviewed and normal, she is taking VD and exercising     Exam unremarkable  Mammogram due 11/19    3)  Rheumatoid arthritis    Per Dr. Smart Lies    4) Depression  Per  Dom      Plan:     · Continue letrozole   · Continue VD  · Counseled in Life style changes   · If all is well, then see me in 3 months with cbc, cmp and gammopathy evaluation      I spent more than 50% of this 25 -minute visit in counseling  I appreciate the opportunity to participate in Ms. Nataliia Cortez's care.     Signed By: Alphonso Laird MD

## 2019-08-15 DIAGNOSIS — C50.912 MALIGNANT NEOPLASM OF LEFT FEMALE BREAST, UNSPECIFIED ESTROGEN RECEPTOR STATUS, UNSPECIFIED SITE OF BREAST (HCC): ICD-10-CM

## 2019-08-28 ENCOUNTER — HOSPITAL ENCOUNTER (OUTPATIENT)
Dept: MAMMOGRAPHY | Age: 69
Discharge: HOME | End: 2019-08-28
Attending: NURSE PRACTITIONER
Payer: MEDICARE

## 2019-08-28 DIAGNOSIS — Z17.0 MALIGNANT NEOPLASM OF UPPER-OUTER QUADRANT OF LEFT BREAST IN FEMALE, ESTROGEN RECEPTOR POSITIVE (HCC): ICD-10-CM

## 2019-08-28 DIAGNOSIS — C50.412 MALIGNANT NEOPLASM OF UPPER-OUTER QUADRANT OF LEFT BREAST IN FEMALE, ESTROGEN RECEPTOR POSITIVE (HCC): ICD-10-CM

## 2019-08-28 DIAGNOSIS — Z98.890 S/P BREAST LUMPECTOMY: ICD-10-CM

## 2019-08-28 DIAGNOSIS — Z92.3 S/P RADIATION THERAPY: ICD-10-CM

## 2019-08-28 PROCEDURE — 77062 BREAST TOMOSYNTHESIS BI: CPT

## 2019-09-03 ENCOUNTER — LAB ONLY (OUTPATIENT)
Dept: FAMILY MEDICINE CLINIC | Age: 69
End: 2019-09-03

## 2019-09-03 DIAGNOSIS — M06.09 SERONEGATIVE RHEUMATOID ARTHRITIS OF MULTIPLE SITES (HCC): ICD-10-CM

## 2019-09-04 DIAGNOSIS — Z79.60 LONG-TERM USE OF IMMUNOSUPPRESSANT MEDICATION: ICD-10-CM

## 2019-09-04 DIAGNOSIS — M06.09 SERONEGATIVE RHEUMATOID ARTHRITIS OF MULTIPLE SITES (HCC): ICD-10-CM

## 2019-09-04 DIAGNOSIS — M35.3 PMR (POLYMYALGIA RHEUMATICA) (HCC): ICD-10-CM

## 2019-09-04 LAB
ALBUMIN SERPL-MCNC: 4.2 G/DL (ref 3.6–4.8)
ALP SERPL-CCNC: 125 IU/L (ref 39–117)
ALT SERPL-CCNC: 10 IU/L (ref 0–32)
AST SERPL-CCNC: 14 IU/L (ref 0–40)
BILIRUB DIRECT SERPL-MCNC: 0.08 MG/DL (ref 0–0.4)
BILIRUB SERPL-MCNC: 0.3 MG/DL (ref 0–1.2)
PROT SERPL-MCNC: 7.1 G/DL (ref 6–8.5)

## 2019-09-04 RX ORDER — METHOTREXATE 25 MG/ML
INJECTION, SOLUTION INTRA-ARTERIAL; INTRAMUSCULAR; INTRAVENOUS
Qty: 12 VIAL | Refills: 1
Start: 2019-09-04 | End: 2020-01-24 | Stop reason: SDUPTHER

## 2019-10-31 ENCOUNTER — OFFICE VISIT (OUTPATIENT)
Dept: INTERNAL MEDICINE CLINIC | Age: 69
End: 2019-10-31

## 2019-10-31 VITALS
HEIGHT: 64 IN | DIASTOLIC BLOOD PRESSURE: 76 MMHG | WEIGHT: 189 LBS | TEMPERATURE: 97.9 F | SYSTOLIC BLOOD PRESSURE: 157 MMHG | RESPIRATION RATE: 20 BRPM | OXYGEN SATURATION: 98 % | HEART RATE: 57 BPM | BODY MASS INDEX: 32.27 KG/M2

## 2019-10-31 DIAGNOSIS — K14.0 TONGUE ULCER: Primary | ICD-10-CM

## 2019-10-31 DIAGNOSIS — K14.0 TONGUE ULCER: ICD-10-CM

## 2019-10-31 RX ORDER — TRIAMCINOLONE ACETONIDE 1 MG/G
PASTE DENTAL 3 TIMES DAILY
Qty: 10 G | Refills: 1 | Status: SHIPPED | OUTPATIENT
Start: 2019-10-31 | End: 2020-01-21

## 2019-10-31 RX ORDER — TRIAMCINOLONE ACETONIDE 1 MG/G
PASTE DENTAL 3 TIMES DAILY
Qty: 10 G | Refills: 1 | Status: SHIPPED | OUTPATIENT
Start: 2019-10-31 | End: 2019-10-31 | Stop reason: SDUPTHER

## 2019-10-31 NOTE — PROGRESS NOTES
Patient c/o of ulcer on tongue, swollen, burns, pain, eating is painful. Patient takes methotrexate. Patient has not contacted Dr. Tanisha White.

## 2019-10-31 NOTE — PROGRESS NOTES
Chief Complaint   Patient presents with    Tongue Swelling     x5 days     she is a 70y.o. year old female who presents for evaluation of painful tongue. I need you to fix my tongue. She reports she has an ulcer on it that started about 5 days. She does not recall doing anything to injure her tongue. She states it seems to be getting larger. She is on methotrexate. She has been taking this for some time without problems. Reviewed PmHx, RxHx, FmHx, SocHx, AllgHx and updated and dated in the chart. Review of Systems - negative except as listed above    Objective:     Vitals:    10/31/19 1153   BP: 157/76   Pulse: (!) 57   Resp: 20   Temp: 97.9 °F (36.6 °C)   TempSrc: Oral   SpO2: 98%   Weight: 189 lb (85.7 kg)   Height: 5' 4\" (1.626 m)     Physical Examination: General appearance - alert, well appearing, and in no distress  Mouth - tongue-     Assessment/ Plan:   Diagnoses and all orders for this visit:    1. Tongue ulcer  -     triamcinolone acetonide (KENALOG) 0.1 % dental paste; by Dental route three (3) times daily. patient is to use the paste as prescribed. She is to let me know if not better or if she develop additional sores. I have discussed the diagnosis with the patient and the intended plan as seen in the above orders. The patient has received an after-visit summary and questions were answered concerning future plans.      Medication Side Effects and Warnings were discussed with patient: yes  Patient Labs were reviewed and or requested: yes  Patient Past Records were reviewed and or requested  yes    Sherron Rios PA-C

## 2019-11-14 DIAGNOSIS — D47.2 MGUS (MONOCLONAL GAMMOPATHY OF UNKNOWN SIGNIFICANCE): ICD-10-CM

## 2019-11-15 DIAGNOSIS — F51.01 PRIMARY INSOMNIA: ICD-10-CM

## 2019-11-15 RX ORDER — LORAZEPAM 1 MG/1
TABLET ORAL
Qty: 45 TAB | Refills: 1 | Status: SHIPPED | OUTPATIENT
Start: 2019-11-15 | End: 2020-01-21

## 2019-11-27 RX ORDER — LEVOTHYROXINE SODIUM 200 UG/1
TABLET ORAL
Qty: 90 TAB | Refills: 3 | Status: SHIPPED | OUTPATIENT
Start: 2019-11-27 | End: 2020-01-21 | Stop reason: SDUPTHER

## 2019-12-26 ENCOUNTER — TELEPHONE (OUTPATIENT)
Dept: RHEUMATOLOGY | Age: 69
End: 2019-12-26

## 2019-12-26 NOTE — TELEPHONE ENCOUNTER
CALLED PATIENT ON 12/26/2019 TO RESCHEDULE APPT FOR 1/30/2020 DUE TO A PROVIDER CANCELLATION PATIENT STATED SHE WOULD CALL BACK IN THE MORNING ON 12/27/2019 TO RESCHEDULE ANY REP CAN ASSIST PT. SD[facility]

## 2020-01-20 DIAGNOSIS — C50.912 MALIGNANT NEOPLASM OF LEFT FEMALE BREAST, UNSPECIFIED ESTROGEN RECEPTOR STATUS, UNSPECIFIED SITE OF BREAST (HCC): ICD-10-CM

## 2020-01-21 ENCOUNTER — HOSPITAL ENCOUNTER (OUTPATIENT)
Dept: LAB | Age: 70
Discharge: HOME | End: 2020-01-21
Payer: MEDICARE

## 2020-01-21 ENCOUNTER — OFFICE VISIT (OUTPATIENT)
Dept: RHEUMATOLOGY | Age: 70
End: 2020-01-21

## 2020-01-21 VITALS
TEMPERATURE: 98.5 F | SYSTOLIC BLOOD PRESSURE: 152 MMHG | HEART RATE: 69 BPM | WEIGHT: 198 LBS | RESPIRATION RATE: 18 BRPM | BODY MASS INDEX: 33.8 KG/M2 | HEIGHT: 64 IN | DIASTOLIC BLOOD PRESSURE: 85 MMHG

## 2020-01-21 DIAGNOSIS — Z79.60 LONG-TERM USE OF IMMUNOSUPPRESSANT MEDICATION: ICD-10-CM

## 2020-01-21 DIAGNOSIS — E55.9 VITAMIN D DEFICIENCY: ICD-10-CM

## 2020-01-21 DIAGNOSIS — M35.3 PMR (POLYMYALGIA RHEUMATICA) (HCC): ICD-10-CM

## 2020-01-21 DIAGNOSIS — R11.0 NAUSEA: ICD-10-CM

## 2020-01-21 DIAGNOSIS — D47.2 MGUS (MONOCLONAL GAMMOPATHY OF UNKNOWN SIGNIFICANCE): ICD-10-CM

## 2020-01-21 DIAGNOSIS — M06.09 SERONEGATIVE RHEUMATOID ARTHRITIS OF MULTIPLE SITES (HCC): Primary | ICD-10-CM

## 2020-01-21 PROCEDURE — 85651 RBC SED RATE NONAUTOMATED: CPT

## 2020-01-21 PROCEDURE — 80053 COMPREHEN METABOLIC PANEL: CPT

## 2020-01-21 PROCEDURE — 85025 COMPLETE CBC W/AUTO DIFF WBC: CPT

## 2020-01-21 PROCEDURE — 86803 HEPATITIS C AB TEST: CPT

## 2020-01-21 PROCEDURE — 86140 C-REACTIVE PROTEIN: CPT

## 2020-01-21 PROCEDURE — 82306 VITAMIN D 25 HYDROXY: CPT

## 2020-01-21 PROCEDURE — 86334 IMMUNOFIX E-PHORESIS SERUM: CPT

## 2020-01-21 PROCEDURE — 84165 PROTEIN E-PHORESIS SERUM: CPT

## 2020-01-21 RX ORDER — TRIAMCINOLONE ACETONIDE 40 MG/ML
80 INJECTION, SUSPENSION INTRA-ARTICULAR; INTRAMUSCULAR ONCE
Qty: 1 ML | Refills: 0
Start: 2020-01-21 | End: 2020-01-21

## 2020-01-21 RX ORDER — ONDANSETRON 4 MG/1
4 TABLET, ORALLY DISINTEGRATING ORAL
Qty: 30 TAB | Refills: 1 | Status: SHIPPED | OUTPATIENT
Start: 2020-01-21 | End: 2021-08-10 | Stop reason: ALTCHOICE

## 2020-01-21 NOTE — LETTER
1/21/20 Patient: Juana Dorman YOB: 1950 Date of Visit: 1/21/2020 Niki Dasilva MD 
[address] [place] [id number] VIA In Basket Dear Niki Dasilva MD, Thank you for referring Ms. Charu Cortez to [address] [place] [address] for evaluation. My notes for this consultation are attached. If you have questions, please do not hesitate to call me. I look forward to following your patient along with you.  
 
 
Sincerely, 
 
Richard Bell MD

## 2020-01-21 NOTE — PROGRESS NOTES
Chief Complaint   Patient presents with    Arthritis     1. Have you been to the ER, urgent care clinic since your last visit? Hospitalized since your last visit? No    2. Have you seen or consulted any other health care providers outside of the [address] [place] [address] since your last visit? Include any pap smears or colon screening.  Yes, Dr. Lesvia Zhang

## 2020-01-21 NOTE — PROGRESS NOTES
REASON FOR VISIT    This is a follow-up visit for Ms. Paul Anderson for     ICD-10-CM   1. Seronegative rheumatoid arthritis of multiple sites ([facility] M06.09   2. PMR (polymyalgia rheumatica) ([facility]) M35.3     Inflammatory arthritis phenotype includes:  Anti-CCP positive: no  Rheumatoid factor positive: no  Erosive disease: no  Extra-articular manifestations include: Polymyalgia Rheumatica, smoldering myeloma    Immunosuppression Screening (10/04/2018): Quantiferon TB: negative  PPD:  Not performed  Hepatitis B: negative  Hepatitis C: negative    Therapy History includes:  Current DMARD therapy include: methotrexate 25 mg SubQ every Wednesday (4/29/2019 to present)  Prior DMARD therapy include: methotrexate 20 mg every Wednesday (10/15/2018 to 4/29/2019)  Discontinued DMARDs because of inefficacy: None  Discontinued DMARDs because of side effects: None    Immunization History   Administered Date(s) Administered    Influenza Vaccine 10/10/2013, 10/14/2015, 10/15/2018    Influenza Vaccine Split 09/26/2012    Influenza Vaccine Whole 10/22/2011    Pneumococcal Polysaccharide (PPSV-23) 05/05/2015       Patient Active Problem List   Diagnosis Code    Hypothyroidism E03.9    IBS (irritable bowel syndrome) K58.9    HTN (hypertension) I10    Graves disease E05.00    Moderate major depression ([facility] F32.1    PMR (polymyalgia rheumatica) ([facility]) M35.3    Iliotibial band syndrome of both sides M76.31, M76.32    Long term current use of non-steroidal anti-inflammatories (NSAID) Z79.1    Seronegative rheumatoid arthritis of multiple sites ([facility]) M06.09    Monoclonal gammopathy of unknown significance (MGUS) D47.2    Vitamin D deficiency E55.9    Severe obesity ([facility]) E66.01    Long-term use of immunosuppressant medication Z79.899    Malignant neoplasm of left female breast ([address] C50.912    Multiple myeloma ([facility] [address] C90.00    Smoldering myeloma ([facility] [address] C90.00     HISTORY OF PRESENT ILLNESS    Ms. Paul Anderson returns for a follow-up. On her last visit, I continued methotrexate 25 mg subcutaneous every Wednesday, which she has taken with good tolerance but has noticed nausea after each dose that lasts 2 days for the past 4 weeks. She skipped 2 doses of methotrexate and had resolution of nausea. She tried taking oral tablets of methotrexate but still had issues. She is not sure what changed since she had been on SubQ since 4/2019 with good tolerance. Today, she feels horrible due to missing doses of her methotrexate due to nausea. She feels aching pain in her hands, shoulders, hips, and knees that is worse by midday. She denies swelling or stiffness. She feels horrible during the day and has to force her self to function. She endorses nausea, abdominal pain associated with methotrexate dosing. She denies fever, weight loss, blurred vision, vision loss, oral ulcers, ankle swelling, dry cough, dyspnea, vomiting, dysphagia, black or bloody stool, fall since last visit, rash, easy bruising and increased thirst.    Ms. Marcelle Arana has continued her medications for arthritis and reports good tolerance without significant side effects. Last toxicity monitoring by blood work was done on 8/08/2019 and did not reveal any significant adverse effects, except     Most recent inflammatory markers from 7/29/2019 revealed a ESR 14 mm/hr and CRP 4 mg/L. The patient has not had any interval hospital admissions, infections, or surgeries. REVIEW OF SYSTEMS    A comprehensive review of systems was performed and pertinent results are documented in the HPI, review of systems is otherwise non-contributory. PAST MEDICAL HISTORY    She has a past medical history of Breast cancer, left ([address] Depression, Goiter, Hearing loss, Hypertension, Hypothyroid, IBS (irritable bowel syndrome), Menopause, Rheumatoid arthritis ([address] and S/P radiation therapy.     FAMILY HISTORY    Her family history includes Alcohol abuse in her brother; COPD in her brother and mother; Cancer in her father and mother; Diabetes in her brother and brother; Heart Disease in her brother, brother, paternal grandmother, and paternal uncle; Liver Disease in her brother. SOCIAL HISTORY    She reports that she has never smoked. She has never used smokeless tobacco. She reports current alcohol use of about 5.0 - 6.0 standard drinks of alcohol per week. She reports that she does not use drugs. MEDICATIONS    Current Outpatient Medications   Medication Sig Dispense Refill    LORazepam (ATIVAN) 1 mg tablet TAKE 1 & 1/2 (ONE & ONE-HALF) TABLETS BY MOUTH NIGHTLY 45 Tab 2    ondansetron (ZOFRAN ODT) 4 mg disintegrating tablet Take 1 Tab by mouth every eight (8) hours as needed for Nausea. 30 Tab 1    triamcinolone acetonide (KENALOG) 40 mg/mL injection 2 mL by IntraMUSCular route once for 1 dose. 1 mL 0    methotrexate 25 mg/mL chemo injection INJECT 1 ML UNDER SKIN ONCE A WEEK ON WEDNESDAY 12 Vial 1    folic acid (FOLVITE) 1 mg tablet TAKE 1 TABLET BY MOUTH ONCE DAILY 90 Tab 1    diclofenac EC (VOLTAREN) 75 mg EC tablet Take 1 Tab by mouth two (2) times a day. 30 Tab 1    letrozole (FEMARA) 2.5 mg tablet Take 1 Tab by mouth daily. Indications: Hormone Receptor Positive Postmenopausal Early Breast Cancer After Adjuvant Tamoxifen 30 Tab 3    ibuprofen (ADVIL) 200 mg tablet Take 800 mg by mouth every six (6) hours as needed for Pain.  lisinopril-hydroCHLOROthiazide (PRINZIDE, ZESTORETIC) 10-12.5 mg per tablet TAKE ONE TABLET BY MOUTH ONCE DAILY 90 Tab 3    ergocalciferol (ERGOCALCIFEROL) 50,000 unit capsule Take 1 Cap by mouth every Tuesday.  12 Cap 3    levothyroxine (SYNTHROID) 200 mcg tablet TAKE ONE TABLET BY MOUTH ONCE DAILY BEFORE BREAKFAST 90 Tab 3        ALLERGIES    Allergies   Allergen Reactions    Keflex [Cephalexin] Rash    Monistat 1 [Tioconazole] Swelling    Sulfa (Sulfonamide Antibiotics) Rash       PHYSICAL EXAMINATION    Visit Vitals  /85   Pulse 69   Temp 98.5 °F (36.9 °C)   Resp 18   Ht 5' 4\" (1.626 m)   Wt 198 lb (89.8 kg)   BMI 33.99 kg/m²     Body mass index is 33.99 kg/m². General: Patient is alert, oriented x 3, not in acute distress    HEENT:   Sclerae are not injected and appear moist.  There is no alopecia. Neck is supple     Cardiovascular:  Heart is regular rate and rhythm, no murmurs. Chest:  Lungs are clear to auscultation bilaterally. Extremities:  Free of clubbing, cyanosis, edema    Neurological exam:  Muscle strength is full in upper and lower extremities     Skin exam:  There are no rashes, no alopecia, no discoid lesions, no active Raynaud's, no livedo reticularis, no periungual erythema. Musculoskeletal exam:  A comprehensive musculoskeletal exam was performed for all joints of each upper and lower extremity and assessed for swelling, tenderness and range of motion. Positive results are documented as below:    Bilateral Sebasti[place] and [place] nodes. Bilateral trochanteric bursa tenderness.     Z-Deformities:   no  [place] Neck Deformities:  no  Boutonierre's Deformities:  no  Ulnar Deviation:   no  MCP Subluxation:  no     Joint Count 1/21/2020 7/29/2019 4/29/2019 1/28/2019 11/28/2018 10/4/2018   Patient pain (0-100) 80 5 15 40 70 75   MHAQ 1 0.375 1 0.5 0 1   Left shoulder - Tender 1 - - - 1 1   Left shoulder - Swollen 0 - - - 1 0   Left elbow - Tender 1 - - - - -   Left elbow - Swollen 0 - - - - -   Left wrist- Tender 1 0 1 0 1 -   Left wrist- Swollen 1 0 1 0 1 -   Left 1st MCP - Tender 1 - 1 - 1 1   Left 1st MCP - Swollen 1 - 1 - 1 1   Left 2nd MCP - Tender 1 - 1 - 1 1   Left 2nd MCP - Swollen 1 - 1 - 1 1   Left 3rd MCP - Tender 1 - 1 - - 1   Left 3rd MCP - Swollen 0 - 1 - - 1   Left 4th MCP - Tender 1 - 1 - - -   Left 4th MCP - Swollen 0 - 0 - - -   Left 5th MCP - Tender 1 - 1 - - 1   Left 5th MCP - Swollen 0 - 0 - - 1   Left 2nd PIP - Tender 1 - - - 1 1   Left 2nd PIP - Swollen 1 - - - 1 1   Left 3rd PIP - Tender 1 - - - 1 1   Left 3rd PIP - Swollen 1 - 1 - 1 1   Left 4th PIP - Tender - - 1 - - -   Right shoulder - Tender 1 - - - 1 1   Right shoulder - Swollen 0 - - - 0 0   Right elbow - Tender 1 - - - - -   Right elbow - Swollen 0 - - - - -   Right wrist- Tender 1 - 1 - - 1   Right wrist- Swollen 1 - 1 - - 1   Right 1st MCP - Tender - - 1 - 1 1   Right 1st MCP - Swollen - - 1 - 1 1   Right 2nd MCP - Tender 1 - - - 1 1   Right 2nd MCP - Swollen 0 - - - 1 1   Right 3rd MCP - Tender - - 0 - - -   Right 3rd MCP - Swollen - - 1 - - -   Right 4th MCP - Tender 1 - 1 - - -   Right 4th MCP - Swollen 0 - 1 - - -   Right 5th MCP - Tender 1 - - - - -   Right 5th MCP - Swollen 0 - - - - -   Right thumb IP - Tender - - - - 1 1   Right thumb IP - Swollen - - - - 1 0   Right 2nd PIP - Tender 1 - 1 - 1 1   Right 2nd PIP - Swollen 1 - 1 - 1 1   Right 3rd PIP - Tender 1 - - - - 1   Right 3rd PIP - Swollen 1 - - - - 1   Right 4th PIP - Tender 1 - 1 - - 1   Right 4th PIP - Swollen 1 - 0 - - 1   Right 5th PIP - Tender 1 - 1 - - 1   Right 5th PIP - Swollen 1 - 1 - - 1   Tender Joint Count (Total) 20 0 13 0 11 16   Swollen Joint Count (Total) 10 0 11 0 10 13   Physician Assessment (0-10) 5 0 4 0 4 5   Patient Assessment (0-10) 8 1 1.5 2.5 5 7.5   CDAI Total (calculated) 43 1 29.5 2.5 30 41.5       DATA REVIEW    Laboratory     Recent laboratory results were reviewed, summarized, and discussed with the patient. Imaging    Musculoskeletal Ultrasound    None    Radiographs    Skeletal Survey 11/28/2018: Few small calvarial lucencies. No fractures or lytic lesions at risk for fracture. Bilateral Shoulder 10/04/2018: RIGHT: There is prominent AC joint degeneration with spurring and loss of joint space. There is some mild deformity, chronic in nature of the tuberosity. No fracture or dislocation, no bony erosion, the bone is normal in mineral contents. No soft tissue calcifications. LEFT: The bone is normal in mineral contents. There is some mild chronic deformity of the tuberosity and AC joint degeneration. There are no soft tissue calcification bony erosion fracture or dislocation. Bilateral Hand 10/04/2018: RIGHT: no acute fracture or dislocation. Alignment is anatomic. Mild degenerative changes are seen in the interphalangeal joint of the thumb. No erosions are seen. No abnormality seen in the soft tissues. LEFT: no acute fracture or dislocation. Alignment is anatomic. Moderate to severe degenerative changes are present in the first [facility] joint. A cystic lucency in the ace of the first metacarpal likely represents a subchondral cyst. No clear erosions are seen. No abnormality seen in the soft tissues. Bilateral Foot 10/04/2018: RIGHT:  no fracture or other acute osseous or articular abnormality. A small plantar calcaneal heel spur is noted. No erosions or joint space narrowing are present. The soft tissues are within normal limits. LEFT: no acute fracture or dislocation. Alignment is anatomic. A small plantar calcaneal heel spur is noted. No erosions or joint space narrowing are present. . No abnormality is seen in the soft tissues.     CT Imaging    PET/CT Scan 1/24/2019: HEAD/NECK: No apparent foci of abnormal hypermetabolism. Cerebral evaluation is limited by normal intense activity. CHEST: No foci of abnormal hypermetabolism. The known small breast cancer at 12:00 in the left breast demonstrates no increased metabolic activity. ABDOMEN/PELVIS: No foci of abnormal hypermetabolism. SKELETON: No foci of abnormal hypermetabolism in the axial and visualized appendicular skeleton. Lower extremities:. Imaging of the lower extremities is unremarkable. There is muscular activity noted. CT Head without contrast 9/13/2017: Prior right occipital craniectomy. Encephalomalacia in the right cerebellar region. . There is no significant white matter disease.  There is no intracranial hemorrhage, extra-axial collection, mass, mass effect or midline shift. The basilar cisterns are open. No acute infarct is identified. The visualized portions of the paranasal sinuses and mastoid air cells are clear    MR Imaging    MRI Breasts with and without contrast 1/07/2019: There is minimal background parenchymal enhancement and the breasts are almost entirely fat. A few sub-5 mm foci of enhancement are scattered bilaterally. Right breast: No suspicious enhancing foci. No axillary or internal mammary chain lymphadenopathy. Left breast: A vague area of enhancement around the biopsy clip in the anterior third at 12:00 does not reach threshold enhancement. This measures approximately 9 x 9 mm, and correlates well with the small area of architectural distortion on mammography and subtle architectural distortion and shadowing on ultrasound. No axillary or internal mammary chain lymphadenopathy. DXA     DXA 5/12/2015: (excluded distal radius) lumbar spine L1-L4 T score 0.4 (BMD 1.241 g/cm2), left femoral neck T score: 0.5 (1.109 g/cm2), left total hip T score: 1.0 (1.139 g/cm2), right femoral neck T score: 0.2 (1.062 g/cm2), right total hip T score: 1.1 (1.145 g/cm2). FRAX score N/A % probability in 10 years for major osteoporotic fracture and N/A % 10 year probability of hip fracture. PATHOLOGY    Lymph node, left axilla, #1, sentinel node excision 1/29/2019: No evidence for malignancy in one node (0/1). Breast, left, lumpectomy 1/29/2019:  Invasive lobular carcinoma. Lobular carcinoma in situ     Bone Marrow Biopsy 12/19/2018: Bone marrow: Variably cellular marrow with mild monoclonal plasmacytosis. Trilineage hematopoiesis with maturation. Breast, left, needle core biopsy 12/03/2018: Invasive mammary carcinoma with ductal and lobular features Favor Deedee histologic grade 1. Largest glass slide measurement of invasive carcinoma: 8.5 mm. ER pos TN pos (weak) HER 2 neg.      PROCEDURE     [facility] [address] PROCEDURE PROGRESS NOTE      Symptom relief from Rheumatoid Arthritis flare. (01/21/20)     Chart reviewed for the following:   I, Verito Story MD, have reviewed the History, Physical and updated the Allergic reactions for [address] performed immediately prior to start of procedure:   Donna Diaz MD, have performed the following reviews on Tiara Espinsoa prior to the start of the procedure            * Patient was identified by name and date of birth   * Agreement on procedure being performed was verified  * Risks and Benefits explained to the patient  * Procedure site verified and marked as necessary  * Patient was positioned for comfort  * Consent was signed and verified     Time: 11:19 AM    Date of procedure: 1/21/2020    Procedure performed by: Verito Story MD    Provider assisted by: self    Patient assisted by: self    How tolerated by patient: tolerated the procedure well with no complications    Post Procedural Pain Scale: 0 - No Hurt    Comments: none    Indications:   Symptom relief from Rheumatoid Arthritis flare (01/21/20)     Procedure:   After consent was obtained, and timeout performed, using sterile technique the right gluteus was prepped using alcohol. Local anesthetic used: none. The gluteus was entered and 0 ml's of fluid was withdrawn. Kenalog 80 mg was injected into the gluteus and the needle withdrawn. The procedure was well tolerated. The patient was asked to watch for fever, or increased swelling or persistent pain in the joint. Call or return to clinic as needed if such symptoms occur or there is failure to improve as anticipated. ASSESSMENT AND PLAN    This is a follow-up visit for Ms. Berenice Pittman. 1) Seronegative Rheumatoid Arthritis with secondary Polymyalgia Rheumatica.  She was maintaind on methotrexate 25 mg SubQ every Wednesday with good tolerance and clinical improvement but for the past 4 weeks has had severe nausea to the point of skipping doses and then relapsing in her Polymyalgia Rheumatica and Rheumatoid Arthritis. She did not start any new medications. She has been on SubQ methotrexate since 4/2019 and it is unclear why this just started. Her CDAI was 43 (previously 1, 29.5, 2.5, 30, 41.5) with 20 tender and 10 swollen joints, consistent with high disease. I prescribed Zofran and asked her to inform me that stops her nausea from methotrexate when she takes it this week. If she continues to have nausea, I asked her to inform me so I may discontinue it and initiate leflunomide. I discussed initiation with the DMARD Arava (leflunomide). I informed the patient the potential adverse effects, which may include: nausea, vomiting, dyspepsia, diarrhea, oral ulcers, infection, liver function abnormalities, blood count abnormalities, paresthesia, rash, and rarely melanoma and non-melanoma skin cancer. The patient understood these possible adverse effects. I informed the patient of the need for routine CBC and CMP as a measure for long term use of immunosuppressants. I also instructed the patient to avoid ill contacts and the needs for annual influenza vaccines and the pneumonia vaccines. In childbearing patients, pregnancy is contra-indicated on leflunomide due to risk for birth defects. I informed the patient that leflunomide may take 4 to 12 weeks to be effective. If she does not tolerate methotrexate while on Zofran, I will prescribe the patient Arava (leflunomide) 20 mg, and instructed to start with half a tablet (10 mg) daily for 7 days and then increase to a full tablet (20 mg) if she has no side effects, such as diarrhea or upset stomach. The patient will follow up in 4 weeks for laboratory monitoring. I injected her with Kenalog 80 mg IM with good tolerance for her flare. 2) Polymyalgia Rheumatica. She had bilateral shoulder and pelvic girdle symptoms. This flared now. 3) Long Term Use of Immunosuppressants.  The patient remains on immunomodulatory medications (methotrexate) and requires frequent toxicity monitoring by blood work. Respective labs were ordered (CBC and CMP). 4) Long Term Use of NSAIDs She is holding ibuprofen for her back while on prednisone. 5) Smoldering Myeloma/MGUS. Immunofixation shows IgA monoclonal protein with kappa light chain. M-spike 0.3. She was evaluated by Dr. Arnel Rodriguez. Bone marrow biopsy showed smoldering myeloma. 6) Vitamin D Deficiency. Her vitamin D level was 40.0 (previously 32.0, 21.7). She is on weekly ergocalciferol 50,000. I will check her level. 7) Bilateral iliotibial Band Syndrome. This was not an active issue today. I will refer her to physical therapy if she is symptomatic. 8) Left Breast Cancer Stage 1. She is scheduled for lumpectomy. The patient voiced understanding of the aforementioned assessment and plan. Summary of plan was provided in the After Visit Summary patient instructions. TODAY'S ORDERS    Orders Placed This Encounter    THER/PROPH/DIAG INJECTION, SUBCUT/IM ([id number])    QUANTIFERON-TB GOLD PLUS    CBC WITH AUTOMATED DIFF    METABOLIC PANEL, COMPREHENSIVE    C REACTIVE PROTEIN, QT    SED RATE (ESR)    VITAMIN D, 25 HYDROXY    PROTEIN ELECTROPHORESIS W/ REFLX ELIEL    CHRONIC HEPATITIS PANEL    TRIAMCINOLONE ACETONIDE INJ    ondansetron (ZOFRAN ODT) 4 mg disintegrating tablet    triamcinolone acetonide (KENALOG) 40 mg/mL injection     Future Appointments   Date Time Provider Geoff Duggan   2/18/2020  8:30 AM Lars Griffith NP [facility]   3/10/2020  8:20 AM Tameka Smith MD [facility]   8/28/2020  8:15 AM [address] [place] [address].  [address] [facility], [place], [address] [facility]    Adult Rheumatology   Rheumatology Ultrasound Certified  [address]  [facility], [address] [facility]   Phone [phone]  Fax [phone]

## 2020-01-21 NOTE — PATIENT INSTRUCTIONS
I will prescribe Zofran as needed to take for nausea while on methotrexate. If your nausea is not controlled with Zofran, please let me know so I can change methotrexate to leflunomide ARAVA (leflunomide) I prescribed you Arava (leflunomide) 20 mg tablets. Please start with half a tablet (10 mg) daily for 7 days and if you have no side effects, such as diarrhea or upset stomach, please increase to one full tablet daily (20 mg). Potential Adverse Affects 
 
- Nausea - Vomiting - Upset stomach 
- Diarrhea 
- Oral ulcers 
- Hair thinning - Infection - Liver function abnormalities - Blood count abnormalities - Numbness and tingling - Burning sensation - Itchiness - Rash Medication Monitoring You will need routine blood counts and kidney and liver testing as a measure of monitoring for long term use of immunosuppressants. Things You Should Do You should avoid ill contacts You should get annual influenza vaccines and the pneumonia vaccines. You should apply SPF 50 sunscreen when out in the sun. You should avoid alcohol as it may hasten hepatotoxicity. You should avoid pregnancy due to risk for birth defects. You should contact me if you are sick. You should hold leflunomide if you are sick and resume after your sickness resolves. If you have any problems or side effects with this medication, please contact me immediately to inform me. Leflunomide may take 4 to 12 weeks to be effective.

## 2020-01-23 ENCOUNTER — TELEPHONE (OUTPATIENT)
Dept: ONCOLOGY | Age: 70
End: 2020-01-23

## 2020-01-23 DIAGNOSIS — C50.912 MALIGNANT NEOPLASM OF LEFT FEMALE BREAST, UNSPECIFIED ESTROGEN RECEPTOR STATUS, UNSPECIFIED SITE OF BREAST (HCC): ICD-10-CM

## 2020-01-23 LAB
25(OH)D3+25(OH)D2 SERPL-MCNC: 20.1 NG/ML (ref 30–100)
ALBUMIN SERPL ELPH-MCNC: 4.1 G/DL (ref 2.9–4.4)
ALBUMIN SERPL-MCNC: 4.3 G/DL (ref 3.8–4.8)
ALBUMIN/GLOB SERPL: 1.2 {RATIO} (ref 0.7–1.7)
ALBUMIN/GLOB SERPL: 1.4 {RATIO} (ref 1.2–2.2)
ALP SERPL-CCNC: 138 IU/L (ref 39–117)
ALPHA1 GLOB SERPL ELPH-MCNC: 0.2 G/DL (ref 0–0.4)
ALPHA2 GLOB SERPL ELPH-MCNC: 0.6 G/DL (ref 0.4–1)
ALT SERPL-CCNC: 24 IU/L (ref 0–32)
AST SERPL-CCNC: 21 IU/L (ref 0–40)
B-GLOBULIN SERPL ELPH-MCNC: 1.4 G/DL (ref 0.7–1.3)
BASOPHILS # BLD AUTO: 0.1 X10E3/UL (ref 0–0.2)
BASOPHILS NFR BLD AUTO: 1 %
BILIRUB SERPL-MCNC: 0.5 MG/DL (ref 0–1.2)
BUN SERPL-MCNC: 17 MG/DL (ref 8–27)
BUN/CREAT SERPL: 22 (ref 12–28)
CALCIUM SERPL-MCNC: 10.1 MG/DL (ref 8.7–10.3)
CHLORIDE SERPL-SCNC: 104 MMOL/L (ref 96–106)
CO2 SERPL-SCNC: 25 MMOL/L (ref 20–29)
COMMENT, 144067: NORMAL
CREAT SERPL-MCNC: 0.79 MG/DL (ref 0.57–1)
CRP SERPL-MCNC: 1 MG/L (ref 0–10)
EOSINOPHIL # BLD AUTO: 0.1 X10E3/UL (ref 0–0.4)
EOSINOPHIL NFR BLD AUTO: 2 %
ERYTHROCYTE [DISTWIDTH] IN BLOOD BY AUTOMATED COUNT: 12.8 % (ref 11.7–15.4)
ERYTHROCYTE [SEDIMENTATION RATE] IN BLOOD BY WESTERGREN METHOD: 18 MM/HR (ref 0–40)
GAMMA GLOB SERPL ELPH-MCNC: 1.2 G/DL (ref 0.4–1.8)
GAMMA INTERFERON BACKGROUND BLD IA-ACNC: 0.02 IU/ML
GLOBULIN SER CALC-MCNC: 3.1 G/DL (ref 1.5–4.5)
GLOBULIN SER CALC-MCNC: 3.3 G/DL (ref 2.2–3.9)
GLUCOSE SERPL-MCNC: 95 MG/DL (ref 65–99)
HBV CORE AB SERPL QL IA: NEGATIVE
HBV CORE IGM SERPL QL IA: NEGATIVE
HBV E AB SERPL QL IA: NEGATIVE
HBV E AG SERPL QL IA: NEGATIVE
HBV SURFACE AB SER QL: REACTIVE
HBV SURFACE AG SERPL QL IA: NEGATIVE
HCT VFR BLD AUTO: 41.7 % (ref 34–46.6)
HCV AB S/CO SERPL IA: <0.1 S/CO RATIO (ref 0–0.9)
HGB BLD-MCNC: 14 G/DL (ref 11.1–15.9)
IGA SERPL-MCNC: 502 MG/DL (ref 87–352)
IGG SERPL-MCNC: 1685 MG/DL (ref 700–1600)
IGM SERPL-MCNC: 65 MG/DL (ref 26–217)
IMM GRANULOCYTES # BLD AUTO: 0 X10E3/UL (ref 0–0.1)
IMM GRANULOCYTES NFR BLD AUTO: 1 %
INTERPRETATION SERPL IEP-IMP: ABNORMAL
LYMPHOCYTES # BLD AUTO: 1 X10E3/UL (ref 0.7–3.1)
LYMPHOCYTES NFR BLD AUTO: 19 %
M PROTEIN SERPL ELPH-MCNC: 0.2 G/DL
M TB IFN-G BLD-IMP: NEGATIVE
M TB IFN-G CD4+ BCKGRND COR BLD-ACNC: 0.02 IU/ML
MCH RBC QN AUTO: 29.8 PG (ref 26.6–33)
MCHC RBC AUTO-ENTMCNC: 33.6 G/DL (ref 31.5–35.7)
MCV RBC AUTO: 89 FL (ref 79–97)
MITOGEN IGNF BLD-ACNC: >10 IU/ML
MONOCYTES # BLD AUTO: 0.4 X10E3/UL (ref 0.1–0.9)
MONOCYTES NFR BLD AUTO: 8 %
NEUTROPHILS # BLD AUTO: 3.8 X10E3/UL (ref 1.4–7)
NEUTROPHILS NFR BLD AUTO: 69 %
PLATELET # BLD AUTO: 374 X10E3/UL (ref 150–450)
PLEASE NOTE, 011150: ABNORMAL
POTASSIUM SERPL-SCNC: 4.9 MMOL/L (ref 3.5–5.2)
PROT PATTERN SERPL ELPH-IMP: ABNORMAL
PROT SERPL-MCNC: 7.4 G/DL (ref 6–8.5)
QUANTIFERON INCUBATION, QF1T: NORMAL
QUANTIFERON TB2 AG: 0.03 IU/ML
RBC # BLD AUTO: 4.7 X10E6/UL (ref 3.77–5.28)
SERVICE CMNT-IMP: NORMAL
SODIUM SERPL-SCNC: 142 MMOL/L (ref 134–144)
WBC # BLD AUTO: 5.4 X10E3/UL (ref 3.4–10.8)

## 2020-01-23 RX ORDER — LETROZOLE 2.5 MG/1
2.5 TABLET, FILM COATED ORAL DAILY
Qty: 30 TAB | Refills: 12 | Status: SHIPPED | OUTPATIENT
Start: 2020-01-23 | End: 2020-02-18 | Stop reason: SDUPTHER

## 2020-01-23 RX ORDER — LETROZOLE 2.5 MG/1
TABLET, FILM COATED ORAL
Qty: 30 TAB | Refills: 0 | Status: SHIPPED | OUTPATIENT
Start: 2020-01-23 | End: 2020-09-26

## 2020-01-23 NOTE — TELEPHONE ENCOUNTER
Patient called in refill for Femara on 01/20/2020, and it has not been approved yet for pharmacy. Went to  medication last night from pharmacy. Please refill.

## 2020-01-24 DIAGNOSIS — M35.3 PMR (POLYMYALGIA RHEUMATICA) (HCC): ICD-10-CM

## 2020-01-24 DIAGNOSIS — Z79.60 LONG-TERM USE OF IMMUNOSUPPRESSANT MEDICATION: ICD-10-CM

## 2020-01-24 DIAGNOSIS — M06.09 SERONEGATIVE RHEUMATOID ARTHRITIS OF MULTIPLE SITES (HCC): ICD-10-CM

## 2020-01-24 NOTE — PROGRESS NOTES
The results were reviewed. Elevated ALK. Low vitamin D 20.1. stable MGUS (0.2 IgA). All remaining labs are normal/negative.

## 2020-01-27 DIAGNOSIS — F51.01 PRIMARY INSOMNIA: ICD-10-CM

## 2020-01-27 RX ORDER — SYRINGE AND NEEDLE,INSULIN,1ML 31GX15/64"
SYRINGE, EMPTY DISPOSABLE MISCELLANEOUS
Qty: 100 PEN NEEDLE | Refills: 0 | Status: SHIPPED | OUTPATIENT
Start: 2020-01-27 | End: 2020-02-13 | Stop reason: SDUPTHER

## 2020-01-27 RX ORDER — FOLIC ACID 1 MG/1
TABLET ORAL
Qty: 90 TAB | Refills: 1 | Status: SHIPPED | OUTPATIENT
Start: 2020-01-27 | End: 2020-02-04 | Stop reason: SDUPTHER

## 2020-01-27 RX ORDER — LORAZEPAM 1 MG/1
1.5 TABLET ORAL
Qty: 135 TAB | Refills: 1 | Status: SHIPPED | OUTPATIENT
Start: 2020-01-27 | End: 2020-06-09

## 2020-01-27 RX ORDER — METHOTREXATE 25 MG/ML
INJECTION, SOLUTION INTRA-ARTERIAL; INTRAMUSCULAR; INTRAVENOUS
Qty: 12 VIAL | Refills: 1
Start: 2020-01-27 | End: 2020-02-13 | Stop reason: SDUPTHER

## 2020-01-30 ENCOUNTER — TELEPHONE (OUTPATIENT)
Dept: INTERNAL MEDICINE CLINIC | Age: 70
End: 2020-01-30

## 2020-01-31 RX ORDER — LEVOTHYROXINE SODIUM 200 UG/1
TABLET ORAL
Qty: 90 TAB | Refills: 3 | Status: SHIPPED | OUTPATIENT
Start: 2020-01-31 | End: 2021-01-08

## 2020-01-31 RX ORDER — LISINOPRIL AND HYDROCHLOROTHIAZIDE 10; 12.5 MG/1; MG/1
TABLET ORAL
Qty: 90 TAB | Refills: 3 | Status: SHIPPED | OUTPATIENT
Start: 2020-01-31 | End: 2020-11-05

## 2020-01-31 RX ORDER — CITALOPRAM 40 MG/1
TABLET, FILM COATED ORAL
Qty: 90 TAB | Refills: 3 | Status: SHIPPED | OUTPATIENT
Start: 2020-01-31 | End: 2020-11-05

## 2020-02-04 DIAGNOSIS — Z79.60 LONG-TERM USE OF IMMUNOSUPPRESSANT MEDICATION: ICD-10-CM

## 2020-02-04 DIAGNOSIS — M35.3 PMR (POLYMYALGIA RHEUMATICA) (HCC): ICD-10-CM

## 2020-02-04 DIAGNOSIS — M06.09 SERONEGATIVE RHEUMATOID ARTHRITIS OF MULTIPLE SITES (HCC): ICD-10-CM

## 2020-02-04 RX ORDER — FOLIC ACID 1 MG/1
TABLET ORAL
Qty: 90 TAB | Refills: 1 | Status: SHIPPED | OUTPATIENT
Start: 2020-02-04 | End: 2020-06-08 | Stop reason: SDUPTHER

## 2020-02-05 ENCOUNTER — OFFICE VISIT (OUTPATIENT)
Dept: INTERNAL MEDICINE CLINIC | Age: 70
End: 2020-02-05

## 2020-02-05 VITALS
HEIGHT: 64 IN | SYSTOLIC BLOOD PRESSURE: 127 MMHG | OXYGEN SATURATION: 98 % | WEIGHT: 198 LBS | BODY MASS INDEX: 33.8 KG/M2 | HEART RATE: 65 BPM | RESPIRATION RATE: 16 BRPM | DIASTOLIC BLOOD PRESSURE: 78 MMHG | TEMPERATURE: 97.4 F

## 2020-02-05 DIAGNOSIS — J40 BRONCHITIS: ICD-10-CM

## 2020-02-05 DIAGNOSIS — J01.90 ACUTE NON-RECURRENT SINUSITIS, UNSPECIFIED LOCATION: Primary | ICD-10-CM

## 2020-02-05 RX ORDER — DOXYCYCLINE 100 MG/1
100 CAPSULE ORAL 2 TIMES DAILY
Qty: 20 CAP | Refills: 0 | Status: SHIPPED | OUTPATIENT
Start: 2020-02-05 | End: 2020-02-15

## 2020-02-05 NOTE — PROGRESS NOTES
Amirah Ellison is a 78 y.o. female  Chief Complaint   Patient presents with    Cold Symptoms     for the last 2 weeks     Visit Vitals  /78 (BP 1 Location: Left arm, BP Patient Position: At rest)   Pulse 65   Temp 97.4 °F (36.3 °C) (Oral)   Resp 16   Ht 5' 4\" (1.626 m)   Wt 198 lb (89.8 kg)   SpO2 98%   BMI 33.99 kg/m²      Health Maintenance Due   Topic Date Due    DTaP/Tdap/Td series (1 - Tdap) 12/17/1960    Shingrix Vaccine Age 50> (1 of 2) 12/17/1999    GLAUCOMA SCREENING Q2Y  12/17/2014    Influenza Age 9 to Adult  08/01/2019    Colonoscopy  11/12/2019       HPI  Ronda Mustafa MD's patient in to see me today for an acute visit. C/o sinus drainage going down throat causing cough. Using mucinex, used Advil sinus, Tylenol sinus---no more green phlegm - now yellow. Going into the 2nd week now. Review of Systems   Constitutional: Positive for malaise/fatigue. Negative for fever. HENT: Positive for congestion. Negative for ear pain. Respiratory: Positive for cough and sputum production. Negative for shortness of breath. Neurological: Positive for headaches. Endo/Heme/Allergies: Negative for environmental allergies. Physical Exam  Vitals signs and nursing note reviewed. Constitutional:       General: She is not in acute distress. Appearance: She is well-developed. HENT:      Head: Normocephalic and atraumatic. Comments: B TMs with fluid. No erythema. Nose: Congestion and rhinorrhea present. Comments: Red, inflamed nasal mucosa. Mouth/Throat:      Pharynx: No oropharyngeal exudate. Eyes:      Conjunctiva/sclera: Conjunctivae normal.   Neck:      Musculoskeletal: Neck supple. Cardiovascular:      Rate and Rhythm: Normal rate and regular rhythm. Heart sounds: Normal heart sounds. Pulmonary:      Effort: Pulmonary effort is normal.      Breath sounds: Rhonchi present.       Comments: + rhonchi B lungs  Lymphadenopathy:      Cervical: No cervical adenopathy. Skin:     General: Skin is warm and dry. Neurological:      Mental Status: She is alert and oriented to person, place, and time. Diagnoses and all orders for this visit:    1. Acute non-recurrent sinusitis, unspecified location  -     doxycycline (MONODOX) 100 mg capsule; Take 1 Cap by mouth two (2) times a day for 10 days. Finish entire course. 2. Bronchitis  -     doxycycline (MONODOX) 100 mg capsule; Take 1 Cap by mouth two (2) times a day for 10 days. Finish entire course. Mucinex, Rest, Fluids.

## 2020-02-05 NOTE — PROGRESS NOTES
1. Have you been to the ER, urgent care clinic since your last visit? Hospitalized since your last visit?no  2. Have you seen or consulted any other health care providers outside of the [address] [place] [address] since your last visit? Include any pap smears or colon screening.  No    Chief Complaint   Patient presents with    Cold Symptoms     for the last 2 weeks     Fasting

## 2020-02-13 DIAGNOSIS — M35.3 PMR (POLYMYALGIA RHEUMATICA) (HCC): ICD-10-CM

## 2020-02-13 DIAGNOSIS — M06.09 SERONEGATIVE RHEUMATOID ARTHRITIS OF MULTIPLE SITES (HCC): ICD-10-CM

## 2020-02-13 DIAGNOSIS — Z79.60 LONG-TERM USE OF IMMUNOSUPPRESSANT MEDICATION: ICD-10-CM

## 2020-02-13 RX ORDER — METHOTREXATE 25 MG/ML
INJECTION, SOLUTION INTRA-ARTERIAL; INTRAMUSCULAR; INTRAVENOUS
Qty: 12 VIAL | Refills: 1 | Status: SHIPPED | OUTPATIENT
Start: 2020-02-13 | End: 2020-06-08 | Stop reason: SDUPTHER

## 2020-02-13 RX ORDER — SYRINGE AND NEEDLE,INSULIN,1ML 31GX15/64"
SYRINGE, EMPTY DISPOSABLE MISCELLANEOUS
Qty: 100 PEN NEEDLE | Refills: 0 | Status: SHIPPED | OUTPATIENT
Start: 2020-02-13 | End: 2021-04-23 | Stop reason: ALTCHOICE

## 2020-02-18 ENCOUNTER — OFFICE VISIT (OUTPATIENT)
Dept: SURGERY | Age: 70
End: 2020-02-18

## 2020-02-18 VITALS
HEIGHT: 64 IN | DIASTOLIC BLOOD PRESSURE: 78 MMHG | BODY MASS INDEX: 33.8 KG/M2 | SYSTOLIC BLOOD PRESSURE: 160 MMHG | WEIGHT: 198 LBS

## 2020-02-18 DIAGNOSIS — Z17.0 MALIGNANT NEOPLASM OF UPPER-OUTER QUADRANT OF LEFT BREAST IN FEMALE, ESTROGEN RECEPTOR POSITIVE (HCC): Primary | ICD-10-CM

## 2020-02-18 DIAGNOSIS — C50.412 MALIGNANT NEOPLASM OF UPPER-OUTER QUADRANT OF LEFT BREAST IN FEMALE, ESTROGEN RECEPTOR POSITIVE (HCC): Primary | ICD-10-CM

## 2020-02-18 DIAGNOSIS — Z98.890 S/P BREAST LUMPECTOMY: ICD-10-CM

## 2020-02-18 DIAGNOSIS — Z92.3 S/P RADIATION THERAPY: ICD-10-CM

## 2020-02-18 NOTE — LETTER
February 18, 2020 Sancho Eugene [address] [place] [id number] Dear Karina Morgan: Thank you for requesting access to [employer]. Please follow the instructions below to view your test results, access and download parts of your medical record, view details of your past and upcoming appointments, and view your medications online. How Do I Sign Up? 1. In your internet browser, go to [email or web address]. [email or web address] 2. Click on the First Time User? Click Here link in the Sign In box. You will see the New Member Sign Up page. 3. Enter your [employer] Access Code exactly as it appears below. You will not need to use this code after youve completed the sign-up process. If you do not sign up before the expiration date, you must request a new code. · [employer] Access Code: [email or web address] · Expires: 3/6/2020 11:07 AM 
 
4. Enter the last four digits of your Social Security Number (xxxx) and Date of Birth (mm/dd/yyyy) as indicated and click Submit. You will be taken to the next sign-up page. 5. Create a MailMe[employer] ID. This will be your [employer] login ID and cannot be changed, so think of one that is secure and easy to remember. 6. Create a MailMe[employer] password. You can change your password at any time. 7. Enter your Password Reset Question and Answer. This can be used at a later time if you forget your password. 8. Enter your e-mail address. You will receive e-mail notification when new information is available in [address] 9. Click Sign Up. You can now view and download portions of your medical record. 10. Click the Download Summary menu link to download a portable copy of your medical information. Additional Information: If you require any assistance or have any questions, please contact our [email or web address] at [phone], email at [email or web address]. Remember, MailMe[employer] is NOT to be used for urgent needs.  For medical emergencies, dial 911. Now available from your iPhone and Android! Sincerely, Latia Renteria

## 2020-02-18 NOTE — PATIENT INSTRUCTIONS

## 2020-02-18 NOTE — PROGRESS NOTES
HISTORY OF PRESENT ILLNESS  Leeann Yun is a 78 y.o. female. HPI  ESTABLISHED patient here for follow up LEFT breast cancer. Has some tenderness to her outer LEFT breast, especially when pressed. Denies palpable lumps and skin changes. Breast cancer -   Referring - Dr. Gricelda Stone  1/29/19-left lumpectomy with sentinel lymph node biopsy. Dr Heather Wagoner    1.8 cm invasive lobular carcinoma, grade 1, 1/1 negative lymph nodes. Completed XRT (Dr. Selena Clifford) early April of 2019. Currently on letrozole (Dr. Luisa Santana)  Oncotype DX low risk    No family history of breast or ovarian cancer. Mammogram, 8/28/19, BIRADS 2    OB History    No obstetric history on file. Obstetric Comments   Menarche 14, LMP 46, # of children 0, no biological children, age of 1st delivery na, Hysterectomy/oophorectomy no/no, Breast bx yes, 12/2018 , history of breast feeding na, BCP no, Hormone therapy no             Past Surgical History:   Procedure Laterality Date    HX BREAST LUMPECTOMY Left 1/29/2019    LEFT BREAST LUMPECTOMY WITH ULTRASOUND, LEFT BREAST SENTINEL NODE BIOPSY (11a) performed by Addy Henao MD at [address] HX CATARACT REMOVAL Bilateral     [address]  7/97    brain lesion removed ? infectious     ROS    Physical Exam  Constitutional:       Appearance: Normal appearance. Chest:      Breasts:         Right: No mass, nipple discharge, skin change or tenderness. Left: Tenderness (tenderness to left lateral breast and axilla at site of treatment) present. No mass, nipple discharge or skin change. Musculoskeletal: Normal range of motion. Comments: UE x 22   Lymphadenopathy:      Upper Body:      Right upper body: No supraclavicular or axillary adenopathy. Left upper body: No supraclavicular or axillary adenopathy. Neurological:      Mental Status: She is alert.    Psychiatric:         Attention and Perception: Attention normal. Mood and Affect: Mood normal.         Speech: Speech normal.         Behavior: Behavior normal.       Visit Vitals  /78   Ht 5' 4\" (1.626 m)   Wt 198 lb (89.8 kg)   BMI 33.99 kg/m²     ASSESSMENT and PLAN  Encounter Diagnoses   Name Primary?  Malignant neoplasm of upper-outer quadrant of left breast in female, estrogen receptor positive ([facility] Yes    S/P breast lumpectomy     S/P radiation therapy      Normal exam and imaging with no evidence of breast malignancy. Extremely tender on left breast and axilla. Likely related to scar tissue and post XRT chest wall pain. Symptom management with ibuprofen and heat/ice PRN. BDmammogram 3D in 8/2020. RTC in 9/2020 or sooner. She is comfortable with this plan. All questions answered and she stated understanding.

## 2020-03-10 ENCOUNTER — OFFICE VISIT (OUTPATIENT)
Dept: RHEUMATOLOGY | Age: 70
End: 2020-03-10

## 2020-03-10 ENCOUNTER — HOSPITAL ENCOUNTER (OUTPATIENT)
Dept: LAB | Age: 70
Discharge: HOME | End: 2020-03-10
Payer: MEDICARE

## 2020-03-10 VITALS
SYSTOLIC BLOOD PRESSURE: 147 MMHG | RESPIRATION RATE: 18 BRPM | HEART RATE: 74 BPM | BODY MASS INDEX: 34.15 KG/M2 | DIASTOLIC BLOOD PRESSURE: 80 MMHG | HEIGHT: 64 IN | TEMPERATURE: 98.1 F | WEIGHT: 200 LBS

## 2020-03-10 DIAGNOSIS — M65.4 DE QUERVAIN'S TENOSYNOVITIS, LEFT: ICD-10-CM

## 2020-03-10 DIAGNOSIS — M19.042 PRIMARY OSTEOARTHRITIS OF BOTH HANDS: ICD-10-CM

## 2020-03-10 DIAGNOSIS — M35.3 PMR (POLYMYALGIA RHEUMATICA) (HCC): ICD-10-CM

## 2020-03-10 DIAGNOSIS — Z79.60 LONG-TERM USE OF IMMUNOSUPPRESSANT MEDICATION: ICD-10-CM

## 2020-03-10 DIAGNOSIS — M06.09 SERONEGATIVE RHEUMATOID ARTHRITIS OF MULTIPLE SITES (HCC): Primary | ICD-10-CM

## 2020-03-10 DIAGNOSIS — E55.9 VITAMIN D DEFICIENCY: ICD-10-CM

## 2020-03-10 DIAGNOSIS — M19.041 PRIMARY OSTEOARTHRITIS OF BOTH HANDS: ICD-10-CM

## 2020-03-10 PROCEDURE — 85651 RBC SED RATE NONAUTOMATED: CPT

## 2020-03-10 PROCEDURE — 82306 VITAMIN D 25 HYDROXY: CPT

## 2020-03-10 PROCEDURE — 80053 COMPREHEN METABOLIC PANEL: CPT

## 2020-03-10 PROCEDURE — 86140 C-REACTIVE PROTEIN: CPT

## 2020-03-10 PROCEDURE — 85025 COMPLETE CBC W/AUTO DIFF WBC: CPT

## 2020-03-10 RX ORDER — DICLOFENAC SODIUM 10 MG/G
GEL TOPICAL 4 TIMES DAILY
Qty: 100 G | Refills: 2 | Status: SHIPPED | OUTPATIENT
Start: 2020-03-10

## 2020-03-10 NOTE — PROGRESS NOTES
Chief Complaint   Patient presents with    Joint Pain     1. Have you been to the ER, urgent care clinic since your last visit? Hospitalized since your last visit? No    2. Have you seen or consulted any other health care providers outside of the [address] [place] [address] since your last visit? Include any pap smears or colon screening.  No

## 2020-03-10 NOTE — PROGRESS NOTES
REASON FOR VISIT    This is a follow-up visit for Ms. Paul Anderson for     ICD-10-CM   1. Seronegative rheumatoid arthritis of multiple sites ([address] M06.09   2. PMR (polymyalgia rheumatica) ([facility]) M35.3     Inflammatory arthritis phenotype includes:  Anti-CCP positive: no  Rheumatoid factor positive: no  Erosive disease: no  Extra-articular manifestations include: Polymyalgia Rheumatica, smoldering myeloma    Immunosuppression Screening (1/21/2020):  Quantiferon TB: negative  PPD:  Not performed  Hepatitis B: negative  Hepatitis C: negative    Therapy History includes:  Current DMARD therapy include: methotrexate 25 mg SubQ every Wednesday (4/29/2019 to present)  Prior DMARD therapy include: methotrexate 20 mg every Wednesday (10/15/2018 to 4/29/2019)  Discontinued DMARDs because of inefficacy: None  Discontinued DMARDs because of side effects: None    Immunization History   Administered Date(s) Administered    Influenza Vaccine 10/10/2013, 10/14/2015, 10/15/2018    Influenza Vaccine Split 09/26/2012    Influenza Vaccine Whole 10/22/2011    Pneumococcal Polysaccharide (PPSV-23) 05/05/2015       Patient Active Problem List   Diagnosis Code    Hypothyroidism E03.9    IBS (irritable bowel syndrome) K58.9    HTN (hypertension) I10    Graves disease E05.00    Moderate major depression ([address] F32.1    PMR (polymyalgia rheumatica) ([facility]) M35.3    Iliotibial band syndrome of both sides M76.31, M76.32    Long term current use of non-steroidal anti-inflammatories (NSAID) Z79.1    Seronegative rheumatoid arthritis of multiple sites ([address] M06.09    Monoclonal gammopathy of unknown significance (MGUS) D47.2    Vitamin D deficiency E55.9    Severe obesity ([facility]) E66.01    Long-term use of immunosuppressant medication Z79.899    Malignant neoplasm of left female breast ([facility] C50.912    Multiple myeloma ([address] C90.00    Smoldering myeloma ([facility] C90.00     HISTORY OF PRESENT ILLNESS    Ms. Paul Anderson returns for a follow-up. On her last visit, due to new onset interval nausea on methotrexate 25 mg subcutaneous every Wednesday, I prescribed Zofran and asked her to inform me if she continues to have nausea so I may discontinue methotrexate and initiate leflunomide. I injected her with Kenalog 80 mg IM due to her flare. She informs me that Zofran has helped resolve her nausea and the injection helped her a lot. Today, she feels better. She has no pain or swelling in her hands in the morning but has morning stiffness lasting minutes. She has pain in her left thumb that is constant and aching. She massages it all day. Advil and Voltaren helps very little. It is worse with use, such as buttoning buttons. She is no longer having aching pain in her hands, shoulders, hips, and knees that is worse by midday. She denies swelling or stiffness. She feels horrible during the day and has to force her self to function. She denies fever, weight loss, blurred vision, vision loss, oral ulcers, ankle swelling, dry cough, dyspnea, nausea, vomiting, dysphagia, abdominal pain, black or bloody stool, fall since last visit, rash, easy bruising and increased thirst.    Ms. Malachi Valero has continued her medications for arthritis and reports good tolerance without significant side effects. Last toxicity monitoring by blood work was done on 1/21/2020 and did not reveal any significant adverse effects, except     Most recent inflammatory markers from 1/21/2020 revealed a ESR 18 mm/hr and CRP 1 mg/L. The patient has not had any interval hospital admissions, infections, or surgeries. REVIEW OF SYSTEMS    A comprehensive review of systems was performed and pertinent results are documented in the HPI, review of systems is otherwise non-contributory.     PAST MEDICAL HISTORY    She has a past medical history of Breast cancer, left ([address] Depression, Goiter, Hearing loss, Hypertension, Hypothyroid, IBS (irritable bowel syndrome), Menopause, Rheumatoid arthritis ([facility] [address] and S/P radiation therapy. FAMILY HISTORY    Her family history includes Alcohol abuse in her brother; COPD in her brother and mother; Cancer in her father and mother; Diabetes in her brother and brother; Heart Disease in her brother, brother, paternal grandmother, and paternal uncle; Liver Disease in her brother. SOCIAL HISTORY    She reports that she has never smoked. She has never used smokeless tobacco. She reports current alcohol use of about 5.0 - 6.0 standard drinks of alcohol per week. She reports that she does not use drugs. MEDICATIONS    Current Outpatient Medications   Medication Sig Dispense Refill    diclofenac (VOLTAREN) 1 % gel Apply  to affected area four (4) times daily. 100 g 2    methotrexate 25 mg/mL chemo injection INJECT 1 ML UNDER SKIN ONCE A WEEK ON WEDNESDAY 12 Vial 1    insulin syringe-needle U-100 1/2 mL 31 gauge x 15/64\" syrg Use to inject methotrexate once weekly 100 Pen Needle 0    folic acid (FOLVITE) 1 mg tablet TAKE 1 TABLET BY MOUTH ONCE DAILY 90 Tab 1    lisinopril-hydroCHLOROthiazide (PRINZIDE, ZESTORETIC) 10-12.5 mg per tablet TAKE 1 TABLET BY MOUTH ONCE DAILY 90 Tab 3    levothyroxine (SYNTHROID) 200 mcg tablet TAKE ONE TABLET BY MOUTH ONCE DAILY BEFORE BREAKFAST 90 Tab 3    citalopram (CELEXA) 40 mg tablet TAKE ONE TABLET BY MOUTH DAILY 90 Tab 3    LORazepam (ATIVAN) 1 mg tablet Take 1.5 Tabs by mouth nightly. Max Daily Amount: 1.5 mg. 135 Tab 1    letrozole (FEMARA) 2.5 mg tablet TAKE 1 TABLET BY MOUTH ONCE DAILY 30 Tab 0    ondansetron (ZOFRAN ODT) 4 mg disintegrating tablet Take 1 Tab by mouth every eight (8) hours as needed for Nausea. 30 Tab 1    diclofenac EC (VOLTAREN) 75 mg EC tablet Take 1 Tab by mouth two (2) times a day. 30 Tab 1    ibuprofen (ADVIL) 200 mg tablet Take 800 mg by mouth every six (6) hours as needed for Pain.           ALLERGIES    Allergies   Allergen Reactions    Keflex [Cephalexin] Rash    Monistat 1 [Tioconazole] Swelling    Sulfa (Sulfonamide Antibiotics) Rash       PHYSICAL EXAMINATION    Visit Vitals  /80   Pulse 74   Temp 98.1 °F (36.7 °C)   Resp 18   Ht 5' 4\" (1.626 m)   Wt 200 lb (90.7 kg)   BMI 34.33 kg/m²     Body mass index is 34.33 kg/m². General: Patient is alert, oriented x 3, not in acute distress    HEENT:   Sclerae are not injected and appear moist.  There is no alopecia. Neck is supple     Cardiovascular:  Heart is regular rate and rhythm, no murmurs. Chest:  Lungs are clear to auscultation bilaterally. Extremities:  Free of clubbing, cyanosis, edema    Neurological exam:  Muscle strength is full in upper and lower extremities     Skin exam:  There are no rashes, no alopecia, no discoid lesions, no active Raynaud's, no livedo reticularis, no periungual erythema. Musculoskeletal exam:  A comprehensive musculoskeletal exam was performed for all joints of each upper and lower extremity and assessed for swelling, tenderness and range of motion. Positive results are documented as below:    Positive Finkelstein's on left with tenderness along the ABL EPB. Bilateral Sebastián and Heberden nodes.     Z-Deformities:   no  Danese Neck Deformities:  no  Boutonierre's Deformities:  no  Ulnar Deviation:   no  MCP Subluxation:  no     Joint Count 3/10/2020 1/21/2020 7/29/2019 4/29/2019 1/28/2019 11/28/2018 10/4/2018   Patient pain (0-100) 40 80 5 15 40 70 75   MHAQ 0.375 1 0.375 1 0.5 0 1   Left shoulder - Tender - 1 - - - 1 1   Left shoulder - Swollen - 0 - - - 1 0   Left elbow - Tender - 1 - - - - -   Left elbow - Swollen - 0 - - - - -   Left wrist- Tender 1 1 0 1 0 1 -   Left wrist- Swollen 0 1 0 1 0 1 -   Left 1st MCP - Tender - 1 - 1 - 1 1   Left 1st MCP - Swollen - 1 - 1 - 1 1   Left 2nd MCP - Tender 1 1 - 1 - 1 1   Left 2nd MCP - Swollen 0 1 - 1 - 1 1   Left 3rd MCP - Tender 1 1 - 1 - - 1   Left 3rd MCP - Swollen 0 0 - 1 - - 1   Left 4th MCP - Tender 1 1 - 1 - - -   Left 4th MCP - Swollen 0 0 - 0 - - -   Left 5th MCP - Tender 1 1 - 1 - - 1   Left 5th MCP - Swollen 0 0 - 0 - - 1   Left 2nd PIP - Tender 1 1 - - - 1 1   Left 2nd PIP - Swollen 0 1 - - - 1 1   Left 3rd PIP - Tender 1 1 - - - 1 1   Left 3rd PIP - Swollen 0 1 - 1 - 1 1   Left 4th PIP - Tender 1 - - 1 - - -   Left 4th PIP - Swollen 0 - - - - - -   Left 5th PIP - Tender 1 - - - - - -   Right shoulder - Tender - 1 - - - 1 1   Right shoulder - Swollen - 0 - - - 0 0   Right elbow - Tender - 1 - - - - -   Right elbow - Swollen - 0 - - - - -   Right wrist- Tender - 1 - 1 - - 1   Right wrist- Swollen - 1 - 1 - - 1   Right 1st MCP - Tender - - - 1 - 1 1   Right 1st MCP - Swollen - - - 1 - 1 1   Right 2nd MCP - Tender - 1 - - - 1 1   Right 2nd MCP - Swollen - 0 - - - 1 1   Right 3rd MCP - Tender - - - 0 - - -   Right 3rd MCP - Swollen - - - 1 - - -   Right 4th MCP - Tender - 1 - 1 - - -   Right 4th MCP - Swollen - 0 - 1 - - -   Right 5th MCP - Tender - 1 - - - - -   Right 5th MCP - Swollen - 0 - - - - -   Right thumb IP - Tender - - - - - 1 1   Right thumb IP - Swollen - - - - - 1 0   Right 2nd PIP - Tender - 1 - 1 - 1 1   Right 2nd PIP - Swollen - 1 - 1 - 1 1   Right 3rd PIP - Tender - 1 - - - - 1   Right 3rd PIP - Swollen - 1 - - - - 1   Right 4th PIP - Tender 1 1 - 1 - - 1   Right 4th PIP - Swollen 0 1 - 0 - - 1   Right 5th PIP - Tender - 1 - 1 - - 1   Right 5th PIP - Swollen - 1 - 1 - - 1   Tender Joint Count (Total) 10 20 0 13 0 11 16   Swollen Joint Count (Total) 0 10 0 11 0 10 13   Physician Assessment (0-10) 2 5 0 4 0 4 5   Patient Assessment (0-10) 4 8 1 1.5 2.5 5 7.5   CDAI Total (calculated) 16 43 1 29.5 2.5 30 41.5       DATA REVIEW    Laboratory     Recent laboratory results were reviewed, summarized, and discussed with the patient. Imaging    Musculoskeletal Ultrasound    None    Radiographs    Skeletal Survey 11/28/2018: Few small calvarial lucencies.  No fractures or lytic lesions at risk for fracture. Bilateral Shoulder 10/04/2018: RIGHT: There is prominent AC joint degeneration with spurring and loss of joint space. There is some mild deformity, chronic in nature of the tuberosity. No fracture or dislocation, no bony erosion, the bone is normal in mineral contents. No soft tissue calcifications. LEFT: The bone is normal in mineral contents. There is some mild chronic deformity of the tuberosity and AC joint degeneration. There are no soft tissue calcification bony erosion fracture or dislocation. Bilateral Hand 10/04/2018: RIGHT: no acute fracture or dislocation. Alignment is anatomic. Mild degenerative changes are seen in the interphalangeal joint of the thumb. No erosions are seen. No abnormality seen in the soft tissues. LEFT: no acute fracture or dislocation. Alignment is anatomic. Moderate to severe degenerative changes are present in the first [facility] joint. A cystic lucency in the ace of the first metacarpal likely represents a subchondral cyst. No clear erosions are seen. No abnormality seen in the soft tissues. Bilateral Foot 10/04/2018: RIGHT:  no fracture or other acute osseous or articular abnormality. A small plantar calcaneal heel spur is noted. No erosions or joint space narrowing are present. The soft tissues are within normal limits. LEFT: no acute fracture or dislocation. Alignment is anatomic. A small plantar calcaneal heel spur is noted. No erosions or joint space narrowing are present. . No abnormality is seen in the soft tissues.     CT Imaging    PET/CT Scan 1/24/2019: HEAD/NECK: No apparent foci of abnormal hypermetabolism. Cerebral evaluation is limited by normal intense activity. CHEST: No foci of abnormal hypermetabolism. The known small breast cancer at 12:00 in the left breast demonstrates no increased metabolic activity. ABDOMEN/PELVIS: No foci of abnormal hypermetabolism. SKELETON: No foci of abnormal hypermetabolism in the axial and visualized appendicular skeleton. Lower extremities:. Imaging of the lower extremities is unremarkable. There is muscular activity noted. CT Head without contrast 9/13/2017: Prior right occipital craniectomy. Encephalomalacia in the right cerebellar region. . There is no significant white matter disease. There is no intracranial hemorrhage, extra-axial collection, mass, mass effect or midline shift. The basilar cisterns are open. No acute infarct is identified. The visualized portions of the paranasal sinuses and mastoid air cells are clear    MR Imaging    MRI Breasts with and without contrast 1/07/2019: There is minimal background parenchymal enhancement and the breasts are almost entirely fat. A few sub-5 mm foci of enhancement are scattered bilaterally. Right breast: No suspicious enhancing foci. No axillary or internal mammary chain lymphadenopathy. Left breast: A vague area of enhancement around the biopsy clip in the anterior third at 12:00 does not reach threshold enhancement. This measures approximately 9 x 9 mm, and correlates well with the small area of architectural distortion on mammography and subtle architectural distortion and shadowing on ultrasound. No axillary or internal mammary chain lymphadenopathy. DXA     DXA 5/12/2015: (excluded distal radius) lumbar spine L1-L4 T score 0.4 (BMD 1.241 g/cm2), left femoral neck T score: 0.5 (1.109 g/cm2), left total hip T score: 1.0 (1.139 g/cm2), right femoral neck T score: 0.2 (1.062 g/cm2), right total hip T score: 1.1 (1.145 g/cm2). FRAX score N/A % probability in 10 years for major osteoporotic fracture and N/A % 10 year probability of hip fracture. PATHOLOGY    Lymph node, left axilla, #1, sentinel node excision 1/29/2019: No evidence for malignancy in one node (0/1). Breast, left, lumpectomy 1/29/2019:  Invasive lobular carcinoma. Lobular carcinoma in situ     Bone Marrow Biopsy 12/19/2018: Bone marrow: Variably cellular marrow with mild monoclonal plasmacytosis. Trilineage hematopoiesis with maturation. Breast, left, needle core biopsy 12/03/2018: Invasive mammary carcinoma with ductal and lobular features Favor Deedee histologic grade 1. Largest glass slide measurement of invasive carcinoma: 8.5 mm. ER pos NH pos (weak) HER 2 neg. PROCEDURE     Kenalog 80 mg IM. (01/21/20)    ASSESSMENT AND PLAN    This is a follow-up visit for Ms. Maurilio Bravo. 1) Seronegative Rheumatoid Arthritis with secondary Polymyalgia Rheumatica. She is maintaind on methotrexate 25 mg SubQ every Wednesday with good tolerance now that she is on Zofran. Her CDAI was 6 (previously 43, 1, 29.5, 2.5, 30, 41.5) with 10 tender and 0 swollen joints, consistent with moderate disease. Her tender joint count appears to be from osteoarthritis. I prescribed diclofenac gel. 2) Polymyalgia Rheumatica. This resolved. 3) Long Term Use of Immunosuppressants. The patient remains on immunomodulatory medications (methotrexate) and requires frequent toxicity monitoring by blood work. Respective labs were ordered (CBC and CMP). 4) Long Term Use of NSAIDs She is taking Advl and diclofenac together. I recommended against combination. 5) Smoldering Myeloma/MGUS. Immunofixation shows IgA monoclonal protein with kappa light chain. M-spike 0.2. She was evaluated by Dr. Katt Pereira. Bone marrow biopsy showed smoldering myeloma. 6) Vitamin D Deficiency. Her vitamin D level was 20.1 (previously 40.0, 32.0, 21.7). She is on weekly ergocalciferol 50,000. I will check her level. 7) Bilateral iliotibial Band Syndrome. This was not an active issue today. I will refer her to physical therapy if she is symptomatic. 8) Left Breast Cancer Stage 1. She is scheduled for lumpectomy. 9) De Quervain's Tenosynovitis of Left. I referred her to orthopedic, Dr. Daina العراقي, per her request.     The patient voiced understanding of the aforementioned assessment and plan.  Summary of plan was provided in the After Visit Summary patient instructions. TODAY'S ORDERS    Orders Placed This Encounter    CBC WITH AUTOMATED DIFF    METABOLIC PANEL, COMPREHENSIVE    C REACTIVE PROTEIN, QT    SED RATE (ESR)    METHOTREXATE POLYGLUTAMATES    VITAMIN D, 25 HYDROXY    REFERRAL TO ORTHOPEDICS    diclofenac (VOLTAREN) 1 % gel     Future Appointments   Date Time Provider Geoff Duggan   6/9/2020  8:40 AM Tony Acosta MD [address]   8/28/2020  8:15 AM [address]   9/22/2020  8:30 AM Chapis Perez NP [facility] [address], [facility]    Adult Rheumatology   Rheumatology Ultrasound Certified  [facility]  A Part of [facility], [address] [place] [address] Road   Phone [phone]  Fax [phone]

## 2020-03-10 NOTE — LETTER
3/10/20 Patient: Amirah Clarity YOB: 1950 Date of Visit: 3/10/2020 Ada Leal MD 
[address] [id number] VIA In Basket Dear Ada Leal MD, Thank you for referring Ms. Ana María Cortez to [address] [place] [address] for evaluation. My notes for this consultation are attached. If you have questions, please do not hesitate to call me. I look forward to following your patient along with you.  
 
 
Sincerely, 
 
Yakov Hernandez MD

## 2020-03-18 LAB
25(OH)D3+25(OH)D2 SERPL-MCNC: 25.7 NG/ML (ref 30–100)
ALBUMIN SERPL-MCNC: 4.4 G/DL (ref 3.8–4.8)
ALBUMIN/GLOB SERPL: 1.6 {RATIO} (ref 1.2–2.2)
ALP SERPL-CCNC: 132 IU/L (ref 39–117)
ALT SERPL-CCNC: 17 IU/L (ref 0–32)
AST SERPL-CCNC: 19 IU/L (ref 0–40)
BASOPHILS # BLD AUTO: 0.1 X10E3/UL (ref 0–0.2)
BASOPHILS NFR BLD AUTO: 1 %
BILIRUB SERPL-MCNC: 0.5 MG/DL (ref 0–1.2)
BUN SERPL-MCNC: 16 MG/DL (ref 8–27)
BUN/CREAT SERPL: 19 (ref 12–28)
CALCIUM SERPL-MCNC: 9.9 MG/DL (ref 8.7–10.3)
CHLORIDE SERPL-SCNC: 106 MMOL/L (ref 96–106)
CO2 SERPL-SCNC: 25 MMOL/L (ref 20–29)
CREAT SERPL-MCNC: 0.85 MG/DL (ref 0.57–1)
CRP SERPL-MCNC: <1 MG/L (ref 0–10)
EOSINOPHIL # BLD AUTO: 0.1 X10E3/UL (ref 0–0.4)
EOSINOPHIL NFR BLD AUTO: 1 %
ERYTHROCYTE [DISTWIDTH] IN BLOOD BY AUTOMATED COUNT: 14.2 % (ref 11.7–15.4)
ERYTHROCYTE [SEDIMENTATION RATE] IN BLOOD BY WESTERGREN METHOD: 9 MM/HR (ref 0–40)
GLOBULIN SER CALC-MCNC: 2.7 G/DL (ref 1.5–4.5)
GLUCOSE SERPL-MCNC: 98 MG/DL (ref 65–99)
HCT VFR BLD AUTO: 42.8 % (ref 34–46.6)
HGB BLD-MCNC: 14 G/DL (ref 11.1–15.9)
IMM GRANULOCYTES # BLD AUTO: 0 X10E3/UL (ref 0–0.1)
IMM GRANULOCYTES NFR BLD AUTO: 1 %
LYMPHOCYTES # BLD AUTO: 0.7 X10E3/UL (ref 0.7–3.1)
LYMPHOCYTES NFR BLD AUTO: 13 %
MCH RBC QN AUTO: 31 PG (ref 26.6–33)
MCHC RBC AUTO-ENTMCNC: 32.7 G/DL (ref 31.5–35.7)
MCV RBC AUTO: 95 FL (ref 79–97)
METHOTREXATE PG1: 57.93 NMOL/L RBC
METHOTREXATE PG2: 22.92 NMOL/L RBC
METHOTREXATE PG3: 83.22 NMOL/L RBC
METHOTREXATE PG4: 77.24 NMOL/L RBC
METHOTREXATE PG5: 45.06 NMOL/L RBC
METHOTREXATE-PG TOTAL: 286.37 NMOL/L RBC
MONOCYTES # BLD AUTO: 0.4 X10E3/UL (ref 0.1–0.9)
MONOCYTES NFR BLD AUTO: 7 %
MTXPGSRA INTERPRETATION: NORMAL
NEUTROPHILS # BLD AUTO: 4.2 X10E3/UL (ref 1.4–7)
NEUTROPHILS NFR BLD AUTO: 77 %
PLATELET # BLD AUTO: 376 X10E3/UL (ref 150–450)
POTASSIUM SERPL-SCNC: 5.2 MMOL/L (ref 3.5–5.2)
PROT SERPL-MCNC: 7.1 G/DL (ref 6–8.5)
RBC # BLD AUTO: 4.51 X10E6/UL (ref 3.77–5.28)
SODIUM SERPL-SCNC: 143 MMOL/L (ref 134–144)
WBC # BLD AUTO: 5.5 X10E3/UL (ref 3.4–10.8)

## 2020-03-18 NOTE — PROGRESS NOTES
The results were reviewed. Elevated ALK. Therapeutic methotrexate level 286.37. vitamin D low but improved at 25.7. remaining labs are good.

## 2020-04-27 ENCOUNTER — TELEPHONE (OUTPATIENT)
Dept: INTERNAL MEDICINE CLINIC | Age: 70
End: 2020-04-27

## 2020-04-27 DIAGNOSIS — F51.01 PRIMARY INSOMNIA: Primary | ICD-10-CM

## 2020-04-27 RX ORDER — ZOLPIDEM TARTRATE 5 MG/1
5 TABLET ORAL
Qty: 30 TAB | Refills: 3 | Status: SHIPPED | OUTPATIENT
Start: 2020-04-27 | End: 2020-10-19

## 2020-04-27 NOTE — TELEPHONE ENCOUNTER
Spoke with patient , states that insurance will no longer cover Ativan. Would like to know if there is some thing else she could take to help her sleep. Patient states that she will also call her pharmacy to see how much it would cost to pay out of pocket.

## 2020-04-27 NOTE — TELEPHONE ENCOUNTER
Find out what else she has tried for sleep in the past.  Lorazepam tends to be expensive, generic Ambien is much less expensive.

## 2020-05-21 ENCOUNTER — VIRTUAL VISIT (OUTPATIENT)
Dept: INTERNAL MEDICINE CLINIC | Age: 70
End: 2020-05-21

## 2020-05-21 DIAGNOSIS — Z00.00 MEDICARE ANNUAL WELLNESS VISIT, SUBSEQUENT: Primary | ICD-10-CM

## 2020-05-21 DIAGNOSIS — Z71.89 ADVANCE DIRECTIVE DISCUSSED WITH PATIENT: ICD-10-CM

## 2020-05-21 DIAGNOSIS — E03.9 ACQUIRED HYPOTHYROIDISM: ICD-10-CM

## 2020-05-21 DIAGNOSIS — Z12.11 SCREEN FOR COLON CANCER: ICD-10-CM

## 2020-05-21 RX ORDER — ZOSTER VACCINE RECOMBINANT, ADJUVANTED 50 MCG/0.5
KIT INTRAMUSCULAR
Qty: 0.5 ML | Refills: 1 | Status: SHIPPED | OUTPATIENT
Start: 2020-05-21 | End: 2020-06-09

## 2020-05-21 NOTE — PROGRESS NOTES
This is the Subsequent Medicare Annual Wellness Exam, performed 12 months or more after the Initial AWV or the last Subsequent AWV    Consent: Darian Artis, who was seen by synchronous (real-time) audio-video technology, and/or her healthcare decision maker, is aware that this patient-initiated, Telehealth encounter on 5/21/2020 is a billable service. While AWVs are fully covered by Medicare, any services rendered on this date that are not included in an AWV are subject to additional billing, with coverage as determined by her insurance carrier. She is aware that she may receive a bill for any such additional services and has provided verbal consent to proceed: Yes. I have reviewed the patient's medical history in detail and updated the computerized patient record.      History     Patient Active Problem List   Diagnosis Code    Hypothyroidism E03.9    IBS (irritable bowel syndrome) K58.9    HTN (hypertension) I10    Graves disease E05.00    Moderate major depression ([facility] [address] F32.1    PMR (polymyalgia rheumatica) ([facility]) M35.3    Iliotibial band syndrome of both sides M76.31, M76.32    Long term current use of non-steroidal anti-inflammatories (NSAID) Z79.1    Seronegative rheumatoid arthritis of multiple sites ([facility]) M06.09    Monoclonal gammopathy of unknown significance (MGUS) D47.2    Vitamin D deficiency E55.9    Severe obesity ([facility]) E66.01    Long-term use of immunosuppressant medication Z79.899    Malignant neoplasm of left female breast ([facility] [address] C50.912    Multiple myeloma (HCC) C90.00    Smoldering myeloma (HCC) C90.00     Past Medical History:   Diagnosis Date    Breast cancer, left ([facility] [address]     Depression     Goiter     Hearing loss     bilateral    Hypertension     Hypothyroid     IBS (irritable bowel syndrome)     Menopause     Rheumatoid arthritis (HCC)     S/P radiation therapy       Past Surgical History:   Procedure Laterality Date    HX BREAST LUMPECTOMY Left 1/29/2019 LEFT BREAST LUMPECTOMY WITH ULTRASOUND, LEFT BREAST SENTINEL NODE BIOPSY (11a) performed by Bryant Navarro MD at [address] HX CATARACT REMOVAL Bilateral     HX [address]  7/97    brain lesion removed ? infectious     Current Outpatient Medications   Medication Sig Dispense Refill    varicella-zoster recombinant, PF, (Shingrix, PF,) 50 mcg/0.5 mL susr injection 0.5mL by IntraMUSCular route once now and then repeat in 2-6 months 0.5 mL 1    diph,pertuss,acel,,tetanus vac,PF, (ADACEL) 2 Lf-(2.5-5-3-5 mcg)-5Lf/0.5 mL syrg vaccine 0.5 mL by IntraMUSCular route once for 1 dose. 0.5 mL 0    zolpidem (AMBIEN) 5 mg tablet Take 1 Tab by mouth nightly as needed for Sleep. Max Daily Amount: 5 mg. 30 Tab 3    diclofenac (VOLTAREN) 1 % gel Apply  to affected area four (4) times daily. 100 g 2    methotrexate 25 mg/mL chemo injection INJECT 1 ML UNDER SKIN ONCE A WEEK ON WEDNESDAY 12 Vial 1    insulin syringe-needle U-100 1/2 mL 31 gauge x 15/64\" syrg Use to inject methotrexate once weekly 100 Pen Needle 0    folic acid (FOLVITE) 1 mg tablet TAKE 1 TABLET BY MOUTH ONCE DAILY 90 Tab 1    lisinopril-hydroCHLOROthiazide (PRINZIDE, ZESTORETIC) 10-12.5 mg per tablet TAKE 1 TABLET BY MOUTH ONCE DAILY 90 Tab 3    levothyroxine (SYNTHROID) 200 mcg tablet TAKE ONE TABLET BY MOUTH ONCE DAILY BEFORE BREAKFAST 90 Tab 3    citalopram (CELEXA) 40 mg tablet TAKE ONE TABLET BY MOUTH DAILY 90 Tab 3    LORazepam (ATIVAN) 1 mg tablet Take 1.5 Tabs by mouth nightly. Max Daily Amount: 1.5 mg. 135 Tab 1    letrozole (FEMARA) 2.5 mg tablet TAKE 1 TABLET BY MOUTH ONCE DAILY 30 Tab 0    ondansetron (ZOFRAN ODT) 4 mg disintegrating tablet Take 1 Tab by mouth every eight (8) hours as needed for Nausea. 30 Tab 1    diclofenac EC (VOLTAREN) 75 mg EC tablet Take 1 Tab by mouth two (2) times a day.  30 Tab 1    ibuprofen (ADVIL) 200 mg tablet Take 800 mg by mouth every six (6) hours as needed for Pain. Allergies   Allergen Reactions    Keflex [Cephalexin] Rash    Monistat 1 [Tioconazole] Swelling    Sulfa (Sulfonamide Antibiotics) Rash       Family History   Problem Relation Age of Onset    Cancer Mother         lung    COPD Mother     Cancer Father         lung    Heart Disease Brother         CABGx3    Diabetes Brother     Heart Disease Brother     Diabetes Brother     COPD Brother     Liver Disease Brother         cirrhosis    Alcohol abuse Brother     Heart Disease Paternal Uncle         CAD    Heart Disease Paternal Grandmother         CAD    Anesth Problems Neg Hx      Social History     Tobacco Use    Smoking status: Never Smoker    Smokeless tobacco: Never Used   Substance Use Topics    Alcohol use: Yes     Alcohol/week: 5.0 - 6.0 standard drinks     Types: 5 Glasses of wine per week       Depression Risk Factor Screening:     3 most recent PHQ Screens 5/15/2019   Little interest or pleasure in doing things Not at all   Feeling down, depressed, irritable, or hopeless Not at all   Total Score PHQ 2 0       Alcohol Risk Factor Screening:   Do you average 1 drink per night or more than 7 drinks a week:  No    On any one occasion in the past three months have you have had more than 3 drinks containing alcohol:  No      Functional Ability and Level of Safety:   Hearing: Hearing is good. Activities of Daily Living: The home contains: no safety equipment. Patient does total self care    Ambulation: with no difficulty    Fall Risk:  Fall Risk Assessment, last 12 mths 3/10/2020   Able to walk? Yes   Fall in past 12 months? Yes   Fall with injury?  No   Number of falls in past 12 months 1   Fall Risk Score 1       Abuse Screen:  Patient is not abused    Cognitive Screening   Has your family/caregiver stated any concerns about your memory: no  Cognitive Screening: Normal -      Patient Care Team   Patient Care Team:  Socorro Yu MD as PCP - General (Internal Medicine)  Noy Kilgore MD as PCP [facility]  Milad Hunt MD (Gastroenterology)  Obi Wright MD (Breast Surgery)  Obi Wright MD (Breast Surgery)  Jerrica Fishman MD as Physician (Radiation Oncology)    Assessment/Plan   Education and counseling provided:  Are appropriate based on today's review and evaluation   Advanced directive discussed with patient. Diagnoses and all orders for this visit:    1. Medicare annual wellness visit, subsequent    2. Screen for colon cancer  -     COLOGUARD TEST (FECAL DNA COLORECTAL CANCER SCREENING)    3. Acquired hypothyroidism  -     TSH 3RD GENERATION; Future  -     T4, FREE; Future    Other orders  -     varicella-zoster recombinant, PF, (Shingrix, PF,) 50 mcg/0.5 mL susr injection; 0.5mL by IntraMUSCular route once now and then repeat in 2-6 months  -     diph,pertuss,acel,,tetanus vac,PF, (ADACEL) 2 Lf-(2.5-5-3-5 mcg)-5Lf/0.5 mL syrg vaccine; 0.5 mL by IntraMUSCular route once for 1 dose. Health Maintenance Due   Topic Date Due    DTaP/Tdap/Td series (1 - Tdap) Prescription given    Shingrix Vaccine Age 50> (1 of 2) Prescription given.  GLAUCOMA SCREENING Q2Y  Up to date    Cologuard Ordered today    Medicare Yearly Exam  05/21/2021       Darian Artis is a 78 y.o. female who was evaluated by an audio-video encounter for concerns as above. Patient identification was verified prior to start of the visit. A caregiver was present when appropriate. Due to this being a TeleHealth encounter (During IDKMB-60 public health emergency), evaluation of the following organ systems was limited: Vitals/Constitutional/EENT/Resp/CV/GI//MS/Neuro/Skin/Heme-Lymph-Imm.   Pursuant to the emergency declaration under the [facility] [address] waiver authority and the [address] [employer] I and [employer] Act, this Virtual Visit was conducted, with patient's (and/or legal guardian's) consent, to reduce the patient's risk of exposure to COVID-19 and provide necessary medical care. Services were provided through a synchronous discussion virtually to substitute for in-person clinic visit. I was at home. The patient was at home.       Maynor Gant MD

## 2020-05-21 NOTE — PROGRESS NOTES
Patient has been informed of any other discussion outside the parameters of the physical could result in other charges including a co pay, patient verbalized understanding. Chief Complaint   Patient presents with   [facility] Annual Wellness Visit       1. Have you been to the ER, urgent care clinic since your last visit? Hospitalized since your last visit? No    2. Have you seen or consulted any other health care providers outside of the [address] [place] [address] since your last visit? Include any pap smears or colon screening.  No

## 2020-05-21 NOTE — PATIENT INSTRUCTIONS
Medicare Wellness Visit, Female The best way to live healthy is to have a lifestyle where you eat a well-balanced diet, exercise regularly, limit alcohol use, and quit all forms of tobacco/nicotine, if applicable. Regular preventive services are another way to keep healthy. Preventive services (vaccines, screening tests, monitoring & exams) can help personalize your care plan, which helps you manage your own care. Screening tests can find health problems at the earliest stages, when they are easiest to treat. [facility] follows the current, evidence-based guidelines published by the [facility] T[facility] Singh ([facility]) when recommending preventive services for our patients. Because we follow these guidelines, sometimes recommendations change over time as research supports it. (For example, mammograms used to be recommended annually. Even though Medicare will still pay for an annual mammogram, the newer guidelines recommend a mammogram every two years for women of average risk). Of course, you and your doctor may decide to screen more often for some diseases, based on your risk and your co-morbidities (chronic disease you are already diagnosed with). Preventive services for you include: - Medicare offers their members a free annual wellness visit, which is time for you and your primary care provider to discuss and plan for your preventive service needs. Take advantage of this benefit every year! 
-All adults over the age of 72 should receive the recommended pneumonia vaccines. Current USPSTF guidelines recommend a series of two vaccines for the best pneumonia protection.  
-All adults should have a flu vaccine yearly and a tetanus vaccine every 10 years.  
-All adults age 48 and older should receive the shingles vaccines (series of two vaccines). -All adults age 38-68 who are overweight should have a diabetes screening test once every three years. -All adults born between [birth year] and 1966 should be screened once for Hepatitis C. 
-Other screening tests and preventive services for persons with diabetes include: an eye exam to screen for diabetic retinopathy, a kidney function test, a foot exam, and stricter control over your cholesterol.  
-Cardiovascular screening for adults with routine risk involves an electrocardiogram (ECG) at intervals determined by your doctor.  
-Colorectal cancer screenings should be done for adults age 54-65 with no increased risk factors for colorectal cancer. There are a number of acceptable methods of screening for this type of cancer. Each test has its own benefits and drawbacks. Discuss with your doctor what is most appropriate for you during your annual wellness visit. The different tests include: colonoscopy (considered the best screening method), a fecal occult blood test, a fecal DNA test, and sigmoidoscopy. 
 
-A bone mass density test is recommended when a woman turns 65 to screen for osteoporosis. This test is only recommended one time, as a screening. Some providers will use this same test as a disease monitoring tool if you already have osteoporosis. -Breast cancer screenings are recommended every other year for women of normal risk, age 54-69. 
-Cervical cancer screenings for women over age 72 are only recommended with certain risk factors. Here is a list of your current Health Maintenance items (your personalized list of preventive services) with a due date: 
 
 
 
Health Maintenance Due Topic Date Due  
 DTaP/Tdap/Td series (1 - Tdap) Prescription given  Shingrix Vaccine Age 50> (1 of 2) Prescription given.  GLAUCOMA SCREENING Q2Y  Up to date  Cologuard Ordered today  Medicare Yearly Exam  05/21/2021

## 2020-05-26 RX ORDER — SYRGE-NDL,INS 0.5 ML HALF MARK 31GX15/64"
SYRINGE, EMPTY DISPOSABLE MISCELLANEOUS
Qty: 12 SYRINGE | Refills: 3 | Status: SHIPPED | OUTPATIENT
Start: 2020-05-26 | End: 2020-06-09

## 2020-06-09 ENCOUNTER — VIRTUAL VISIT (OUTPATIENT)
Dept: RHEUMATOLOGY | Age: 70
End: 2020-06-09

## 2020-06-09 DIAGNOSIS — Z79.60 LONG-TERM USE OF IMMUNOSUPPRESSANT MEDICATION: ICD-10-CM

## 2020-06-09 DIAGNOSIS — R29.898 WEAKNESS OF BOTH LOWER EXTREMITIES: ICD-10-CM

## 2020-06-09 DIAGNOSIS — M35.3 PMR (POLYMYALGIA RHEUMATICA) (HCC): ICD-10-CM

## 2020-06-09 DIAGNOSIS — M06.09 SERONEGATIVE RHEUMATOID ARTHRITIS OF MULTIPLE SITES (HCC): Primary | ICD-10-CM

## 2020-06-09 RX ORDER — FOLIC ACID 1 MG/1
TABLET ORAL
Qty: 90 TAB | Refills: 1 | Status: SHIPPED | OUTPATIENT
Start: 2020-06-09

## 2020-06-09 RX ORDER — METHOTREXATE 25 MG/ML
INJECTION, SOLUTION INTRA-ARTERIAL; INTRAMUSCULAR; INTRAVENOUS
Qty: 12 VIAL | Refills: 1
Start: 2020-06-09 | End: 2020-06-16

## 2020-06-09 NOTE — PROGRESS NOTES
REASON FOR VISIT    This is a follow-up visit for Ms. Caprice Corona for     ICD-10-CM   1. Seronegative rheumatoid arthritis of multiple sites ([facility] [address] M06.09   2. PMR (polymyalgia rheumatica) ([facility]) M35.3     The patient has consented for synchronous (real-time) Telemedicine (audio-video technology) on 6/9/2020 for their care to be delivered over telemedicine in place of their regularly scheduled office visit pursuant to the emergency declaration under the [facility], [facility] waiver authority and the [employer] Act, this Virtual  Visit was conducted, with patient's consent, to reduce the patient's risk of exposure to COVID-19 and provide continuity of care for an established patient. Services were provided through a video synchronous discussion virtually to substitute for in-person clinic visit.     Inflammatory arthritis phenotype includes:  Anti-CCP positive: no  Rheumatoid factor positive: no  Erosive disease: no  Extra-articular manifestations include: Polymyalgia Rheumatica, smoldering myeloma    Immunosuppression Screening (1/21/2020):  Quantiferon TB: negative  PPD:  Not performed  Hepatitis B: negative  Hepatitis C: negative    Therapy History includes:  Current DMARD therapy include: methotrexate 25 mg SubQ every Wednesday (4/29/2019 to present)  Prior DMARD therapy include: methotrexate 20 mg every Wednesday (10/15/2018 to 4/29/2019)  Discontinued DMARDs because of inefficacy: None  Discontinued DMARDs because of side effects: None    Immunization History   Administered Date(s) Administered    Influenza Vaccine 10/10/2013, 10/14/2015, 10/15/2018    Influenza Vaccine Split 09/26/2012    Influenza Vaccine Whole 10/22/2011    Pneumococcal Polysaccharide (PPSV-23) 05/05/2015       Patient Active Problem List   Diagnosis Code    Hypothyroidism E03.9    IBS (irritable bowel syndrome) K58.9    HTN (hypertension) I10  Graves disease E05.00    Moderate major depression ([facility]) F32.1    PMR (polymyalgia rheumatica) ([facility]) M35.3    Iliotibial band syndrome of both sides M76.31, M76.32    Long term current use of non-steroidal anti-inflammatories (NSAID) Z79.1    Seronegative rheumatoid arthritis of multiple sites ([facility]) M06.09    Monoclonal gammopathy of unknown significance (MGUS) D47.2    Vitamin D deficiency E55.9    Severe obesity ([facility]) E66.01    Long-term use of immunosuppressant medication Z79.899    Malignant neoplasm of left female breast ([facility]) C50.912    Multiple myeloma ([facility]) C90.00    Smoldering myeloma ([facility]) C90.00     HISTORY OF PRESENT ILLNESS    Ms. Talita Sanchez returns for a follow-up. On her last visit, I continued methotrexate 25 mg subcutaneous every Wednesday with Zofran, which she has taken with good tolerance. I referred her to Dr. Fred Okeefe, hand ortho, for stenosing tenosynovitis, per her request. I prescribed her diclofenac gel hor hand osteoarthritis. Today, she feels well. She denies pain, swelling, or stiffness in her hands. She denies Polymyalgia Rheumatica symptoms. She does not have some difficulty going up and down steps but that is challenging due to weakness. She denies fever, weight loss, blurred vision, vision loss, oral ulcers, ankle swelling, dry cough, dyspnea, nausea, vomiting, dysphagia, abdominal pain, black or bloody stool, fall since last visit, rash, easy bruising and increased thirst.    Last toxicity monitoring by blood work was done on 3/10/2020 and did not reveal any significant adverse effects, except . Most recent inflammatory markers from 3/10/2020 revealed a ESR 9 mm/hr and CRP <1 mg/L. The patient has not had any interval hospital admissions, infections, or surgeries. REVIEW OF SYSTEMS    A comprehensive review of systems was performed and pertinent results are documented in the HPI, review of systems is otherwise non-contributory.     PAST MEDICAL HISTORY    She has a past medical history of Breast cancer, left ([address] Depression, Goiter, Hearing loss, Hypertension, Hypothyroid, IBS (irritable bowel syndrome), Menopause, Rheumatoid arthritis ([address] and S/P radiation therapy. FAMILY HISTORY    Her family history includes Alcohol abuse in her brother; COPD in her brother and mother; Cancer in her father and mother; Diabetes in her brother and brother; Heart Disease in her brother, brother, paternal grandmother, and paternal uncle; Liver Disease in her brother. SOCIAL HISTORY    She reports that she has never smoked. She has never used smokeless tobacco. She reports current alcohol use of about 5.0 - 6.0 standard drinks of alcohol per week. She reports that she does not use drugs. MEDICATIONS    Current Outpatient Medications   Medication Sig Dispense Refill    methotrexate 25 mg/mL chemo injection INJECT 1 ML UNDER SKIN ONCE A WEEK ON WEDNESDAY 12 Vial 1    folic acid (FOLVITE) 1 mg tablet TAKE 1 TABLET BY MOUTH ONCE DAILY 90 Tab 1    zolpidem (AMBIEN) 5 mg tablet Take 1 Tab by mouth nightly as needed for Sleep. Max Daily Amount: 5 mg. 30 Tab 3    diclofenac (VOLTAREN) 1 % gel Apply  to affected area four (4) times daily. 100 g 2    insulin syringe-needle U-100 1/2 mL 31 gauge x 15/64\" syrg Use to inject methotrexate once weekly 100 Pen Needle 0    lisinopril-hydroCHLOROthiazide (PRINZIDE, ZESTORETIC) 10-12.5 mg per tablet TAKE 1 TABLET BY MOUTH ONCE DAILY 90 Tab 3    levothyroxine (SYNTHROID) 200 mcg tablet TAKE ONE TABLET BY MOUTH ONCE DAILY BEFORE BREAKFAST 90 Tab 3    citalopram (CELEXA) 40 mg tablet TAKE ONE TABLET BY MOUTH DAILY 90 Tab 3    letrozole (FEMARA) 2.5 mg tablet TAKE 1 TABLET BY MOUTH ONCE DAILY 30 Tab 0    ondansetron (ZOFRAN ODT) 4 mg disintegrating tablet Take 1 Tab by mouth every eight (8) hours as needed for Nausea.  30 Tab 1    ibuprofen (ADVIL) 200 mg tablet Take 800 mg by mouth every six (6) hours as needed for Pain.          ALLERGIES    Allergies   Allergen Reactions    Keflex [Cephalexin] Rash    Monistat 1 [Tioconazole] Swelling    Sulfa (Sulfonamide Antibiotics) Rash       PHYSICAL EXAMINATION    There were no vitals taken for this visit. There is no height or weight on file to calculate BMI. General: Patient is alert, oriented x 3, not in acute distress    HEENT:   Sclerae are not injected and appear moist.  There is no alopecia. Neck is supple     Chest:  Breathing comfortably at room air    Musculoskeletal exam:  A comprehensive musculoskeletal exam was NOT performed for all joints of each upper and lower extremity and assessed for swelling, tenderness and range of motion. Positive results are documented as below:    Previous Exam    Positive Finkelstein's on left with tenderness along the ABL EPB. Bilateral Sebastián and [place] nodes.     Z-Deformities:   no  [place] Neck Deformities:  no  Boutonierre's Deformities:  no  Ulnar Deviation:   no  MCP Subluxation:  no     Joint Count 3/10/2020 1/21/2020 7/29/2019 4/29/2019 1/28/2019 11/28/2018 10/4/2018   Patient pain (0-100) 40 80 5 15 40 70 75   MHAQ 0.375 1 0.375 1 0.5 0 1   Left shoulder - Tender - 1 - - - 1 1   Left shoulder - Swollen - 0 - - - 1 0   Left elbow - Tender - 1 - - - - -   Left elbow - Swollen - 0 - - - - -   Left wrist- Tender 1 1 0 1 0 1 -   Left wrist- Swollen 0 1 0 1 0 1 -   Left 1st MCP - Tender - 1 - 1 - 1 1   Left 1st MCP - Swollen - 1 - 1 - 1 1   Left 2nd MCP - Tender 1 1 - 1 - 1 1   Left 2nd MCP - Swollen 0 1 - 1 - 1 1   Left 3rd MCP - Tender 1 1 - 1 - - 1   Left 3rd MCP - Swollen 0 0 - 1 - - 1   Left 4th MCP - Tender 1 1 - 1 - - -   Left 4th MCP - Swollen 0 0 - 0 - - -   Left 5th MCP - Tender 1 1 - 1 - - 1   Left 5th MCP - Swollen 0 0 - 0 - - 1   Left 2nd PIP - Tender 1 1 - - - 1 1   Left 2nd PIP - Swollen 0 1 - - - 1 1   Left 3rd PIP - Tender 1 1 - - - 1 1   Left 3rd PIP - Swollen 0 1 - 1 - 1 1   Left 4th PIP - Tender 1 - - 1 - - - Left 4th PIP - Swollen 0 - - - - - -   Left 5th PIP - Tender 1 - - - - - -   Right shoulder - Tender - 1 - - - 1 1   Right shoulder - Swollen - 0 - - - 0 0   Right elbow - Tender - 1 - - - - -   Right elbow - Swollen - 0 - - - - -   Right wrist- Tender - 1 - 1 - - 1   Right wrist- Swollen - 1 - 1 - - 1   Right 1st MCP - Tender - - - 1 - 1 1   Right 1st MCP - Swollen - - - 1 - 1 1   Right 2nd MCP - Tender - 1 - - - 1 1   Right 2nd MCP - Swollen - 0 - - - 1 1   Right 3rd MCP - Tender - - - 0 - - -   Right 3rd MCP - Swollen - - - 1 - - -   Right 4th MCP - Tender - 1 - 1 - - -   Right 4th MCP - Swollen - 0 - 1 - - -   Right 5th MCP - Tender - 1 - - - - -   Right 5th MCP - Swollen - 0 - - - - -   Right thumb IP - Tender - - - - - 1 1   Right thumb IP - Swollen - - - - - 1 0   Right 2nd PIP - Tender - 1 - 1 - 1 1   Right 2nd PIP - Swollen - 1 - 1 - 1 1   Right 3rd PIP - Tender - 1 - - - - 1   Right 3rd PIP - Swollen - 1 - - - - 1   Right 4th PIP - Tender 1 1 - 1 - - 1   Right 4th PIP - Swollen 0 1 - 0 - - 1   Right 5th PIP - Tender - 1 - 1 - - 1   Right 5th PIP - Swollen - 1 - 1 - - 1   Tender Joint Count (Total) 10 20 0 13 0 11 16   Swollen Joint Count (Total) 0 10 0 11 0 10 13   Physician Assessment (0-10) 2 5 0 4 0 4 5   Patient Assessment (0-10) 4 8 1 1.5 2.5 5 7.5   CDAI Total (calculated) 16 43 1 29.5 2.5 30 41.5       DATA REVIEW    Laboratory     Recent laboratory results were reviewed, summarized, and discussed with the patient. Imaging    Musculoskeletal Ultrasound    None    Radiographs    Skeletal Survey 11/28/2018: Few small calvarial lucencies. No fractures or lytic lesions at risk for fracture. Bilateral Shoulder 10/04/2018: RIGHT: There is prominent AC joint degeneration with spurring and loss of joint space. There is some mild deformity, chronic in nature of the tuberosity. No fracture or dislocation, no bony erosion, the bone is normal in mineral contents. No soft tissue calcifications.  LEFT: The bone is normal in mineral contents. There is some mild chronic deformity of the tuberosity and AC joint degeneration. There are no soft tissue calcification bony erosion fracture or dislocation. Bilateral Hand 10/04/2018: RIGHT: no acute fracture or dislocation. Alignment is anatomic. Mild degenerative changes are seen in the interphalangeal joint of the thumb. No erosions are seen. No abnormality seen in the soft tissues. LEFT: no acute fracture or dislocation. Alignment is anatomic. Moderate to severe degenerative changes are present in the first [facility] joint. A cystic lucency in the ace of the first metacarpal likely represents a subchondral cyst. No clear erosions are seen. No abnormality seen in the soft tissues. Bilateral Foot 10/04/2018: RIGHT:  no fracture or other acute osseous or articular abnormality. A small plantar calcaneal heel spur is noted. No erosions or joint space narrowing are present. The soft tissues are within normal limits. LEFT: no acute fracture or dislocation. Alignment is anatomic. A small plantar calcaneal heel spur is noted. No erosions or joint space narrowing are present. . No abnormality is seen in the soft tissues.     CT Imaging    PET/CT Scan 1/24/2019: HEAD/NECK: No apparent foci of abnormal hypermetabolism. Cerebral evaluation is limited by normal intense activity. CHEST: No foci of abnormal hypermetabolism. The known small breast cancer at 12:00 in the left breast demonstrates no increased metabolic activity. ABDOMEN/PELVIS: No foci of abnormal hypermetabolism. SKELETON: No foci of abnormal hypermetabolism in the axial and visualized appendicular skeleton. Lower extremities:. Imaging of the lower extremities is unremarkable. There is muscular activity noted. CT Head without contrast 9/13/2017: Prior right occipital craniectomy. Encephalomalacia in the right cerebellar region. . There is no significant white matter disease.  There is no intracranial hemorrhage, extra-axial collection, mass, mass effect or midline shift. The basilar cisterns are open. No acute infarct is identified. The visualized portions of the paranasal sinuses and mastoid air cells are clear    MR Imaging    MRI Breasts with and without contrast 1/07/2019: There is minimal background parenchymal enhancement and the breasts are almost entirely fat. A few sub-5 mm foci of enhancement are scattered bilaterally. Right breast: No suspicious enhancing foci. No axillary or internal mammary chain lymphadenopathy. Left breast: A vague area of enhancement around the biopsy clip in the anterior third at 12:00 does not reach threshold enhancement. This measures approximately 9 x 9 mm, and correlates well with the small area of architectural distortion on mammography and subtle architectural distortion and shadowing on ultrasound. No axillary or internal mammary chain lymphadenopathy. DXA     DXA 5/12/2015: (excluded distal radius) lumbar spine L1-L4 T score 0.4 (BMD 1.241 g/cm2), left femoral neck T score: 0.5 (1.109 g/cm2), left total hip T score: 1.0 (1.139 g/cm2), right femoral neck T score: 0.2 (1.062 g/cm2), right total hip T score: 1.1 (1.145 g/cm2). FRAX score N/A % probability in 10 years for major osteoporotic fracture and N/A % 10 year probability of hip fracture. PATHOLOGY    Lymph node, left axilla, #1, sentinel node excision 1/29/2019: No evidence for malignancy in one node (0/1). Breast, left, lumpectomy 1/29/2019:  Invasive lobular carcinoma. Lobular carcinoma in situ     Bone Marrow Biopsy 12/19/2018: Bone marrow: Variably cellular marrow with mild monoclonal plasmacytosis. Trilineage hematopoiesis with maturation. Breast, left, needle core biopsy 12/03/2018: Invasive mammary carcinoma with ductal and lobular features Favor Deedee histologic grade 1. Largest glass slide measurement of invasive carcinoma: 8.5 mm. ER pos VT pos (weak) HER 2 neg. PROCEDURE     Kenalog 80 mg IM. (01/21/20)    ASSESSMENT AND PLAN    This is a follow-up visit for Ms. Rosanne Carlisle. 1) Seronegative Rheumatoid Arthritis with secondary Polymyalgia Rheumatica. She is maintaind on methotrexate 25 mg SubQ every Wednesday with good tolerance now that she is on Zofran. Her CDAI was 6 (previously 43, 1, 29.5, 2.5, 30, 41.5) with 10 tender and 0 swollen joints, consistent with moderate disease. She feels well. I will continue treatment. I refilled methotrexate. 2) Polymyalgia Rheumatica. This resolved. 3) Long Term Use of Immunosuppressants. The patient remains on immunomodulatory medications (methotrexate) and requires frequent toxicity monitoring by blood work. Respective labs were ordered (CBC and CMP). 4) Long Term Use of NSAIDs She is taking Advl and diclofenac together. I recommended against combination. 5) Smoldering Myeloma/MGUS. Immunofixation shows IgA monoclonal protein with kappa light chain. M-spike 0.2. She was evaluated by Dr. Elisa Butler. Bone marrow biopsy showed smoldering myeloma. 6) Vitamin D Deficiency. Her vitamin D level was 25.7 (previously 20.1, 40.0, 32.0, 21.7). She is on weekly ergocalciferol 50,000.     7) Bilateral iliotibial Band Syndrome. This was not an active issue today. I will refer her to physical therapy if she is symptomatic. 8) Left Breast Cancer Stage 1. She is scheduled for lumpectomy. 9) De Quervain's Tenosynovitis of Left. I had referred her to orthopedic, Dr. Paul Gamez, per her request.     10) Bilateral Lower Extremity Weakness. I referred her to physical therapy. The patient voiced understanding of the aforementioned assessment and plan.      The patient has consented for synchronous (real-time) Telemedicine (audio-video technology) on 6/9/2020 for their care to be delivered over telemedicine in place of their regularly scheduled office visit pursuant to the emergency declaration under the [facility], [facility] waiver authority and the Coron[employer] Prepared[employer] and [employer] Act, this Virtual  Visit was conducted, with patient's consent, to reduce the patient's risk of exposure to COVID-19 and provide continuity of care for an established patient. Services were provided through a video synchronous discussion virtually to substitute for in-person clinic visit. TODAY'S ORDERS    Orders Placed This Encounter    REFERRAL TO PHYSICAL THERAPY    methotrexate 25 mg/mL chemo injection    folic acid (FOLVITE) 1 mg tablet     Future Appointments   Date Time Provider Geoff Duggan   8/28/2020  8:15 AM [facility] PSYCHIATRIC [facility]   9/22/2020  8:30 AM Alma Rosa Obregon NP [facility] [address], [facility]    Adult Rheumatology   Rheumatology Ultrasound Certified  [facility]  A Part of [facility], [address]   Phone [phone]  Fax [phone]

## 2020-06-15 DIAGNOSIS — Z79.60 LONG-TERM USE OF IMMUNOSUPPRESSANT MEDICATION: ICD-10-CM

## 2020-06-15 DIAGNOSIS — M06.09 SERONEGATIVE RHEUMATOID ARTHRITIS OF MULTIPLE SITES (HCC): ICD-10-CM

## 2020-06-15 DIAGNOSIS — M35.3 PMR (POLYMYALGIA RHEUMATICA) (HCC): ICD-10-CM

## 2020-06-16 RX ORDER — METHOTREXATE 25 MG/ML
INJECTION, SOLUTION INTRA-ARTERIAL; INTRAMUSCULAR; INTRAVENOUS
Qty: 12 ML | Refills: 1 | Status: SHIPPED | OUTPATIENT
Start: 2020-06-16 | End: 2020-11-02

## 2020-07-07 ENCOUNTER — HOSPITAL ENCOUNTER (OUTPATIENT)
Dept: LAB | Age: 70
Discharge: HOME | End: 2020-07-07
Payer: MEDICARE

## 2020-07-07 PROCEDURE — 84439 ASSAY OF FREE THYROXINE: CPT

## 2020-07-07 PROCEDURE — 84443 ASSAY THYROID STIM HORMONE: CPT

## 2020-07-07 PROCEDURE — 36415 COLL VENOUS BLD VENIPUNCTURE: CPT

## 2020-07-08 LAB
T4 FREE SERPL-MCNC: 1.56 NG/DL (ref 0.82–1.77)
TSH SERPL DL<=0.005 MIU/L-ACNC: 0.02 UIU/ML (ref 0.45–4.5)

## 2020-07-15 ENCOUNTER — TELEPHONE (OUTPATIENT)
Dept: INTERNAL MEDICINE CLINIC | Age: 70
End: 2020-07-15

## 2020-07-15 DIAGNOSIS — E03.9 ACQUIRED HYPOTHYROIDISM: Primary | ICD-10-CM

## 2020-07-16 NOTE — TELEPHONE ENCOUNTER
tsh suppressed. Decreased levothyroxine from 200mg every day to 200mg Mon-Sat with 1/2 tab on Sunday. Pt notified via letter. Repeat labs 6 weeks.

## 2020-08-28 ENCOUNTER — HOSPITAL ENCOUNTER (OUTPATIENT)
Dept: MAMMOGRAPHY | Age: 70
Discharge: HOME | End: 2020-08-28
Attending: NURSE PRACTITIONER
Payer: MEDICARE

## 2020-08-28 DIAGNOSIS — Z92.3 S/P RADIATION THERAPY: ICD-10-CM

## 2020-08-28 DIAGNOSIS — Z98.890 S/P BREAST LUMPECTOMY: ICD-10-CM

## 2020-08-28 DIAGNOSIS — C50.412 MALIGNANT NEOPLASM OF UPPER-OUTER QUADRANT OF LEFT BREAST IN FEMALE, ESTROGEN RECEPTOR POSITIVE (HCC): ICD-10-CM

## 2020-08-28 DIAGNOSIS — Z17.0 MALIGNANT NEOPLASM OF UPPER-OUTER QUADRANT OF LEFT BREAST IN FEMALE, ESTROGEN RECEPTOR POSITIVE (HCC): ICD-10-CM

## 2020-08-28 PROCEDURE — 77062 BREAST TOMOSYNTHESIS BI: CPT

## 2020-09-09 DIAGNOSIS — E03.9 ACQUIRED HYPOTHYROIDISM: ICD-10-CM

## 2020-09-10 ENCOUNTER — VIRTUAL VISIT (OUTPATIENT)
Dept: INTERNAL MEDICINE CLINIC | Age: 70
End: 2020-09-10
Payer: MEDICARE

## 2020-09-10 DIAGNOSIS — N30.00 ACUTE CYSTITIS WITHOUT HEMATURIA: Primary | ICD-10-CM

## 2020-09-10 PROCEDURE — G8756 NO BP MEASURE DOC: HCPCS | Performed by: INTERNAL MEDICINE

## 2020-09-10 PROCEDURE — 1090F PRES/ABSN URINE INCON ASSESS: CPT | Performed by: INTERNAL MEDICINE

## 2020-09-10 PROCEDURE — G8399 PT W/DXA RESULTS DOCUMENT: HCPCS | Performed by: INTERNAL MEDICINE

## 2020-09-10 PROCEDURE — G8417 CALC BMI ABV UP PARAM F/U: HCPCS | Performed by: INTERNAL MEDICINE

## 2020-09-10 PROCEDURE — G9717 DOC PT DX DEP/BP F/U NT REQ: HCPCS | Performed by: INTERNAL MEDICINE

## 2020-09-10 PROCEDURE — 3288F FALL RISK ASSESSMENT DOCD: CPT | Performed by: INTERNAL MEDICINE

## 2020-09-10 PROCEDURE — G8428 CUR MEDS NOT DOCUMENT: HCPCS | Performed by: INTERNAL MEDICINE

## 2020-09-10 PROCEDURE — 1100F PTFALLS ASSESS-DOCD GE2>/YR: CPT | Performed by: INTERNAL MEDICINE

## 2020-09-10 PROCEDURE — 3017F COLORECTAL CA SCREEN DOC REV: CPT | Performed by: INTERNAL MEDICINE

## 2020-09-10 PROCEDURE — G8536 NO DOC ELDER MAL SCRN: HCPCS | Performed by: INTERNAL MEDICINE

## 2020-09-10 PROCEDURE — G9899 SCRN MAM PERF RSLTS DOC: HCPCS | Performed by: INTERNAL MEDICINE

## 2020-09-10 PROCEDURE — 99213 OFFICE O/P EST LOW 20 MIN: CPT | Performed by: INTERNAL MEDICINE

## 2020-09-10 RX ORDER — CIPROFLOXACIN 250 MG/1
250 TABLET, FILM COATED ORAL 2 TIMES DAILY
Qty: 6 TAB | Refills: 0 | Status: SHIPPED | OUTPATIENT
Start: 2020-09-10 | End: 2020-09-26

## 2020-09-10 NOTE — PROGRESS NOTES
Claribel Jacobsen, who was evaluated through a synchronous (real-time) audio-video encounter, and/or her healthcare decision maker, is aware that it is a billable service, with coverage as determined by her insurance carrier. She provided verbal consent to proceed: Yes, and patient identification was verified. It was conducted pursuant to the emergency declaration under the [address] [place] [address] and the [employer] Act. A caregiver was present when appropriate. Ability to conduct physical exam was limited. I was at home. The patient was at home. Claribel Jacobsen is a 78 y.o. female being evaluated by a Virtual Visit (video visit) encounter to address concerns as mentioned above. A caregiver was present when appropriate. Due to this being a TeleHealth encounter (During [facility] public health emergency), evaluation of the following organ systems was limited: Vitals/Constitutional/EENT/Resp/CV/GI//MS/Neuro/Skin/Heme-Lymph-Imm. Pursuant to the emergency declaration under the [address] [place] [address] and the [employer] Act, this Virtual Visit was conducted with patient's (and/or legal guardian's) consent, to reduce the risk of exposure to COVID-19 and provide necessary medical care. Services were provided through a video synchronous discussion virtually to substitute for in-person encounter. --Rollen Goodpasture, MD on 9/10/2020 at 2:08 PM    An electronic signature was used to authenticate this note. HISTORY OF PRESENT ILLNESS  Claribel Jacobsen is a 78 y.o. female. HPI  See today for UTI. Increased pressure in pelvis. Urgency and dysuria. Started this am.  No back pain, no fevers or chills. No hematuria or pyuria. Only trickle of urine.         Current Outpatient Medications:    LORazepam (ATIVAN) 1 mg tablet, TAKE 1 & 1/2 (ONE & ONE-HALF) TABLETS BY MOUTH NIGHTLY, Disp: 45 Tab, Rfl: 2    methotrexate 25 mg/mL chemo injection, INJECT 1 ML UNDER SKIN ONCE A WEEK ON WEDNESDAY AS DIRECTED (DISCARD AND BEGIN A NEW VIAL EVERY 28 DAYS), Disp: 12 mL, Rfl: 1    folic acid (FOLVITE) 1 mg tablet, TAKE 1 TABLET BY MOUTH ONCE DAILY, Disp: 90 Tab, Rfl: 1    zolpidem (AMBIEN) 5 mg tablet, Take 1 Tab by mouth nightly as needed for Sleep. Max Daily Amount: 5 mg., Disp: 30 Tab, Rfl: 3    diclofenac (VOLTAREN) 1 % gel, Apply  to affected area four (4) times daily. , Disp: 100 g, Rfl: 2    insulin syringe-needle U-100 1/2 mL 31 gauge x 15/64\" syrg, Use to inject methotrexate once weekly, Disp: 100 Pen Needle, Rfl: 0    lisinopril-hydroCHLOROthiazide (PRINZIDE, ZESTORETIC) 10-12.5 mg per tablet, TAKE 1 TABLET BY MOUTH ONCE DAILY, Disp: 90 Tab, Rfl: 3    levothyroxine (SYNTHROID) 200 mcg tablet, TAKE ONE TABLET BY MOUTH ONCE DAILY BEFORE BREAKFAST, Disp: 90 Tab, Rfl: 3    citalopram (CELEXA) 40 mg tablet, TAKE ONE TABLET BY MOUTH DAILY, Disp: 90 Tab, Rfl: 3    letrozole (FEMARA) 2.5 mg tablet, TAKE 1 TABLET BY MOUTH ONCE DAILY, Disp: 30 Tab, Rfl: 0    ondansetron (ZOFRAN ODT) 4 mg disintegrating tablet, Take 1 Tab by mouth every eight (8) hours as needed for Nausea., Disp: 30 Tab, Rfl: 1    ibuprofen (ADVIL) 200 mg tablet, Take 800 mg by mouth every six (6) hours as needed for Pain., Disp: , Rfl:     There were no vitals taken for this visit. ROS see above  Physical Exam  Constitutional:       Appearance: Normal appearance. HENT:      Head: Normocephalic and atraumatic. Neck:      Comments: nml appearance  Pulmonary:      Effort: Pulmonary effort is normal.      Comments: Rate normal.    Neurological:      Mental Status: She is alert. ASSESSMENT and PLAN  UTI - cipro bid x 3 days. Call if no improvement.     Orders Placed This Encounter    ciprofloxacin HCl (CIPRO) 250 mg tablet

## 2020-09-14 ENCOUNTER — HOSPITAL ENCOUNTER (INPATIENT)
Age: 70
LOS: 11 days | Discharge: NURSING, REHAB OR LONG-TERM CARE | DRG: 194 | End: 2020-09-26
Attending: EMERGENCY MEDICINE | Admitting: HOSPITALIST
Payer: MEDICARE

## 2020-09-14 ENCOUNTER — TELEPHONE (OUTPATIENT)
Dept: INTERNAL MEDICINE CLINIC | Age: 70
End: 2020-09-14

## 2020-09-14 ENCOUNTER — APPOINTMENT (OUTPATIENT)
Dept: GENERAL RADIOLOGY | Age: 70
DRG: 194 | End: 2020-09-14
Attending: EMERGENCY MEDICINE
Payer: MEDICARE

## 2020-09-14 ENCOUNTER — APPOINTMENT (OUTPATIENT)
Dept: CT IMAGING | Age: 70
DRG: 194 | End: 2020-09-14
Attending: EMERGENCY MEDICINE
Payer: MEDICARE

## 2020-09-14 DIAGNOSIS — R06.02 SHORTNESS OF BREATH: Primary | ICD-10-CM

## 2020-09-14 DIAGNOSIS — J18.9 PNEUMONIA OF BOTH LOWER LOBES DUE TO INFECTIOUS ORGANISM: ICD-10-CM

## 2020-09-14 DIAGNOSIS — F51.01 PRIMARY INSOMNIA: ICD-10-CM

## 2020-09-14 DIAGNOSIS — R53.1 WEAKNESS: ICD-10-CM

## 2020-09-14 PROBLEM — J12.9 VIRAL PNEUMONIA: Status: ACTIVE | Noted: 2020-09-14

## 2020-09-14 LAB
ALBUMIN SERPL-MCNC: 3.8 G/DL (ref 3.5–5)
ALBUMIN/GLOB SERPL: 0.8 {RATIO} (ref 1.1–2.2)
ALP SERPL-CCNC: 155 U/L (ref 45–117)
ALT SERPL-CCNC: 37 U/L (ref 12–78)
ANION GAP SERPL CALC-SCNC: 8 MMOL/L (ref 5–15)
APPEARANCE UR: CLEAR
AST SERPL-CCNC: 28 U/L (ref 15–37)
BACTERIA URNS QL MICRO: ABNORMAL /HPF
BASOPHILS # BLD: 0 K/UL (ref 0–0.1)
BASOPHILS NFR BLD: 1 % (ref 0–1)
BILIRUB SERPL-MCNC: 0.6 MG/DL (ref 0.2–1)
BILIRUB UR QL: NEGATIVE
BUN SERPL-MCNC: 11 MG/DL (ref 6–20)
BUN/CREAT SERPL: 12 (ref 12–20)
CALCIUM SERPL-MCNC: 9.3 MG/DL (ref 8.5–10.1)
CHLORIDE SERPL-SCNC: 104 MMOL/L (ref 97–108)
CO2 SERPL-SCNC: 22 MMOL/L (ref 21–32)
COLOR UR: ABNORMAL
COMMENT, HOLDF: NORMAL
CREAT SERPL-MCNC: 0.89 MG/DL (ref 0.55–1.02)
DIFFERENTIAL METHOD BLD: ABNORMAL
EOSINOPHIL # BLD: 0 K/UL (ref 0–0.4)
EOSINOPHIL NFR BLD: 0 % (ref 0–7)
EPITH CASTS URNS QL MICRO: ABNORMAL /LPF
ERYTHROCYTE [DISTWIDTH] IN BLOOD BY AUTOMATED COUNT: 13.2 % (ref 11.5–14.5)
GLOBULIN SER CALC-MCNC: 4.8 G/DL (ref 2–4)
GLUCOSE SERPL-MCNC: 94 MG/DL (ref 65–100)
GLUCOSE UR STRIP.AUTO-MCNC: NEGATIVE MG/DL
HCT VFR BLD AUTO: 42.8 % (ref 35–47)
HGB BLD-MCNC: 14.9 G/DL (ref 11.5–16)
HGB UR QL STRIP: NEGATIVE
IMM GRANULOCYTES # BLD AUTO: 0 K/UL (ref 0–0.04)
IMM GRANULOCYTES NFR BLD AUTO: 1 % (ref 0–0.5)
KETONES UR QL STRIP.AUTO: NEGATIVE MG/DL
LEUKOCYTE ESTERASE UR QL STRIP.AUTO: ABNORMAL
LYMPHOCYTES # BLD: 0.6 K/UL (ref 0.8–3.5)
LYMPHOCYTES NFR BLD: 16 % (ref 12–49)
MCH RBC QN AUTO: 31 PG (ref 26–34)
MCHC RBC AUTO-ENTMCNC: 34.8 G/DL (ref 30–36.5)
MCV RBC AUTO: 89 FL (ref 80–99)
MONOCYTES # BLD: 0.4 K/UL (ref 0–1)
MONOCYTES NFR BLD: 11 % (ref 5–13)
NEUTS SEG # BLD: 3 K/UL (ref 1.8–8)
NEUTS SEG NFR BLD: 71 % (ref 32–75)
NITRITE UR QL STRIP.AUTO: NEGATIVE
NRBC # BLD: 0 K/UL (ref 0–0.01)
NRBC BLD-RTO: 0 PER 100 WBC
PH UR STRIP: 7.5 [PH] (ref 5–8)
PLATELET # BLD AUTO: 263 K/UL (ref 150–400)
PMV BLD AUTO: 9.6 FL (ref 8.9–12.9)
POTASSIUM SERPL-SCNC: 3.7 MMOL/L (ref 3.5–5.1)
PROT SERPL-MCNC: 8.6 G/DL (ref 6.4–8.2)
PROT UR STRIP-MCNC: NEGATIVE MG/DL
RBC # BLD AUTO: 4.81 M/UL (ref 3.8–5.2)
RBC #/AREA URNS HPF: ABNORMAL /HPF (ref 0–5)
RBC MORPH BLD: ABNORMAL
SAMPLES BEING HELD,HOLD: NORMAL
SODIUM SERPL-SCNC: 134 MMOL/L (ref 136–145)
SP GR UR REFRACTOMETRY: 1.02 (ref 1–1.03)
TROPONIN I SERPL-MCNC: <0.05 NG/ML
UA: UC IF INDICATED,UAUC: ABNORMAL
UROBILINOGEN UR QL STRIP.AUTO: 0.2 EU/DL (ref 0.2–1)
WBC # BLD AUTO: 4 K/UL (ref 3.6–11)
WBC URNS QL MICRO: ABNORMAL /HPF (ref 0–4)

## 2020-09-14 PROCEDURE — 99218 HC RM OBSERVATION: CPT

## 2020-09-14 PROCEDURE — 85730 THROMBOPLASTIN TIME PARTIAL: CPT

## 2020-09-14 PROCEDURE — 36415 COLL VENOUS BLD VENIPUNCTURE: CPT

## 2020-09-14 PROCEDURE — 83615 LACTATE (LD) (LDH) ENZYME: CPT

## 2020-09-14 PROCEDURE — 0100U RESPIRATORY PANEL,PCR,NASOPHARYNGEAL: CPT

## 2020-09-14 PROCEDURE — 84145 PROCALCITONIN (PCT): CPT

## 2020-09-14 PROCEDURE — 83605 ASSAY OF LACTIC ACID: CPT

## 2020-09-14 PROCEDURE — 85610 PROTHROMBIN TIME: CPT

## 2020-09-14 PROCEDURE — 84484 ASSAY OF TROPONIN QUANT: CPT

## 2020-09-14 PROCEDURE — 85384 FIBRINOGEN ACTIVITY: CPT

## 2020-09-14 PROCEDURE — 74011250636 HC RX REV CODE- 250/636: Performed by: HOSPITALIST

## 2020-09-14 PROCEDURE — 99285 EMERGENCY DEPT VISIT HI MDM: CPT

## 2020-09-14 PROCEDURE — 82728 ASSAY OF FERRITIN: CPT

## 2020-09-14 PROCEDURE — 86900 BLOOD TYPING SEROLOGIC ABO: CPT

## 2020-09-14 PROCEDURE — 71046 X-RAY EXAM CHEST 2 VIEWS: CPT

## 2020-09-14 PROCEDURE — 80053 COMPREHEN METABOLIC PANEL: CPT

## 2020-09-14 PROCEDURE — 96374 THER/PROPH/DIAG INJ IV PUSH: CPT

## 2020-09-14 PROCEDURE — 93005 ELECTROCARDIOGRAM TRACING: CPT

## 2020-09-14 PROCEDURE — 85379 FIBRIN DEGRADATION QUANT: CPT

## 2020-09-14 PROCEDURE — 74011250636 HC RX REV CODE- 250/636: Performed by: EMERGENCY MEDICINE

## 2020-09-14 PROCEDURE — 74011000258 HC RX REV CODE- 258: Performed by: RADIOLOGY

## 2020-09-14 PROCEDURE — 74011000636 HC RX REV CODE- 636: Performed by: RADIOLOGY

## 2020-09-14 PROCEDURE — 71275 CT ANGIOGRAPHY CHEST: CPT

## 2020-09-14 PROCEDURE — 87635 SARS-COV-2 COVID-19 AMP PRB: CPT

## 2020-09-14 PROCEDURE — 85025 COMPLETE CBC W/AUTO DIFF WBC: CPT

## 2020-09-14 PROCEDURE — 81001 URINALYSIS AUTO W/SCOPE: CPT

## 2020-09-14 RX ORDER — ALBUTEROL SULFATE 90 UG/1
1 AEROSOL, METERED RESPIRATORY (INHALATION)
Status: DISCONTINUED | OUTPATIENT
Start: 2020-09-15 | End: 2020-09-15

## 2020-09-14 RX ORDER — SODIUM CHLORIDE 0.9 % (FLUSH) 0.9 %
5-40 SYRINGE (ML) INJECTION AS NEEDED
Status: DISCONTINUED | OUTPATIENT
Start: 2020-09-14 | End: 2020-09-26 | Stop reason: HOSPADM

## 2020-09-14 RX ORDER — ACETAMINOPHEN 325 MG/1
650 TABLET ORAL
Status: DISCONTINUED | OUTPATIENT
Start: 2020-09-14 | End: 2020-09-26 | Stop reason: HOSPADM

## 2020-09-14 RX ORDER — SODIUM CHLORIDE 0.9 % (FLUSH) 0.9 %
10 SYRINGE (ML) INJECTION
Status: COMPLETED | OUTPATIENT
Start: 2020-09-14 | End: 2020-09-14

## 2020-09-14 RX ORDER — LEVOTHYROXINE SODIUM 100 UG/1
200 TABLET ORAL
Status: DISCONTINUED | OUTPATIENT
Start: 2020-09-15 | End: 2020-09-26 | Stop reason: HOSPADM

## 2020-09-14 RX ORDER — ENOXAPARIN SODIUM 100 MG/ML
30 INJECTION SUBCUTANEOUS EVERY 12 HOURS
Status: DISCONTINUED | OUTPATIENT
Start: 2020-09-14 | End: 2020-09-14

## 2020-09-14 RX ORDER — DEXAMETHASONE 4 MG/1
6 TABLET ORAL
Status: COMPLETED | OUTPATIENT
Start: 2020-09-14 | End: 2020-09-23

## 2020-09-14 RX ORDER — ENOXAPARIN SODIUM 100 MG/ML
40 INJECTION SUBCUTANEOUS EVERY 12 HOURS
Status: DISCONTINUED | OUTPATIENT
Start: 2020-09-15 | End: 2020-09-19

## 2020-09-14 RX ORDER — FAMOTIDINE 20 MG/1
20 TABLET, FILM COATED ORAL 2 TIMES DAILY
Status: DISCONTINUED | OUTPATIENT
Start: 2020-09-15 | End: 2020-09-26 | Stop reason: HOSPADM

## 2020-09-14 RX ORDER — ACETAMINOPHEN 650 MG/1
650 SUPPOSITORY RECTAL
Status: DISCONTINUED | OUTPATIENT
Start: 2020-09-14 | End: 2020-09-26 | Stop reason: HOSPADM

## 2020-09-14 RX ORDER — LISINOPRIL 10 MG/1
10 TABLET ORAL DAILY
Status: DISCONTINUED | OUTPATIENT
Start: 2020-09-15 | End: 2020-09-22

## 2020-09-14 RX ORDER — SODIUM CHLORIDE 0.9 % (FLUSH) 0.9 %
5-40 SYRINGE (ML) INJECTION EVERY 8 HOURS
Status: DISCONTINUED | OUTPATIENT
Start: 2020-09-14 | End: 2020-09-26 | Stop reason: HOSPADM

## 2020-09-14 RX ORDER — CITALOPRAM 20 MG/1
40 TABLET, FILM COATED ORAL DAILY
Status: DISCONTINUED | OUTPATIENT
Start: 2020-09-15 | End: 2020-09-26 | Stop reason: HOSPADM

## 2020-09-14 RX ORDER — FOLIC ACID 1 MG/1
1 TABLET ORAL DAILY
Status: DISCONTINUED | OUTPATIENT
Start: 2020-09-15 | End: 2020-09-26 | Stop reason: HOSPADM

## 2020-09-14 RX ORDER — ACETAMINOPHEN 325 MG/1
650 TABLET ORAL
Status: DISCONTINUED | OUTPATIENT
Start: 2020-09-14 | End: 2020-09-14 | Stop reason: SDUPTHER

## 2020-09-14 RX ADMIN — Medication 10 ML: at 20:35

## 2020-09-14 RX ADMIN — SODIUM CHLORIDE 1000 ML: 900 INJECTION, SOLUTION INTRAVENOUS at 19:40

## 2020-09-14 RX ADMIN — SODIUM CHLORIDE 80 ML: 900 INJECTION, SOLUTION INTRAVENOUS at 20:35

## 2020-09-14 RX ADMIN — AZITHROMYCIN 500 MG: 500 INJECTION, POWDER, LYOPHILIZED, FOR SOLUTION INTRAVENOUS at 23:34

## 2020-09-14 RX ADMIN — IOPAMIDOL 100 ML: 755 INJECTION, SOLUTION INTRAVENOUS at 20:35

## 2020-09-14 RX ADMIN — DEXAMETHASONE 6 MG: 4 TABLET ORAL at 23:32

## 2020-09-14 NOTE — ED PROVIDER NOTES
This is a 34-year-old female with a history of breast cancer, hypertension and irritable bowel syndrome. Since Friday of last week she has developed increasing fatigue and weakness. She has not had any fever or chill. Yesterday she began with some shortness of breath without cough or congestion. Sunday she was so weak she could not get out of bed. She was beginning to feel that way on Saturday. She has had no nausea or vomiting and no diarrhea. She has had some abdominal cramps. She has not had very much to eat or drink at all over the weekend. She feels like she is extremely dehydrated and is having a difficult time sitting in a chair. Past Medical History:   Diagnosis Date    Breast cancer, left ([facility] [address]     Depression     Goiter     Hearing loss     bilateral    Hypertension     Hypothyroid     IBS (irritable bowel syndrome)     Menopause     Rheumatoid arthritis ([facility] [address]     S/P radiation therapy        Past Surgical History:   Procedure Laterality Date    HX BREAST LUMPECTOMY Left 1/29/2019    LEFT BREAST LUMPECTOMY WITH ULTRASOUND, LEFT BREAST SENTINEL NODE BIOPSY (11a) performed by Kaci Pierre MD at [address] HX CATARACT REMOVAL Bilateral     HX [address]  7/97    brain lesion removed ?  infectious         Family History:   Problem Relation Age of Onset   [place] Cancer Mother         lung    COPD Mother     Cancer Father         lung    Heart Disease Brother         CABGx3    Diabetes Brother     Heart Disease Brother     Diabetes Brother     COPD Brother     Liver Disease Brother         cirrhosis    Alcohol abuse Brother     Heart Disease Paternal Uncle         CAD    Heart Disease Paternal Grandmother         CAD    Anesth Problems Neg Hx        Social History     Socioeconomic History    Marital status:      Spouse name: Not on file    Number of children: Not on file    Years of education: Not on file  Highest education level: Not on file   Occupational History    Not on file   Social Needs    Financial resource strain: Not on file    Food insecurity     Worry: Not on file     Inability: Not on file    Transportation needs     Medical: Not on file     Non-medical: Not on file   Tobacco Use    Smoking status: Never Smoker    Smokeless tobacco: Never Used   Substance and Sexual Activity    Alcohol use: Yes     Alcohol/week: 5.0 - 6.0 standard drinks     Types: 5 Glasses of wine per week    Drug use: No    Sexual activity: Yes     Partners: Male   Lifestyle    Physical activity     Days per week: Not on file     Minutes per session: Not on file    Stress: Not on file   Relationships    Social connections     Talks on phone: Not on file     Gets together: Not on file     Attends [employer] service: Not on file     Active member of club or organization: Not on file     Attends meetings of clubs or organizations: Not on file     Relationship status: Not on file    Intimate partner violence     Fear of current or ex partner: Not on file     Emotionally abused: Not on file     Physically abused: Not on file     Forced sexual activity: Not on file   Other Topics Concern    Not on file   Social History Narrative    Not on file         ALLERGIES: Keflex [cephalexin]; Monistat 1 [tioconazole]; and Sulfa (sulfonamide antibiotics)    Review of Systems   Constitutional: Positive for chills and fatigue. Negative for activity change, appetite change and fever. HENT: Negative for ear pain, facial swelling, sore throat and trouble swallowing. Eyes: Negative for pain, discharge and visual disturbance. Respiratory: Positive for shortness of breath. Negative for chest tightness and wheezing. Cardiovascular: Negative for chest pain and palpitations. Gastrointestinal: Negative for abdominal pain, blood in stool, nausea and vomiting.    Genitourinary: Negative for difficulty urinating, flank pain and hematuria. Musculoskeletal: Negative for arthralgias, joint swelling, myalgias and neck pain. Skin: Negative for color change and rash. Neurological: Positive for weakness. Negative for dizziness, numbness and headaches. Hematological: Negative for adenopathy. Does not bruise/bleed easily. Psychiatric/Behavioral: Negative for behavioral problems, confusion and sleep disturbance. All other systems reviewed and are negative. Vitals:    09/14/20 1739   BP: (!) 121/47   Pulse: 91   Resp: 24   Temp: 98 °F (36.7 °C)   SpO2: 99%            Physical Exam  Vitals signs and nursing note reviewed. Constitutional:       General: She is in acute distress. Appearance: She is well-developed. She is obese. She is ill-appearing. HENT:      Head: Normocephalic and atraumatic. Nose: Nose normal.   Eyes:      General: No scleral icterus. Conjunctiva/sclera: Conjunctivae normal.      Pupils: Pupils are equal, round, and reactive to light. Neck:      Musculoskeletal: Normal range of motion and neck supple. Thyroid: No thyromegaly. Vascular: No JVD. Trachea: No tracheal deviation. Comments: No carotid bruits noted. Cardiovascular:      Rate and Rhythm: Normal rate and regular rhythm. Heart sounds: Normal heart sounds. No murmur. No friction rub. No gallop. Pulmonary:      Effort: Pulmonary effort is normal. No respiratory distress. Breath sounds: Examination of the right-middle field reveals decreased breath sounds. Examination of the right-lower field reveals decreased breath sounds. Decreased breath sounds present. No wheezing or rales. Chest:      Chest wall: No tenderness. Abdominal:      General: Bowel sounds are normal. There is no distension. Palpations: Abdomen is soft. There is no mass. Tenderness: There is no abdominal tenderness. There is no guarding or rebound. Musculoskeletal: Normal range of motion. General: No tenderness. Lymphadenopathy:      Cervical: No cervical adenopathy. Skin:     General: Skin is warm and dry. Findings: No erythema or rash. Neurological:      Mental Status: She is alert and oriented to person, place, and time. Cranial Nerves: No cranial nerve deficit. Coordination: Coordination normal.      Deep Tendon Reflexes: Reflexes are normal and symmetric. Psychiatric:         Behavior: Behavior normal.         Thought Content: Thought content normal.         Judgment: Judgment normal.          MDM  Number of Diagnoses or Management Options  Pneumonia of both lower lobes due to infectious organism: new and requires workup  Shortness of breath: new and requires workup  Weakness: new and requires workup     Amount and/or Complexity of Data Reviewed  Clinical lab tests: ordered and reviewed  Tests in the radiology section of CPT®: ordered and reviewed  Decide to obtain previous medical records or to obtain history from someone other than the patient: yes  Review and summarize past medical records: yes  Discuss the patient with other providers: yes  Independent visualization of images, tracings, or specimens: yes    Risk of Complications, Morbidity, and/or Mortality  Presenting problems: high  Diagnostic procedures: high  Management options: high           Procedures    The patient appears ill and is dyspneic with PO2 91% on room air. She has decreased breath sounds on the right side with a normal-appearing chest x-ray. She went to [facility] to get a COVID test but was sent directly here because she appeared so ill. Will obtain a CT of the chest and give her some IV fluids. We will also obtain the coag test here. Perfect Serve Consult for Admission  9:41 PM    ED Room Number: ER09/09  Patient Name and age:  Oris Emmanuel 78 y.o.  female  Working Diagnosis:   1. Shortness of breath    2. Weakness    3.  Pneumonia of both lower lobes due to infectious organism        COVID-19 Suspicion: yes  Sepsis present:  no  Reassessment needed: no  Code Status:  Full Code  Readmission: no  Isolation Requirements:  yes  Recommended Level of Care:  med/surg  Department:[facility] Adult ED - 21   Other:

## 2020-09-14 NOTE — TELEPHONE ENCOUNTER
Patient showing many symptoms of the covid 19, travel screen done, patient sick for about 1 week, worse since Friday, patient going to [address] [place] [address].

## 2020-09-14 NOTE — ED TRIAGE NOTES
Pt arrives via EMS from [facility] c/o abd cramping, dizziness, and sob. Pt reports her s/s started on Tuesday. Pt was given Bactrim by PCP for UTI. Pt states since that time she has become increasingly weak and \"not feeling well\"  Pt currently receiving treatment for breast ca. Pt anxious in triage.

## 2020-09-15 PROBLEM — Z20.822 SUSPECTED COVID-19 VIRUS INFECTION: Status: ACTIVE | Noted: 2020-09-15

## 2020-09-15 PROBLEM — J18.9 PNEUMONIA: Status: ACTIVE | Noted: 2020-09-15

## 2020-09-15 LAB
ABO + RH BLD: NORMAL
ALBUMIN SERPL-MCNC: 3.2 G/DL (ref 3.5–5)
ALBUMIN/GLOB SERPL: 0.7 {RATIO} (ref 1.1–2.2)
ALP SERPL-CCNC: 127 U/L (ref 45–117)
ALT SERPL-CCNC: 31 U/L (ref 12–78)
ANION GAP SERPL CALC-SCNC: 5 MMOL/L (ref 5–15)
APTT PPP: 26.1 SEC (ref 22.1–32)
APTT PPP: 26.7 SEC (ref 22.1–32)
AST SERPL-CCNC: 22 U/L (ref 15–37)
ATRIAL RATE: 83 BPM
B PERT DNA SPEC QL NAA+PROBE: NOT DETECTED
BASOPHILS # BLD: 0 K/UL (ref 0–0.1)
BASOPHILS NFR BLD: 1 % (ref 0–1)
BILIRUB SERPL-MCNC: 0.3 MG/DL (ref 0.2–1)
BLOOD GROUP ANTIBODIES SERPL: NORMAL
BORDETELLA PARAPERTUSSIS PCR, BORPAR: NOT DETECTED
BUN SERPL-MCNC: 11 MG/DL (ref 6–20)
BUN/CREAT SERPL: 15 (ref 12–20)
C PNEUM DNA SPEC QL NAA+PROBE: NOT DETECTED
CALCIUM SERPL-MCNC: 8.9 MG/DL (ref 8.5–10.1)
CALCULATED P AXIS, ECG09: 58 DEGREES
CALCULATED R AXIS, ECG10: 82 DEGREES
CALCULATED T AXIS, ECG11: 72 DEGREES
CHLORIDE SERPL-SCNC: 111 MMOL/L (ref 97–108)
CO2 SERPL-SCNC: 21 MMOL/L (ref 21–32)
COMMENT, HOLDF: NORMAL
CREAT SERPL-MCNC: 0.75 MG/DL (ref 0.55–1.02)
D DIMER PPP FEU-MCNC: 0.61 MG/L FEU (ref 0–0.65)
D DIMER PPP FEU-MCNC: 0.96 MG/L FEU (ref 0–0.65)
DIAGNOSIS, 93000: NORMAL
DIFFERENTIAL METHOD BLD: ABNORMAL
EOSINOPHIL # BLD: 0 K/UL (ref 0–0.4)
EOSINOPHIL NFR BLD: 0 % (ref 0–7)
ERYTHROCYTE [DISTWIDTH] IN BLOOD BY AUTOMATED COUNT: 13.2 % (ref 11.5–14.5)
FERRITIN SERPL-MCNC: 660 NG/ML (ref 26–388)
FIBRINOGEN PPP-MCNC: 426 MG/DL (ref 200–475)
FIBRINOGEN PPP-MCNC: 426 MG/DL (ref 200–475)
FLUAV H1 2009 PAND RNA SPEC QL NAA+PROBE: NOT DETECTED
FLUAV H1 RNA SPEC QL NAA+PROBE: NOT DETECTED
FLUAV H3 RNA SPEC QL NAA+PROBE: NOT DETECTED
FLUAV SUBTYP SPEC NAA+PROBE: NOT DETECTED
FLUBV RNA SPEC QL NAA+PROBE: NOT DETECTED
GLOBULIN SER CALC-MCNC: 4.3 G/DL (ref 2–4)
GLUCOSE SERPL-MCNC: 165 MG/DL (ref 65–100)
HADV DNA SPEC QL NAA+PROBE: NOT DETECTED
HCOV 229E RNA SPEC QL NAA+PROBE: NOT DETECTED
HCOV HKU1 RNA SPEC QL NAA+PROBE: NOT DETECTED
HCOV NL63 RNA SPEC QL NAA+PROBE: NOT DETECTED
HCOV OC43 RNA SPEC QL NAA+PROBE: NOT DETECTED
HCT VFR BLD AUTO: 38.1 % (ref 35–47)
HGB BLD-MCNC: 12.9 G/DL (ref 11.5–16)
HMPV RNA SPEC QL NAA+PROBE: NOT DETECTED
HPIV1 RNA SPEC QL NAA+PROBE: NOT DETECTED
HPIV2 RNA SPEC QL NAA+PROBE: NOT DETECTED
HPIV3 RNA SPEC QL NAA+PROBE: NOT DETECTED
HPIV4 RNA SPEC QL NAA+PROBE: NOT DETECTED
IMM GRANULOCYTES # BLD AUTO: 0 K/UL (ref 0–0.04)
IMM GRANULOCYTES NFR BLD AUTO: 1 % (ref 0–0.5)
INR PPP: 1.2 (ref 0.9–1.1)
LACTATE SERPL-SCNC: 0.9 MMOL/L (ref 0.4–2)
LDH SERPL L TO P-CCNC: 225 U/L (ref 81–246)
LYMPHOCYTES # BLD: 0.3 K/UL (ref 0.8–3.5)
LYMPHOCYTES NFR BLD: 13 % (ref 12–49)
M PNEUMO DNA SPEC QL NAA+PROBE: NOT DETECTED
MCH RBC QN AUTO: 30.5 PG (ref 26–34)
MCHC RBC AUTO-ENTMCNC: 33.9 G/DL (ref 30–36.5)
MCV RBC AUTO: 90.1 FL (ref 80–99)
MONOCYTES # BLD: 0.1 K/UL (ref 0–1)
MONOCYTES NFR BLD: 5 % (ref 5–13)
NEUTS SEG # BLD: 1.8 K/UL (ref 1.8–8)
NEUTS SEG NFR BLD: 80 % (ref 32–75)
NRBC # BLD: 0 K/UL (ref 0–0.01)
NRBC BLD-RTO: 0 PER 100 WBC
P-R INTERVAL, ECG05: 160 MS
PLATELET # BLD AUTO: 234 K/UL (ref 150–400)
PMV BLD AUTO: 9.7 FL (ref 8.9–12.9)
POTASSIUM SERPL-SCNC: 3.9 MMOL/L (ref 3.5–5.1)
PROCALCITONIN SERPL-MCNC: <0.05 NG/ML
PROT SERPL-MCNC: 7.5 G/DL (ref 6.4–8.2)
PROTHROMBIN TIME: 12.6 SEC (ref 9–11.1)
Q-T INTERVAL, ECG07: 388 MS
QRS DURATION, ECG06: 78 MS
QTC CALCULATION (BEZET), ECG08: 455 MS
RBC # BLD AUTO: 4.23 M/UL (ref 3.8–5.2)
RBC MORPH BLD: ABNORMAL
RSV RNA SPEC QL NAA+PROBE: NOT DETECTED
RV+EV RNA SPEC QL NAA+PROBE: NOT DETECTED
SAMPLES BEING HELD,HOLD: NORMAL
SODIUM SERPL-SCNC: 137 MMOL/L (ref 136–145)
SPECIMEN EXP DATE BLD: NORMAL
THERAPEUTIC RANGE,PTTT: NORMAL SECS (ref 58–77)
THERAPEUTIC RANGE,PTTT: NORMAL SECS (ref 58–77)
VENTRICULAR RATE, ECG03: 83 BPM
WBC # BLD AUTO: 2.2 K/UL (ref 3.6–11)

## 2020-09-15 PROCEDURE — 74011250637 HC RX REV CODE- 250/637: Performed by: HOSPITALIST

## 2020-09-15 PROCEDURE — 99218 HC RM OBSERVATION: CPT

## 2020-09-15 PROCEDURE — 94640 AIRWAY INHALATION TREATMENT: CPT

## 2020-09-15 PROCEDURE — 85379 FIBRIN DEGRADATION QUANT: CPT

## 2020-09-15 PROCEDURE — 96372 THER/PROPH/DIAG INJ SC/IM: CPT

## 2020-09-15 PROCEDURE — 36415 COLL VENOUS BLD VENIPUNCTURE: CPT

## 2020-09-15 PROCEDURE — 94664 DEMO&/EVAL PT USE INHALER: CPT

## 2020-09-15 PROCEDURE — 85730 THROMBOPLASTIN TIME PARTIAL: CPT

## 2020-09-15 PROCEDURE — 65270000029 HC RM PRIVATE

## 2020-09-15 PROCEDURE — 85384 FIBRINOGEN ACTIVITY: CPT

## 2020-09-15 PROCEDURE — 94760 N-INVAS EAR/PLS OXIMETRY 1: CPT

## 2020-09-15 PROCEDURE — 85025 COMPLETE CBC W/AUTO DIFF WBC: CPT

## 2020-09-15 PROCEDURE — 74011250636 HC RX REV CODE- 250/636: Performed by: HOSPITALIST

## 2020-09-15 PROCEDURE — 80053 COMPREHEN METABOLIC PANEL: CPT

## 2020-09-15 PROCEDURE — 77030012341 HC CHMB SPCR OPTC MDI VYRM -A

## 2020-09-15 RX ORDER — ALBUTEROL SULFATE 90 UG/1
2 AEROSOL, METERED RESPIRATORY (INHALATION)
Status: DISCONTINUED | OUTPATIENT
Start: 2020-09-15 | End: 2020-09-22

## 2020-09-15 RX ADMIN — LORAZEPAM 1.5 MG: 0.5 TABLET ORAL at 21:20

## 2020-09-15 RX ADMIN — FOLIC ACID 1 MG: 1 TABLET ORAL at 09:33

## 2020-09-15 RX ADMIN — ALBUTEROL SULFATE 2 PUFF: 90 AEROSOL, METERED RESPIRATORY (INHALATION) at 16:00

## 2020-09-15 RX ADMIN — DEXAMETHASONE 6 MG: 4 TABLET ORAL at 09:33

## 2020-09-15 RX ADMIN — ALBUTEROL SULFATE 2 PUFF: 90 AEROSOL, METERED RESPIRATORY (INHALATION) at 09:04

## 2020-09-15 RX ADMIN — LEVOTHYROXINE SODIUM 200 MCG: 0.1 TABLET ORAL at 06:02

## 2020-09-15 RX ADMIN — Medication 10 ML: at 21:21

## 2020-09-15 RX ADMIN — FAMOTIDINE 20 MG: 20 TABLET ORAL at 17:52

## 2020-09-15 RX ADMIN — CITALOPRAM HYDROBROMIDE 40 MG: 20 TABLET ORAL at 09:50

## 2020-09-15 RX ADMIN — ALBUTEROL SULFATE 2 PUFF: 90 AEROSOL, METERED RESPIRATORY (INHALATION) at 22:42

## 2020-09-15 RX ADMIN — ALBUTEROL SULFATE 2 PUFF: 90 AEROSOL, METERED RESPIRATORY (INHALATION) at 11:31

## 2020-09-15 RX ADMIN — FAMOTIDINE 20 MG: 20 TABLET ORAL at 09:33

## 2020-09-15 RX ADMIN — ENOXAPARIN SODIUM 40 MG: 40 INJECTION SUBCUTANEOUS at 09:41

## 2020-09-15 RX ADMIN — AZITHROMYCIN 500 MG: 500 INJECTION, POWDER, LYOPHILIZED, FOR SOLUTION INTRAVENOUS at 23:33

## 2020-09-15 RX ADMIN — LISINOPRIL 10 MG: 10 TABLET ORAL at 09:33

## 2020-09-15 RX ADMIN — Medication 10 ML: at 09:51

## 2020-09-15 RX ADMIN — ENOXAPARIN SODIUM 40 MG: 40 INJECTION SUBCUTANEOUS at 21:20

## 2020-09-15 RX ADMIN — Medication 10 ML: at 17:49

## 2020-09-15 NOTE — PROGRESS NOTES
TRANSFER - IN REPORT:    Verbal report received from Candace Rosado (name) on Deloris Hawley  being received from ED (unit) for routine progression of care      Report consisted of patients Situation, Background, Assessment and   Recommendations(SBAR). Information from the following report(s) SBAR and Kardex was reviewed with the receiving nurse. Opportunity for questions and clarification was provided. Assessment completed upon patients arrival to unit and care assumed.

## 2020-09-15 NOTE — ED NOTES
Pt monitor reapplied, admission labs drawn, covid and PCR swabs sent and nightly meds given. Blood cultures drawn and sent on hold prior to azithromycin administration. Pt resting comfortably at this time.

## 2020-09-15 NOTE — PROGRESS NOTES
[facility] Adult  Hospitalist Group                                                                                          Hospitalist Progress Note  Keith Tena MD  Answering service: [phone] from in house phone        Date of Service:  9/15/2020  NAME:  Miesha   :  1950  MRN:  [id number]    This documentation was facilitated by a Voice Recognition software and may contain inadvertent typographical errors. Admission Summary:   Per H&P   \"Candace Garg is a 78 y.o.  female who has a history of breast cancer, hypertension, rheumatoid arthritis on methotrexate presenting with a 3-week history of increasing fatigue. 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 She denies fevers or chills but since yesterday she became progressively short of breath. She also reports that she felt so weak that she could not get out of bed. She reported that she was having some nausea and some loose stools. She denies bleeding hematemesis, hematochezia or melena. She presents to the emergency room reporting that she was extremely dehydrated. In the ER she had a CT angiogram of the chest which was negative for embolism. But did show bilateral lower lobe patchy pneumonia with the possibility of COVID-19 pneumonia. \"        Interval history / Subjective: Follow-up for bilateral pneumonia  -Patient presented to evaluate shortness of breath, fatigue, nausea and loose stools. She was recently treated  for UTI with Bactrim  -She tells me her boss recently tested positive for COVID    Assessment & Plan:   Bilateral pneumonia, bacterial versus viral, rule out COVID  -Continue Zithromax. She has recently received Bactrim for UTI. -She has allergic to Keflex, so no ceftriaxone  -SARS-CoV-2 pending.   Maintain droplet plus precaution  -Continue Decadron initiated in the ED  -Anticoagulation with Lovenox per COVID-19 protocol  -Monitor inflammatory markers     Hypertension  Continue with lisinopril     Hypothyroidism  On Synthroid        Code Status: Full code      surrogate Decision Maker: Daughter     DVT Prophylaxis: On Lovenox  GI Prophylaxis:      FUNCTIONAL STATUS PRIOR TO ADMISSION: Ambulates Independently     Patient is from: [facility]  Date Reviewed: 3/10/2020          Codes Class Noted POA    Viral pneumonia ICD-10-CM: J12.9  ICD-9-CM: 480.9  9/14/2020 Unknown                Review of Systems:   A comprehensive review of systems was negative except for that written in the HPI. Vital Signs:    Last 24hrs VS reviewed since prior progress note. Most recent are:  Visit Vitals  /61   Pulse 71   Temp 98.2 °F (36.8 °C)   Resp 18   Wt 90.6 kg (199 lb 11.8 oz)   SpO2 99%   BMI 34.28 kg/m²       No intake or output data in the 24 hours ending 09/15/20 0717     Physical Examination:             Constitutional:  No acute distress, cooperative, pleasant    HEENT:  Atraumatic. Oral mucosa moist,. Non icteric sclera. No pallor. Resp:  CTA bilaterally. No wheezing/rhonchi/rales. No accessory muscle use   Chest Wall: No deformity   CV:  Regular rhythm, normal rate, no murmurs, gallops, rubs    GI:  Soft, non distended, non tender.  normoactive bowel sounds, no hepatosplenomegaly    :  No CVA or suprapubic tenderness    Musculoskeletal:  No edema, warm, 2+ pulses throughout    Neurologic:  Mental status:AAOx3,   Cranial nerves II-XII : WNL  Motor exam:Moves all extremities symmetrically              Data Review:    Review and/or order of clinical lab test  Review and/or order of tests in the radiology section of CPT  Review and/or order of tests in the medicine section of CPT      Labs:     Recent Labs     09/15/20  0557 09/14/20  1751   WBC 2.2* 4.0   HGB 12.9 14.9   HCT 38.1 42.8    263     Recent Labs     09/15/20  0558 09/14/20  1751    134* K 3.9 3.7   * 104   CO2 21 22   BUN 11 11   CREA 0.75 0.89   * 94   CA 8.9 9.3     Recent Labs     09/15/20  0558 09/14/20  1751   ALT 31 37   * 155*   TBILI 0.3 0.6   TP 7.5 8.6*   ALB 3.2* 3.8   GLOB 4.3* 4.8*     Recent Labs     09/15/20  0558 09/14/20  2318   INR  --  1.2*   PTP  --  12.6*   APTT 26.7 26.1      Recent Labs     09/14/20  2318   FERR 660*      No results found for: FOL, RBCF   No results for input(s): PH, PCO2, PO2 in the last 72 hours.   Recent Labs     09/14/20  1751   TROIQ <0.05     Lab Results   Component Value Date/Time    Cholesterol, total 187 12/12/2018 12:00 AM    HDL Cholesterol 55 12/12/2018 12:00 AM    LDL, calculated 103 (H) 12/12/2018 12:00 AM    Triglyceride 146 12/12/2018 12:00 AM    CHOL/HDL Ratio 4.6 11/07/2009 08:29 AM     No results found for: GLUCPOC  Lab Results   Component Value Date/Time    Color YELLOW/STRAW 09/14/2020 09:01 PM    Appearance CLEAR 09/14/2020 09:01 PM    Specific gravity 1.021 09/14/2020 09:01 PM    pH (UA) 7.5 09/14/2020 09:01 PM    Protein Negative 09/14/2020 09:01 PM    Glucose Negative 09/14/2020 09:01 PM    Ketone Negative 09/14/2020 09:01 PM    Bilirubin Negative 09/14/2020 09:01 PM    Urobilinogen 0.2 09/14/2020 09:01 PM    Nitrites Negative 09/14/2020 09:01 PM    Leukocyte Esterase TRACE (A) 09/14/2020 09:01 PM    Epithelial cells FEW 09/14/2020 09:01 PM    Bacteria 1+ (A) 09/14/2020 09:01 PM    WBC 0-4 09/14/2020 09:01 PM    RBC 0-5 09/14/2020 09:01 PM         Medications Reviewed:     Current Facility-Administered Medications   Medication Dose Route Frequency    acetaminophen (TYLENOL) tablet 650 mg  650 mg Oral Q6H PRN    Or    acetaminophen (TYLENOL) suppository 650 mg  650 mg Rectal Q6H PRN    dexAMETHasone (DECADRON) tablet 6 mg  6 mg Oral DAILY WITH BREAKFAST    sodium chloride (NS) flush 5-40 mL  5-40 mL IntraVENous Q8H    sodium chloride (NS) flush 5-40 mL  5-40 mL IntraVENous PRN    azithromycin (ZITHROMAX) 500 mg in 0.9% sodium chloride (MBP/ADV) 250 mL  500 mg IntraVENous Q24H    albuterol (PROVENTIL HFA, VENTOLIN HFA, PROAIR HFA) inhaler 1 Puff  1 Puff Inhalation Q4H RT    enoxaparin (LOVENOX) injection 40 mg  40 mg SubCUTAneous Q12H    levothyroxine (SYNTHROID) tablet 200 mcg  200 mcg Oral 6am    citalopram (CELEXA) tablet 40 mg  40 mg Oral DAILY    folic acid (FOLVITE) tablet 1 mg  1 mg Oral DAILY    lisinopriL (PRINIVIL, ZESTRIL) tablet 10 mg  10 mg Oral DAILY    famotidine (PEPCID) tablet 20 mg  20 mg Oral BID     Current Outpatient Medications   Medication Sig    ciprofloxacin HCl (CIPRO) 250 mg tablet Take 1 Tab by mouth two (2) times a day.  LORazepam (ATIVAN) 1 mg tablet TAKE 1 & 1/2 (ONE & ONE-HALF) TABLETS BY MOUTH NIGHTLY    methotrexate 25 mg/mL chemo injection INJECT 1 ML UNDER SKIN ONCE A WEEK ON WEDNESDAY AS DIRECTED (DISCARD AND BEGIN A NEW VIAL EVERY 28 DAYS)    folic acid (FOLVITE) 1 mg tablet TAKE 1 TABLET BY MOUTH ONCE DAILY    zolpidem (AMBIEN) 5 mg tablet Take 1 Tab by mouth nightly as needed for Sleep. Max Daily Amount: 5 mg.  diclofenac (VOLTAREN) 1 % gel Apply  to affected area four (4) times daily.  insulin syringe-needle U-100 1/2 mL 31 gauge x 15/64\" syrg Use to inject methotrexate once weekly    lisinopril-hydroCHLOROthiazide (PRINZIDE, ZESTORETIC) 10-12.5 mg per tablet TAKE 1 TABLET BY MOUTH ONCE DAILY    levothyroxine (SYNTHROID) 200 mcg tablet TAKE ONE TABLET BY MOUTH ONCE DAILY BEFORE BREAKFAST    citalopram (CELEXA) 40 mg tablet TAKE ONE TABLET BY MOUTH DAILY    letrozole (FEMARA) 2.5 mg tablet TAKE 1 TABLET BY MOUTH ONCE DAILY    ondansetron (ZOFRAN ODT) 4 mg disintegrating tablet Take 1 Tab by mouth every eight (8) hours as needed for Nausea.     ibuprofen (ADVIL) 200 mg tablet Take 800 mg by mouth every six (6) hours as needed for Pain.     ______________________________________________________________________  EXPECTED LENGTH OF STAY: - - -  ACTUAL LENGTH OF STAY:          0                 Stephen Quinn MD

## 2020-09-15 NOTE — ACP (ADVANCE CARE PLANNING)
Advance Care Planning     Advance Care Planning Clinical Specialist  Conversation Note      Date of ACP Conversation: 9/14/2020    Conversation Conducted with:   Patient with Decision Making Capacity    ACP Clinical Specialist: [address] Decision Maker:    Current Designated Health Care Decision Maker:   Primary Decision Maker: Aniket Chavez Child - [phone]    Care Preferences    Hospitalization: \"If your health worsens and it becomes clear that your chance of recovery is unlikely, what would your preference be regarding hospitalization? \"    Choice:  [x]  The patient wants hospitalization  []  The patient prefers comfort-focused treatment without hospitalization. Ventilation: \"If you were in your present state of health and suddenly became very ill and were unable to breathe on your own, what would your preference be about the use of a ventilator (breathing machine) if it were available to you? \"      If patient would desire the use of a ventilator (breathing machine), answer \"yes\", if not \"no\": NO    \"If your health worsens and it becomes clear that your chance of recovery is unlikely, what would your preference be about the use of a ventilator (breathing machine) if it were available to you? \"     If patient would desire the use of a ventilator (breathing machine), answer \"yes\", if not \"no\": NO      Resuscitation  \"CPR works best to restart the heart when there is a sudden event, like a heart attack, in someone who is otherwise healthy. Unfortunately, CPR does not typically restart the heart for people who have serious health conditions or who are very sick. \"    \"In the event your heart stopped as a result of an underlying serious health condition, would you want attempts to be made to restart your heart (answer \"yes\" for attempt to resuscitate) or would you prefer a natural death (answer \"no\" for do not attempt to resuscitate)? \" :NO      [x] Yes  [] No   Educated Patient / Decision Maker regarding differences between Advance Directives and portable DNR orders.     Length of ACP Conversation in minutes:      Conversation Outcomes:  [x] ACP discussion completed  [] Existing advance directive reviewed with patient; no changes to patient's previously recorded wishes   [] New Advance Directive completed   [] Portable Do Not Rescitate prepared for Provider review and signature  [] POLST/POST/MOLST/MOST prepared for Provider review and signature      Follow-up plan:    [x] Schedule follow-up conversation to continue planning  [x] Referred individual to Provider for additional questions/concerns   [] Advised patient/agent/surrogate to review completed ACP document and update if needed with changes in condition, patient preferences or care setting     [] This note routed to one or more involved healthcare providers    SAAD Cruz Chi/PAMELA  Care Management  7:24 PM

## 2020-09-15 NOTE — ACP (ADVANCE CARE PLANNING)
Advance Care Planning Note    Name: Danny León  YOB: 1950  MRN: [id number]  Admission Date: 9/14/2020  7:22 PM    Date of discussion: 9/14/2020    Active Diagnoses:    Hospital Problems  Date Reviewed: 3/10/2020          Codes Class Noted POA    Viral pneumonia ICD-10-CM: J12.9  ICD-9-CM: 480.9  9/14/2020 Unknown               These active diagnoses are of sufficient risk that focused discussion on advance care planning is indicated in order to allow the patient to thoughtfully consider personal goals of care, and if situations arise that prevent the ability to personally give input, to ensure appropriate representation of their personal desires for different levels and aggressiveness of care. Discussion:     Persons present and participating in discussion: Jaclyn Rader MD, Daughter    Discussion:   CPR, Cardioversion,Pacing,Pressors, Intubation,Bipap/CPAP,Antibiotics,Renal failure needing Dialysis. CODE STATUS: FullCode    Time Spent:     Total time spent face-to-face in education and discussion: 17 minutes.      Sabra Gutierrez MD  9/14/2020  11:32 PM

## 2020-09-15 NOTE — H&P
Hospitalist Admission Note    NAME: Bipin Nur   :  1950   MRN:  [id number]     Date/Time:  2020 10:28 PM    Patient PCP: Toribio Flores MD  _____________________________________________________________________  Given the patient's current clinical presentation, I have a high level of concern for decompensation if discharged from the emergency department. Complex decision making was performed, which includes reviewing the patient's available past medical records, laboratory results, and x-ray films. My assessment of this patient's clinical condition and my plan of care is as follows. Assessment / Plan:  Bilateral pneumonia/viral pneumonia on admission  Rule out COVID-19  Order COVID panel  Initiate on Decadron  Anticoagulation with Lovenox per COVID-19 protocol  Ordered d-dimer and fibrinogen  On antibiotic with Zithromax    Hypertension  Continue with lisinopril    Hypothyroidism  On Synthroid      Code Status: Full code     surrogate Decision Maker: Daughter    DVT Prophylaxis: On Lovenox  GI Prophylaxis:     FUNCTIONAL STATUS PRIOR TO ADMISSION: Ambulates Independently    Patient is from: Home     Her daughter was with her in the room and explained plan of care with her. Subjective:   CHIEF COMPLAINT: Cough and shortness of breath    HISTORY OF PRESENT ILLNESS:     Bipin Nur is a 78 y.o.  female who has a history of breast cancer, hypertension, rheumatoid arthritis on methotrexate presenting with a 3-week history of increasing fatigue. 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 She denies fevers or chills but since yesterday she became progressively short of breath. She also reports that she felt so weak that she could not get out of bed. She reported that she was having some nausea and some loose stools.   She denies bleeding hematemesis, hematochezia or melena. She presents to the emergency room reporting that she was extremely dehydrated. In the ER she had a CT angiogram of the chest which was negative for embolism. But did show bilateral lower lobe patchy pneumonia with the possibility of COVID-19 pneumonia. She denies any prior history of COVID-19 or exposure to COVID-19. We were asked to admit for work up and evaluation of the above problems. Vital signs in the emergency room includes temperature of 98 degrees, pulse rate of 91, blood pressure 121/47 and sats were 99% on 2 L      Past Medical History:   Diagnosis Date    Breast cancer, left (HCC)     Depression     Goiter     Hearing loss     bilateral    Hypertension     Hypothyroid     IBS (irritable bowel syndrome)     Menopause     Rheumatoid arthritis ([address]     S/P radiation therapy         Past Surgical History:   Procedure Laterality Date    HX BREAST LUMPECTOMY Left 1/29/2019    LEFT BREAST LUMPECTOMY WITH ULTRASOUND, LEFT BREAST SENTINEL NODE BIOPSY (11a) performed by Dionna Ahumada MD at [address] HX CATARACT REMOVAL Bilateral     HX CHOLECYSTECTOMY  1997    NEUROLOGICAL PROCEDURE UNLISTED  7/97    brain lesion removed ? infectious       Social History     Tobacco Use    Smoking status: Never Smoker    Smokeless tobacco: Never Used   Substance Use Topics    Alcohol use:  Yes     Alcohol/week: 5.0 - 6.0 standard drinks     Types: 5 Glasses of wine per week        Family History   Problem Relation Age of Onset   [de-identified] Cancer Mother         lung    COPD Mother     Cancer Father         lung    Heart Disease Brother         CABGx3    Diabetes Brother     Heart Disease Brother     Diabetes Brother     COPD Brother     Liver Disease Brother         cirrhosis    Alcohol abuse Brother     Heart Disease Paternal Uncle         CAD    Heart Disease Paternal Grandmother         CAD    Anesth Problems Neg Hx      Allergies   Allergen Reactions    Keflex [Cephalexin] Rash    Monistat 1 [Tioconazole] Swelling    Sulfa (Sulfonamide Antibiotics) Rash        Prior to Admission medications    Medication Sig Start Date End Date Taking? Authorizing Provider   ciprofloxacin HCl (CIPRO) 250 mg tablet Take 1 Tab by mouth two (2) times a day. 9/10/20   Sarah Quintana MD   LORazepam (ATIVAN) 1 mg tablet TAKE 1 & 1/2 (ONE & ONE-HALF) TABLETS BY MOUTH NIGHTLY 7/30/20   Sarah Quintana MD   methotrexate 25 mg/mL chemo injection INJECT 1 ML UNDER SKIN ONCE A WEEK ON WEDNESDAY AS DIRECTED (DISCARD AND BEGIN A NEW VIAL EVERY 28 DAYS) 6/16/20   Slim Altamirano MD   folic acid (FOLVITE) 1 mg tablet TAKE 1 TABLET BY MOUTH ONCE DAILY 6/9/20   Slim Altamirano MD   zolpidem (AMBIEN) 5 mg tablet Take 1 Tab by mouth nightly as needed for Sleep. Max Daily Amount: 5 mg. 4/27/20   Monserrat Galindo MD   diclofenac (VOLTAREN) 1 % gel Apply  to affected area four (4) times daily. 3/10/20   Slim Altamirano MD   insulin syringe-needle U-100 1/2 mL 31 gauge x 15/64\" syrg Use to inject methotrexate once weekly 2/13/20   Slim Altamirano MD   lisinopril-hydroCHLOROthiazide (PRINZIDE, ZESTORETIC) 10-12.5 mg per tablet TAKE 1 TABLET BY MOUTH ONCE DAILY 1/31/20   Monserrat Galindo MD   levothyroxine (SYNTHROID) 200 mcg tablet TAKE ONE TABLET BY MOUTH ONCE DAILY BEFORE BREAKFAST 1/31/20   Sarah Quintana MD   citalopram (CELEXA) 40 mg tablet TAKE ONE TABLET BY MOUTH DAILY 1/31/20   Sarah Pete MD   letrozole (FEMARA) 2.5 mg tablet TAKE 1 TABLET BY MOUTH ONCE DAILY 1/23/20   Papo Brooks MD   ondansetron (ZOFRAN ODT) 4 mg disintegrating tablet Take 1 Tab by mouth every eight (8) hours as needed for Nausea. 1/21/20   Slim Altamirano MD   ibuprofen (ADVIL) 200 mg tablet Take 800 mg by mouth every six (6) hours as needed for Pain. Provider, Historical       REVIEW OF SYSTEMS:     I am not able to complete the review of systems because:    The patient is intubated and sedated    The patient has altered mental status due to his acute medical problems    The patient has baseline aphasia from prior stroke(s)    The patient has baseline dementia and is not reliable historian    The patient is in acute medical distress and unable to provide information           Total of 12 systems reviewed as follows:    Review of Systems   Constitutional: Negative. HENT: Negative. Eyes: Negative. Respiratory: Positive for cough and shortness of breath. Cardiovascular: Negative. Gastrointestinal: Negative. Genitourinary: Negative. Musculoskeletal: Negative. Skin: Negative. Neurological: Negative. Endo/Heme/Allergies: Negative. Psychiatric/Behavioral: Negative. Objective:   VITALS:    Visit Vitals  BP (!) 121/47 (BP 1 Location: Left arm, BP Patient Position: Sitting)   Pulse 91   Temp 98 °F (36.7 °C)   Resp 24   SpO2 99%       PHYSICAL EXAM:    GEN: awake, alert, non-diaphoretic, no psychomotor agitation, mild distress  HEENT: Atraumatic, normocephalic, no rashes noted, no facial lesions noted. Eyes: No redness, no discharge, no swelling. No drainage observed from nose.    CARDIOPULMONARY: no increased respiratory effort, speaking in clear sentences, I:E ratio WNL  GI: Abdomen does not appear to be distended  MSK: normal ROM in the neck, upper extremities and lower extremities  SKIN: no lesions, wounds, erythema, or cyanosis noted on face or hands  NEURO: cranial nerves grossly normal, normal speech rate and rhythm, moves both upper extremities equally  PSYCH: appearance, behavior, and attitude - well groomed, pleasant, cooperative    _______________________________________________________________________  Care Plan discussed with:    Comments   Patient X    Family  X    RN     Care Manager                    Consultant:      _______________________________________________________________________  Expected  Disposition:   Home with Family X   HH/PT/OT/RN    SNF/LTC    [facility] ________________________________________________________________________  TOTAL TIME:  61  Minutes    Critical Care Provided     Minutes non procedure based      Comments    X Reviewed previous records   >50% of visit spent in counseling and coordination of care X Discussion with patient and/or family and questions answered       Given the patient's current clinical presentation, I have a high level of concern for decompensation if discharged from the ED. Complex decision making was performed which includes reviewing the patient's available past medical records, laboratory results, and Xray films. I have also directly communicated my plan and discussed this case with the involved ED physician. Procedures: see electronic medical records for all procedures/Xrays and details which were not copied into this note but were reviewed prior to creation of Plan. LAB DATA REVIEWED:    Recent Results (from the past 24 hour(s))   CBC WITH AUTOMATED DIFF    Collection Time: 09/14/20  5:51 PM   Result Value Ref Range    WBC 4.0 3.6 - 11.0 K/uL    RBC 4.81 3.80 - 5.20 M/uL    HGB 14.9 11.5 - 16.0 g/dL    HCT 42.8 35.0 - 47.0 %    MCV 89.0 80.0 - 99.0 FL    MCH 31.0 26.0 - 34.0 PG    MCHC 34.8 30.0 - 36.5 g/dL    RDW 13.2 11.5 - 14.5 %    PLATELET 440 018 - 551 K/uL    MPV 9.6 8.9 - 12.9 FL    NRBC 0.0 0  WBC    ABSOLUTE NRBC 0.00 0.00 - 0.01 K/uL    NEUTROPHILS 71 32 - 75 %    LYMPHOCYTES 16 12 - 49 %    MONOCYTES 11 5 - 13 %    EOSINOPHILS 0 0 - 7 %    BASOPHILS 1 0 - 1 %    IMMATURE GRANULOCYTES 1 (H) 0.0 - 0.5 %    ABS. NEUTROPHILS 3.0 1.8 - 8.0 K/UL    ABS. LYMPHOCYTES 0.6 (L) 0.8 - 3.5 K/UL    ABS. MONOCYTES 0.4 0.0 - 1.0 K/UL    ABS. EOSINOPHILS 0.0 0.0 - 0.4 K/UL    ABS. BASOPHILS 0.0 0.0 - 0.1 K/UL    ABS. IMM.  GRANS. 0.0 0.00 - 0.04 K/UL    DF SMEAR SCANNED      RBC COMMENTS NORMOCYTIC, NORMOCHROMIC     METABOLIC PANEL, COMPREHENSIVE    Collection Time: 09/14/20  5:51 PM   Result Value Ref Range    Sodium 134 (L) 136 - 145 mmol/L    Potassium 3.7 3.5 - 5.1 mmol/L    Chloride 104 97 - 108 mmol/L    CO2 22 21 - 32 mmol/L    Anion gap 8 5 - 15 mmol/L    Glucose 94 65 - 100 mg/dL    BUN 11 6 - 20 MG/DL    Creatinine 0.89 0.55 - 1.02 MG/DL    BUN/Creatinine ratio 12 12 - 20      GFR est AA >60 >60 ml/min/1.73m2    GFR est non-AA >60 >60 ml/min/1.73m2    Calcium 9.3 8.5 - 10.1 MG/DL    Bilirubin, total 0.6 0.2 - 1.0 MG/DL    ALT (SGPT) 37 12 - 78 U/L    AST (SGOT) 28 15 - 37 U/L    Alk. phosphatase 155 (H) 45 - 117 U/L    Protein, total 8.6 (H) 6.4 - 8.2 g/dL    Albumin 3.8 3.5 - 5.0 g/dL    Globulin 4.8 (H) 2.0 - 4.0 g/dL    A-G Ratio 0.8 (L) 1.1 - 2.2     TROPONIN I    Collection Time: 09/14/20  5:51 PM   Result Value Ref Range    Troponin-I, Qt. <0.05 <0.05 ng/mL   SAMPLES BEING HELD    Collection Time: 09/14/20  5:51 PM   Result Value Ref Range    SAMPLES BEING HELD 1 red, 1 blue     COMMENT        Add-on orders for these samples will be processed based on acceptable specimen integrity and analyte stability, which may vary by analyte. URINALYSIS W/ REFLEX CULTURE    Collection Time: 09/14/20  9:01 PM    Specimen: Urine   Result Value Ref Range    Color YELLOW/STRAW      Appearance CLEAR CLEAR      Specific gravity 1.021 1.003 - 1.030      pH (UA) 7.5 5.0 - 8.0      Protein Negative NEG mg/dL    Glucose Negative NEG mg/dL    Ketone Negative NEG mg/dL    Bilirubin Negative NEG      Blood Negative NEG      Urobilinogen 0.2 0.2 - 1.0 EU/dL    Nitrites Negative NEG      Leukocyte Esterase TRACE (A) NEG      WBC 0-4 0 - 4 /hpf    RBC 0-5 0 - 5 /hpf    Epithelial cells FEW FEW /lpf    Bacteria 1+ (A) NEG /hpf    UA:UC IF INDICATED CULTURE NOT INDICATED BY UA RESULT CNI         Xr Chest Pa Lat    Result Date: 9/14/2020  IMPRESSION: No acute process within the limitation of low lung volumes. Cta Chest W Or W Wo Cont    Result Date: 9/14/2020  IMPRESSION: 1. No pulmonary embolism.  2. Bilateral lower lobe patchy pneumonia is nonspecific. COVID19 is possible though not pathognomonic. 3. Right hilar lymphadenopathy is likely reactive to the pneumonia. I discussed this impression with Dr. Sunday Tovar at 2052 hours on 9/14/2020. Patient was explained about the risk associated with hospitalization including (and not a complete) risk of falls,fractures,blood clots,Bleeding from medications (including anticoagulants used for DVT ppx), Sepsis,allergic reactions,infections,radiation risk from the imaging studies performed,transfusions of blood products,Renal failure  and also risk of death. Patient also understands and agrees to the treatment plan including medications and also understand the risk with radiation while undergoing imaging studies. The patient  And  family  (after permission given by the patient) understands the need to be hospitalized and follow medical orders, agree with the admission plan. Thank You Dr Lin Agudelo MD for taking care of your patient. Please call if you have any questions.          ____________________________________________________________________  Cally Mckenna MD

## 2020-09-15 NOTE — PROGRESS NOTES
Transition of Care Plan:       1. COVID 19 Rule Out Pending    2. TBD/subject to change pending recommendations.   -Anticipate home with family assistance    3. Family to transport    Reason for Admission:   Shortness of breath                   RUR Score:   13               PCP: YES First and Last name:  Gabaide Downey   Name of Practice: [facility]   Are you a current patient: Yes/No: YES   Approximate date of last visit: 9/10/20   Can you participate in a virtual visit if needed: YES    Do you (patient/family) have any concerns for transition/discharge? Not at this time. Plan for utilizing home health:   Not at this time    Current Advanced Directive/Advance Care Plan:  Patient is a full code with no ACP documentation on file. Patient has completed ACP documentation. Patient's son currently out of town and unable to bring copy of documentation to the hospital.  Identified health care decision maker is patient's son, Martin Mayers [phone].    78year old female, AOx4. Independent with ADL's and IADL's. No DME utilized. Resides alone in her own 1 story home with 3 steps to enter. Currently employed with [employer] with no significant financial stressors or concerns. Also receives [employer] as source of income. Insurance verified: Medicare A&B/ AARP. [address] is utilized for prescriptions. Family able to transport at discharge. Other identified emergency contact is patient's daughter-in-law, Christ Wilks [phone]    Observation notice provided in writing to patient and/or caregiver as well as verbal explanation of the policy. Patients who are in outpatient status also receive the Observation notice. Care Management Interventions  PCP Verified by CM: Yes  Last Visit to PCP: 09/10/20  Palliative Care Criteria Met (RRAT>21 & CHF Dx)?: No  Mode of Transport at Discharge:  Other (see comment)(Family)  Transition of Care Consult (CM Consult): (No current CM consults or needs at this time)  Discharge Durable Medical Equipment: No  Physical Therapy Consult: No  Occupational Therapy Consult: No  Speech Therapy Consult: No  Current Support Network: Own Home, Lives Alone  Confirm Follow Up Transport: Family  Discharge Location  Discharge Placement: Home with family assistance(TBD/subject to change pending recommendations)    SAAD Matt/PAMELA  Care Management  10:14 AM

## 2020-09-16 LAB
ALBUMIN SERPL-MCNC: 3.2 G/DL (ref 3.5–5)
ALBUMIN/GLOB SERPL: 0.9 {RATIO} (ref 1.1–2.2)
ALP SERPL-CCNC: 121 U/L (ref 45–117)
ALT SERPL-CCNC: 28 U/L (ref 12–78)
ANION GAP SERPL CALC-SCNC: 9 MMOL/L (ref 5–15)
APTT PPP: 28.5 SEC (ref 22.1–32)
AST SERPL-CCNC: 17 U/L (ref 15–37)
BASOPHILS # BLD: 0 K/UL (ref 0–0.1)
BASOPHILS NFR BLD: 0 % (ref 0–1)
BILIRUB SERPL-MCNC: 0.3 MG/DL (ref 0.2–1)
BUN SERPL-MCNC: 12 MG/DL (ref 6–20)
BUN/CREAT SERPL: 23 (ref 12–20)
CALCIUM SERPL-MCNC: 8.6 MG/DL (ref 8.5–10.1)
CHLORIDE SERPL-SCNC: 110 MMOL/L (ref 97–108)
CO2 SERPL-SCNC: 20 MMOL/L (ref 21–32)
COVID-19, XGCOVT: NOT DETECTED
CREAT SERPL-MCNC: 0.53 MG/DL (ref 0.55–1.02)
D DIMER PPP FEU-MCNC: 0.71 MG/L FEU (ref 0–0.65)
DIFFERENTIAL METHOD BLD: ABNORMAL
EOSINOPHIL # BLD: 0 K/UL (ref 0–0.4)
EOSINOPHIL NFR BLD: 0 % (ref 0–7)
ERYTHROCYTE [DISTWIDTH] IN BLOOD BY AUTOMATED COUNT: 13.2 % (ref 11.5–14.5)
FERRITIN SERPL-MCNC: 753 NG/ML (ref 26–388)
FIBRINOGEN PPP-MCNC: 410 MG/DL (ref 200–475)
GLOBULIN SER CALC-MCNC: 3.4 G/DL (ref 2–4)
GLUCOSE SERPL-MCNC: 109 MG/DL (ref 65–100)
HCT VFR BLD AUTO: 37.3 % (ref 35–47)
HEALTH STATUS, XMCV2T: NORMAL
HGB BLD-MCNC: 12.4 G/DL (ref 11.5–16)
IMM GRANULOCYTES # BLD AUTO: 0 K/UL (ref 0–0.04)
IMM GRANULOCYTES NFR BLD AUTO: 0 % (ref 0–0.5)
INR PPP: 1 (ref 0.9–1.1)
LYMPHOCYTES # BLD: 0.5 K/UL (ref 0.8–3.5)
LYMPHOCYTES NFR BLD: 8 % (ref 12–49)
MCH RBC QN AUTO: 29.9 PG (ref 26–34)
MCHC RBC AUTO-ENTMCNC: 33.2 G/DL (ref 30–36.5)
MCV RBC AUTO: 89.9 FL (ref 80–99)
MONOCYTES # BLD: 0.6 K/UL (ref 0–1)
MONOCYTES NFR BLD: 11 % (ref 5–13)
NEUTS SEG # BLD: 4.8 K/UL (ref 1.8–8)
NEUTS SEG NFR BLD: 81 % (ref 32–75)
NRBC # BLD: 0 K/UL (ref 0–0.01)
NRBC BLD-RTO: 0 PER 100 WBC
PLATELET # BLD AUTO: 285 K/UL (ref 150–400)
PMV BLD AUTO: 9.7 FL (ref 8.9–12.9)
POTASSIUM SERPL-SCNC: 3.5 MMOL/L (ref 3.5–5.1)
PROT SERPL-MCNC: 6.6 G/DL (ref 6.4–8.2)
PROTHROMBIN TIME: 10.3 SEC (ref 9–11.1)
RBC # BLD AUTO: 4.15 M/UL (ref 3.8–5.2)
RBC MORPH BLD: ABNORMAL
SODIUM SERPL-SCNC: 139 MMOL/L (ref 136–145)
SOURCE, COVRS: NORMAL
SPECIMEN SOURCE, FCOV2M: NORMAL
SPECIMEN TYPE, XMCV1T: NORMAL
THERAPEUTIC RANGE,PTTT: NORMAL SECS (ref 58–77)
WBC # BLD AUTO: 5.9 K/UL (ref 3.6–11)

## 2020-09-16 PROCEDURE — 74011250637 HC RX REV CODE- 250/637: Performed by: HOSPITALIST

## 2020-09-16 PROCEDURE — 36415 COLL VENOUS BLD VENIPUNCTURE: CPT

## 2020-09-16 PROCEDURE — 85610 PROTHROMBIN TIME: CPT

## 2020-09-16 PROCEDURE — 82728 ASSAY OF FERRITIN: CPT

## 2020-09-16 PROCEDURE — 65270000029 HC RM PRIVATE

## 2020-09-16 PROCEDURE — 85730 THROMBOPLASTIN TIME PARTIAL: CPT

## 2020-09-16 PROCEDURE — 94640 AIRWAY INHALATION TREATMENT: CPT

## 2020-09-16 PROCEDURE — 85379 FIBRIN DEGRADATION QUANT: CPT

## 2020-09-16 PROCEDURE — 85384 FIBRINOGEN ACTIVITY: CPT

## 2020-09-16 PROCEDURE — 74011250636 HC RX REV CODE- 250/636: Performed by: HOSPITALIST

## 2020-09-16 PROCEDURE — 85025 COMPLETE CBC W/AUTO DIFF WBC: CPT

## 2020-09-16 PROCEDURE — 36600 WITHDRAWAL OF ARTERIAL BLOOD: CPT

## 2020-09-16 PROCEDURE — 80053 COMPREHEN METABOLIC PANEL: CPT

## 2020-09-16 RX ADMIN — FAMOTIDINE 20 MG: 20 TABLET ORAL at 16:48

## 2020-09-16 RX ADMIN — Medication 10 ML: at 21:23

## 2020-09-16 RX ADMIN — LISINOPRIL 10 MG: 10 TABLET ORAL at 09:06

## 2020-09-16 RX ADMIN — DEXAMETHASONE 6 MG: 4 TABLET ORAL at 08:16

## 2020-09-16 RX ADMIN — Medication 10 ML: at 05:30

## 2020-09-16 RX ADMIN — ALBUTEROL SULFATE 2 PUFF: 90 AEROSOL, METERED RESPIRATORY (INHALATION) at 00:53

## 2020-09-16 RX ADMIN — Medication 10 ML: at 12:50

## 2020-09-16 RX ADMIN — LEVOTHYROXINE SODIUM 200 MCG: 0.1 TABLET ORAL at 05:30

## 2020-09-16 RX ADMIN — LORAZEPAM 1.5 MG: 0.5 TABLET ORAL at 21:23

## 2020-09-16 RX ADMIN — FOLIC ACID 1 MG: 1 TABLET ORAL at 09:06

## 2020-09-16 RX ADMIN — ENOXAPARIN SODIUM 40 MG: 40 INJECTION SUBCUTANEOUS at 09:06

## 2020-09-16 RX ADMIN — ENOXAPARIN SODIUM 40 MG: 40 INJECTION SUBCUTANEOUS at 21:23

## 2020-09-16 RX ADMIN — ALBUTEROL SULFATE 2 PUFF: 90 AEROSOL, METERED RESPIRATORY (INHALATION) at 20:27

## 2020-09-16 RX ADMIN — CITALOPRAM HYDROBROMIDE 40 MG: 20 TABLET ORAL at 09:06

## 2020-09-16 RX ADMIN — FAMOTIDINE 20 MG: 20 TABLET ORAL at 09:06

## 2020-09-16 RX ADMIN — ACETAMINOPHEN 650 MG: 325 TABLET ORAL at 12:50

## 2020-09-16 RX ADMIN — ALBUTEROL SULFATE 2 PUFF: 90 AEROSOL, METERED RESPIRATORY (INHALATION) at 08:07

## 2020-09-16 RX ADMIN — ALBUTEROL SULFATE 2 PUFF: 90 AEROSOL, METERED RESPIRATORY (INHALATION) at 04:49

## 2020-09-16 NOTE — PROGRESS NOTES
Problem: Risk for Spread of Infection  Goal: Prevent transmission of infectious organism to others  Description: Prevent the transmission of infectious organisms to other patients, staff members, and visitors.   9/16/2020 1515 by Sandra Ye RN  Outcome: Progressing Towards Goal  9/16/2020 1127 by Sandra Ye RN  Outcome: Progressing Towards Goal

## 2020-09-16 NOTE — PROGRESS NOTES
Primary Nurse Wagner San RN and Rashard William RN performed a dual skin assessment on this patient No impairment noted  Darrion score is 21

## 2020-09-16 NOTE — PROGRESS NOTES
Bedside shift change report given to selma (oncoming nurse) by Zach Ramey (offgoing nurse). Report included the following information SBAR, Kardex and Intake/Output.

## 2020-09-16 NOTE — PROGRESS NOTES
[address]  Hospitalist Group                                                                                          Hospitalist Progress Note  Max Pereira MD  Answering service: [phone] from in house phone        Date of Service:  2020  NAME:  Jessica Whitney  :  1950  MRN:  [id number]    This documentation was facilitated by a Voice Recognition software and may contain inadvertent typographical errors. Admission Summary:   Per H&P   \"Candace Robledo is a 78 y.o.  female who has a history of breast cancer, hypertension, rheumatoid arthritis on methotrexate presenting with a 3-week history of increasing fatigue. 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 She denies fevers or chills but since yesterday she became progressively short of breath. She also reports that she felt so weak that she could not get out of bed. She reported that she was having some nausea and some loose stools. She denies bleeding hematemesis, hematochezia or melena. She presents to the emergency room reporting that she was extremely dehydrated. In the ER she had a CT angiogram of the chest which was negative for embolism. But did show bilateral lower lobe patchy pneumonia with the possibility of COVID-19 pneumonia. \"        Interval history / Subjective: Follow-up for bilateral pneumonia  -Patient's breathing better. He is on room air. She was sitting in a chair by the bedside. Assessment & Plan:   Bilateral pneumonia, bacterial versus viral, rule out COVID  -Continue Zithromax. She has recently received Bactrim for UTI. -She has allergic to Keflex, so no ceftriaxone  -SARS-CoV-2 pending.   Maintain droplet plus precaution  -Continue Decadron initiated in the ED  -Anticoagulation with Lovenox per COVID-19 protocol  -Monitor inflammatory markers  -On room air Hypertension  Continue with lisinopril     Hypothyroidism  On Synthroid        Code Status: Full code      surrogate Decision Maker: Daughter     DVT Prophylaxis: On Lovenox  GI Prophylaxis:      FUNCTIONAL STATUS PRIOR TO ADMISSION: Ambulates Independently     Patient is from: [facility] Problems  Date Reviewed: 3/10/2020          Codes Class Noted POA    Pneumonia ICD-10-CM: J18.9  ICD-9-CM: 486  9/15/2020 Unknown        Suspected COVID-19 virus infection ICD-10-CM: Z20.828  ICD-9-CM: V01.79  9/15/2020 Unknown        Viral pneumonia ICD-10-CM: J12.9  ICD-9-CM: 480.9  9/14/2020 Unknown                Review of Systems:   A comprehensive review of systems was negative except for that written in the HPI. Vital Signs:    Last 24hrs VS reviewed since prior progress note. Most recent are:  Visit Vitals  BP (!) 105/53 (BP 1 Location: Right arm, BP Patient Position: Sitting)   Pulse 71   Temp 99.1 °F (37.3 °C)   Resp 19   Ht 5' 4\" (1.626 m)   Wt 90.6 kg (199 lb 11.8 oz)   SpO2 97%   BMI 34.28 kg/m²       No intake or output data in the 24 hours ending 09/16/20 1822     Physical Examination:             Constitutional:  No acute distress, cooperative, pleasant    HEENT:  Atraumatic. Oral mucosa moist,. Non icteric sclera. No pallor. Resp:  CTA bilaterally. No wheezing/rhonchi/rales. No accessory muscle use   Chest Wall: No deformity   CV:  Regular rhythm, normal rate, no murmurs, gallops, rubs    GI:  Soft, non distended, non tender.  normoactive bowel sounds, no hepatosplenomegaly    :  No CVA or suprapubic tenderness    Musculoskeletal:  No edema, warm, 2+ pulses throughout    Neurologic:  Mental status:AAOx3,   Cranial nerves II-XII : WNL  Motor exam:Moves all extremities symmetrically              Data Review:    Review and/or order of clinical lab test  Review and/or order of tests in the radiology section of CPT  Review and/or order of tests in the medicine section of CPT      Labs:     Recent Labs 09/16/20  0614 09/15/20  0557   WBC 5.9 2.2*   HGB 12.4 12.9   HCT 37.3 38.1    234     Recent Labs     09/16/20  0614 09/15/20  0558 09/14/20  1751    137 134*   K 3.5 3.9 3.7   * 111* 104   CO2 20* 21 22   BUN 12 11 11   CREA 0.53* 0.75 0.89   * 165* 94   CA 8.6 8.9 9.3     Recent Labs     09/16/20  0614 09/15/20  0558 09/14/20  1751   ALT 28 31 37   * 127* 155*   TBILI 0.3 0.3 0.6   TP 6.6 7.5 8.6*   ALB 3.2* 3.2* 3.8   GLOB 3.4 4.3* 4.8*     Recent Labs     09/16/20  0614 09/15/20  0558 09/14/20  2318   INR 1.0  --  1.2*   PTP 10.3  --  12.6*   APTT 28.5 26.7 26.1      Recent Labs     09/16/20  0614 09/14/20  2318   FERR 753* 660*      No results found for: FOL, RBCF   No results for input(s): PH, PCO2, PO2 in the last 72 hours.   Recent Labs     09/14/20  1751   TROIQ <0.05     Lab Results   Component Value Date/Time    Cholesterol, total 187 12/12/2018 12:00 AM    HDL Cholesterol 55 12/12/2018 12:00 AM    LDL, calculated 103 (H) 12/12/2018 12:00 AM    Triglyceride 146 12/12/2018 12:00 AM    CHOL/HDL Ratio 4.6 11/07/2009 08:29 AM     No results found for: GLUCPOC  Lab Results   Component Value Date/Time    Color YELLOW/STRAW 09/14/2020 09:01 PM    Appearance CLEAR 09/14/2020 09:01 PM    Specific gravity 1.021 09/14/2020 09:01 PM    pH (UA) 7.5 09/14/2020 09:01 PM    Protein Negative 09/14/2020 09:01 PM    Glucose Negative 09/14/2020 09:01 PM    Ketone Negative 09/14/2020 09:01 PM    Bilirubin Negative 09/14/2020 09:01 PM    Urobilinogen 0.2 09/14/2020 09:01 PM    Nitrites Negative 09/14/2020 09:01 PM    Leukocyte Esterase TRACE (A) 09/14/2020 09:01 PM    Epithelial cells FEW 09/14/2020 09:01 PM    Bacteria 1+ (A) 09/14/2020 09:01 PM    WBC 0-4 09/14/2020 09:01 PM    RBC 0-5 09/14/2020 09:01 PM         Medications Reviewed:     Current Facility-Administered Medications   Medication Dose Route Frequency    influenza vaccine 2020-21 (6 mos+)(PF) (FLUARIX/FLULAVAL/FLUZONE QUAD) injection 0.5 mL  0.5 mL IntraMUSCular PRIOR TO DISCHARGE    albuterol (PROVENTIL HFA, VENTOLIN HFA, PROAIR HFA) inhaler 2 Puff  2 Puff Inhalation Q4H RT    LORazepam (ATIVAN) tablet 1.5 mg  1.5 mg Oral QHS    acetaminophen (TYLENOL) tablet 650 mg  650 mg Oral Q6H PRN    Or    acetaminophen (TYLENOL) suppository 650 mg  650 mg Rectal Q6H PRN    dexAMETHasone (DECADRON) tablet 6 mg  6 mg Oral DAILY WITH BREAKFAST    sodium chloride (NS) flush 5-40 mL  5-40 mL IntraVENous Q8H    sodium chloride (NS) flush 5-40 mL  5-40 mL IntraVENous PRN    azithromycin (ZITHROMAX) 500 mg in 0.9% sodium chloride (MBP/ADV) 250 mL  500 mg IntraVENous Q24H    enoxaparin (LOVENOX) injection 40 mg  40 mg SubCUTAneous Q12H    levothyroxine (SYNTHROID) tablet 200 mcg  200 mcg Oral 6am    citalopram (CELEXA) tablet 40 mg  40 mg Oral DAILY    folic acid (FOLVITE) tablet 1 mg  1 mg Oral DAILY    lisinopriL (PRINIVIL, ZESTRIL) tablet 10 mg  10 mg Oral DAILY    famotidine (PEPCID) tablet 20 mg  20 mg Oral BID     ______________________________________________________________________  EXPECTED LENGTH OF STAY: 3d 4h  ACTUAL LENGTH OF STAY:          1                 Ahsan Blanco MD

## 2020-09-17 LAB
ALBUMIN SERPL-MCNC: 3.2 G/DL (ref 3.5–5)
ALBUMIN/GLOB SERPL: 0.7 {RATIO} (ref 1.1–2.2)
ALP SERPL-CCNC: 130 U/L (ref 45–117)
ALT SERPL-CCNC: 30 U/L (ref 12–78)
ANION GAP SERPL CALC-SCNC: 9 MMOL/L (ref 5–15)
APTT PPP: 29.9 SEC (ref 22.1–32)
AST SERPL-CCNC: 21 U/L (ref 15–37)
BILIRUB SERPL-MCNC: 0.3 MG/DL (ref 0.2–1)
BUN SERPL-MCNC: 12 MG/DL (ref 6–20)
BUN/CREAT SERPL: 15 (ref 12–20)
CALCIUM SERPL-MCNC: 8.8 MG/DL (ref 8.5–10.1)
CHLORIDE SERPL-SCNC: 104 MMOL/L (ref 97–108)
CO2 SERPL-SCNC: 20 MMOL/L (ref 21–32)
CREAT SERPL-MCNC: 0.78 MG/DL (ref 0.55–1.02)
D DIMER PPP FEU-MCNC: 0.67 MG/L FEU (ref 0–0.65)
FIBRINOGEN PPP-MCNC: 526 MG/DL (ref 200–475)
GLOBULIN SER CALC-MCNC: 4.6 G/DL (ref 2–4)
GLUCOSE SERPL-MCNC: 103 MG/DL (ref 65–100)
INR PPP: 1 (ref 0.9–1.1)
POTASSIUM SERPL-SCNC: 3.8 MMOL/L (ref 3.5–5.1)
PROT SERPL-MCNC: 7.8 G/DL (ref 6.4–8.2)
PROTHROMBIN TIME: 10.3 SEC (ref 9–11.1)
SODIUM SERPL-SCNC: 133 MMOL/L (ref 136–145)
THERAPEUTIC RANGE,PTTT: NORMAL SECS (ref 58–77)

## 2020-09-17 PROCEDURE — 74011250637 HC RX REV CODE- 250/637: Performed by: HOSPITALIST

## 2020-09-17 PROCEDURE — 85730 THROMBOPLASTIN TIME PARTIAL: CPT

## 2020-09-17 PROCEDURE — 85610 PROTHROMBIN TIME: CPT

## 2020-09-17 PROCEDURE — 36415 COLL VENOUS BLD VENIPUNCTURE: CPT

## 2020-09-17 PROCEDURE — 74011250636 HC RX REV CODE- 250/636: Performed by: INTERNAL MEDICINE

## 2020-09-17 PROCEDURE — 85384 FIBRINOGEN ACTIVITY: CPT

## 2020-09-17 PROCEDURE — 74011250637 HC RX REV CODE- 250/637: Performed by: NURSE PRACTITIONER

## 2020-09-17 PROCEDURE — 85379 FIBRIN DEGRADATION QUANT: CPT

## 2020-09-17 PROCEDURE — 74011250636 HC RX REV CODE- 250/636: Performed by: HOSPITALIST

## 2020-09-17 PROCEDURE — 65270000029 HC RM PRIVATE

## 2020-09-17 PROCEDURE — 94664 DEMO&/EVAL PT USE INHALER: CPT

## 2020-09-17 PROCEDURE — 80053 COMPREHEN METABOLIC PANEL: CPT

## 2020-09-17 PROCEDURE — 94640 AIRWAY INHALATION TREATMENT: CPT

## 2020-09-17 RX ORDER — SODIUM CHLORIDE 9 MG/ML
100 INJECTION, SOLUTION INTRAVENOUS CONTINUOUS
Status: DISCONTINUED | OUTPATIENT
Start: 2020-09-17 | End: 2020-09-26 | Stop reason: HOSPADM

## 2020-09-17 RX ORDER — LEVOFLOXACIN 5 MG/ML
750 INJECTION, SOLUTION INTRAVENOUS EVERY 24 HOURS
Status: DISCONTINUED | OUTPATIENT
Start: 2020-09-17 | End: 2020-09-21

## 2020-09-17 RX ADMIN — SODIUM CHLORIDE 125 ML/HR: 9 INJECTION, SOLUTION INTRAVENOUS at 12:33

## 2020-09-17 RX ADMIN — LISINOPRIL 10 MG: 10 TABLET ORAL at 09:24

## 2020-09-17 RX ADMIN — ALBUTEROL SULFATE 2 PUFF: 90 AEROSOL, METERED RESPIRATORY (INHALATION) at 17:41

## 2020-09-17 RX ADMIN — Medication 10 ML: at 12:00

## 2020-09-17 RX ADMIN — FOLIC ACID 1 MG: 1 TABLET ORAL at 09:24

## 2020-09-17 RX ADMIN — FAMOTIDINE 20 MG: 20 TABLET ORAL at 09:25

## 2020-09-17 RX ADMIN — AZITHROMYCIN 500 MG: 500 INJECTION, POWDER, LYOPHILIZED, FOR SOLUTION INTRAVENOUS at 00:24

## 2020-09-17 RX ADMIN — ALBUTEROL SULFATE 2 PUFF: 90 AEROSOL, METERED RESPIRATORY (INHALATION) at 11:56

## 2020-09-17 RX ADMIN — Medication 10 ML: at 07:15

## 2020-09-17 RX ADMIN — LORAZEPAM 1.5 MG: 0.5 TABLET ORAL at 22:03

## 2020-09-17 RX ADMIN — ENOXAPARIN SODIUM 40 MG: 40 INJECTION SUBCUTANEOUS at 09:20

## 2020-09-17 RX ADMIN — DEXAMETHASONE 6 MG: 4 TABLET ORAL at 07:14

## 2020-09-17 RX ADMIN — FAMOTIDINE 20 MG: 20 TABLET ORAL at 19:21

## 2020-09-17 RX ADMIN — Medication 10 ML: at 22:05

## 2020-09-17 RX ADMIN — BENZOCAINE AND MENTHOL 1 LOZENGE: 15; 3.6 LOZENGE ORAL at 22:03

## 2020-09-17 RX ADMIN — ALBUTEROL SULFATE 2 PUFF: 90 AEROSOL, METERED RESPIRATORY (INHALATION) at 22:08

## 2020-09-17 RX ADMIN — SODIUM CHLORIDE 1000 ML: 9 INJECTION, SOLUTION INTRAVENOUS at 09:31

## 2020-09-17 RX ADMIN — BENZOCAINE AND MENTHOL 1 LOZENGE: 15; 3.6 LOZENGE ORAL at 01:38

## 2020-09-17 RX ADMIN — ALBUTEROL SULFATE 2 PUFF: 90 AEROSOL, METERED RESPIRATORY (INHALATION) at 07:27

## 2020-09-17 RX ADMIN — LEVOFLOXACIN 750 MG: 5 INJECTION, SOLUTION INTRAVENOUS at 12:00

## 2020-09-17 RX ADMIN — ACETAMINOPHEN 650 MG: 325 TABLET ORAL at 01:38

## 2020-09-17 RX ADMIN — AZITHROMYCIN 500 MG: 500 INJECTION, POWDER, LYOPHILIZED, FOR SOLUTION INTRAVENOUS at 22:03

## 2020-09-17 RX ADMIN — ENOXAPARIN SODIUM 40 MG: 40 INJECTION SUBCUTANEOUS at 22:03

## 2020-09-17 RX ADMIN — BENZOCAINE AND MENTHOL 1 LOZENGE: 15; 3.6 LOZENGE ORAL at 19:28

## 2020-09-17 RX ADMIN — LEVOTHYROXINE SODIUM 200 MCG: 0.1 TABLET ORAL at 07:14

## 2020-09-17 NOTE — PROGRESS NOTES
[address] [facility] Adult  Hospitalist Group                                                                                          Hospitalist Progress Note  Nishi Gilliland MD  Answering service: [phone] from in house phone        Date of Service:  2020  NAME:  Suhas Montana  :  1950  MRN:  [id number]    This documentation was facilitated by a Voice Recognition software and may contain inadvertent typographical errors. Admission Summary:   Per H&P   \"Candace Loya is a 78 y.o.  female who has a history of breast cancer, hypertension, rheumatoid arthritis on methotrexate presenting with a 3-week history of increasing fatigue. 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 She denies fevers or chills but since yesterday she became progressively short of breath. She also reports that she felt so weak that she could not get out of bed. She reported that she was having some nausea and some loose stools. She denies bleeding hematemesis, hematochezia or melena. She presents to the emergency room reporting that she was extremely dehydrated. In the ER she had a CT angiogram of the chest which was negative for embolism. But did show bilateral lower lobe patchy pneumonia with the possibility of COVID-19 pneumonia. \"        Interval history / Subjective:        Feels sick, not eating/drinking well, temp to 101. Assessment & Plan:   Bilateral pneumonia, bacterial versus viral, rule out COVID  -Continue Zithromax. She has recently received Bactrim for UTI. -She has allergic to Keflex, so no ceftriaxone  -SARS-CoV-2 pending. Maintain droplet plus precaution  -Continue Decadron initiated in the ED  -Anticoagulation with Lovenox per COVID-19 protocol  -Monitor inflammatory markers  -On room air  - abx expanded.  2020     DEHYDRATION: adding ivf bolus and ivf's / f.u lab Hypertension  Continue with lisinopril     Hypothyroidism  On Synthroid        Code Status: Full code      surrogate Decision Maker: Daughter     DVT Prophylaxis: On Lovenox  GI Prophylaxis:      FUNCTIONAL STATUS PRIOR TO ADMISSION: Ambulates Independently     Patient is from: [facility] Problems  Date Reviewed: 3/10/2020          Codes Class Noted POA    Pneumonia ICD-10-CM: J18.9  ICD-9-CM: 486  9/15/2020 Unknown        Suspected COVID-19 virus infection ICD-10-CM: Z20.828  ICD-9-CM: V01.79  9/15/2020 Unknown        Viral pneumonia ICD-10-CM: J12.9  ICD-9-CM: 480.9  9/14/2020 Unknown                Review of Systems:   A comprehensive review of systems was negative except for that written in the HPI. 'feels dry, not eating/drinking well,       Vital Signs:    Last 24hrs VS reviewed since prior progress note. Most recent are:  Visit Vitals  BP (!) 166/74 (BP 1 Location: Right arm, BP Patient Position: At rest)   Pulse 87   Temp (!) 101.1 °F (38.4 °C)   Resp 19   Ht 5' 4\" (1.626 m)   Wt 90.6 kg (199 lb 11.8 oz)   SpO2 96%   BMI 34.28 kg/m²       No intake or output data in the 24 hours ending 09/17/20 0905     Physical Examination:             Constitutional:    Ill appearing. Else: cooperative, pleasant    HEENT:  Atraumatic. Oral mucosa moist,. Non icteric sclera. No pallor. Resp:  CTA bilaterally. No wheezing/rhonchi/rales. No accessory muscle use   Chest Wall: No deformity   CV:  Regular rhythm, normal rate, no murmurs, gallops, rubs    GI:  Soft, non distended, non tender.  normoactive bowel sounds, no hepatosplenomegaly    :  No CVA or suprapubic tenderness    Musculoskeletal:  No edema, warm, 2+ pulses throughout    Neurologic:  Mental status:AAOx3,   Cranial nerves II-XII : WNL  Motor exam:Moves all extremities symmetrically              Data Review:    Review and/or order of clinical lab test  Review and/or order of tests in the radiology section of CPT  Review and/or order of tests in the medicine section of [facility]      Labs:     Recent Labs     09/16/20  0614 09/15/20  0557   WBC 5.9 2.2*   HGB 12.4 12.9   HCT 37.3 38.1    234     Recent Labs     09/17/20  0032 09/16/20  0614 09/15/20  0558   * 139 137   K 3.8 3.5 3.9    110* 111*   CO2 20* 20* 21   BUN 12 12 11   CREA 0.78 0.53* 0.75   * 109* 165*   CA 8.8 8.6 8.9     Recent Labs     09/17/20  0032 09/16/20  0614 09/15/20  0558   ALT 30 28 31   * 121* 127*   TBILI 0.3 0.3 0.3   TP 7.8 6.6 7.5   ALB 3.2* 3.2* 3.2*   GLOB 4.6* 3.4 4.3*     Recent Labs     09/17/20  0032 09/16/20  0614 09/15/20  0558 09/14/20  2318   INR 1.0 1.0  --  1.2*   PTP 10.3 10.3  --  12.6*   APTT 29.9 28.5 26.7 26.1      Recent Labs     09/16/20  0614 09/14/20  2318   FERR 753* 660*      No results found for: FOL, RBCF   No results for input(s): PH, PCO2, PO2 in the last 72 hours.   Recent Labs     09/14/20  1751   TROIQ <0.05     Lab Results   Component Value Date/Time    Cholesterol, total 187 12/12/2018 12:00 AM    HDL Cholesterol 55 12/12/2018 12:00 AM    LDL, calculated 103 (H) 12/12/2018 12:00 AM    Triglyceride 146 12/12/2018 12:00 AM    CHOL/HDL Ratio 4.6 11/07/2009 08:29 AM     No results found for: GLUCPOC  Lab Results   Component Value Date/Time    Color YELLOW/STRAW 09/14/2020 09:01 PM    Appearance CLEAR 09/14/2020 09:01 PM    Specific gravity 1.021 09/14/2020 09:01 PM    pH (UA) 7.5 09/14/2020 09:01 PM    Protein Negative 09/14/2020 09:01 PM    Glucose Negative 09/14/2020 09:01 PM    Ketone Negative 09/14/2020 09:01 PM    Bilirubin Negative 09/14/2020 09:01 PM    Urobilinogen 0.2 09/14/2020 09:01 PM    Nitrites Negative 09/14/2020 09:01 PM    Leukocyte Esterase TRACE (A) 09/14/2020 09:01 PM    Epithelial cells FEW 09/14/2020 09:01 PM    Bacteria 1+ (A) 09/14/2020 09:01 PM    WBC 0-4 09/14/2020 09:01 PM    RBC 0-5 09/14/2020 09:01 PM         Medications Reviewed:     Current Facility-Administered Medications   Medication Dose Route Frequency    benzocaine-menthoL (CEPACOL) lozenge 1 Lozenge  1 Lozenge Mucous Membrane PRN    sodium chloride 0.9 % bolus infusion 1,000 mL  1,000 mL IntraVENous ONCE    0.9% sodium chloride infusion  125 mL/hr IntraVENous CONTINUOUS    levoFLOXacin (LEVAQUIN) 750 mg in D5W IVPB  750 mg IntraVENous Q24H    influenza vaccine 2020-21 (6 mos+)(PF) (FLUARIX/FLULAVAL/FLUZONE QUAD) injection 0.5 mL  0.5 mL IntraMUSCular PRIOR TO DISCHARGE    albuterol (PROVENTIL HFA, VENTOLIN HFA, PROAIR HFA) inhaler 2 Puff  2 Puff Inhalation Q4H RT    LORazepam (ATIVAN) tablet 1.5 mg  1.5 mg Oral QHS    acetaminophen (TYLENOL) tablet 650 mg  650 mg Oral Q6H PRN    Or    acetaminophen (TYLENOL) suppository 650 mg  650 mg Rectal Q6H PRN    dexAMETHasone (DECADRON) tablet 6 mg  6 mg Oral DAILY WITH BREAKFAST    sodium chloride (NS) flush 5-40 mL  5-40 mL IntraVENous Q8H    sodium chloride (NS) flush 5-40 mL  5-40 mL IntraVENous PRN    azithromycin (ZITHROMAX) 500 mg in 0.9% sodium chloride (MBP/ADV) 250 mL  500 mg IntraVENous Q24H    enoxaparin (LOVENOX) injection 40 mg  40 mg SubCUTAneous Q12H    levothyroxine (SYNTHROID) tablet 200 mcg  200 mcg Oral 6am    [Held by provider] citalopram (CELEXA) tablet 40 mg  40 mg Oral DAILY    folic acid (FOLVITE) tablet 1 mg  1 mg Oral DAILY    lisinopriL (PRINIVIL, ZESTRIL) tablet 10 mg  10 mg Oral DAILY    famotidine (PEPCID) tablet 20 mg  20 mg Oral BID     ______________________________________________________________________  EXPECTED LENGTH OF STAY: 3d 4h  ACTUAL LENGTH OF STAY:          2    Medical decision making    I have reviewed flow sheets and previous day's notes  Patient has acute or chronic illness that posses a threat to bodily functions or life   Have Reviewed and ordered Clinical test  Have reviewed and ordered Radiology test  Have personally Reviewed imaging reports   High risk drug therapy as that require intensive monitoring for toxicity as in pressors, antibiotics, steroids and narcotic pain management.                 Ryan Benedict MD

## 2020-09-17 NOTE — PROGRESS NOTES
Bedside shift change report given to Violeta Moore RN (oncoming nurse) by Della Bazzi RN (offgoing nurse). Report included the following information SBAR, MAR and Recent Results.

## 2020-09-17 NOTE — PROGRESS NOTES
Bedside shift change report given to Avelino Ellsworth (oncoming nurse) by Urszula Solis RN (offgoing nurse). Report included the following information SBAR, Kardex and MAR.

## 2020-09-18 LAB
ALBUMIN SERPL-MCNC: 2.6 G/DL (ref 3.5–5)
ALBUMIN/GLOB SERPL: 0.6 {RATIO} (ref 1.1–2.2)
ALP SERPL-CCNC: 108 U/L (ref 45–117)
ALT SERPL-CCNC: 26 U/L (ref 12–78)
ANION GAP SERPL CALC-SCNC: 7 MMOL/L (ref 5–15)
APTT PPP: 33.9 SEC (ref 22.1–32)
AST SERPL-CCNC: 20 U/L (ref 15–37)
BILIRUB SERPL-MCNC: 0.3 MG/DL (ref 0.2–1)
BUN SERPL-MCNC: 11 MG/DL (ref 6–20)
BUN/CREAT SERPL: 17 (ref 12–20)
CALCIUM SERPL-MCNC: 8.2 MG/DL (ref 8.5–10.1)
CHLORIDE SERPL-SCNC: 106 MMOL/L (ref 97–108)
CO2 SERPL-SCNC: 20 MMOL/L (ref 21–32)
CREAT SERPL-MCNC: 0.66 MG/DL (ref 0.55–1.02)
D DIMER PPP FEU-MCNC: 0.91 MG/L FEU (ref 0–0.65)
FIBRINOGEN PPP-MCNC: 552 MG/DL (ref 200–475)
GLOBULIN SER CALC-MCNC: 4.1 G/DL (ref 2–4)
GLUCOSE SERPL-MCNC: 102 MG/DL (ref 65–100)
INR PPP: 1.1 (ref 0.9–1.1)
POTASSIUM SERPL-SCNC: 3.4 MMOL/L (ref 3.5–5.1)
PROT SERPL-MCNC: 6.7 G/DL (ref 6.4–8.2)
PROTHROMBIN TIME: 11 SEC (ref 9–11.1)
SODIUM SERPL-SCNC: 133 MMOL/L (ref 136–145)
THERAPEUTIC RANGE,PTTT: ABNORMAL SECS (ref 58–77)

## 2020-09-18 PROCEDURE — 85379 FIBRIN DEGRADATION QUANT: CPT

## 2020-09-18 PROCEDURE — 74011250636 HC RX REV CODE- 250/636: Performed by: INTERNAL MEDICINE

## 2020-09-18 PROCEDURE — 74011250636 HC RX REV CODE- 250/636: Performed by: HOSPITALIST

## 2020-09-18 PROCEDURE — 65270000029 HC RM PRIVATE

## 2020-09-18 PROCEDURE — 74011250637 HC RX REV CODE- 250/637: Performed by: HOSPITALIST

## 2020-09-18 PROCEDURE — 36415 COLL VENOUS BLD VENIPUNCTURE: CPT

## 2020-09-18 PROCEDURE — 80053 COMPREHEN METABOLIC PANEL: CPT

## 2020-09-18 PROCEDURE — 94760 N-INVAS EAR/PLS OXIMETRY 1: CPT

## 2020-09-18 PROCEDURE — 85610 PROTHROMBIN TIME: CPT

## 2020-09-18 PROCEDURE — 85730 THROMBOPLASTIN TIME PARTIAL: CPT

## 2020-09-18 PROCEDURE — 94640 AIRWAY INHALATION TREATMENT: CPT

## 2020-09-18 PROCEDURE — 85384 FIBRINOGEN ACTIVITY: CPT

## 2020-09-18 PROCEDURE — 74011250637 HC RX REV CODE- 250/637: Performed by: NURSE PRACTITIONER

## 2020-09-18 RX ORDER — ONDANSETRON 4 MG/1
4 TABLET, ORALLY DISINTEGRATING ORAL ONCE
Status: COMPLETED | OUTPATIENT
Start: 2020-09-18 | End: 2020-09-18

## 2020-09-18 RX ADMIN — ACETAMINOPHEN 650 MG: 325 TABLET ORAL at 03:03

## 2020-09-18 RX ADMIN — Medication 10 ML: at 22:06

## 2020-09-18 RX ADMIN — ALBUTEROL SULFATE 2 PUFF: 90 AEROSOL, METERED RESPIRATORY (INHALATION) at 03:18

## 2020-09-18 RX ADMIN — ONDANSETRON 4 MG: 4 TABLET, ORALLY DISINTEGRATING ORAL at 00:51

## 2020-09-18 RX ADMIN — LEVOTHYROXINE SODIUM 200 MCG: 0.1 TABLET ORAL at 07:27

## 2020-09-18 RX ADMIN — BENZOCAINE AND MENTHOL 1 LOZENGE: 15; 3.6 LOZENGE ORAL at 17:33

## 2020-09-18 RX ADMIN — LEVOFLOXACIN 750 MG: 5 INJECTION, SOLUTION INTRAVENOUS at 12:06

## 2020-09-18 RX ADMIN — ALBUTEROL SULFATE 2 PUFF: 90 AEROSOL, METERED RESPIRATORY (INHALATION) at 07:33

## 2020-09-18 RX ADMIN — Medication 10 ML: at 07:28

## 2020-09-18 RX ADMIN — ENOXAPARIN SODIUM 40 MG: 40 INJECTION SUBCUTANEOUS at 22:06

## 2020-09-18 RX ADMIN — ALBUTEROL SULFATE 2 PUFF: 90 AEROSOL, METERED RESPIRATORY (INHALATION) at 15:39

## 2020-09-18 RX ADMIN — FAMOTIDINE 20 MG: 20 TABLET ORAL at 17:32

## 2020-09-18 RX ADMIN — ALBUTEROL SULFATE 2 PUFF: 90 AEROSOL, METERED RESPIRATORY (INHALATION) at 12:25

## 2020-09-18 RX ADMIN — FOLIC ACID 1 MG: 1 TABLET ORAL at 09:52

## 2020-09-18 RX ADMIN — AZITHROMYCIN 500 MG: 500 INJECTION, POWDER, LYOPHILIZED, FOR SOLUTION INTRAVENOUS at 22:06

## 2020-09-18 RX ADMIN — LORAZEPAM 1.5 MG: 0.5 TABLET ORAL at 22:06

## 2020-09-18 RX ADMIN — LISINOPRIL 10 MG: 10 TABLET ORAL at 09:52

## 2020-09-18 RX ADMIN — BENZOCAINE AND MENTHOL 1 LOZENGE: 15; 3.6 LOZENGE ORAL at 03:08

## 2020-09-18 RX ADMIN — FAMOTIDINE 20 MG: 20 TABLET ORAL at 09:52

## 2020-09-18 RX ADMIN — Medication 10 ML: at 17:33

## 2020-09-18 RX ADMIN — ENOXAPARIN SODIUM 40 MG: 40 INJECTION SUBCUTANEOUS at 09:52

## 2020-09-18 RX ADMIN — DEXAMETHASONE 6 MG: 4 TABLET ORAL at 07:27

## 2020-09-18 NOTE — PROGRESS NOTES
Post Fall Documentation      Hardik Russo witnessed/unwitnessed fall occurred on 09/18/20 (Date) at [address] (Time). The answers to the following questions summarize the fall: In the patient's own words,:  · What were you attempting to do when you fell? To use the bathroom  · Do you know why you fell? Sleepy  · Do you have any pain/discomfort or any other complaints? None  · Which part of your body made contact with the floor or other object? Buttocks    Nurse:  Ferny Garibay Was this an assisted fall? no   Was fall witnessed? No   If witnessed, what part of the body made contact with the floor or other object? Buttocks   Patients mental status after the fall/when found: Alert and oriented   Any apparent injury:  No apparent injury   Immediate interventions for injury/suspected injury? Other Vitals were taken and IV site was dressed; Zofran administered   Patient assisted back to bed? Assist X2   Name of provider notified and time, any comments? Laura Bustillo NP [phone]        Name of family member notified and time: Family not notified per patient request       Immediate VS and physical assessment documented in flow sheets. Neuro assessment every hour x 4 (for potential head injury or unwitnessed fall) documented in flow sheets.       Susen Goodpasture

## 2020-09-18 NOTE — PROGRESS NOTES
Patient stated that while she was sleeping, she had the urge to use the bathroom. While attempting to get out of the bed, she lost her foot and fell on her bottom. As a result of the fall, she lost her IV access. No injuries were noted and vitals were stable. NP was notified and was told to continue to monitor. Patient stated that she felt nauseous and oral Zofran was administered. This patient is normally up ad tye.

## 2020-09-18 NOTE — PROGRESS NOTES
[facility] [facility] Adult  Hospitalist Group                                                                                          Hospitalist Progress Note  Abilio Smith MD  Answering service: [phone] from in house phone        Date of Service:  2020  NAME:  Tracie Velazquez  :  1950  MRN:  [id number]    This documentation was facilitated by a Voice Recognition software and may contain inadvertent typographical errors. Admission Summary:   Per H&P   \"Candace Ansari is a 78 y.o.  female who has a history of breast cancer, hypertension, rheumatoid arthritis on methotrexate presenting with a 3-week history of increasing fatigue. 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 She denies fevers or chills but since yesterday she became progressively short of breath. She also reports that she felt so weak that she could not get out of bed. She reported that she was having some nausea and some loose stools. She denies bleeding hematemesis, hematochezia or melena. She presents to the emergency room reporting that she was extremely dehydrated. In the ER she had a CT angiogram of the chest which was negative for embolism. But did show bilateral lower lobe patchy pneumonia with the possibility of COVID-19 pneumonia. \"        Interval history / Subjective:        2020   T-max of 102 continues IV antibiotics    Assessment & Plan:   Bilateral pneumonia, bacterial versus viral, rule out COVID  -Continue Zithromax. She has recently received Bactrim for UTI. -She has allergic to Keflex, so no ceftriaxone  -SARS-CoV-2 pending. Maintain droplet plus precaution  -Continue Decadron initiated in the ED  -Anticoagulation with Lovenox per COVID-19 protocol  -Monitor inflammatory markers  -On room air  - abx expanded. 2020 ; adding Levaquin.    T-max 102    DEHYDRATION: adding ivf bolus and ivf's / f.u lab      Hypertension  Continue with lisinopril  BP Readings from Last 1 Encounters:   09/18/20 (!) 111/58         Hypothyroidism  On Synthroid        Code Status: Full code      surrogate Decision Maker: Daughter     DVT Prophylaxis: On Lovenox  GI Prophylaxis:      FUNCTIONAL STATUS PRIOR TO ADMISSION: Ambulates Independently     Patient is from: [facility] Problems  Date Reviewed: 3/10/2020          Codes Class Noted POA    Pneumonia ICD-10-CM: J18.9  ICD-9-CM: 486  9/15/2020 Unknown        Suspected COVID-19 virus infection ICD-10-CM: Z20.828  ICD-9-CM: V01.79  9/15/2020 Unknown        Viral pneumonia ICD-10-CM: J12.9  ICD-9-CM: 480.9  9/14/2020 Unknown                Review of Systems:   A comprehensive review of systems was negative except for that written in the HPI. Vital Signs:    Last 24hrs VS reviewed since prior progress note. Most recent are:  Visit Vitals  BP (!) 111/58 (BP 1 Location: Right arm, BP Patient Position: At rest)   Pulse 81   Temp 98.2 °F (36.8 °C)   Resp 16   Ht 5' 4\" (1.626 m)   Wt 90.6 kg (199 lb 11.8 oz)   SpO2 91%   BMI 34.28 kg/m²         Intake/Output Summary (Last 24 hours) at 9/18/2020 0840  Last data filed at 9/17/2020 1830  Gross per 24 hour   Intake 420 ml   Output    Net 420 ml        Physical Examination:             Constitutional:    Ill appearing. Else: cooperative, pleasant    HEENT:  Atraumatic. Oral mucosa moist,. Non icteric sclera. No pallor. Resp:  CTA bilaterally. No wheezing/rhonchi/rales. No accessory muscle use   Chest Wall: No deformity   CV:  Regular rhythm, normal rate, no murmurs, gallops, rubs    GI:  Soft, non distended, non tender.  normoactive bowel sounds, no hepatosplenomegaly    :  No CVA or suprapubic tenderness    Musculoskeletal:  No edema, warm, 2+ pulses throughout    Neurologic:  Mental status:AAOx3,   Cranial nerves II-XII : WNL  Motor exam:Moves all extremities symmetrically              Data Review: Review and/or order of clinical lab test  Review and/or order of tests in the radiology section of CPT  Review and/or order of tests in the medicine section of CPT      Labs:     Recent Labs     09/16/20 0614   WBC 5.9   HGB 12.4   HCT 37.3        Recent Labs     09/18/20  0007 09/17/20  0032 09/16/20 0614   * 133* 139   K 3.4* 3.8 3.5    104 110*   CO2 20* 20* 20*   BUN 11 12 12   CREA 0.66 0.78 0.53*   * 103* 109*   CA 8.2* 8.8 8.6     Recent Labs     09/18/20 0007 09/17/20 0032 09/16/20 0614   ALT 26 30 28    130* 121*   TBILI 0.3 0.3 0.3   TP 6.7 7.8 6.6   ALB 2.6* 3.2* 3.2*   GLOB 4.1* 4.6* 3.4     Recent Labs     09/18/20 0007 09/17/20 0032 09/16/20  0614   INR 1.1 1.0 1.0   PTP 11.0 10.3 10.3   APTT 33.9* 29.9 28.5      Recent Labs     09/16/20  0614   FERR 753*      No results found for: FOL, RBCF   No results for input(s): PH, PCO2, PO2 in the last 72 hours. No results for input(s): CPK, CKNDX, TROIQ in the last 72 hours.     No lab exists for component: CPKMB  Lab Results   Component Value Date/Time    Cholesterol, total 187 12/12/2018 12:00 AM    HDL Cholesterol 55 12/12/2018 12:00 AM    LDL, calculated 103 (H) 12/12/2018 12:00 AM    Triglyceride 146 12/12/2018 12:00 AM    CHOL/HDL Ratio 4.6 11/07/2009 08:29 AM     No results found for: GLUCPOC  Lab Results   Component Value Date/Time    Color YELLOW/STRAW 09/14/2020 09:01 PM    Appearance CLEAR 09/14/2020 09:01 PM    Specific gravity 1.021 09/14/2020 09:01 PM    pH (UA) 7.5 09/14/2020 09:01 PM    Protein Negative 09/14/2020 09:01 PM    Glucose Negative 09/14/2020 09:01 PM    Ketone Negative 09/14/2020 09:01 PM    Bilirubin Negative 09/14/2020 09:01 PM    Urobilinogen 0.2 09/14/2020 09:01 PM    Nitrites Negative 09/14/2020 09:01 PM    Leukocyte Esterase TRACE (A) 09/14/2020 09:01 PM    Epithelial cells FEW 09/14/2020 09:01 PM    Bacteria 1+ (A) 09/14/2020 09:01 PM    WBC 0-4 09/14/2020 09:01 PM    RBC 0-5 09/14/2020 09:01 PM         Medications Reviewed:     Current Facility-Administered Medications   Medication Dose Route Frequency    benzocaine-menthoL (CEPACOL) lozenge 1 Lozenge  1 Lozenge Mucous Membrane PRN    0.9% sodium chloride infusion  125 mL/hr IntraVENous CONTINUOUS    levoFLOXacin (LEVAQUIN) 750 mg in D5W IVPB  750 mg IntraVENous Q24H    influenza vaccine 2020-21 (6 mos+)(PF) (FLUARIX/FLULAVAL/FLUZONE QUAD) injection 0.5 mL  0.5 mL IntraMUSCular PRIOR TO DISCHARGE    albuterol (PROVENTIL HFA, VENTOLIN HFA, PROAIR HFA) inhaler 2 Puff  2 Puff Inhalation Q4H RT    LORazepam (ATIVAN) tablet 1.5 mg  1.5 mg Oral QHS    acetaminophen (TYLENOL) tablet 650 mg  650 mg Oral Q6H PRN    Or    acetaminophen (TYLENOL) suppository 650 mg  650 mg Rectal Q6H PRN    dexAMETHasone (DECADRON) tablet 6 mg  6 mg Oral DAILY WITH BREAKFAST    sodium chloride (NS) flush 5-40 mL  5-40 mL IntraVENous Q8H    sodium chloride (NS) flush 5-40 mL  5-40 mL IntraVENous PRN    azithromycin (ZITHROMAX) 500 mg in 0.9% sodium chloride (MBP/ADV) 250 mL  500 mg IntraVENous Q24H    enoxaparin (LOVENOX) injection 40 mg  40 mg SubCUTAneous Q12H    levothyroxine (SYNTHROID) tablet 200 mcg  200 mcg Oral 6am    [Held by provider] citalopram (CELEXA) tablet 40 mg  40 mg Oral DAILY    folic acid (FOLVITE) tablet 1 mg  1 mg Oral DAILY    lisinopriL (PRINIVIL, ZESTRIL) tablet 10 mg  10 mg Oral DAILY    famotidine (PEPCID) tablet 20 mg  20 mg Oral BID     ______________________________________________________________________  EXPECTED LENGTH OF STAY: 3d 4h  ACTUAL LENGTH OF STAY:          3    Medical decision making    I have reviewed flow sheets and previous day's notes  Patient has acute or chronic illness that posses a threat to bodily functions or life   Have Reviewed and ordered Clinical test  Have reviewed and ordered Radiology test  Have personally Reviewed imaging reports   High risk drug therapy as that require intensive monitoring for toxicity as in pressors, antibiotics, steroids and narcotic pain management.                 Tiffanie Gusman MD

## 2020-09-18 NOTE — ROUTINE PROCESS
Bedside shift change report given to Ford (oncoming nurse) by Fahad Dsouza (offgoing nurse). Report included the following information SBAR, Kardex, Intake/Output, MAR and Recent Results.

## 2020-09-18 NOTE — PROGRESS NOTES
CHONG:  1. Home with family when stable. 2. Family or friend transport. Patient does not have any needs for home health services. Medicare pt has received, reviewed, and signed 2nd IM letter informing them of their right to appeal the discharge. Signed copy has been placed on pt bedside chart.     Deandra Martinez [facility]

## 2020-09-18 NOTE — PROGRESS NOTES
Bedside shift change report given to Avelino Ellsworth (oncoming nurse) by Oscar Cervantes (offgoing nurse). Report included the following information SBAR, Kardex and MAR.

## 2020-09-19 ENCOUNTER — APPOINTMENT (OUTPATIENT)
Dept: GENERAL RADIOLOGY | Age: 70
DRG: 194 | End: 2020-09-19
Attending: INTERNAL MEDICINE
Payer: MEDICARE

## 2020-09-19 LAB
ANION GAP SERPL CALC-SCNC: 7 MMOL/L (ref 5–15)
ARTERIAL PATENCY WRIST A: YES
BASE DEFICIT BLD-SCNC: 1 MMOL/L
BASOPHILS # BLD: 0 K/UL (ref 0–0.1)
BASOPHILS NFR BLD: 0 % (ref 0–1)
BDY SITE: ABNORMAL
BUN SERPL-MCNC: 10 MG/DL (ref 6–20)
BUN/CREAT SERPL: 13 (ref 12–20)
CA-I BLD-SCNC: 1.18 MMOL/L (ref 1.12–1.32)
CALCIUM SERPL-MCNC: 8.7 MG/DL (ref 8.5–10.1)
CHLORIDE SERPL-SCNC: 102 MMOL/L (ref 97–108)
CO2 SERPL-SCNC: 23 MMOL/L (ref 21–32)
CREAT SERPL-MCNC: 0.76 MG/DL (ref 0.55–1.02)
DIFFERENTIAL METHOD BLD: ABNORMAL
EOSINOPHIL # BLD: 0 K/UL (ref 0–0.4)
EOSINOPHIL NFR BLD: 0 % (ref 0–7)
ERYTHROCYTE [DISTWIDTH] IN BLOOD BY AUTOMATED COUNT: 12.8 % (ref 11.5–14.5)
GAS FLOW.O2 O2 DELIVERY SYS: ABNORMAL L/MIN
GAS FLOW.O2 SETTING OXYMISER: 3 L/M
GLUCOSE SERPL-MCNC: 114 MG/DL (ref 65–100)
HCO3 BLD-SCNC: 22.2 MMOL/L (ref 22–26)
HCT VFR BLD AUTO: 37.7 % (ref 35–47)
HGB BLD-MCNC: 13.1 G/DL (ref 11.5–16)
IMM GRANULOCYTES # BLD AUTO: 0.1 K/UL (ref 0–0.04)
IMM GRANULOCYTES NFR BLD AUTO: 1 % (ref 0–0.5)
LYMPHOCYTES # BLD: 0.4 K/UL (ref 0.8–3.5)
LYMPHOCYTES NFR BLD: 4 % (ref 12–49)
MAGNESIUM SERPL-MCNC: 1.6 MG/DL (ref 1.6–2.4)
MCH RBC QN AUTO: 30.2 PG (ref 26–34)
MCHC RBC AUTO-ENTMCNC: 34.7 G/DL (ref 30–36.5)
MCV RBC AUTO: 86.9 FL (ref 80–99)
MONOCYTES # BLD: 0.5 K/UL (ref 0–1)
MONOCYTES NFR BLD: 5 % (ref 5–13)
NEUTS SEG # BLD: 8 K/UL (ref 1.8–8)
NEUTS SEG NFR BLD: 90 % (ref 32–75)
NRBC # BLD: 0 K/UL (ref 0–0.01)
NRBC BLD-RTO: 0 PER 100 WBC
PCO2 BLD: 29.8 MMHG (ref 35–45)
PH BLD: 7.48 [PH] (ref 7.35–7.45)
PLATELET # BLD AUTO: 320 K/UL (ref 150–400)
PMV BLD AUTO: 8.9 FL (ref 8.9–12.9)
PO2 BLD: 66 MMHG (ref 80–100)
POTASSIUM SERPL-SCNC: 3.6 MMOL/L (ref 3.5–5.1)
RBC # BLD AUTO: 4.34 M/UL (ref 3.8–5.2)
RBC MORPH BLD: ABNORMAL
SAO2 % BLD: 94 % (ref 92–97)
SODIUM SERPL-SCNC: 132 MMOL/L (ref 136–145)
SPECIMEN TYPE: ABNORMAL
WBC # BLD AUTO: 9 K/UL (ref 3.6–11)

## 2020-09-19 PROCEDURE — 74011250636 HC RX REV CODE- 250/636: Performed by: INTERNAL MEDICINE

## 2020-09-19 PROCEDURE — 74011250636 HC RX REV CODE- 250/636: Performed by: HOSPITALIST

## 2020-09-19 PROCEDURE — 74011250637 HC RX REV CODE- 250/637: Performed by: INTERNAL MEDICINE

## 2020-09-19 PROCEDURE — 87635 SARS-COV-2 COVID-19 AMP PRB: CPT

## 2020-09-19 PROCEDURE — 74011250637 HC RX REV CODE- 250/637: Performed by: HOSPITALIST

## 2020-09-19 PROCEDURE — 36600 WITHDRAWAL OF ARTERIAL BLOOD: CPT

## 2020-09-19 PROCEDURE — 94640 AIRWAY INHALATION TREATMENT: CPT

## 2020-09-19 PROCEDURE — 36415 COLL VENOUS BLD VENIPUNCTURE: CPT

## 2020-09-19 PROCEDURE — 82803 BLOOD GASES ANY COMBINATION: CPT

## 2020-09-19 PROCEDURE — 80048 BASIC METABOLIC PNL TOTAL CA: CPT

## 2020-09-19 PROCEDURE — 71045 X-RAY EXAM CHEST 1 VIEW: CPT

## 2020-09-19 PROCEDURE — 65270000029 HC RM PRIVATE

## 2020-09-19 PROCEDURE — 74011250637 HC RX REV CODE- 250/637: Performed by: NURSE PRACTITIONER

## 2020-09-19 PROCEDURE — 85025 COMPLETE CBC W/AUTO DIFF WBC: CPT

## 2020-09-19 PROCEDURE — 83735 ASSAY OF MAGNESIUM: CPT

## 2020-09-19 PROCEDURE — 77010033678 HC OXYGEN DAILY

## 2020-09-19 RX ORDER — FUROSEMIDE 10 MG/ML
40 INJECTION INTRAMUSCULAR; INTRAVENOUS ONCE
Status: ACTIVE | OUTPATIENT
Start: 2020-09-19 | End: 2020-09-20

## 2020-09-19 RX ORDER — POLYETHYLENE GLYCOL 3350 17 G/17G
17 POWDER, FOR SOLUTION ORAL DAILY
Status: DISCONTINUED | OUTPATIENT
Start: 2020-09-19 | End: 2020-09-25

## 2020-09-19 RX ORDER — ENOXAPARIN SODIUM 100 MG/ML
40 INJECTION SUBCUTANEOUS EVERY 24 HOURS
Status: DISCONTINUED | OUTPATIENT
Start: 2020-09-20 | End: 2020-09-20

## 2020-09-19 RX ORDER — ONDANSETRON 2 MG/ML
4 INJECTION INTRAMUSCULAR; INTRAVENOUS
Status: DISCONTINUED | OUTPATIENT
Start: 2020-09-19 | End: 2020-09-26 | Stop reason: HOSPADM

## 2020-09-19 RX ORDER — CODEINE PHOSPHATE AND GUAIFENESIN 10; 100 MG/5ML; MG/5ML
10 SOLUTION ORAL
Status: DISCONTINUED | OUTPATIENT
Start: 2020-09-19 | End: 2020-09-26 | Stop reason: HOSPADM

## 2020-09-19 RX ADMIN — BENZOCAINE AND MENTHOL 1 LOZENGE: 15; 3.6 LOZENGE ORAL at 01:46

## 2020-09-19 RX ADMIN — LISINOPRIL 10 MG: 10 TABLET ORAL at 08:51

## 2020-09-19 RX ADMIN — DEXAMETHASONE 6 MG: 4 TABLET ORAL at 07:27

## 2020-09-19 RX ADMIN — LEVOFLOXACIN 750 MG: 5 INJECTION, SOLUTION INTRAVENOUS at 11:13

## 2020-09-19 RX ADMIN — POLYETHYLENE GLYCOL 3350 17 G: 17 POWDER, FOR SOLUTION ORAL at 10:00

## 2020-09-19 RX ADMIN — ENOXAPARIN SODIUM 40 MG: 40 INJECTION SUBCUTANEOUS at 08:51

## 2020-09-19 RX ADMIN — ALBUTEROL SULFATE 2 PUFF: 90 AEROSOL, METERED RESPIRATORY (INHALATION) at 03:31

## 2020-09-19 RX ADMIN — LORAZEPAM 1.5 MG: 0.5 TABLET ORAL at 22:16

## 2020-09-19 RX ADMIN — Medication 10 ML: at 23:30

## 2020-09-19 RX ADMIN — FAMOTIDINE 20 MG: 20 TABLET ORAL at 19:33

## 2020-09-19 RX ADMIN — ALBUTEROL SULFATE 2 PUFF: 90 AEROSOL, METERED RESPIRATORY (INHALATION) at 15:12

## 2020-09-19 RX ADMIN — Medication 10 ML: at 14:52

## 2020-09-19 RX ADMIN — FAMOTIDINE 20 MG: 20 TABLET ORAL at 08:51

## 2020-09-19 RX ADMIN — ONDANSETRON 4 MG: 2 INJECTION INTRAMUSCULAR; INTRAVENOUS at 19:34

## 2020-09-19 RX ADMIN — GUAIFENESIN AND CODEINE PHOSPHATE 10 ML: 100; 10 SOLUTION ORAL at 14:52

## 2020-09-19 RX ADMIN — ALBUTEROL SULFATE 2 PUFF: 90 AEROSOL, METERED RESPIRATORY (INHALATION) at 00:58

## 2020-09-19 RX ADMIN — ACETAMINOPHEN 650 MG: 325 TABLET ORAL at 19:34

## 2020-09-19 RX ADMIN — ONDANSETRON 4 MG: 2 INJECTION INTRAMUSCULAR; INTRAVENOUS at 23:30

## 2020-09-19 RX ADMIN — LEVOTHYROXINE SODIUM 200 MCG: 0.1 TABLET ORAL at 07:27

## 2020-09-19 RX ADMIN — ALBUTEROL SULFATE 2 PUFF: 90 AEROSOL, METERED RESPIRATORY (INHALATION) at 07:48

## 2020-09-19 RX ADMIN — ACETAMINOPHEN 650 MG: 325 TABLET ORAL at 01:52

## 2020-09-19 RX ADMIN — Medication 10 ML: at 06:38

## 2020-09-19 RX ADMIN — ALBUTEROL SULFATE 2 PUFF: 90 AEROSOL, METERED RESPIRATORY (INHALATION) at 20:35

## 2020-09-19 RX ADMIN — FOLIC ACID 1 MG: 1 TABLET ORAL at 08:51

## 2020-09-19 RX ADMIN — GUAIFENESIN AND CODEINE PHOSPHATE 10 ML: 100; 10 SOLUTION ORAL at 09:56

## 2020-09-19 RX ADMIN — ALBUTEROL SULFATE 2 PUFF: 90 AEROSOL, METERED RESPIRATORY (INHALATION) at 12:20

## 2020-09-19 NOTE — PROGRESS NOTES
[address] [facility] Adult  Hospitalist Group                                                                                          Hospitalist Progress Note  Millie Acosta MD  Answering service: [phone] from in house phone        Date of Service:  2020  NAME:  Jessica Whitney  :  1950  MRN:  [id number]    This documentation was facilitated by a Voice Recognition software and may contain inadvertent typographical errors. Admission Summary:   Per H&P   \"Candace Robledo is a 78 y.o.  female who has a history of breast cancer, hypertension, rheumatoid arthritis on methotrexate presenting with a 3-week history of increasing fatigue. 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 She denies fevers or chills but since yesterday she became progressively short of breath. She also reports that she felt so weak that she could not get out of bed. She reported that she was having some nausea and some loose stools. She denies bleeding hematemesis, hematochezia or melena. She presents to the emergency room reporting that she was extremely dehydrated. In the ER she had a CT angiogram of the chest which was negative for embolism. But did show bilateral lower lobe patchy pneumonia with the possibility of COVID-19 pneumonia. \"        Interval history / Subjective:        2020   Again:   T-max of 102 continues IV antibiotics  Pt does feel some  Better, however cough and poor appetite present     Assessment & Plan:   Bilateral pneumonia, bacterial versus viral, rule out COVID  -Continue Zithromax. She has recently received Bactrim for UTI. -She has allergic to Keflex, so no ceftriaxone  -SARS-CoV-2 pending.   Maintain droplet plus precaution  -Continue Decadron initiated in the ED  -Anticoagulation with Lovenox per COVID-19 protocol  -Monitor inflammatory markers  -On room air  - abx expanded. 9/17/2020 ; adding Levaquin. 9/18 T-max 102    DEHYDRATION: adding ivf bolus and ivf's / f.u lab      Hypertension  Continue with lisinopril  BP Readings from Last 1 Encounters:   09/19/20 (!) 150/72         Hypothyroidism  On Synthroid        Code Status: Full code      surrogate Decision Maker: Daughter     DVT Prophylaxis: On Lovenox  GI Prophylaxis:      FUNCTIONAL STATUS PRIOR TO ADMISSION: Ambulates Independently     Patient is from: [facility]  Date Reviewed: 3/10/2020          Codes Class Noted POA    Pneumonia ICD-10-CM: J18.9  ICD-9-CM: 486  9/15/2020 Unknown        Suspected COVID-19 virus infection ICD-10-CM: Z20.828  ICD-9-CM: V01.79  9/15/2020 Unknown        Viral pneumonia ICD-10-CM: J12.9  ICD-9-CM: 480.9  9/14/2020 Unknown                Review of Systems:   A comprehensive review of systems was negative except for that written in the HPI. Vital Signs:    Last 24hrs VS reviewed since prior progress note. Most recent are:  Visit Vitals  BP (!) 150/72 (BP 1 Location: Right arm, BP Patient Position: At rest)   Pulse 73   Temp 98.7 °F (37.1 °C)   Resp 16   Ht 5' 4\" (1.626 m)   Wt 90.6 kg (199 lb 11.8 oz)   SpO2 93%   BMI 34.28 kg/m²       No intake or output data in the 24 hours ending 09/19/20 2874     Physical Examination:             Constitutional:    Ill appearing. Else: cooperative, pleasant    HEENT:  Atraumatic. Oral mucosa moist,. Non icteric sclera. No pallor. Resp:  CTA bilaterally. No wheezing/rhonchi/rales. No accessory muscle use   Chest Wall: No deformity   CV:  Regular rhythm, normal rate, no murmurs, gallops, rubs    GI:  Soft, non distended, non tender.  normoactive bowel sounds, no hepatosplenomegaly    :  No CVA or suprapubic tenderness    Musculoskeletal:  No edema, warm, 2+ pulses throughout    Neurologic:  Mental status:AAOx3,   Cranial nerves II-XII : WNL  Motor exam:Moves all extremities symmetrically              Data Review:    Review and/or order of clinical lab test  Review and/or order of tests in the radiology section of CPT  Review and/or order of tests in the medicine section of CPT      Labs:     Recent Labs     09/19/20 0147   WBC 9.0   HGB 13.1   HCT 37.7        Recent Labs     09/19/20 0147 09/18/20 0007 09/17/20  0032   * 133* 133*   K 3.6 3.4* 3.8    106 104   CO2 23 20* 20*   BUN 10 11 12   CREA 0.76 0.66 0.78   * 102* 103*   CA 8.7 8.2* 8.8   MG 1.6  --   --      Recent Labs     09/18/20 0007 09/17/20  0032   ALT 26 30    130*   TBILI 0.3 0.3   TP 6.7 7.8   ALB 2.6* 3.2*   GLOB 4.1* 4.6*     Recent Labs     09/18/20 0007 09/17/20  0032   INR 1.1 1.0   PTP 11.0 10.3   APTT 33.9* 29.9      No results for input(s): FE, TIBC, PSAT, FERR in the last 72 hours. No results found for: FOL, RBCF   No results for input(s): PH, PCO2, PO2 in the last 72 hours. No results for input(s): CPK, CKNDX, TROIQ in the last 72 hours.     No lab exists for component: CPKMB  Lab Results   Component Value Date/Time    Cholesterol, total 187 12/12/2018 12:00 AM    HDL Cholesterol 55 12/12/2018 12:00 AM    LDL, calculated 103 (H) 12/12/2018 12:00 AM    Triglyceride 146 12/12/2018 12:00 AM    CHOL/HDL Ratio 4.6 11/07/2009 08:29 AM     No results found for: GLUCPOC  Lab Results   Component Value Date/Time    Color YELLOW/STRAW 09/14/2020 09:01 PM    Appearance CLEAR 09/14/2020 09:01 PM    Specific gravity 1.021 09/14/2020 09:01 PM    pH (UA) 7.5 09/14/2020 09:01 PM    Protein Negative 09/14/2020 09:01 PM    Glucose Negative 09/14/2020 09:01 PM    Ketone Negative 09/14/2020 09:01 PM    Bilirubin Negative 09/14/2020 09:01 PM    Urobilinogen 0.2 09/14/2020 09:01 PM    Nitrites Negative 09/14/2020 09:01 PM    Leukocyte Esterase TRACE (A) 09/14/2020 09:01 PM    Epithelial cells FEW 09/14/2020 09:01 PM    Bacteria 1+ (A) 09/14/2020 09:01 PM    WBC 0-4 09/14/2020 09:01 PM    RBC 0-5 09/14/2020 09:01 PM         Medications Reviewed: Current Facility-Administered Medications   Medication Dose Route Frequency    guaiFENesin-codeine (ROBITUSSIN AC) 100-10 mg/5 mL solution 10 mL  10 mL Oral Q4H PRN    polyethylene glycol (MIRALAX) packet 17 g  17 g Oral DAILY    benzocaine-menthoL (CEPACOL) lozenge 1 Lozenge  1 Lozenge Mucous Membrane PRN    0.9% sodium chloride infusion  75 mL/hr IntraVENous CONTINUOUS    levoFLOXacin (LEVAQUIN) 750 mg in D5W IVPB  750 mg IntraVENous Q24H    influenza vaccine 2020-21 (6 mos+)(PF) (FLUARIX/FLULAVAL/FLUZONE QUAD) injection 0.5 mL  0.5 mL IntraMUSCular PRIOR TO DISCHARGE    albuterol (PROVENTIL HFA, VENTOLIN HFA, PROAIR HFA) inhaler 2 Puff  2 Puff Inhalation Q4H RT    LORazepam (ATIVAN) tablet 1.5 mg  1.5 mg Oral QHS    acetaminophen (TYLENOL) tablet 650 mg  650 mg Oral Q6H PRN    Or    acetaminophen (TYLENOL) suppository 650 mg  650 mg Rectal Q6H PRN    dexAMETHasone (DECADRON) tablet 6 mg  6 mg Oral DAILY WITH BREAKFAST    sodium chloride (NS) flush 5-40 mL  5-40 mL IntraVENous Q8H    sodium chloride (NS) flush 5-40 mL  5-40 mL IntraVENous PRN    enoxaparin (LOVENOX) injection 40 mg  40 mg SubCUTAneous Q12H    levothyroxine (SYNTHROID) tablet 200 mcg  200 mcg Oral 6am    [Held by provider] citalopram (CELEXA) tablet 40 mg  40 mg Oral DAILY    folic acid (FOLVITE) tablet 1 mg  1 mg Oral DAILY    lisinopriL (PRINIVIL, ZESTRIL) tablet 10 mg  10 mg Oral DAILY    famotidine (PEPCID) tablet 20 mg  20 mg Oral BID     ______________________________________________________________________  EXPECTED LENGTH OF STAY: 3d 4h  ACTUAL LENGTH OF STAY:          4    Medical decision making    I have reviewed flow sheets and previous day's notes  Patient has acute or chronic illness that posses a threat to bodily functions or life   Have Reviewed and ordered Clinical test  Have reviewed and ordered Radiology test  Have personally Reviewed imaging reports   High risk drug therapy as that require intensive monitoring for toxicity as in pressors, antibiotics, steroids and narcotic pain management.                 Alina Murillo MD

## 2020-09-19 NOTE — PROGRESS NOTES
This nurse reviewing the patient's symptoms since she feels more sickly, more SOB, had temperature of 102.6 overnight, she now on 3L NC where she was on RA when she first came, her body-ache is worst, CT chest shows reva patchy pneumonia, and her lab markers all elevated, patient reports of sick contact with her boss, this nurse suggest MD to re-test her for covid 23  MD only agree to ABG.

## 2020-09-19 NOTE — PROGRESS NOTES
Bedside and Verbal shift change report given to Leellen Litten, RN (oncoming nurse) by Delilah Kumar RN (offgoing nurse). Report included the following information SBAR, Kardex, Intake/Output, MAR, Recent Results and Med Rec Status.

## 2020-09-19 NOTE — PROGRESS NOTES
c-x-ray done at the bedside, shows progressing patchy airspace infiltrate highly suspected for covid related pneumonia, MD paged again  Order received to re-swab patient,will follow the order.

## 2020-09-19 NOTE — PROGRESS NOTES
Bedside shift change report given to selma (oncoming nurse) by Uziel Hoover (offgoing nurse). Report included the following information SBAR, Kardex and Intake/Output.

## 2020-09-19 NOTE — PROGRESS NOTES
Patient complains of worsening cough and increased short of breath. MD aware and examined patient this morning. Robitussin ordered PRN and given. Patient states that it has helped. Will continue to monitor symptoms. Problem: Risk for Spread of Infection  Goal: Prevent transmission of infectious organism to others  Description: Prevent the transmission of infectious organisms to other patients, staff members, and visitors.   Outcome: Progressing Towards Goal     Problem: Breathing Pattern - Ineffective  Goal: *Absence of hypoxia  Outcome: Progressing Towards Goal

## 2020-09-20 LAB
ANION GAP SERPL CALC-SCNC: 7 MMOL/L (ref 5–15)
BUN SERPL-MCNC: 7 MG/DL (ref 6–20)
BUN/CREAT SERPL: 12 (ref 12–20)
CALCIUM SERPL-MCNC: 8.5 MG/DL (ref 8.5–10.1)
CHLORIDE SERPL-SCNC: 107 MMOL/L (ref 97–108)
CO2 SERPL-SCNC: 24 MMOL/L (ref 21–32)
COMMENT, HOLDF: NORMAL
COVID-19, XGCOVT: NOT DETECTED
CREAT SERPL-MCNC: 0.6 MG/DL (ref 0.55–1.02)
GLUCOSE SERPL-MCNC: 102 MG/DL (ref 65–100)
HEALTH STATUS, XMCV2T: NORMAL
POTASSIUM SERPL-SCNC: 3.5 MMOL/L (ref 3.5–5.1)
SAMPLES BEING HELD,HOLD: NORMAL
SODIUM SERPL-SCNC: 138 MMOL/L (ref 136–145)
SOURCE, COVRS: NORMAL
SPECIMEN SOURCE, FCOV2M: NORMAL
SPECIMEN TYPE, XMCV1T: NORMAL

## 2020-09-20 PROCEDURE — 74011250637 HC RX REV CODE- 250/637: Performed by: INTERNAL MEDICINE

## 2020-09-20 PROCEDURE — 74011250637 HC RX REV CODE- 250/637: Performed by: HOSPITALIST

## 2020-09-20 PROCEDURE — 36415 COLL VENOUS BLD VENIPUNCTURE: CPT

## 2020-09-20 PROCEDURE — 74011250636 HC RX REV CODE- 250/636: Performed by: INTERNAL MEDICINE

## 2020-09-20 PROCEDURE — 80048 BASIC METABOLIC PNL TOTAL CA: CPT

## 2020-09-20 PROCEDURE — 65270000029 HC RM PRIVATE

## 2020-09-20 PROCEDURE — 94640 AIRWAY INHALATION TREATMENT: CPT

## 2020-09-20 PROCEDURE — 74011250636 HC RX REV CODE- 250/636: Performed by: HOSPITALIST

## 2020-09-20 RX ORDER — ENOXAPARIN SODIUM 100 MG/ML
40 INJECTION SUBCUTANEOUS EVERY 12 HOURS
Status: DISCONTINUED | OUTPATIENT
Start: 2020-09-20 | End: 2020-09-24

## 2020-09-20 RX ORDER — ENOXAPARIN SODIUM 100 MG/ML
40 INJECTION SUBCUTANEOUS EVERY 24 HOURS
Status: DISCONTINUED | OUTPATIENT
Start: 2020-09-20 | End: 2020-09-20 | Stop reason: ALTCHOICE

## 2020-09-20 RX ORDER — DEXAMETHASONE 4 MG/1
4 TABLET ORAL EVERY 12 HOURS
Status: DISCONTINUED | OUTPATIENT
Start: 2020-09-20 | End: 2020-09-20 | Stop reason: ALTCHOICE

## 2020-09-20 RX ORDER — ENOXAPARIN SODIUM 100 MG/ML
30 INJECTION SUBCUTANEOUS EVERY 12 HOURS
Status: DISCONTINUED | OUTPATIENT
Start: 2020-09-20 | End: 2020-09-20

## 2020-09-20 RX ADMIN — ENOXAPARIN SODIUM 40 MG: 40 INJECTION SUBCUTANEOUS at 23:05

## 2020-09-20 RX ADMIN — ALBUTEROL SULFATE 2 PUFF: 90 AEROSOL, METERED RESPIRATORY (INHALATION) at 11:38

## 2020-09-20 RX ADMIN — LORAZEPAM 1.5 MG: 0.5 TABLET ORAL at 23:05

## 2020-09-20 RX ADMIN — ENOXAPARIN SODIUM 40 MG: 40 INJECTION SUBCUTANEOUS at 08:29

## 2020-09-20 RX ADMIN — ALBUTEROL SULFATE 2 PUFF: 90 AEROSOL, METERED RESPIRATORY (INHALATION) at 19:28

## 2020-09-20 RX ADMIN — LEVOFLOXACIN 750 MG: 5 INJECTION, SOLUTION INTRAVENOUS at 13:59

## 2020-09-20 RX ADMIN — FOLIC ACID 1 MG: 1 TABLET ORAL at 08:29

## 2020-09-20 RX ADMIN — Medication 10 ML: at 05:54

## 2020-09-20 RX ADMIN — FAMOTIDINE 20 MG: 20 TABLET ORAL at 08:29

## 2020-09-20 RX ADMIN — LEVOTHYROXINE SODIUM 200 MCG: 0.1 TABLET ORAL at 05:53

## 2020-09-20 RX ADMIN — ACETAMINOPHEN 650 MG: 325 TABLET ORAL at 13:59

## 2020-09-20 RX ADMIN — DEXAMETHASONE 6 MG: 4 TABLET ORAL at 05:54

## 2020-09-20 RX ADMIN — LISINOPRIL 10 MG: 10 TABLET ORAL at 08:29

## 2020-09-20 RX ADMIN — Medication 10 ML: at 23:05

## 2020-09-20 RX ADMIN — SODIUM CHLORIDE 75 ML/HR: 9 INJECTION, SOLUTION INTRAVENOUS at 08:32

## 2020-09-20 RX ADMIN — ALBUTEROL SULFATE 2 PUFF: 90 AEROSOL, METERED RESPIRATORY (INHALATION) at 16:19

## 2020-09-20 RX ADMIN — POLYETHYLENE GLYCOL 3350 17 G: 17 POWDER, FOR SOLUTION ORAL at 08:29

## 2020-09-20 RX ADMIN — Medication 10 ML: at 14:02

## 2020-09-20 RX ADMIN — SODIUM CHLORIDE 1000 ML: 9 INJECTION, SOLUTION INTRAVENOUS at 11:03

## 2020-09-20 RX ADMIN — ALBUTEROL SULFATE 2 PUFF: 90 AEROSOL, METERED RESPIRATORY (INHALATION) at 07:45

## 2020-09-20 RX ADMIN — FAMOTIDINE 20 MG: 20 TABLET ORAL at 18:15

## 2020-09-20 NOTE — PROGRESS NOTES
1120: Attempted to assist patient with ambulation. Patient complained of increased dizziness and weakness when attempting to get out of bed to transfer to the [facility]. Patient educated about Jimenez Barge placement to urinate, to call for assistance from nursing staff. Patient currently receiving a bolus of NS, and educated about the increase need to urinate due to this. MD notified about current patient symptoms.

## 2020-09-20 NOTE — PROGRESS NOTES
Bedside and Verbal shift change report given to Leellen Litten, RN (oncoming nurse) by Brandon Ruff RN (offgoing nurse). Report included the following information SBAR, Kardex, Intake/Output, MAR, Recent Results and Med Rec Status.

## 2020-09-20 NOTE — PROGRESS NOTES
Bedside shift change report given to De Perez RN (oncoming nurse) by Bree Garcia RN (offgoing nurse). Report included the following information SBAR, Kardex, MAR and Recent Results.

## 2020-09-20 NOTE — PROGRESS NOTES
Problem: Risk for Spread of Infection  Goal: Prevent transmission of infectious organism to others  Description: Prevent the transmission of infectious organisms to other patients, staff members, and visitors. Outcome: Progressing Towards Goal     Problem: Falls - Risk of  Goal: *Absence of Falls  Description: Document [employer] Fall Risk and appropriate interventions in the flowsheet.   Outcome: Progressing Towards Goal  Note: Fall Risk Interventions:            Medication Interventions: Evaluate medications/consider consulting pharmacy, Patient to call before getting OOB    Elimination Interventions: Call light in reach, Patient to call for help with toileting needs    History of Falls Interventions: Bed/chair exit alarm, Evaluate medications/consider consulting pharmacy

## 2020-09-20 NOTE — PROGRESS NOTES
[address] [facility] Adult  Hospitalist Group                                                                                          Hospitalist Progress Note  Danii Macdonald MD  Answering service: [phone] from in house phone        Date of Service:  2020  NAME:  Ana Soto  :  1950  MRN:  [id number]    This documentation was facilitated by a Voice Recognition software and may contain inadvertent typographical errors. Admission Summary:   Per H&P   \"Candace العراقي is a 78 y.o.  female who has a history of breast cancer, hypertension, rheumatoid arthritis on methotrexate presenting with a 3-week history of increasing fatigue. 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 She denies fevers or chills but since yesterday she became progressively short of breath. She also reports that she felt so weak that she could not get out of bed. She reported that she was having some nausea and some loose stools. She denies bleeding hematemesis, hematochezia or melena. She presents to the emergency room reporting that she was extremely dehydrated. In the ER she had a CT angiogram of the chest which was negative for embolism. But did show bilateral lower lobe patchy pneumonia with the possibility of COVID-19 pneumonia. \"        Interval history / Subjective:        2020   N/v  cxr worst suggestive per rad reading of COVID  Re-tested for COVID   IV fluid bolus and increase ivf 2020   Decadron/lovenox on covid protocol     Assessment & Plan:   Bilateral pneumonia, bacterial versus viral, rule out COVID  -Continue Zithromax. She has recently received Bactrim for UTI. -She has allergic to Keflex, so no ceftriaxone  -SARS-CoV-2 pending.   Maintain droplet plus precaution  -Continue Decadron initiated in the ED  -Anticoagulation with Lovenox per COVID-19 protocol  -Monitor inflammatory markers  -On room air  - abx expanded. 9/17/2020 ; adding Levaquin. 9/18 T-max 102  Cont to spike at night 102 , cxr worst and c/c covid  covidd protocol else continue; re- tested 9/19     DEHYDRATION: adding ivf bolus and ivf's / f.u lab      Hypertension  Continue with lisinopril  BP Readings from Last 1 Encounters:   09/20/20 (!) 172/73         Hypothyroidism  On Synthroid        Code Status: Full code      surrogate Decision Maker: Daughter     DVT Prophylaxis: On Lovenox  GI Prophylaxis:      FUNCTIONAL STATUS PRIOR TO ADMISSION: Ambulates Independently     Patient is from: [facility] Problems  Date Reviewed: 3/10/2020          Codes Class Noted POA    Pneumonia ICD-10-CM: J18.9  ICD-9-CM: 486  9/15/2020 Unknown        Suspected COVID-19 virus infection ICD-10-CM: Z20.828  ICD-9-CM: V01.79  9/15/2020 Unknown        Viral pneumonia ICD-10-CM: J12.9  ICD-9-CM: 480.9  9/14/2020 Unknown                Review of Systems:   A comprehensive review of systems was negative except for that written in the HPI. Vital Signs:    Last 24hrs VS reviewed since prior progress note. Most recent are:  Visit Vitals  BP (!) 172/73 (BP 1 Location: Right arm, BP Patient Position: At rest)   Pulse 66   Temp 98.7 °F (37.1 °C)   Resp 16   Ht 5' 4\" (1.626 m)   Wt 94.5 kg (208 lb 5.4 oz)   SpO2 95%   BMI 35.76 kg/m²       No intake or output data in the 24 hours ending 09/20/20 1030     Physical Examination:             Constitutional:    Ill appearing. Else: cooperative, pleasant    HEENT:  Atraumatic. Oral mucosa moist,. Non icteric sclera. No pallor. Resp:  CTA bilaterally. No wheezing/rhonchi/rales. No accessory muscle use   Chest Wall: No deformity   CV:  Regular rhythm, normal rate, no murmurs, gallops, rubs    GI:  Soft, non distended, non tender.  normoactive bowel sounds, no hepatosplenomegaly    :  No CVA or suprapubic tenderness    Musculoskeletal:  No edema, warm, 2+ pulses throughout Neurologic:  Mental status:AAOx3,   Cranial nerves II-XII : WNL  Motor exam:Moves all extremities symmetrically              Data Review:    Review and/or order of clinical lab test  Review and/or order of tests in the radiology section of [facility]  Review and/or order of tests in the medicine section of [facility]      Labs:     Recent Labs     09/19/20 0147   WBC 9.0   HGB 13.1   HCT 37.7        Recent Labs     09/20/20  0224 09/19/20 0147 09/18/20 0007    132* 133*   K 3.5 3.6 3.4*    102 106   CO2 24 23 20*   BUN 7 10 11   CREA 0.60 0.76 0.66   * 114* 102*   CA 8.5 8.7 8.2*   MG  --  1.6  --      Recent Labs     09/18/20 0007   ALT 26      TBILI 0.3   TP 6.7   ALB 2.6*   GLOB 4.1*     Recent Labs     09/18/20 0007   INR 1.1   PTP 11.0   APTT 33.9*      No results for input(s): FE, TIBC, PSAT, FERR in the last 72 hours. No results found for: FOL, RBCF   No results for input(s): PH, PCO2, PO2 in the last 72 hours. No results for input(s): CPK, CKNDX, TROIQ in the last 72 hours.     No lab exists for component: CPKMB  Lab Results   Component Value Date/Time    Cholesterol, total 187 12/12/2018 12:00 AM    HDL Cholesterol 55 12/12/2018 12:00 AM    LDL, calculated 103 (H) 12/12/2018 12:00 AM    Triglyceride 146 12/12/2018 12:00 AM    CHOL/HDL Ratio 4.6 11/07/2009 08:29 AM     No results found for: GLUCPOC  Lab Results   Component Value Date/Time    Color YELLOW/STRAW 09/14/2020 09:01 PM    Appearance CLEAR 09/14/2020 09:01 PM    Specific gravity 1.021 09/14/2020 09:01 PM    pH (UA) 7.5 09/14/2020 09:01 PM    Protein Negative 09/14/2020 09:01 PM    Glucose Negative 09/14/2020 09:01 PM    Ketone Negative 09/14/2020 09:01 PM    Bilirubin Negative 09/14/2020 09:01 PM    Urobilinogen 0.2 09/14/2020 09:01 PM    Nitrites Negative 09/14/2020 09:01 PM    Leukocyte Esterase TRACE (A) 09/14/2020 09:01 PM    Epithelial cells FEW 09/14/2020 09:01 PM    Bacteria 1+ (A) 09/14/2020 09:01 PM    WBC 0-4 09/14/2020 09:01 PM    RBC 0-5 09/14/2020 09:01 PM         Medications Reviewed:     Current Facility-Administered Medications   Medication Dose Route Frequency    enoxaparin (LOVENOX) injection 30 mg  30 mg SubCUTAneous Q12H    sodium chloride 0.9 % bolus infusion 1,000 mL  1,000 mL IntraVENous ONCE    guaiFENesin-codeine (ROBITUSSIN AC) 100-10 mg/5 mL solution 10 mL  10 mL Oral Q4H PRN    polyethylene glycol (MIRALAX) packet 17 g  17 g Oral DAILY    ondansetron (ZOFRAN) injection 4 mg  4 mg IntraVENous Q4H PRN    benzocaine-menthoL (CEPACOL) lozenge 1 Lozenge  1 Lozenge Mucous Membrane PRN    0.9% sodium chloride infusion  100 mL/hr IntraVENous CONTINUOUS    levoFLOXacin (LEVAQUIN) 750 mg in D5W IVPB  750 mg IntraVENous Q24H    influenza vaccine 2020-21 (6 mos+)(PF) (FLUARIX/FLULAVAL/FLUZONE QUAD) injection 0.5 mL  0.5 mL IntraMUSCular PRIOR TO DISCHARGE    albuterol (PROVENTIL HFA, VENTOLIN HFA, PROAIR HFA) inhaler 2 Puff  2 Puff Inhalation Q4H RT    LORazepam (ATIVAN) tablet 1.5 mg  1.5 mg Oral QHS    acetaminophen (TYLENOL) tablet 650 mg  650 mg Oral Q6H PRN    Or    acetaminophen (TYLENOL) suppository 650 mg  650 mg Rectal Q6H PRN    dexAMETHasone (DECADRON) tablet 6 mg  6 mg Oral DAILY WITH BREAKFAST    sodium chloride (NS) flush 5-40 mL  5-40 mL IntraVENous Q8H    sodium chloride (NS) flush 5-40 mL  5-40 mL IntraVENous PRN    levothyroxine (SYNTHROID) tablet 200 mcg  200 mcg Oral 6am    [Held by provider] citalopram (CELEXA) tablet 40 mg  40 mg Oral DAILY    folic acid (FOLVITE) tablet 1 mg  1 mg Oral DAILY    lisinopriL (PRINIVIL, ZESTRIL) tablet 10 mg  10 mg Oral DAILY    famotidine (PEPCID) tablet 20 mg  20 mg Oral BID     ______________________________________________________________________  EXPECTED LENGTH OF STAY: 3d 4h  ACTUAL LENGTH OF STAY:          5    Medical decision making    I have reviewed flow sheets and previous day's notes  Patient has acute or chronic illness that posses a threat to bodily functions or life   Have Reviewed and ordered Clinical test  Have reviewed and ordered Radiology test  Have personally Reviewed imaging reports   High risk drug therapy as that require intensive monitoring for toxicity as in pressors, antibiotics, steroids and narcotic pain management.                 Pillo Last MD

## 2020-09-20 NOTE — PROGRESS NOTES
Problem: Risk for Spread of Infection  Goal: Prevent transmission of infectious organism to others  Description: Prevent the transmission of infectious organisms to other patients, staff members, and visitors. Outcome: Progressing Towards Goal     Problem: Breathing Pattern - Ineffective  Goal: *Absence of hypoxia  Outcome: Progressing Towards Goal     Problem: Falls - Risk of  Goal: *Absence of Falls  Description: Document Fito Fall Risk and appropriate interventions in the flowsheet.   Outcome: Progressing Towards Goal  Note: Fall Risk Interventions:            Medication Interventions: Evaluate medications/consider consulting pharmacy, Patient to call before getting OOB    Elimination Interventions: Call light in reach, Patient to call for help with toileting needs    History of Falls Interventions: Bed/chair exit alarm, Evaluate medications/consider consulting pharmacy

## 2020-09-21 LAB
ANION GAP SERPL CALC-SCNC: 6 MMOL/L (ref 5–15)
BASOPHILS # BLD: 0 K/UL (ref 0–0.1)
BASOPHILS NFR BLD: 1 % (ref 0–1)
BNP SERPL-MCNC: 839 PG/ML
BUN SERPL-MCNC: 9 MG/DL (ref 6–20)
BUN/CREAT SERPL: 19 (ref 12–20)
CALCIUM SERPL-MCNC: 8.8 MG/DL (ref 8.5–10.1)
CHLORIDE SERPL-SCNC: 108 MMOL/L (ref 97–108)
CO2 SERPL-SCNC: 22 MMOL/L (ref 21–32)
CREAT SERPL-MCNC: 0.47 MG/DL (ref 0.55–1.02)
D DIMER PPP FEU-MCNC: 1.01 MG/L FEU (ref 0–0.65)
DIFFERENTIAL METHOD BLD: ABNORMAL
EOSINOPHIL # BLD: 0 K/UL (ref 0–0.4)
EOSINOPHIL NFR BLD: 0 % (ref 0–7)
ERYTHROCYTE [DISTWIDTH] IN BLOOD BY AUTOMATED COUNT: 12.8 % (ref 11.5–14.5)
GLUCOSE SERPL-MCNC: 131 MG/DL (ref 65–100)
HCT VFR BLD AUTO: 35.8 % (ref 35–47)
HGB BLD-MCNC: 12.3 G/DL (ref 11.5–16)
IMM GRANULOCYTES # BLD AUTO: 0 K/UL
IMM GRANULOCYTES NFR BLD AUTO: 0 %
LYMPHOCYTES # BLD: 0.4 K/UL (ref 0.8–3.5)
LYMPHOCYTES NFR BLD: 9 % (ref 12–49)
MCH RBC QN AUTO: 29.6 PG (ref 26–34)
MCHC RBC AUTO-ENTMCNC: 34.4 G/DL (ref 30–36.5)
MCV RBC AUTO: 86.3 FL (ref 80–99)
MONOCYTES # BLD: 0.4 K/UL (ref 0–1)
MONOCYTES NFR BLD: 10 % (ref 5–13)
MYELOCYTES NFR BLD MANUAL: 1 %
NEUTS BAND NFR BLD MANUAL: 1 % (ref 0–6)
NEUTS SEG # BLD: 3.2 K/UL (ref 1.8–8)
NEUTS SEG NFR BLD: 78 % (ref 32–75)
NRBC # BLD: 0 K/UL (ref 0–0.01)
NRBC BLD-RTO: 0 PER 100 WBC
PLATELET # BLD AUTO: 392 K/UL (ref 150–400)
PMV BLD AUTO: 8.8 FL (ref 8.9–12.9)
POTASSIUM SERPL-SCNC: 3.6 MMOL/L (ref 3.5–5.1)
RBC # BLD AUTO: 4.15 M/UL (ref 3.8–5.2)
RBC MORPH BLD: ABNORMAL
SODIUM SERPL-SCNC: 136 MMOL/L (ref 136–145)
WBC # BLD AUTO: 4 K/UL (ref 3.6–11)

## 2020-09-21 PROCEDURE — 85379 FIBRIN DEGRADATION QUANT: CPT

## 2020-09-21 PROCEDURE — 36415 COLL VENOUS BLD VENIPUNCTURE: CPT

## 2020-09-21 PROCEDURE — 74011250636 HC RX REV CODE- 250/636: Performed by: HOSPITALIST

## 2020-09-21 PROCEDURE — 74011250636 HC RX REV CODE- 250/636: Performed by: INTERNAL MEDICINE

## 2020-09-21 PROCEDURE — 80048 BASIC METABOLIC PNL TOTAL CA: CPT

## 2020-09-21 PROCEDURE — 85025 COMPLETE CBC W/AUTO DIFF WBC: CPT

## 2020-09-21 PROCEDURE — 74011250637 HC RX REV CODE- 250/637: Performed by: HOSPITALIST

## 2020-09-21 PROCEDURE — 77010033678 HC OXYGEN DAILY

## 2020-09-21 PROCEDURE — 83880 ASSAY OF NATRIURETIC PEPTIDE: CPT

## 2020-09-21 PROCEDURE — 74011250637 HC RX REV CODE- 250/637: Performed by: INTERNAL MEDICINE

## 2020-09-21 PROCEDURE — 94640 AIRWAY INHALATION TREATMENT: CPT

## 2020-09-21 PROCEDURE — 94760 N-INVAS EAR/PLS OXIMETRY 1: CPT

## 2020-09-21 PROCEDURE — 65270000029 HC RM PRIVATE

## 2020-09-21 RX ORDER — LEVOFLOXACIN 5 MG/ML
750 INJECTION, SOLUTION INTRAVENOUS EVERY 24 HOURS
Status: COMPLETED | OUTPATIENT
Start: 2020-09-21 | End: 2020-09-23

## 2020-09-21 RX ADMIN — LEVOFLOXACIN 750 MG: 5 INJECTION, SOLUTION INTRAVENOUS at 11:41

## 2020-09-21 RX ADMIN — FAMOTIDINE 20 MG: 20 TABLET ORAL at 09:23

## 2020-09-21 RX ADMIN — ALBUTEROL SULFATE 2 PUFF: 90 AEROSOL, METERED RESPIRATORY (INHALATION) at 15:33

## 2020-09-21 RX ADMIN — ENOXAPARIN SODIUM 40 MG: 40 INJECTION SUBCUTANEOUS at 21:28

## 2020-09-21 RX ADMIN — LEVOTHYROXINE SODIUM 200 MCG: 0.1 TABLET ORAL at 06:52

## 2020-09-21 RX ADMIN — POLYETHYLENE GLYCOL 3350 17 G: 17 POWDER, FOR SOLUTION ORAL at 09:24

## 2020-09-21 RX ADMIN — ENOXAPARIN SODIUM 40 MG: 40 INJECTION SUBCUTANEOUS at 09:24

## 2020-09-21 RX ADMIN — LORAZEPAM 1.5 MG: 0.5 TABLET ORAL at 21:28

## 2020-09-21 RX ADMIN — ALBUTEROL SULFATE 2 PUFF: 90 AEROSOL, METERED RESPIRATORY (INHALATION) at 11:30

## 2020-09-21 RX ADMIN — SODIUM CHLORIDE 100 ML/HR: 9 INJECTION, SOLUTION INTRAVENOUS at 02:54

## 2020-09-21 RX ADMIN — Medication 10 ML: at 21:29

## 2020-09-21 RX ADMIN — DEXAMETHASONE 6 MG: 4 TABLET ORAL at 06:53

## 2020-09-21 RX ADMIN — FOLIC ACID 1 MG: 1 TABLET ORAL at 09:23

## 2020-09-21 RX ADMIN — SODIUM CHLORIDE 100 ML/HR: 9 INJECTION, SOLUTION INTRAVENOUS at 15:02

## 2020-09-21 RX ADMIN — ONDANSETRON 4 MG: 2 INJECTION INTRAMUSCULAR; INTRAVENOUS at 16:03

## 2020-09-21 RX ADMIN — LISINOPRIL 10 MG: 10 TABLET ORAL at 09:23

## 2020-09-21 RX ADMIN — Medication 10 ML: at 06:52

## 2020-09-21 RX ADMIN — FAMOTIDINE 20 MG: 20 TABLET ORAL at 18:05

## 2020-09-21 RX ADMIN — ALBUTEROL SULFATE 2 PUFF: 90 AEROSOL, METERED RESPIRATORY (INHALATION) at 07:29

## 2020-09-21 NOTE — PROGRESS NOTES
Bedside and Verbal shift change report given to Katerina (oncoming nurse) by Franklin Echevarria (offgoing nurse). Report included the following information SBAR, Kardex, MAR and Recent Results.

## 2020-09-21 NOTE — PROGRESS NOTES
Bedside shift change report given to Avelino Ellsworth (oncoming nurse) by Becca Iqbal (offgoing nurse). Report included the following information SBAR, Kardex and MAR.

## 2020-09-21 NOTE — PROGRESS NOTES
[facility] [facility] Adult  Hospitalist Group                                                                                          Hospitalist Progress Note  Mckenna Porter MD  Answering service: [phone] from in house phone        Date of Service:  2020  NAME:  Nanci Lay  :  1950  MRN:  [id number]    This documentation was facilitated by a Voice Recognition software and may contain inadvertent typographical errors. Admission Summary:   Per H&P   \"Candace Grant is a 78 y.o.  female who has a history of breast cancer, hypertension, rheumatoid arthritis on methotrexate presenting with a 3-week history of increasing fatigue. 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 She denies fevers or chills but since yesterday she became progressively short of breath. She also reports that she felt so weak that she could not get out of bed. She reported that she was having some nausea and some loose stools. She denies bleeding hematemesis, hematochezia or melena. She presents to the emergency room reporting that she was extremely dehydrated. In the ER she had a CT angiogram of the chest which was negative for embolism. But did show bilateral lower lobe patchy pneumonia with the possibility of COVID-19 pneumonia. \"        Interval history / Subjective:        2020   N/v improved slightly ; is trying to eat  Cont on abx for total 7 days; ( day 5 today Levaquin)     Assessment & Plan:   Bilateral pneumonia, bacterial versus viral, rule out COVID  -Continue Zithromax. She has recently received Bactrim for UTI. -She has allergic to Keflex, so no ceftriaxone  -SARS-CoV-2 pending. Maintain droplet plus precaution  -Continue Decadron initiated in the ED  -Anticoagulation with Lovenox per COVID-19 protocol  -Monitor inflammatory markers  -On room air  - abx expanded.  2020 ; adding Levaquin. 9/18 T-max 102  Cont to spike at night 102 , cxr worst and c/c covid  covidd protocol else continue; re- tested 9/19     DEHYDRATION: adding ivf bolus and ivf's / f.u lab      Hypertension  Continue with lisinopril  BP Readings from Last 1 Encounters:   09/21/20 (!) 129/50         Hypothyroidism  On Synthroid        Code Status: Full code      surrogate Decision Maker: Daughter     DVT Prophylaxis: On Lovenox  GI Prophylaxis:      FUNCTIONAL STATUS PRIOR TO ADMISSION: Ambulates Independently     Patient is from: [facility]  Date Reviewed: 3/10/2020          Codes Class Noted POA    Pneumonia ICD-10-CM: J18.9  ICD-9-CM: 486  9/15/2020 Unknown        Suspected COVID-19 virus infection ICD-10-CM: Z20.828  ICD-9-CM: V01.79  9/15/2020 Unknown        Viral pneumonia ICD-10-CM: J12.9  ICD-9-CM: 480.9  9/14/2020 Unknown                Review of Systems:   A comprehensive review of systems was negative except for that written in the HPI. Vital Signs:    Last 24hrs VS reviewed since prior progress note. Most recent are:  Visit Vitals  BP (!) 129/50 (BP 1 Location: Right arm, BP Patient Position: At rest;Lying left side)   Pulse 61   Temp 97.5 °F (36.4 °C)   Resp 18   Ht 5' 4\" (1.626 m)   Wt 94.5 kg (208 lb 5.4 oz)   SpO2 95%   BMI 35.76 kg/m²         Intake/Output Summary (Last 24 hours) at 9/21/2020 1027  Last data filed at 9/20/2020 1731  Gross per 24 hour   Intake    Output 650 ml   Net -650 ml        Physical Examination:             Constitutional:     Yet Ill appearing. Else: cooperative, pleasant    HEENT:  Atraumatic. Oral mucosa moist,. Non icteric sclera. No pallor. Resp:  coarse reva . No wheezing/rhonchi/rales. No accessory muscle use   Chest Wall: No deformity   CV:  Regular rhythm, normal rate, no murmurs, gallops, rubs    GI:  Soft, non distended, non tender.  normoactive bowel sounds, no hepatosplenomegaly    :  No CVA or suprapubic tenderness    Musculoskeletal: No edema, warm, 2+ pulses throughout    Neurologic:  Mental status:AAOx3,   Cranial nerves II-XII : WNL  Motor exam:Moves all extremities symmetrically              Data Review:    Review and/or order of clinical lab test  Review and/or order of tests in the radiology section of [facility]  Review and/or order of tests in the medicine section of [facility]      Labs:     Recent Labs     09/21/20 0241 09/19/20  0147   WBC 4.0 9.0   HGB 12.3 13.1   HCT 35.8 37.7    320     Recent Labs     09/21/20  0241 09/20/20  0224 09/19/20 0147    138 132*   K 3.6 3.5 3.6    107 102   CO2 22 24 23   BUN 9 7 10   CREA 0.47* 0.60 0.76   * 102* 114*   CA 8.8 8.5 8.7   MG  --   --  1.6     No results for input(s): ALT, AP, TBIL, TBILI, TP, ALB, GLOB, GGT, AML, LPSE in the last 72 hours. No lab exists for component: SGOT, GPT, AMYP, HLPSE  No results for input(s): INR, PTP, APTT, INREXT, INREXT in the last 72 hours. No results for input(s): FE, TIBC, PSAT, FERR in the last 72 hours. No results found for: FOL, RBCF   No results for input(s): PH, PCO2, PO2 in the last 72 hours. No results for input(s): CPK, CKNDX, TROIQ in the last 72 hours.     No lab exists for component: CPKMB  Lab Results   Component Value Date/Time    Cholesterol, total 187 12/12/2018 12:00 AM    HDL Cholesterol 55 12/12/2018 12:00 AM    LDL, calculated 103 (H) 12/12/2018 12:00 AM    Triglyceride 146 12/12/2018 12:00 AM    CHOL/HDL Ratio 4.6 11/07/2009 08:29 AM     No results found for: [facility]  Lab Results   Component Value Date/Time    Color YELLOW/STRAW 09/14/2020 09:01 PM    Appearance CLEAR 09/14/2020 09:01 PM    Specific gravity 1.021 09/14/2020 09:01 PM    pH (UA) 7.5 09/14/2020 09:01 PM    Protein Negative 09/14/2020 09:01 PM    Glucose Negative 09/14/2020 09:01 PM    Ketone Negative 09/14/2020 09:01 PM    Bilirubin Negative 09/14/2020 09:01 PM    Urobilinogen 0.2 09/14/2020 09:01 PM    Nitrites Negative 09/14/2020 09:01 PM    Leukocyte Esterase TRACE (A) 09/14/2020 09:01 PM    Epithelial cells FEW 09/14/2020 09:01 PM    Bacteria 1+ (A) 09/14/2020 09:01 PM    WBC 0-4 09/14/2020 09:01 PM    RBC 0-5 09/14/2020 09:01 PM         Medications Reviewed:     Current Facility-Administered Medications   Medication Dose Route Frequency    levoFLOXacin (LEVAQUIN) 750 mg in D5W IVPB  750 mg IntraVENous Q24H    enoxaparin (LOVENOX) injection 40 mg  40 mg SubCUTAneous Q12H    guaiFENesin-codeine (ROBITUSSIN AC) 100-10 mg/5 mL solution 10 mL  10 mL Oral Q4H PRN    polyethylene glycol (MIRALAX) packet 17 g  17 g Oral DAILY    ondansetron (ZOFRAN) injection 4 mg  4 mg IntraVENous Q4H PRN    benzocaine-menthoL (CEPACOL) lozenge 1 Lozenge  1 Lozenge Mucous Membrane PRN    0.9% sodium chloride infusion  100 mL/hr IntraVENous CONTINUOUS    influenza vaccine 2020-21 (6 mos+)(PF) (FLUARIX/FLULAVAL/FLUZONE QUAD) injection 0.5 mL  0.5 mL IntraMUSCular PRIOR TO DISCHARGE    albuterol (PROVENTIL HFA, VENTOLIN HFA, PROAIR HFA) inhaler 2 Puff  2 Puff Inhalation Q4H RT    LORazepam (ATIVAN) tablet 1.5 mg  1.5 mg Oral QHS    acetaminophen (TYLENOL) tablet 650 mg  650 mg Oral Q6H PRN    Or    acetaminophen (TYLENOL) suppository 650 mg  650 mg Rectal Q6H PRN    dexAMETHasone (DECADRON) tablet 6 mg  6 mg Oral DAILY WITH BREAKFAST    sodium chloride (NS) flush 5-40 mL  5-40 mL IntraVENous Q8H    sodium chloride (NS) flush 5-40 mL  5-40 mL IntraVENous PRN    levothyroxine (SYNTHROID) tablet 200 mcg  200 mcg Oral 6am    [Held by provider] citalopram (CELEXA) tablet 40 mg  40 mg Oral DAILY    folic acid (FOLVITE) tablet 1 mg  1 mg Oral DAILY    lisinopriL (PRINIVIL, ZESTRIL) tablet 10 mg  10 mg Oral DAILY    famotidine (PEPCID) tablet 20 mg  20 mg Oral BID     ______________________________________________________________________  EXPECTED LENGTH OF STAY: 3d 4h  ACTUAL LENGTH OF STAY:          6    Medical decision making    I have reviewed flow sheets and previous day's notes  Patient has acute or chronic illness that posses a threat to bodily functions or life   Have Reviewed and ordered Clinical test  Have reviewed and ordered Radiology test  Have personally Reviewed imaging reports   High risk drug therapy as that require intensive monitoring for toxicity as in pressors, antibiotics, steroids and narcotic pain management.                 Uriah Tate MD

## 2020-09-21 NOTE — PROGRESS NOTES
Problem: Risk for Spread of Infection  Goal: Prevent transmission of infectious organism to others  Description: Prevent the transmission of infectious organisms to other patients, staff members, and visitors. Outcome: Progressing Towards Goal     Problem: Breathing Pattern - Ineffective  Goal: *Absence of hypoxia  Outcome: Progressing Towards Goal     Problem: Falls - Risk of  Goal: *Absence of Falls  Description: Document Fito Fall Risk and appropriate interventions in the flowsheet.   Outcome: Progressing Towards Goal  Note: Fall Risk Interventions:  Mobility Interventions: Patient to call before getting OOB, PT Consult for mobility concerns, Strengthening exercises (ROM-active/passive)         Medication Interventions: Evaluate medications/consider consulting pharmacy, Patient to call before getting OOB, Teach patient to arise slowly    Elimination Interventions: Call light in reach, Patient to call for help with toileting needs, Toileting schedule/hourly rounds    History of Falls Interventions: Door open when patient unattended, Consult care management for discharge planning, Investigate reason for fall

## 2020-09-22 LAB
ANION GAP SERPL CALC-SCNC: 6 MMOL/L (ref 5–15)
BASOPHILS # BLD: 0 K/UL (ref 0–0.1)
BASOPHILS NFR BLD: 0 % (ref 0–1)
BNP SERPL-MCNC: 1261 PG/ML
BUN SERPL-MCNC: 9 MG/DL (ref 6–20)
BUN/CREAT SERPL: 17 (ref 12–20)
CALCIUM SERPL-MCNC: 8.8 MG/DL (ref 8.5–10.1)
CHLORIDE SERPL-SCNC: 109 MMOL/L (ref 97–108)
CO2 SERPL-SCNC: 22 MMOL/L (ref 21–32)
CREAT SERPL-MCNC: 0.53 MG/DL (ref 0.55–1.02)
DIFFERENTIAL METHOD BLD: ABNORMAL
EOSINOPHIL # BLD: 0 K/UL (ref 0–0.4)
EOSINOPHIL NFR BLD: 0 % (ref 0–7)
ERYTHROCYTE [DISTWIDTH] IN BLOOD BY AUTOMATED COUNT: 12.8 % (ref 11.5–14.5)
GLUCOSE SERPL-MCNC: 134 MG/DL (ref 65–100)
HCT VFR BLD AUTO: 34.1 % (ref 35–47)
HGB BLD-MCNC: 11.8 G/DL (ref 11.5–16)
IMM GRANULOCYTES # BLD AUTO: 0 K/UL
IMM GRANULOCYTES NFR BLD AUTO: 0 %
LYMPHOCYTES # BLD: 0.4 K/UL (ref 0.8–3.5)
LYMPHOCYTES NFR BLD: 6 % (ref 12–49)
MCH RBC QN AUTO: 29.7 PG (ref 26–34)
MCHC RBC AUTO-ENTMCNC: 34.6 G/DL (ref 30–36.5)
MCV RBC AUTO: 85.9 FL (ref 80–99)
METAMYELOCYTES NFR BLD MANUAL: 4 %
MONOCYTES # BLD: 0.4 K/UL (ref 0–1)
MONOCYTES NFR BLD: 7 % (ref 5–13)
NEUTS SEG # BLD: 5.1 K/UL (ref 1.8–8)
NEUTS SEG NFR BLD: 83 % (ref 32–75)
NRBC # BLD: 0 K/UL (ref 0–0.01)
NRBC BLD-RTO: 0 PER 100 WBC
PLATELET # BLD AUTO: 416 K/UL (ref 150–400)
PMV BLD AUTO: 8.8 FL (ref 8.9–12.9)
POTASSIUM SERPL-SCNC: 3.6 MMOL/L (ref 3.5–5.1)
RBC # BLD AUTO: 3.97 M/UL (ref 3.8–5.2)
RBC MORPH BLD: ABNORMAL
SODIUM SERPL-SCNC: 137 MMOL/L (ref 136–145)
WBC # BLD AUTO: 6.2 K/UL (ref 3.6–11)

## 2020-09-22 PROCEDURE — 74011250637 HC RX REV CODE- 250/637: Performed by: NURSE PRACTITIONER

## 2020-09-22 PROCEDURE — 74011250637 HC RX REV CODE- 250/637: Performed by: HOSPITALIST

## 2020-09-22 PROCEDURE — 97161 PT EVAL LOW COMPLEX 20 MIN: CPT | Performed by: PHYSICAL THERAPIST

## 2020-09-22 PROCEDURE — 74011250637 HC RX REV CODE- 250/637: Performed by: INTERNAL MEDICINE

## 2020-09-22 PROCEDURE — 74011250636 HC RX REV CODE- 250/636: Performed by: HOSPITALIST

## 2020-09-22 PROCEDURE — 74011250636 HC RX REV CODE- 250/636: Performed by: INTERNAL MEDICINE

## 2020-09-22 PROCEDURE — 85025 COMPLETE CBC W/AUTO DIFF WBC: CPT

## 2020-09-22 PROCEDURE — 83880 ASSAY OF NATRIURETIC PEPTIDE: CPT

## 2020-09-22 PROCEDURE — 97530 THERAPEUTIC ACTIVITIES: CPT | Performed by: PHYSICAL THERAPIST

## 2020-09-22 PROCEDURE — 36415 COLL VENOUS BLD VENIPUNCTURE: CPT

## 2020-09-22 PROCEDURE — 65270000029 HC RM PRIVATE

## 2020-09-22 PROCEDURE — 80048 BASIC METABOLIC PNL TOTAL CA: CPT

## 2020-09-22 PROCEDURE — 97535 SELF CARE MNGMENT TRAINING: CPT | Performed by: OCCUPATIONAL THERAPIST

## 2020-09-22 PROCEDURE — 97165 OT EVAL LOW COMPLEX 30 MIN: CPT | Performed by: OCCUPATIONAL THERAPIST

## 2020-09-22 RX ORDER — CLONIDINE HYDROCHLORIDE 0.1 MG/1
0.2 TABLET ORAL
Status: COMPLETED | OUTPATIENT
Start: 2020-09-23 | End: 2020-09-22

## 2020-09-22 RX ORDER — ALBUTEROL SULFATE 90 UG/1
2 AEROSOL, METERED RESPIRATORY (INHALATION)
Status: DISCONTINUED | OUTPATIENT
Start: 2020-09-22 | End: 2020-09-26 | Stop reason: HOSPADM

## 2020-09-22 RX ORDER — LISINOPRIL 20 MG/1
20 TABLET ORAL DAILY
Status: DISCONTINUED | OUTPATIENT
Start: 2020-09-23 | End: 2020-09-26 | Stop reason: HOSPADM

## 2020-09-22 RX ADMIN — FAMOTIDINE 20 MG: 20 TABLET ORAL at 18:51

## 2020-09-22 RX ADMIN — ACETAMINOPHEN 650 MG: 325 TABLET ORAL at 22:03

## 2020-09-22 RX ADMIN — CLONIDINE HYDROCHLORIDE 0.2 MG: 0.1 TABLET ORAL at 23:35

## 2020-09-22 RX ADMIN — ENOXAPARIN SODIUM 40 MG: 40 INJECTION SUBCUTANEOUS at 08:42

## 2020-09-22 RX ADMIN — Medication 10 ML: at 21:41

## 2020-09-22 RX ADMIN — LORAZEPAM 1.5 MG: 0.5 TABLET ORAL at 21:39

## 2020-09-22 RX ADMIN — LEVOFLOXACIN 750 MG: 5 INJECTION, SOLUTION INTRAVENOUS at 11:50

## 2020-09-22 RX ADMIN — ENOXAPARIN SODIUM 40 MG: 40 INJECTION SUBCUTANEOUS at 21:39

## 2020-09-22 RX ADMIN — ONDANSETRON 4 MG: 2 INJECTION INTRAMUSCULAR; INTRAVENOUS at 21:47

## 2020-09-22 RX ADMIN — FAMOTIDINE 20 MG: 20 TABLET ORAL at 08:42

## 2020-09-22 RX ADMIN — SODIUM CHLORIDE 100 ML/HR: 9 INJECTION, SOLUTION INTRAVENOUS at 06:39

## 2020-09-22 RX ADMIN — POLYETHYLENE GLYCOL 3350 17 G: 17 POWDER, FOR SOLUTION ORAL at 08:42

## 2020-09-22 RX ADMIN — LEVOTHYROXINE SODIUM 200 MCG: 0.1 TABLET ORAL at 06:38

## 2020-09-22 RX ADMIN — DEXAMETHASONE 6 MG: 4 TABLET ORAL at 07:05

## 2020-09-22 RX ADMIN — Medication 10 ML: at 06:39

## 2020-09-22 RX ADMIN — LISINOPRIL 10 MG: 10 TABLET ORAL at 08:42

## 2020-09-22 RX ADMIN — Medication 10 ML: at 18:51

## 2020-09-22 RX ADMIN — FOLIC ACID 1 MG: 1 TABLET ORAL at 08:42

## 2020-09-22 NOTE — PROGRESS NOTES
CHONG:  1. Patient is currently on oxygen. 2. Return home with her friend that will assist as needed. CM met with patient and her friend at bedside to provide home health listing. Her friend is going to be staying with her and assisting her with ADL needs. Patient is currently on oxygen, and if needed cm will arrange. Patient does not wear oxygen at home. Medicare pt has received, reviewed, and signed 2nd IM letter informing them of their right to appeal the discharge. Signed copy has been placed on pt bedside chart. Hamida Goldman, [facility]

## 2020-09-22 NOTE — PROGRESS NOTES
Problem: Self Care Deficits Care Plan (Adult)  Goal: *Acute Goals and Plan of Care (Insert Text)  Description:   FUNCTIONAL STATUS PRIOR TO ADMISSION: Patient was independent and active without use of DME. Prior to 3 weeks ago she was mowing lawn and driving    HOME SUPPORT: The patient lived alone with friend to provide assistance. Occupational Therapy Goals  Initiated 9/22/2020    1. Patient will perform grooming and sponge bathing standing and sitting at the sink prn with modified independence and maintain oxygen sats > or = 92% on room air within 7 day(s). 2.  Patient will perform upper body dressing and lower body dressing with modified independence and maintain oxygen sats > or = 92% on room air within 7 day(s). 3.  Patient will perform simple home management with modified independence  and maintain oxygen sats > or = 92% on room airwithin 7 day(s). 4.  Patient will perform toilet transfers with modified independence into and out of bathroom and maintain oxygen sats > or = 92% on room air  within 7 day(s). 5.  Patient will perform all aspects of toileting with modified independence within 7 day(s). 6.  Patient will participate in upper extremity therapeutic exercise/activities with independence for 8 minutes with < or = 2 rest breaks and maintain oxygen sats > or = 92% on room air within 7 day(s). 7.  Patient will demonstrate pursed lip breathing during functional tasks without cues within 7 day(s). Outcome: Not Met    OCCUPATIONAL THERAPY EVALUATION  Patient: Blaze Winter (37 y.o. female)  Date: 9/22/2020  Primary Diagnosis: Viral pneumonia [J12.9]  Pneumonia [J18.9]  Suspected COVID-19 virus infection [Z20.828]        Precautions: fall     ASSESSMENT  Based on the objective data described below, the patient presents with decreased activity tolerance, received on 2 liters nasal cannula.  Demonstrated good standing balance for transfer to bedside commode and chair, completed CHG bath on commode and toileting with assist. Feeling better and more than she's done in weeks. Required pacing and frequent rest breaks will continue to follow. Current Level of Function Impacting Discharge (ADLs/self-care): SBA to CGA for functional transfers, min to mod assist toileting, SBA LE dressing, min assist sponge bathing    Functional Outcome Measure: The patient scored 50/100 on the Barthel Index outcome measure   Other factors to consider for discharge: lives alone, supportive friend     Patient will benefit from skilled therapy intervention to address the above noted impairments. PLAN :  Recommendations and Planned Interventions: self care training, functional mobility training, therapeutic exercise, balance training, therapeutic activities, endurance activities, patient education, home safety training, and family training/education    Frequency/Duration: Patient will be followed by occupational therapy 5 times a week to address goals. Recommendation for discharge: (in order for the patient to meet his/her long term goals)  Occupational therapy at least 2 days/week in the home AND ensure assist and/or supervision for safety with IADL tasks    This discharge recommendation:  Has not yet been discussed the attending provider and/or case management    IF patient discharges home will need the following DME: to be determined       SUBJECTIVE:   Patient stated I do feel better, I couldn't do this for awhile.  re: sponge bathing    OBJECTIVE DATA SUMMARY:   HISTORY:   Past Medical History:   Diagnosis Date    Breast cancer, left ([facility] [address]     Depression     Goiter     Hearing loss     bilateral    Hypertension     Hypothyroid     IBS (irritable bowel syndrome)     Menopause     Rheumatoid arthritis ([facility] [address]     S/P radiation therapy      Past Surgical History:   Procedure Laterality Date    HX BREAST LUMPECTOMY Left 1/29/2019    LEFT BREAST LUMPECTOMY WITH ULTRASOUND, LEFT BREAST SENTINEL NODE BIOPSY (11a) performed by Alex Hoffmann MD at [facility]    HX CATARACT REMOVAL Bilateral      [facility]  7/97    brain lesion removed ? infectious       Expanded or extensive additional review of patient history:     Home Situation  Home Environment: Private residence  # Steps to Enter: 5  Rails to Enter: Yes  One/Two Story Residence: One story  Living Alone: Yes  Support Systems: Friends \ neighbors  Patient Expects to be Discharged to[de-identified] Private residence  Current DME Used/Available at Home: dk Whitmore, Buffy Siddiqi, sangeeta, [address] [facility], [address] chair  Tub or Shower Type: Shower    Hand dominance: Right    EXAMINATION OF PERFORMANCE DEFICITS:  Cognitive/Behavioral Status:  Neurologic State: Alert  Orientation Level: Oriented X4  Cognition: Follows commands  Perception: Appears intact  Perseveration: No perseveration noted  Safety/Judgement: Awareness of environment; Fall prevention; Insight into deficits;Home safety    Skin: intact    Edema: none noted    Hearing: Auditory  Auditory Impairment: Hard of hearing, bilateral, Hearing aid(s)  Hearing Aids/Status: Bilateral, Functioning    Vision/Perceptual:                                     Range of Motion:  AROM: Within functional limits                         Strength:  Strength: Within functional limits                Coordination:  Coordination: Within functional limits  Fine Motor Skills-Upper: Left Intact; Right Intact    Gross Motor Skills-Upper: Left Intact; Right Intact    Tone & Sensation:  Tone: Normal  Sensation: Intact                      Balance:  Sitting: Intact; Without support  Standing: Intact; Without support    Functional Mobility and Transfers for ADLs:  Bed Mobility:  Rolling: Modified independent  Supine to Sit: Modified independent  Sit to Supine: Modified independent    Transfers:  Sit to Stand: Stand-by assistance  Stand to Sit: Stand-by assistance  Bed to Chair: Stand-by assistance; Additional time  Bathroom Mobility: Dependent/total assistance  Toilet Transfer : Stand-by assistance; Adaptive equipment    ADL Assessment:  Feeding: Independent    Oral Facial Hygiene/Grooming: Setup    Bathing: Minimum assistance    Upper Body Dressing: Modified independent    Lower Body Dressing: Stand-by assistance    Toileting: Moderate assistance;Minimum assistance(assist sacral otilio-care)                ADL Intervention and task modifications:     Educated on role of OT, pursed lip breathing, energy conservation    Patient instructed and indicated understanding the benefits of maintaining activity tolerance, functional mobility, and independence with self care tasks during acute stay  to ensure safe return home and to baseline. Encouraged patient to increase frequency and duration OOB, be out of bed for all meals, perform daily ADLs (as approved by RN/MD regarding bathing etc), and performing functional mobility to/from bathroom. Cognitive Retraining  Safety/Judgement: Awareness of environment; Fall prevention; Insight into deficits;Home safety    Therapeutic Exercise: Instructed to perform bilateral UE AROM exercises throughout day, instructed on techniques to incorporat     Functional Measure:  Barthel Index:    Bathin  Bladder: 5  Bowels: 10  Groomin  Dressin  Feeding: 10  Mobility: 0  Stairs: 0  Toilet Use: 5  Transfer (Bed to Chair and Back): 10  Total: 50/100        The Barthel ADL Index: Guidelines  1. The index should be used as a record of what a patient does, not as a record of what a patient could do. 2. The main aim is to establish degree of independence from any help, physical or verbal, however minor and for whatever reason. 3. The need for supervision renders the patient not independent. 4. A patient's performance should be established using the best available evidence.  Asking the patient, friends/relatives and nurses are the usual sources, but direct observation and common sense are also important. However direct testing is not needed. 5. Usually the patient's performance over the preceding 24-48 hours is important, but occasionally longer periods will be relevant. 6. Middle categories imply that the patient supplies over 50 per cent of the effort. 7. Use of aids to be independent is allowed. [place]., Barthel, D.W. (9510). Functional evaluation: the Barthel Index. [facility] (14)2. Isatu  ben BOYD Arceo, Gladys Breen., Mable Neri., [place], [facility] (1999). Measuring the change indisability after inpatient rehabilitation; comparison of the responsiveness of the Barthel Index and Functional [place] Measure. Journal of Neurology, Neurosurgery, and Psychiatry, 66(4), 498-767. MARY ALICE BenjaminA, WICHO Haywood, & Kenna Aguilar MDUSTIN. (2004.) Assessment of post-stroke quality of life in cost-effectiveness studies: The usefulness of the Barthel Index and the EuroQoL-5D. Quality of Life Research, 15, 463-17         Occupational Therapy Evaluation Charge Determination   History Examination Decision-Making   LOW Complexity : Brief history review  LOW Complexity : 1-3 performance deficits relating to physical, cognitive , or psychosocial skils that result in activity limitations and / or participation restrictions  MEDIUM Complexity : Patient may present with comorbidities that affect occupational performnce. Miniml to moderate modification of tasks or assistance (eg, physical or verbal ) with assesment(s) is necessary to enable patient to complete evaluation       Based on the above components, the patient evaluation is determined to be of the following complexity level: LOW   Pain Rating:  No complaint of pain    Activity Tolerance:   Fair, desaturates with exertion and requires oxygen, requires frequent rest breaks, and observed SOB with activity  Please refer to the flowsheet for vital signs taken during this treatment.     After treatment patient left in no apparent distress:    Sitting in chair and Call bell within reach    COMMUNICATION/EDUCATION:   The patients plan of care was discussed with: Physical therapist and Registered nurse. Home safety education was provided and the patient/caregiver indicated understanding. and Patient/family have participated as able in goal setting and plan of care. This patients plan of care is appropriate for delegation to [facility].     Thank you for this referral.  Dylon Valdivia, OTR/L

## 2020-09-22 NOTE — PROGRESS NOTES
Problem: Risk for Spread of Infection  Goal: Prevent transmission of infectious organism to others  Description: Prevent the transmission of infectious organisms to other patients, staff members, and visitors. Outcome: Progressing Towards Goal     Problem: Breathing Pattern - Ineffective  Goal: *Absence of hypoxia  Outcome: Progressing Towards Goal     Problem: Falls - Risk of  Goal: *Absence of Falls  Description: Document Fito Fall Risk and appropriate interventions in the flowsheet.   Outcome: Progressing Towards Goal  Note: Fall Risk Interventions:  Mobility Interventions: Bed/chair exit alarm, Communicate number of staff needed for ambulation/transfer, Patient to call before getting OOB, PT Consult for mobility concerns, PT Consult for assist device competence, Strengthening exercises (ROM-active/passive)         Medication Interventions: Bed/chair exit alarm, Evaluate medications/consider consulting pharmacy, Patient to call before getting OOB, Teach patient to arise slowly    Elimination Interventions: Call light in reach, Patient to call for help with toileting needs, Toilet paper/wipes in reach    History of Falls Interventions: Consult care management for discharge planning, Door open when patient unattended, Investigate reason for fall, Room close to nurse's station         Problem: Breathing Pattern - Ineffective  Goal: *Absence of hypoxia  Outcome: Progressing Towards Goal

## 2020-09-22 NOTE — PROGRESS NOTES
[facility]  Hospitalist Group                                                                                          Hospitalist Progress Note  Uriah Tate MD  Answering service: [phone] from in house phone        Date of Service:  2020  NAME:  Jyoti Dillon  :  1950  MRN:  [id number]    This documentation was facilitated by a Voice Recognition software and may contain inadvertent typographical errors. Admission Summary:   Per H&P   \"Candace Yeh is a 78 y.o.  female who has a history of breast cancer, hypertension, rheumatoid arthritis on methotrexate presenting with a 3-week history of increasing fatigue. 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 She denies fevers or chills but since yesterday she became progressively short of breath. She also reports that she felt so weak that she could not get out of bed. She reported that she was having some nausea and some loose stools. She denies bleeding hematemesis, hematochezia or melena. She presents to the emergency room reporting that she was extremely dehydrated. In the ER she had a CT angiogram of the chest which was negative for embolism. But did show bilateral lower lobe patchy pneumonia with the possibility of COVID-19 pneumonia. \"        Interval history / Subjective:        2020   Improving across-the-board had good physical therapy session anticipate need to stay now less than 48 hours    Assessment & Plan:   Bilateral pneumonia, bacterial versus viral, rule out COVID  -Continue Zithromax. She has recently received Bactrim for UTI. -She has allergic to Keflex, so no ceftriaxone  -SARS-CoV-2 pending.   Maintain droplet plus precaution  -Continue Decadron initiated in the ED  -Anticoagulation with Lovenox per COVID-19 protocol  -Monitor inflammatory markers  -On room air  - abx expanded. 9/17/2020 ; adding Levaquin. 9/18 T-max 102  Cont to spike at night 102 , cxr worst and c/c covid  covidd protocol else continue; re- tested 9/19 negative    DEHYDRATION: adding ivf bolus and ivf's / f.u lab      Hypertension  Continue with lisinopril  BP Readings from Last 1 Encounters:   09/22/20 (!) 164/71         Hypothyroidism  On Synthroid        Code Status: Full code      surrogate Decision Maker: Daughter     DVT Prophylaxis: On Lovenox  GI Prophylaxis:      FUNCTIONAL STATUS PRIOR TO ADMISSION: Ambulates Independently     Patient is from: [facility]  Date Reviewed: 3/10/2020          Codes Class Noted POA    Pneumonia ICD-10-CM: J18.9  ICD-9-CM: 486  9/15/2020 Unknown        Suspected COVID-19 virus infection ICD-10-CM: Z20.828  ICD-9-CM: V01.79  9/15/2020 Unknown        Viral pneumonia ICD-10-CM: J12.9  ICD-9-CM: 480.9  9/14/2020 Unknown                Review of Systems:   A comprehensive review of systems was negative except for that written in the HPI. Vital Signs:    Last 24hrs VS reviewed since prior progress note. Most recent are:  Visit Vitals  BP (!) 164/71   Pulse (!) 57   Temp 97.9 °F (36.6 °C)   Resp 18   Ht 5' 4\" (1.626 m)   Wt 94.5 kg (208 lb 5.4 oz)   SpO2 95%   BMI 35.76 kg/m²         Intake/Output Summary (Last 24 hours) at 9/22/2020 1109  Last data filed at 9/21/2020 1504  Gross per 24 hour   Intake    Output 1200 ml   Net -1200 ml        Physical Examination:             Constitutional:     Yet Ill appearing. Else: cooperative, pleasant    HEENT:  Atraumatic. Oral mucosa moist,. Non icteric sclera. No pallor. Resp:  coarse reva . No wheezing/rhonchi/rales. No accessory muscle use   Chest Wall: No deformity   CV:  Regular rhythm, normal rate, no murmurs, gallops, rubs    GI:  Soft, non distended, non tender.  normoactive bowel sounds, no hepatosplenomegaly    :  No CVA or suprapubic tenderness    Musculoskeletal:  No edema, warm, 2+ pulses throughout Neurologic:  Mental status:AAOx3,   Cranial nerves II-XII : WNL  Motor exam:Moves all extremities symmetrically              Data Review:    Review and/or order of clinical lab test  Review and/or order of tests in the radiology section of CPT  Review and/or order of tests in the medicine section of [facility]      Labs:     Recent Labs     09/22/20 0239 09/21/20 0241   WBC 6.2 4.0   HGB 11.8 12.3   HCT 34.1* 35.8   * 392     Recent Labs     09/22/20 0239 09/21/20 0241 09/20/20 0224    136 138   K 3.6 3.6 3.5   * 108 107   CO2 22 22 24   BUN 9 9 7   CREA 0.53* 0.47* 0.60   * 131* 102*   CA 8.8 8.8 8.5     No results for input(s): ALT, AP, TBIL, TBILI, TP, ALB, GLOB, GGT, AML, LPSE in the last 72 hours. No lab exists for component: SGOT, GPT, AMYP, HLPSE  No results for input(s): INR, PTP, APTT, INREXT, INREXT in the last 72 hours. No results for input(s): FE, TIBC, PSAT, FERR in the last 72 hours. No results found for: FOL, RBCF   No results for input(s): PH, PCO2, PO2 in the last 72 hours. No results for input(s): CPK, CKNDX, TROIQ in the last 72 hours.     No lab exists for component: CPKMB  Lab Results   Component Value Date/Time    Cholesterol, total 187 12/12/2018 12:00 AM    HDL Cholesterol 55 12/12/2018 12:00 AM    LDL, calculated 103 (H) 12/12/2018 12:00 AM    Triglyceride 146 12/12/2018 12:00 AM    CHOL/HDL Ratio 4.6 11/07/2009 08:29 AM     No results found for: GLUCPOC  Lab Results   Component Value Date/Time    Color YELLOW/STRAW 09/14/2020 09:01 PM    Appearance CLEAR 09/14/2020 09:01 PM    Specific gravity 1.021 09/14/2020 09:01 PM    pH (UA) 7.5 09/14/2020 09:01 PM    Protein Negative 09/14/2020 09:01 PM    Glucose Negative 09/14/2020 09:01 PM    Ketone Negative 09/14/2020 09:01 PM    Bilirubin Negative 09/14/2020 09:01 PM    Urobilinogen 0.2 09/14/2020 09:01 PM    Nitrites Negative 09/14/2020 09:01 PM    Leukocyte Esterase TRACE (A) 09/14/2020 09:01 PM    Epithelial cells FEW 09/14/2020 09:01 PM    Bacteria 1+ (A) 09/14/2020 09:01 PM    WBC 0-4 09/14/2020 09:01 PM    RBC 0-5 09/14/2020 09:01 PM         Medications Reviewed:     Current Facility-Administered Medications   Medication Dose Route Frequency    albuterol (PROVENTIL HFA, VENTOLIN HFA, PROAIR HFA) inhaler 2 Puff  2 Puff Inhalation Q4H PRN    levoFLOXacin (LEVAQUIN) 750 mg in D5W IVPB  750 mg IntraVENous Q24H    enoxaparin (LOVENOX) injection 40 mg  40 mg SubCUTAneous Q12H    guaiFENesin-codeine (ROBITUSSIN AC) 100-10 mg/5 mL solution 10 mL  10 mL Oral Q4H PRN    polyethylene glycol (MIRALAX) packet 17 g  17 g Oral DAILY    ondansetron (ZOFRAN) injection 4 mg  4 mg IntraVENous Q4H PRN    benzocaine-menthoL (CEPACOL) lozenge 1 Lozenge  1 Lozenge Mucous Membrane PRN    0.9% sodium chloride infusion  100 mL/hr IntraVENous CONTINUOUS    influenza vaccine 2020-21 (6 mos+)(PF) (FLUARIX/FLULAVAL/FLUZONE QUAD) injection 0.5 mL  0.5 mL IntraMUSCular PRIOR TO DISCHARGE    LORazepam (ATIVAN) tablet 1.5 mg  1.5 mg Oral QHS    acetaminophen (TYLENOL) tablet 650 mg  650 mg Oral Q6H PRN    Or    acetaminophen (TYLENOL) suppository 650 mg  650 mg Rectal Q6H PRN    dexAMETHasone (DECADRON) tablet 6 mg  6 mg Oral DAILY WITH BREAKFAST    sodium chloride (NS) flush 5-40 mL  5-40 mL IntraVENous Q8H    sodium chloride (NS) flush 5-40 mL  5-40 mL IntraVENous PRN    levothyroxine (SYNTHROID) tablet 200 mcg  200 mcg Oral 6am    [Held by provider] citalopram (CELEXA) tablet 40 mg  40 mg Oral DAILY    folic acid (FOLVITE) tablet 1 mg  1 mg Oral DAILY    lisinopriL (PRINIVIL, ZESTRIL) tablet 10 mg  10 mg Oral DAILY    famotidine (PEPCID) tablet 20 mg  20 mg Oral BID     ______________________________________________________________________  EXPECTED LENGTH OF STAY: 3d 4h  ACTUAL LENGTH OF STAY:          7    Medical decision making    I have reviewed flow sheets and previous day's notes  Patient has acute or chronic illness that posses a threat to bodily functions or life   Have Reviewed and ordered Clinical test  Have reviewed and ordered Radiology test  Have personally Reviewed imaging reports   High risk drug therapy as that require intensive monitoring for toxicity as in pressors, antibiotics, steroids and narcotic pain management.                 Damian Pena MD

## 2020-09-22 NOTE — PROGRESS NOTES
09/21/20 2006   Oxygen Therapy   O2 Sat (%) 98 %   Pulse via Oximetry 57 beats per minute   O2 Device Room air     Patient refused Albuterol treatment.  Stated they vomited after taking last treatment and do not wish to take again

## 2020-09-22 NOTE — PROGRESS NOTES
Problem: Mobility Impaired (Adult and Pediatric)  Goal: *Acute Goals and Plan of Care (Insert Text)  Description: FUNCTIONAL STATUS PRIOR TO ADMISSION: Patient was independent and active without use of DME. Normally works and is complete independent with mobility. HOME SUPPORT PRIOR TO ADMISSION: The patient lived alone with no local support. Has a few friends that can assist her as needed but intermittent support only. Physical Therapy Goals  Initiated 9/22/2020  1. Patient will move from supine to sit and sit to supine  in bed with modified independence within 7 day(s). 2.  Patient will transfer from bed to chair and chair to bed with modified independence using the least restrictive device within 7 day(s). 3.  Patient will perform sit to stand with modified independence within 7 day(s). 4.  Patient will ambulate with modified independence for 250 feet with the least restrictive device within 7 day(s). 5.  Patient will ascend/descend 4 stairs with 1 handrail(s) with modified independence within 7 day(s). Outcome: Progressing Towards Goal   PHYSICAL THERAPY EVALUATION  Patient: Holly Lujan (89 y.o. female)  Date: 9/22/2020  Primary Diagnosis: Viral pneumonia [J12.9]  Pneumonia [J18.9]  Suspected COVID-19 virus infection [Z20.828]        Precautions:   Fall    ASSESSMENT  Based on the objective data described below, the patient presents with decreased functional mobility from baseline level of function. Patient currently limited by generalized weakness, impaired gait/balance, decreased activity tolerance and continued SOB. Currently needing SBA for bed mobility and to move from bed to chair and bed to commode. Patient without overt LOB but has low activity tolerance. Unable to get good O2 read given her acrylic nails but mobilized on 2 L o2 without report of SOB. Anticipate as medical condition improves her mobility will also improve.      Other factors to consider for discharge: lives alone with minimal support at home     Patient will benefit from skilled therapy intervention to address the above noted impairments. PLAN :  Recommendations and Planned Interventions: bed mobility training, transfer training, gait training, therapeutic exercises, patient and family training/education, and therapeutic activities      Frequency/Duration: Patient will be followed by physical therapy:  5 times a week to address goals. Recommendation for discharge: (in order for the patient to meet his/her long term goals)  Physical therapy at least 2 days/week in the home       IF patient discharges home will need the following DME: patient owns DME required for discharge         SUBJECTIVE:   Patient stated I just got so weak I couldn't do anymore.     OBJECTIVE DATA SUMMARY:   HISTORY:    Past Medical History:   Diagnosis Date    Breast cancer, left ([facility] [address]     Depression     Goiter     Hearing loss     bilateral    Hypertension     Hypothyroid     IBS (irritable bowel syndrome)     Menopause     Rheumatoid arthritis ([facility] [address]     S/P radiation therapy      Past Surgical History:   Procedure Laterality Date    HX BREAST LUMPECTOMY Left 1/29/2019    LEFT BREAST LUMPECTOMY WITH ULTRASOUND, LEFT BREAST SENTINEL NODE BIOPSY (11a) performed by Mis Rodrigues MD at [facility] 11    HX CATARACT REMOVAL Bilateral      [facility]  7/97    brain lesion removed ?  infectious       Personal factors and/or comorbidities impacting plan of care:     Home Situation  Home Environment: Private residence  # Steps to Enter: 5  Rails to Enter: Yes  One/Two Story Residence: One story  Living Alone: Yes  Support Systems: Friends \ neighbors  Patient Expects to be Discharged to[de-identified] Private residence  Current DME Used/Available at Home: [employer], [employer], [facility], Shower chair  Tub or Shower Type: Shower    EXAMINATION/PRESENTATION/DECISION MAKING:   Critical Behavior:  Neurologic State: Alert  Orientation Level: Oriented X4  Cognition: Follows commands  Safety/Judgement: Awareness of environment, Fall prevention, Insight into deficits, Home safety  Hearing: Auditory  Auditory Impairment: Hard of hearing, bilateral, Hearing aid(s)  Hearing Aids/Status: Bilateral, Functioning    Range Of Motion:  AROM: Within functional limits                       Strength:    Strength: Within functional limits                    Tone & Sensation:   Tone: Normal              Sensation: Intact               Coordination:  Coordination: Within functional limits  Vision:      Functional Mobility:  Bed Mobility:  Rolling: Modified independent  Supine to Sit: Modified independent  Sit to Supine: Modified independent     Transfers:  Sit to Stand: Stand-by assistance  Stand to Sit: Stand-by assistance        Bed to Chair: Stand-by assistance; Additional time              Balance:   Sitting: Intact; Without support  Standing: Intact; Without support  Ambulation/Gait Training:  Distance (ft): 4 Feet (ft)  Assistive Device: Gait belt  Ambulation - Level of Assistance: Contact guard assistance     Gait Description (WDL): Exceptions to WDL  Gait Abnormalities: Decreased step clearance;Shuffling gait        Base of Support: Widened     Speed/Fransisca: Pace decreased (<100 feet/min); Shuffled; Slow  Step Length: Left shortened;Right shortened          Pain Rating:  No c/o pain    Activity Tolerance:   Fair and requires rest breaks  Please refer to the flowsheet for vital signs taken during this treatment. After treatment patient left in no apparent distress:   Sitting in chair, Call bell within reach    COMMUNICATION/EDUCATION:   The patients plan of care was discussed with: Physical therapist, Occupational therapist, and Registered nurse.      Fall prevention education was provided and the patient/caregiver indicated understanding., Patient/family have participated as able in goal setting and plan of care., and Patient/family agree to work toward stated goals and plan of care.     Thank you for this referral.  Jese Monet, PT, DPT   Time Calculation: 30 mins

## 2020-09-22 NOTE — PROGRESS NOTES
Bedside shift change report given to Avelino Ellsworth (oncoming nurse) by Patria Silva RN (offgoing nurse). Report included the following information SBAR, Kardex and MAR.

## 2020-09-23 PROCEDURE — 74011250637 HC RX REV CODE- 250/637: Performed by: INTERNAL MEDICINE

## 2020-09-23 PROCEDURE — 74011250637 HC RX REV CODE- 250/637: Performed by: NURSE PRACTITIONER

## 2020-09-23 PROCEDURE — 74011250636 HC RX REV CODE- 250/636: Performed by: INTERNAL MEDICINE

## 2020-09-23 PROCEDURE — 74011250637 HC RX REV CODE- 250/637: Performed by: HOSPITALIST

## 2020-09-23 PROCEDURE — 65270000029 HC RM PRIVATE

## 2020-09-23 PROCEDURE — 97535 SELF CARE MNGMENT TRAINING: CPT

## 2020-09-23 PROCEDURE — 74011250636 HC RX REV CODE- 250/636: Performed by: HOSPITALIST

## 2020-09-23 RX ORDER — HYDRALAZINE HYDROCHLORIDE 20 MG/ML
20 INJECTION INTRAMUSCULAR; INTRAVENOUS ONCE
Status: COMPLETED | OUTPATIENT
Start: 2020-09-23 | End: 2020-09-23

## 2020-09-23 RX ADMIN — Medication 10 ML: at 21:26

## 2020-09-23 RX ADMIN — LEVOFLOXACIN 750 MG: 5 INJECTION, SOLUTION INTRAVENOUS at 11:31

## 2020-09-23 RX ADMIN — ENOXAPARIN SODIUM 40 MG: 40 INJECTION SUBCUTANEOUS at 21:26

## 2020-09-23 RX ADMIN — ONDANSETRON 4 MG: 2 INJECTION INTRAMUSCULAR; INTRAVENOUS at 18:10

## 2020-09-23 RX ADMIN — FAMOTIDINE 20 MG: 20 TABLET ORAL at 09:43

## 2020-09-23 RX ADMIN — DEXAMETHASONE 6 MG: 4 TABLET ORAL at 07:04

## 2020-09-23 RX ADMIN — ONDANSETRON 4 MG: 2 INJECTION INTRAMUSCULAR; INTRAVENOUS at 07:23

## 2020-09-23 RX ADMIN — POLYETHYLENE GLYCOL 3350 17 G: 17 POWDER, FOR SOLUTION ORAL at 09:00

## 2020-09-23 RX ADMIN — LORAZEPAM 1.5 MG: 0.5 TABLET ORAL at 21:26

## 2020-09-23 RX ADMIN — FOLIC ACID 1 MG: 1 TABLET ORAL at 09:43

## 2020-09-23 RX ADMIN — ONDANSETRON 4 MG: 2 INJECTION INTRAMUSCULAR; INTRAVENOUS at 12:55

## 2020-09-23 RX ADMIN — Medication 10 ML: at 07:05

## 2020-09-23 RX ADMIN — LISINOPRIL 20 MG: 20 TABLET ORAL at 09:43

## 2020-09-23 RX ADMIN — ACETAMINOPHEN 650 MG: 325 TABLET ORAL at 18:10

## 2020-09-23 RX ADMIN — ENOXAPARIN SODIUM 40 MG: 40 INJECTION SUBCUTANEOUS at 09:43

## 2020-09-23 RX ADMIN — ACETAMINOPHEN 650 MG: 325 TABLET ORAL at 12:57

## 2020-09-23 RX ADMIN — HYDRALAZINE HYDROCHLORIDE 20 MG: 20 INJECTION INTRAMUSCULAR; INTRAVENOUS at 11:31

## 2020-09-23 RX ADMIN — LEVOTHYROXINE SODIUM 200 MCG: 0.1 TABLET ORAL at 07:04

## 2020-09-23 RX ADMIN — FAMOTIDINE 20 MG: 20 TABLET ORAL at 18:10

## 2020-09-23 NOTE — PROGRESS NOTES
Bedside shift change report given to Tony Graham (oncoming nurse) by Ivy Tao RN (offgoing nurse). Report included the following information SBAR, Kardex and MAR.

## 2020-09-23 NOTE — PROGRESS NOTES
[facility]ist [facility]                                                                                          Hospitalist Progress Note  Fitz Mena MD  Answering service: [phone] from in house phone        Date of Service:  2020  NAME:  David Malone  :  1950  MRN:  [id number]    This documentation was facilitated by a Voice Recognition software and may contain inadvertent typographical errors. Admission Summary:   Per H&P   \"Candace Lanza is a 78 y.o.  female who has a history of breast cancer, hypertension, rheumatoid arthritis on methotrexate presenting with a 3-week history of increasing fatigue. 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 She denies fevers or chills but since yesterday she became progressively short of breath. She also reports that she felt so weak that she could not get out of bed. She reported that she was having some nausea and some loose stools. She denies bleeding hematemesis, hematochezia or melena. She presents to the emergency room reporting that she was extremely dehydrated. In the ER she had a CT angiogram of the chest which was negative for embolism. But did show bilateral lower lobe patchy pneumonia with the possibility of COVID-19 pneumonia. \"        Interval history / Subjective:        2020   Feels poorly today , no energy, but no fever and not sob no nausea but poor appetite     Assessment & Plan:   Bilateral pneumonia, bacterial versus viral, rule out COVID  -Continue Zithromax. She has recently received Bactrim for UTI. -She has allergic to Keflex, so no ceftriaxone  -SARS-CoV-2 pending. Maintain droplet plus precaution  -Continue Decadron initiated in the ED  -Anticoagulation with Lovenox per COVID-19 protocol  -Monitor inflammatory markers  -On room air  - abx expanded.  2020 ; adding Levaquin. 9/18 T-max 102  Cont to spike at night 102 , cxr worst and c/c covid  covidd protocol else continue; re- tested 9/19 negative    DEHYDRATION: adding ivf bolus and ivf's / f.u lab      Hypertension  Continue with lisinopril  BP Readings from Last 1 Encounters:   09/23/20 133/70   Meds adjusted 9/23  As bp up to 180's syst       Hypothyroidism  On Synthroid        Code Status: Full code      surrogate Decision Maker: Daughter     DVT Prophylaxis: On Lovenox  GI Prophylaxis:      FUNCTIONAL STATUS PRIOR TO ADMISSION: Ambulates Independently     Patient is from: [facility]  Date Reviewed: 3/10/2020          Codes Class Noted POA    Pneumonia ICD-10-CM: J18.9  ICD-9-CM: 486  9/15/2020 Unknown        Suspected COVID-19 virus infection ICD-10-CM: Z20.828  ICD-9-CM: V01.79  9/15/2020 Unknown        Viral pneumonia ICD-10-CM: J12.9  ICD-9-CM: 480.9  9/14/2020 Unknown                Review of Systems:   A comprehensive review of systems was negative except for that written in the HPI. Vital Signs:    Last 24hrs VS reviewed since prior progress note. Most recent are:  Visit Vitals  /70   Pulse (!) 51   Temp 97.8 °F (36.6 °C)   Resp 16   Ht 5' 4\" (1.626 m)   Wt 95.5 kg (210 lb 8.6 oz)   SpO2 95%   BMI 36.14 kg/m²         Intake/Output Summary (Last 24 hours) at 9/23/2020 1359  Last data filed at 9/23/2020 0716  Gross per 24 hour   Intake    Output 500 ml   Net -500 ml        Physical Examination:             Constitutional:   Fatigued appearing. Else: cooperative, pleasant    HEENT:  Atraumatic. Oral mucosa moist,. Non icteric sclera. No pallor. Resp:  coarse reva . No wheezing/rhonchi/rales. No accessory muscle use   Chest Wall: No deformity   CV:  Regular rhythm, normal rate, no murmurs, gallops, rubs    GI:  Soft, non distended, non tender.  normoactive bowel sounds, no hepatosplenomegaly    :  No CVA or suprapubic tenderness    Musculoskeletal:  No edema, warm, 2+ pulses throughout Neurologic:  Mental status:AAOx3,   Cranial nerves II-XII : WNL  Motor exam:Moves all extremities symmetrically              Data Review:    Review and/or order of clinical lab test  Review and/or order of tests in the radiology section of CPT  Review and/or order of tests in the medicine section of [facility]      Labs:     Recent Labs     09/22/20 0239 09/21/20  0241   WBC 6.2 4.0   HGB 11.8 12.3   HCT 34.1* 35.8   * 392     Recent Labs     09/22/20 0239 09/21/20  0241    136   K 3.6 3.6   * 108   CO2 22 22   BUN 9 9   CREA 0.53* 0.47*   * 131*   CA 8.8 8.8     No results for input(s): ALT, AP, TBIL, TBILI, TP, ALB, GLOB, GGT, AML, LPSE in the last 72 hours. No lab exists for component: SGOT, GPT, AMYP, HLPSE  No results for input(s): INR, PTP, APTT, INREXT, INREXT in the last 72 hours. No results for input(s): FE, TIBC, PSAT, FERR in the last 72 hours. No results found for: FOL, RBCF   No results for input(s): PH, PCO2, PO2 in the last 72 hours. No results for input(s): CPK, CKNDX, TROIQ in the last 72 hours.     No lab exists for component: CPKMB  Lab Results   Component Value Date/Time    Cholesterol, total 187 12/12/2018 12:00 AM    HDL Cholesterol 55 12/12/2018 12:00 AM    LDL, calculated 103 (H) 12/12/2018 12:00 AM    Triglyceride 146 12/12/2018 12:00 AM    CHOL/HDL Ratio 4.6 11/07/2009 08:29 AM     No results found for: GLUCPOC  Lab Results   Component Value Date/Time    Color YELLOW/STRAW 09/14/2020 09:01 PM    Appearance CLEAR 09/14/2020 09:01 PM    Specific gravity 1.021 09/14/2020 09:01 PM    pH (UA) 7.5 09/14/2020 09:01 PM    Protein Negative 09/14/2020 09:01 PM    Glucose Negative 09/14/2020 09:01 PM    Ketone Negative 09/14/2020 09:01 PM    Bilirubin Negative 09/14/2020 09:01 PM    Urobilinogen 0.2 09/14/2020 09:01 PM    Nitrites Negative 09/14/2020 09:01 PM    Leukocyte Esterase TRACE (A) 09/14/2020 09:01 PM    Epithelial cells FEW 09/14/2020 09:01 PM    Bacteria 1+ (A) 09/14/2020 09:01 PM    WBC 0-4 09/14/2020 09:01 PM    RBC 0-5 09/14/2020 09:01 PM         Medications Reviewed:     Current Facility-Administered Medications   Medication Dose Route Frequency    albuterol (PROVENTIL HFA, VENTOLIN HFA, PROAIR HFA) inhaler 2 Puff  2 Puff Inhalation Q4H PRN    lisinopriL (PRINIVIL, ZESTRIL) tablet 20 mg  20 mg Oral DAILY    enoxaparin (LOVENOX) injection 40 mg  40 mg SubCUTAneous Q12H    guaiFENesin-codeine (ROBITUSSIN AC) 100-10 mg/5 mL solution 10 mL  10 mL Oral Q4H PRN    polyethylene glycol (MIRALAX) packet 17 g  17 g Oral DAILY    ondansetron (ZOFRAN) injection 4 mg  4 mg IntraVENous Q4H PRN    benzocaine-menthoL (CEPACOL) lozenge 1 Lozenge  1 Lozenge Mucous Membrane PRN    0.9% sodium chloride infusion  100 mL/hr IntraVENous CONTINUOUS    influenza vaccine 2020-21 (6 mos+)(PF) (FLUARIX/FLULAVAL/FLUZONE QUAD) injection 0.5 mL  0.5 mL IntraMUSCular PRIOR TO DISCHARGE    LORazepam (ATIVAN) tablet 1.5 mg  1.5 mg Oral QHS    acetaminophen (TYLENOL) tablet 650 mg  650 mg Oral Q6H PRN    Or    acetaminophen (TYLENOL) suppository 650 mg  650 mg Rectal Q6H PRN    sodium chloride (NS) flush 5-40 mL  5-40 mL IntraVENous Q8H    sodium chloride (NS) flush 5-40 mL  5-40 mL IntraVENous PRN    levothyroxine (SYNTHROID) tablet 200 mcg  200 mcg Oral 6am    [Held by provider] citalopram (CELEXA) tablet 40 mg  40 mg Oral DAILY    folic acid (FOLVITE) tablet 1 mg  1 mg Oral DAILY    famotidine (PEPCID) tablet 20 mg  20 mg Oral BID     ______________________________________________________________________  EXPECTED LENGTH OF STAY: 3d 4h  ACTUAL LENGTH OF STAY:          8    Medical decision making    I have reviewed flow sheets and previous day's notes  Patient has acute or chronic illness that posses a threat to bodily functions or life   Have Reviewed and ordered Clinical test  Have reviewed and ordered Radiology test  Have personally Reviewed imaging reports   High risk drug therapy as that require intensive monitoring for toxicity as in pressors, antibiotics, steroids and narcotic pain management.                 Isac Salinas MD

## 2020-09-23 NOTE — PROGRESS NOTES
Headache and nausea improved and Order received for one time dose of 0.2mg Clonidine PO. Will continue to monitor.

## 2020-09-23 NOTE — PROGRESS NOTES
09/22/20 2208   Vital Signs   Temp 98.3 °F (36.8 °C)   Temp Source Oral   Pulse (Heart Rate) (!) 56   Heart Rate Source Monitor   Resp Rate 18   O2 Sat (%) 95 %   Level of Consciousness Alert   BP (!) 204/67   MAP (Calculated) 113   BP 1 Method Automatic   BP 1 Location Left arm   BP Patient Position Supine   MEWS Score 3   Patient complain of headache and nausea, medicated and will continue to monitor.   Will repeat BP

## 2020-09-23 NOTE — PROGRESS NOTES
Problem: Risk for Spread of Infection  Goal: Prevent transmission of infectious organism to others  Description: Prevent the transmission of infectious organisms to other patients, staff members, and visitors. Outcome: Progressing Towards Goal     Problem: Patient Education:  Go to Education Activity  Goal: Patient/Family Education  Outcome: Progressing Towards Goal     Problem: Discharge Planning  Goal: *Discharge to safe environment  Outcome: Progressing Towards Goal     Problem: Breathing Pattern - Ineffective  Goal: *Absence of hypoxia  Outcome: Progressing Towards Goal  Goal: *Use of effective breathing techniques  Outcome: Progressing Towards Goal     Problem: Falls - Risk of  Goal: *Absence of Falls  Description: Document Fito Fall Risk and appropriate interventions in the flowsheet.   Outcome: Progressing Towards Goal  Note: Fall Risk Interventions:  Mobility Interventions: Communicate number of staff needed for ambulation/transfer, Patient to call before getting OOB         Medication Interventions: Teach patient to arise slowly, Patient to call before getting OOB    Elimination Interventions: Call light in reach    History of Falls Interventions: Bed/chair exit alarm         Problem: Patient Education: Go to Patient Education Activity  Goal: Patient/Family Education  Outcome: Progressing Towards Goal     Problem: Breathing Pattern - Ineffective  Goal: *Absence of hypoxia  Outcome: Progressing Towards Goal  Goal: *Use of effective breathing techniques  Outcome: Progressing Towards Goal  Goal: *PALLIATIVE CARE:  Alleviation of Dyspnea  Outcome: Progressing Towards Goal     Problem: Patient Education: Go to Patient Education Activity  Goal: Patient/Family Education  Outcome: Progressing Towards Goal     Problem: Patient Education: Go to Patient Education Activity  Goal: Patient/Family Education  Outcome: Progressing Towards Goal     Problem: Patient Education: Go to Patient Education Activity  Goal: Patient/Family Education  Outcome: Progressing Towards Goal

## 2020-09-23 NOTE — PROGRESS NOTES
Problem: Self Care Deficits Care Plan (Adult)  Goal: *Acute Goals and Plan of Care (Insert Text)  Description:   FUNCTIONAL STATUS PRIOR TO ADMISSION: Patient was independent and active without use of DME. Prior to 3 weeks ago she was mowing lawn and driving    HOME SUPPORT: The patient lived alone with friend to provide assistance. Occupational Therapy Goals  Initiated 9/22/2020    1. Patient will perform grooming and sponge bathing standing and sitting at the sink prn with modified independence and maintain oxygen sats > or = 92% on room air within 7 day(s). 2.  Patient will perform upper body dressing and lower body dressing with modified independence and maintain oxygen sats > or = 92% on room air within 7 day(s). 3.  Patient will perform simple home management with modified independence  and maintain oxygen sats > or = 92% on room airwithin 7 day(s). 4.  Patient will perform toilet transfers with modified independence into and out of bathroom and maintain oxygen sats > or = 92% on room air  within 7 day(s). 5.  Patient will perform all aspects of toileting with modified independence within 7 day(s). 6.  Patient will participate in upper extremity therapeutic exercise/activities with independence for 8 minutes with < or = 2 rest breaks and maintain oxygen sats > or = 92% on room air within 7 day(s). 7.  Patient will demonstrate pursed lip breathing during functional tasks without cues within 7 day(s). Outcome: Progressing Towards Goal   OCCUPATIONAL THERAPY TREATMENT  Patient: Desirae Benitez (12 y.o. female)  Date: 9/23/2020  Diagnosis: Viral pneumonia [J12.9]  Pneumonia [J18.9]  Suspected COVID-19 virus infection [Z20.828]   <principal problem not specified>       Precautions: Fall  Chart, occupational therapy assessment, plan of care, and goals were reviewed. ASSESSMENT  Patient continues with skilled OT services and is progressing towards goals.   Nurse reports patient has been nauseated all day. Patient received in bed. Participation impacted by nausea and fatigue this date. Patient reports she has been vomiting \"off and on all day\" and reports \"maybe I did too much yesterday\". Patient instructed in energy conservation/pacing, recommended being up and down often during the day to build endurance instead of staying up all day, and recommended participation in self care as able. Patient reports she is participating in self care and using [facility] with assist at this time. Issued handout on energy conservation/pacing. Instructed in AROM exercises for bilateral UEs with focus on shoulder flex/ext and chest press with focus on triceps extension while in bed. Recommended completing exercises 3 times a day for 10 reps of each. Patient demonstrating understanding. Current Level of Function Impacting Discharge (ADLs):  SBA to CGA for functional transfers, min to mod assist toileting, SBA LE dressing, min assist sponge bathing (per OT note from 9/22/2020)    Other factors to consider for discharge: Lives alone, works at baseline         PLAN :  Patient continues to benefit from skilled intervention to address the above impairments. Continue treatment per established plan of care. to address goals.     Recommend with staff: Tanvi Knock in chair for meals and self care and rest breaks in bed between meals,  SBA to CGA for functional transfers, min to mod assist toileting, SBA LE dressing, min assist sponge bathing    Recommend next OT session: self care activities with application of energy conservation/pacing    Recommendation for discharge: (in order for the patient to meet his/her long term goals)  Occupational therapy at least 2 days/week in the home AND ensure assist and/or supervision for safety with mobility and self care    This discharge recommendation:  Has been made in collaboration with the attending provider and/or case management    IF patient discharges home will need the following DME: ARIEL SUBJECTIVE:   Patient stated maybe I did too much yesterday.     OBJECTIVE DATA SUMMARY:   Cognitive/Behavioral Status:  Neurologic State: Alert  Orientation Level: Oriented X4  Cognition: Appropriate for age attention/concentration; Follows commands  Perception: Appears intact  Perseveration: No perseveration noted  Safety/Judgement: Awareness of environment    Functional Mobility and Transfers for ADLs:    ADL Intervention:        Cognitive Retraining  Safety/Judgement: Awareness of environment    Therapeutic Exercises:   See above      Activity Tolerance:   Fair  Please refer to the flowsheet for vital signs taken during this treatment. After treatment patient left in no apparent distress:   Supine in bed and Call bell within reach    COMMUNICATION/COLLABORATION:   The patients plan of care was discussed with: Registered nurse.      NAY Clarke  Time Calculation: 13 mins

## 2020-09-23 NOTE — PROGRESS NOTES
Bedside and Verbal shift change report given to Matteo Pascual (oncoming nurse) Bishop Soares  (offgoing nurse). Report included the following information SBAR, Kardex, MAR and Recent Results.

## 2020-09-23 NOTE — PROGRESS NOTES
Attempted to see patient for physical therapy. Patient sleeping on arrival. Wakes up at therapists voice but defers therapy stating she will get up later. Will f/u tomorrow for physical therapy.  Rasheed Epps, PT

## 2020-09-24 ENCOUNTER — APPOINTMENT (OUTPATIENT)
Dept: GENERAL RADIOLOGY | Age: 70
DRG: 194 | End: 2020-09-24
Attending: INTERNAL MEDICINE
Payer: MEDICARE

## 2020-09-24 LAB
ANION GAP SERPL CALC-SCNC: 6 MMOL/L (ref 5–15)
BASOPHILS # BLD: 0 K/UL (ref 0–0.1)
BASOPHILS NFR BLD: 0 % (ref 0–1)
BUN SERPL-MCNC: 10 MG/DL (ref 6–20)
BUN/CREAT SERPL: 18 (ref 12–20)
CALCIUM SERPL-MCNC: 8.6 MG/DL (ref 8.5–10.1)
CHLORIDE SERPL-SCNC: 109 MMOL/L (ref 97–108)
CO2 SERPL-SCNC: 24 MMOL/L (ref 21–32)
CREAT SERPL-MCNC: 0.56 MG/DL (ref 0.55–1.02)
DIFFERENTIAL METHOD BLD: ABNORMAL
EOSINOPHIL # BLD: 0 K/UL (ref 0–0.4)
EOSINOPHIL NFR BLD: 0 % (ref 0–7)
ERYTHROCYTE [DISTWIDTH] IN BLOOD BY AUTOMATED COUNT: 13 % (ref 11.5–14.5)
GLUCOSE SERPL-MCNC: 101 MG/DL (ref 65–100)
HCT VFR BLD AUTO: 37.3 % (ref 35–47)
HGB BLD-MCNC: 12.9 G/DL (ref 11.5–16)
IMM GRANULOCYTES # BLD AUTO: 0 K/UL
IMM GRANULOCYTES NFR BLD AUTO: 0 %
LYMPHOCYTES # BLD: 0.6 K/UL (ref 0.8–3.5)
LYMPHOCYTES NFR BLD: 9 % (ref 12–49)
MCH RBC QN AUTO: 29.8 PG (ref 26–34)
MCHC RBC AUTO-ENTMCNC: 34.6 G/DL (ref 30–36.5)
MCV RBC AUTO: 86.1 FL (ref 80–99)
MONOCYTES # BLD: 0.5 K/UL (ref 0–1)
MONOCYTES NFR BLD: 8 % (ref 5–13)
NEUTS BAND NFR BLD MANUAL: 4 % (ref 0–6)
NEUTS SEG # BLD: 5.7 K/UL (ref 1.8–8)
NEUTS SEG NFR BLD: 79 % (ref 32–75)
NRBC # BLD: 0 K/UL (ref 0–0.01)
NRBC BLD-RTO: 0 PER 100 WBC
PLATELET # BLD AUTO: 479 K/UL (ref 150–400)
PMV BLD AUTO: 8.7 FL (ref 8.9–12.9)
POTASSIUM SERPL-SCNC: 3.3 MMOL/L (ref 3.5–5.1)
RBC # BLD AUTO: 4.33 M/UL (ref 3.8–5.2)
RBC MORPH BLD: ABNORMAL
SODIUM SERPL-SCNC: 139 MMOL/L (ref 136–145)
WBC # BLD AUTO: 6.8 K/UL (ref 3.6–11)

## 2020-09-24 PROCEDURE — 74011250636 HC RX REV CODE- 250/636: Performed by: NURSE PRACTITIONER

## 2020-09-24 PROCEDURE — 65270000029 HC RM PRIVATE

## 2020-09-24 PROCEDURE — 74011250637 HC RX REV CODE- 250/637: Performed by: INTERNAL MEDICINE

## 2020-09-24 PROCEDURE — 74011250637 HC RX REV CODE- 250/637: Performed by: HOSPITALIST

## 2020-09-24 PROCEDURE — 71045 X-RAY EXAM CHEST 1 VIEW: CPT

## 2020-09-24 PROCEDURE — 36415 COLL VENOUS BLD VENIPUNCTURE: CPT

## 2020-09-24 PROCEDURE — 97535 SELF CARE MNGMENT TRAINING: CPT

## 2020-09-24 PROCEDURE — 80048 BASIC METABOLIC PNL TOTAL CA: CPT

## 2020-09-24 PROCEDURE — 74011250637 HC RX REV CODE- 250/637: Performed by: NURSE PRACTITIONER

## 2020-09-24 PROCEDURE — 85025 COMPLETE CBC W/AUTO DIFF WBC: CPT

## 2020-09-24 PROCEDURE — 74011250636 HC RX REV CODE- 250/636: Performed by: INTERNAL MEDICINE

## 2020-09-24 RX ORDER — HYDRALAZINE HYDROCHLORIDE 20 MG/ML
10 INJECTION INTRAMUSCULAR; INTRAVENOUS ONCE
Status: COMPLETED | OUTPATIENT
Start: 2020-09-24 | End: 2020-09-24

## 2020-09-24 RX ORDER — ENOXAPARIN SODIUM 100 MG/ML
40 INJECTION SUBCUTANEOUS EVERY 24 HOURS
Status: DISCONTINUED | OUTPATIENT
Start: 2020-09-25 | End: 2020-09-26 | Stop reason: HOSPADM

## 2020-09-24 RX ORDER — ONDANSETRON 4 MG/1
4 TABLET, ORALLY DISINTEGRATING ORAL
Status: DISCONTINUED | OUTPATIENT
Start: 2020-09-24 | End: 2020-09-26 | Stop reason: HOSPADM

## 2020-09-24 RX ORDER — POTASSIUM CHLORIDE 750 MG/1
40 TABLET, FILM COATED, EXTENDED RELEASE ORAL
Status: COMPLETED | OUTPATIENT
Start: 2020-09-24 | End: 2020-09-24

## 2020-09-24 RX ADMIN — Medication 10 ML: at 22:53

## 2020-09-24 RX ADMIN — POTASSIUM CHLORIDE 40 MEQ: 750 TABLET, FILM COATED, EXTENDED RELEASE ORAL at 12:30

## 2020-09-24 RX ADMIN — ONDANSETRON 4 MG: 4 TABLET, ORALLY DISINTEGRATING ORAL at 17:29

## 2020-09-24 RX ADMIN — ONDANSETRON 4 MG: 2 INJECTION INTRAMUSCULAR; INTRAVENOUS at 12:30

## 2020-09-24 RX ADMIN — FAMOTIDINE 20 MG: 20 TABLET ORAL at 09:30

## 2020-09-24 RX ADMIN — Medication 10 ML: at 06:51

## 2020-09-24 RX ADMIN — LORAZEPAM 1.5 MG: 0.5 TABLET ORAL at 22:53

## 2020-09-24 RX ADMIN — POLYETHYLENE GLYCOL 3350 17 G: 17 POWDER, FOR SOLUTION ORAL at 09:31

## 2020-09-24 RX ADMIN — FOLIC ACID 1 MG: 1 TABLET ORAL at 09:29

## 2020-09-24 RX ADMIN — ENOXAPARIN SODIUM 40 MG: 40 INJECTION SUBCUTANEOUS at 09:30

## 2020-09-24 RX ADMIN — SODIUM CHLORIDE 100 ML/HR: 9 INJECTION, SOLUTION INTRAVENOUS at 06:50

## 2020-09-24 RX ADMIN — ACETAMINOPHEN 650 MG: 325 TABLET ORAL at 05:05

## 2020-09-24 RX ADMIN — LEVOTHYROXINE SODIUM 200 MCG: 0.1 TABLET ORAL at 06:49

## 2020-09-24 RX ADMIN — Medication 10 ML: at 14:00

## 2020-09-24 RX ADMIN — LISINOPRIL 20 MG: 20 TABLET ORAL at 09:29

## 2020-09-24 RX ADMIN — FAMOTIDINE 20 MG: 20 TABLET ORAL at 17:29

## 2020-09-24 RX ADMIN — HYDRALAZINE HYDROCHLORIDE 10 MG: 20 INJECTION INTRAMUSCULAR; INTRAVENOUS at 03:20

## 2020-09-24 NOTE — PROGRESS NOTES
Problem: Breathing Pattern - Ineffective  Goal: *Absence of hypoxia  Outcome: Progressing Towards Goal     Problem: Falls - Risk of  Goal: *Absence of Falls  Description: Document Fito Fall Risk and appropriate interventions in the flowsheet.   Outcome: Progressing Towards Goal  Note: Fall Risk Interventions:  Mobility Interventions: Communicate number of staff needed for ambulation/transfer, Strengthening exercises (ROM-active/passive)         Medication Interventions: Evaluate medications/consider consulting pharmacy, Patient to call before getting OOB    Elimination Interventions: Call light in reach, Patient to call for help with toileting needs    History of Falls Interventions: Bed/chair exit alarm, Evaluate medications/consider consulting pharmacy

## 2020-09-24 NOTE — PROGRESS NOTES
[facility] Adult  Hospitalist Group                                                                                          Hospitalist Progress Note  [address] [facility],   Answering service: [phone] OR [phone] from in house phone        Date of Service:  2020  NAME:  Suhas [place]  :  1950  MRN:  [id number]    This documentation was facilitated by a Voice Recognition software and may contain inadvertent typographical errors. Admission Summary:   Per H&P   \"Candace Loya is a 78 y.o.  female who has a history of breast cancer, hypertension, rheumatoid arthritis on methotrexate presenting with a 3-week history of increasing fatigue. 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 She denies fevers or chills but since yesterday she became progressively short of breath. She also reports that she felt so weak that she could not get out of bed. She reported that she was having some nausea and some loose stools. She denies bleeding hematemesis, hematochezia or melena. She presents to the emergency room reporting that she was extremely dehydrated. In the ER she had a CT angiogram of the chest which was negative for embolism. But did show bilateral lower lobe patchy pneumonia with the possibility of COVID-19 pneumonia. \"        Interval history / Subjective: Follow-up pneumonia. Patient seen and examined. First encounter. States had significant nausea yesterday. Feeling somewhat better today. Feels generally weak.     Assessment & Plan:   Bilateral pneumonia, bacterial versus viral:  -S/p Levaquin  -Negative COVID  -Weaned off supplemental oxygen  -Unable to tolerate AA nebs due to nausea  -Initiate incentive spirometer  -Follow-up chest x-ray   -PT/OT  -procalcitonin negative     Nausea/vomiting:  Dehydration:  -Schedule Zofran 3 times daily before meals  -Continue IV fluids  -Schedule Ensure  -Famotidine twice daily    Hypokalemia: Replete     Hypertension  -Continue home lisinopril    Hypothyroidism  -Continue home levothyroxine     Code Status: Full code      surrogate Decision Maker: Daughter     DVT Prophylaxis: On Lovenox     FUNCTIONAL STATUS PRIOR TO ADMISSION: Ambulates Independently     Patient is from: [facility] Problems  Date Reviewed: 3/10/2020          Codes Class Noted POA    Pneumonia ICD-10-CM: J18.9  ICD-9-CM: 140  9/15/2020 Unknown        Suspected COVID-19 virus infection ICD-10-CM: Z20.828  ICD-9-CM: V01.79  9/15/2020 Unknown        Viral pneumonia ICD-10-CM: J12.9  ICD-9-CM: 480.9  9/14/2020 Unknown                Review of Systems:   Negative unless stated above      Vital Signs:    Last 24hrs VS reviewed since prior progress note. Most recent are:  Visit Vitals  /78 (BP 1 Location: Right arm, BP Patient Position: At rest)   Pulse (!) 59   Temp 98.5 °F (36.9 °C)   Resp 18   Ht 5' 4\" (1.626 m)   Wt 94.2 kg (207 lb 10.8 oz)   SpO2 92%   BMI 35.65 kg/m²       No intake or output data in the 24 hours ending 09/24/20 1050     Physical Examination:             Constitutional:  Ill-appearing, cooperative   HEENT:  Atraumatic. Oral mucosa moist,. Non icteric sclera. No pallor. Resp:  Crackles bilaterally, no wheezing. No accessory muscle use       CV:  Regular rhythm, normal rate, no murmurs, gallops, rubs    GI:  Soft, non distended, non tender.  normoactive bowel sounds, no hepatosplenomegaly         Musculoskeletal:  No edema, warm, 2+ pulses throughout    Neurologic:  Mental status:AAOx3,                 Data Review:    Review and/or order of clinical lab test  Review and/or order of tests in the radiology section of CPT  Review and/or order of tests in the medicine section of CPT      Labs:     Recent Labs     09/24/20  0206 09/22/20  0239   WBC 6.8 6.2   HGB 12.9 11.8   HCT 37.3 34.1*   * 416*     Recent Labs 09/24/20  0206 09/22/20  0239    137   K 3.3* 3.6   * 109*   CO2 24 22   BUN 10 9   CREA 0.56 0.53*   * 134*   CA 8.6 8.8     No results for input(s): ALT, AP, TBIL, TBILI, TP, ALB, GLOB, GGT, AML, LPSE in the last 72 hours. No lab exists for component: SGOT, GPT, AMYP, HLPSE  No results for input(s): INR, PTP, APTT, INREXT, INREXT in the last 72 hours. No results for input(s): FE, TIBC, PSAT, FERR in the last 72 hours. No results found for: FOL, RBCF   No results for input(s): PH, PCO2, PO2 in the last 72 hours. No results for input(s): CPK, CKNDX, TROIQ in the last 72 hours.     No lab exists for component: CPKMB  Lab Results   Component Value Date/Time    Cholesterol, total 187 12/12/2018 12:00 AM    HDL Cholesterol 55 12/12/2018 12:00 AM    LDL, calculated 103 (H) 12/12/2018 12:00 AM    Triglyceride 146 12/12/2018 12:00 AM    CHOL/HDL Ratio 4.6 11/07/2009 08:29 AM     No results found for: GLUCPOC  Lab Results   Component Value Date/Time    Color YELLOW/STRAW 09/14/2020 09:01 PM    Appearance CLEAR 09/14/2020 09:01 PM    Specific gravity 1.021 09/14/2020 09:01 PM    pH (UA) 7.5 09/14/2020 09:01 PM    Protein Negative 09/14/2020 09:01 PM    Glucose Negative 09/14/2020 09:01 PM    Ketone Negative 09/14/2020 09:01 PM    Bilirubin Negative 09/14/2020 09:01 PM    Urobilinogen 0.2 09/14/2020 09:01 PM    Nitrites Negative 09/14/2020 09:01 PM    Leukocyte Esterase TRACE (A) 09/14/2020 09:01 PM    Epithelial cells FEW 09/14/2020 09:01 PM    Bacteria 1+ (A) 09/14/2020 09:01 PM    WBC 0-4 09/14/2020 09:01 PM    RBC 0-5 09/14/2020 09:01 PM         Medications Reviewed:     Current Facility-Administered Medications   Medication Dose Route Frequency    ondansetron (ZOFRAN ODT) tablet 4 mg  4 mg Oral TIDAC    albuterol (PROVENTIL HFA, VENTOLIN HFA, PROAIR HFA) inhaler 2 Puff  2 Puff Inhalation Q4H PRN    lisinopriL (PRINIVIL, ZESTRIL) tablet 20 mg  20 mg Oral DAILY    enoxaparin (LOVENOX) injection 40 mg  40 mg SubCUTAneous Q12H    guaiFENesin-codeine (ROBITUSSIN AC) 100-10 mg/5 mL solution 10 mL  10 mL Oral Q4H PRN    polyethylene glycol (MIRALAX) packet 17 g  17 g Oral DAILY    ondansetron (ZOFRAN) injection 4 mg  4 mg IntraVENous Q4H PRN    benzocaine-menthoL (CEPACOL) lozenge 1 Lozenge  1 Lozenge Mucous Membrane PRN    0.9% sodium chloride infusion  100 mL/hr IntraVENous CONTINUOUS    influenza vaccine 2020-21 (6 mos+)(PF) (FLUARIX/FLULAVAL/FLUZONE QUAD) injection 0.5 mL  0.5 mL IntraMUSCular PRIOR TO DISCHARGE    LORazepam (ATIVAN) tablet 1.5 mg  1.5 mg Oral QHS    acetaminophen (TYLENOL) tablet 650 mg  650 mg Oral Q6H PRN    Or    acetaminophen (TYLENOL) suppository 650 mg  650 mg Rectal Q6H PRN    sodium chloride (NS) flush 5-40 mL  5-40 mL IntraVENous Q8H    sodium chloride (NS) flush 5-40 mL  5-40 mL IntraVENous PRN    levothyroxine (SYNTHROID) tablet 200 mcg  200 mcg Oral 6am    [Held by provider] citalopram (CELEXA) tablet 40 mg  40 mg Oral DAILY    folic acid (FOLVITE) tablet 1 mg  1 mg Oral DAILY    famotidine (PEPCID) tablet 20 mg  20 mg Oral BID     ______________________________________________________________________  EXPECTED LENGTH OF STAY: 3d 4h  ACTUAL LENGTH OF STAY:          9    Medical decision making    I have reviewed flow sheets and previous day's notes  Patient has acute or chronic illness that posses a threat to bodily functions or life   Have Reviewed and ordered Clinical test  Have reviewed and ordered Radiology test  Have personally Reviewed imaging reports   High risk drug therapy as that require intensive monitoring for toxicity as in pressors, antibiotics, steroids and narcotic pain management.                 Rita Husseiner, DO

## 2020-09-24 NOTE — PROGRESS NOTES
Problem: Risk for Spread of Infection  Goal: Prevent transmission of infectious organism to others  Description: Prevent the transmission of infectious organisms to other patients, staff members, and visitors. Outcome: Progressing Towards Goal     Problem: Patient Education:  Go to Education Activity  Goal: Patient/Family Education  Outcome: Progressing Towards Goal     Problem: Discharge Planning  Goal: *Discharge to safe environment  Outcome: Progressing Towards Goal     Problem: Breathing Pattern - Ineffective  Goal: *Absence of hypoxia  Outcome: Progressing Towards Goal  Goal: *Use of effective breathing techniques  Outcome: Progressing Towards Goal     Problem: Falls - Risk of  Goal: *Absence of Falls  Description: Document Fito Fall Risk and appropriate interventions in the flowsheet.   Outcome: Progressing Towards Goal  Note: Fall Risk Interventions:  Mobility Interventions: Communicate number of staff needed for ambulation/transfer         Medication Interventions: Evaluate medications/consider consulting pharmacy    Elimination Interventions: Call light in reach    History of Falls Interventions: Bed/chair exit alarm         Problem: Patient Education: Go to Patient Education Activity  Goal: Patient/Family Education  Outcome: Progressing Towards Goal     Problem: Breathing Pattern - Ineffective  Goal: *Absence of hypoxia  Outcome: Progressing Towards Goal  Goal: *Use of effective breathing techniques  Outcome: Progressing Towards Goal  Goal: *PALLIATIVE CARE:  Alleviation of Dyspnea  Outcome: Progressing Towards Goal     Problem: Patient Education: Go to Patient Education Activity  Goal: Patient/Family Education  Outcome: Progressing Towards Goal     Problem: Patient Education: Go to Patient Education Activity  Goal: Patient/Family Education  Outcome: Progressing Towards Goal     Problem: Patient Education: Go to Patient Education Activity  Goal: Patient/Family Education  Outcome: Progressing Towards Goal     Problem: Nutrition Deficit  Goal: *Optimize nutritional status  Outcome: Progressing Towards Goal     Problem: Patient Education: Go to Patient Education Activity  Goal: Patient/Family Education  Outcome: Progressing Towards Goal

## 2020-09-24 NOTE — PROGRESS NOTES
Comprehensive Nutrition Assessment    Type and Reason for Visit: Initial, RD nutrition re-screen/LOS    Nutrition Recommendations/Plan:    1. Continue with zofran prior to meals   2. Document all PO intake of supplements and meals   - family may bring food from home  3. Discontinue miralax or change to PRN with diarrhea. Follow K+ with repletion PRN. Nutrition Assessment:    Pt admitted for viral PNA. Past Medical History:   Diagnosis Date    Breast cancer, left ([address]     Depression     Goiter     Hearing loss     bilateral    Hypertension     Hypothyroid     IBS (irritable bowel syndrome)     Menopause     Rheumatoid arthritis (HCC)     S/P radiation therapy      Pt and daughter visited today. Nausea and vomiting has been an issue for duration of admit with diarrhea a more recent issue. MD ok with addition of Raven-Q with IV abx earlier this admit. Pt notes some of vomiting after breathing treatments but has not gotten for several days. Zofran added scheduled before meals and available PRN. If ineffective may need to try phenergan. Miralax in place daily and has been getting so may want to d/c. Low K+, ? GI losses. Discussed simple meal options that may be better tolerated and preferences added. Ensure added by MD today and drinking chocolate Ensure during visit with no issue. Encouraged intake if less than 75% meals consumed. Fair/intake at home PTA. Does not like to cook so relies mostly;y on Healthy [employer] and [employer] prepared meals with english muffin for breakfast. Encouraged use of Ensure at home as needed for added protein in the morning.      Malnutrition Assessment:  Malnutrition Status:  Mild malnutrition    Context:  Acute illness     Findings of the 6 clinical characteristics of malnutrition:   Energy Intake:  7 - 50% or less of est energy requirements for 5 or more days  Weight Loss:  No significant weight loss     Body Fat Loss:  Unable to assess,     Muscle Mass Loss:  Unable to assess,    Fluid Accumulation:  Unable to assess,     Strength:  Not performed     Nutritionally Significant Medications: celexa, pepcid, folic acid, synthroid, ativan, zofran TID, miralax, NS @ 100ml/hr    Estimated Daily Nutrient Needs:  Energy (kcal):  8226-6888(MSJ x 1.2-1.3 (90.6kg))  Protein (g):  90-108g (1-1.2g/kg)       Fluid (ml/day):  1800 - 1ml/kcal    Nutrition Related Findings:      BM: 9/24  Edema: none  Wounds:  None       Current Nutrition Therapies:   Diet: regular  Supplements: Ensure TID (added today)  Meal intake:   Patient Vitals for the past 168 hrs:   % Diet Eaten   09/17/20 1830 100 %   Additional Caloric Sources: none      Anthropometric Measures:  · Height:  5' 4\" (162.6 cm)  · Current Body Wt:  90.6 kg (199 lb 11.8 oz)   · Admission Body Wt:  199 lb 11.8 oz    · Usual Body Wt:  200 lb     · Ideal Body Wt:   :  166.4 %   · Adjusted Body Weight:   ; Weight Adjustment for:     · Adjusted BMI:       · BMI Categories:        Wt Readings from Last 10 Encounters:   09/24/20 94.2 kg (207 lb 10.8 oz)   03/10/20 90.7 kg (200 lb)   02/18/20 89.8 kg (198 lb)   02/05/20 89.8 kg (198 lb)   01/21/20 89.8 kg (198 lb)   10/31/19 85.7 kg (189 lb)   08/14/19 86.8 kg (191 lb 4.8 oz)   08/13/19 86.2 kg (190 lb)   07/29/19 86.6 kg (191 lb)   05/15/19 89.6 kg (197 lb 9.6 oz)     Nutrition Diagnosis:   · Inadequate protein-energy intake related to altered GI function as evidenced by nausea, vomiting, diarrhea(poor appetite)    Nutrition Interventions:   Food and/or Nutrient Delivery: Continue oral nutrition supplement, Continue current diet  Nutrition Education and Counseling: Education initiated  Coordination of Nutrition Care: Continued inpatient monitoring, Interdisciplinary rounds    Goals:  Consumption of at least 50% meals and 2 supplements/day in 4-5 days       Nutrition Monitoring and Evaluation:   Behavioral-Environmental Outcomes:    Food/Nutrient Intake Outcomes: Food and nutrient intake, Supplement intake, IVF intake  Physical Signs/Symptoms Outcomes: Biochemical data, Weight, GI status    Discharge Planning:     Too soon to determine     Carmen Back, RD  [phone], Pager #[phone] or via [employer]

## 2020-09-24 NOTE — PROGRESS NOTES
Problem: Self Care Deficits Care Plan (Adult)  Goal: *Acute Goals and Plan of Care (Insert Text)  Description:   FUNCTIONAL STATUS PRIOR TO ADMISSION: Patient was independent and active without use of DME. Prior to 3 weeks ago she was mowing lawn and driving    HOME SUPPORT: The patient lived alone with friend to provide assistance. Occupational Therapy Goals  Initiated 9/22/2020    1. Patient will perform grooming and sponge bathing standing and sitting at the sink prn with modified independence and maintain oxygen sats > or = 92% on room air within 7 day(s). 2.  Patient will perform upper body dressing and lower body dressing with modified independence and maintain oxygen sats > or = 92% on room air within 7 day(s). 3.  Patient will perform simple home management with modified independence  and maintain oxygen sats > or = 92% on room airwithin 7 day(s). 4.  Patient will perform toilet transfers with modified independence into and out of bathroom and maintain oxygen sats > or = 92% on room air  within 7 day(s). 5.  Patient will perform all aspects of toileting with modified independence within 7 day(s). 6.  Patient will participate in upper extremity therapeutic exercise/activities with independence for 8 minutes with < or = 2 rest breaks and maintain oxygen sats > or = 92% on room air within 7 day(s). 7.  Patient will demonstrate pursed lip breathing during functional tasks without cues within 7 day(s). Outcome: Progressing Towards Goal   OCCUPATIONAL THERAPY TREATMENT  Patient: Hansa Alegre (58 y.o. female)  Date: 9/24/2020  Diagnosis: Viral pneumonia [J12.9]  Pneumonia [J18.9]  Suspected COVID-19 virus infection [Z20.828]   <principal problem not specified>       Precautions: Fall  Chart, occupational therapy assessment, plan of care, and goals were reviewed. ASSESSMENT  Patient continues with skilled OT services and is progressing towards goals.   Patient reports nausea, vomiting and diarrhea today, limiting ADL functional skills and activity tolerance; SOB, RR 22-26 with very light sitting activity, sats 98% on 1L but she feels SOB. Trained PLB and she is able to slow RR and talk without SOB. Remains anxious regarding fatigue from vomiting and diarrhea today. Willing to participate with B UE ADL tasks despite fatigue and not feeling well. Current Level of Function Impacting Discharge (ADLs): set up-min A UE ADLs, intolerance for LE ADLs and functional mobility as noted; declined out of bed activity, but willing to sit up in bed    Other factors to consider for discharge: lives alone, receptive to [facility] therapies and friends to assist         PLAN :  Patient continues to benefit from skilled intervention to address the above impairments. Continue treatment per established plan of care. to address goals. Recommend with staff: at minimum bed in chair position, up to chair for meals as tolerated    Recommend next OT session: yellow theraband, PLB handouts, EC with ADLs as she lives alone, Bathroom DME PRN    Recommendation for discharge: (in order for the patient to meet his/her long term goals)  Occupational therapy at least 2 days/week in the home AND ensure assist and/or supervision for safety with bathing, driving    This discharge recommendation:  A follow-up discussion with the attending provider and/or case management is planned    IF patient discharges home will need the following DME: AE: long handled bathing, AE: long handled dressing, bedside commode, transfer bench, and walker: rolling       SUBJECTIVE:   Patient stated I just feel terrible.     OBJECTIVE DATA SUMMARY:   Cognitive/Behavioral Status:  Neurologic State: Alert  Orientation Level: Oriented X4  Cognition: Follows commands;Decreased attention/concentration; Impaired decision making  Perception: Appears intact  Perseveration: No perseveration noted  Safety/Judgement: Decreased insight into deficits; Awareness of environment; Fall prevention    Functional Mobility and Transfers for ADLs:  Bed Mobility:  Rolling: Minimum assistance  Supine to Sit: Supervision;Contact guard assistance  Sit to Supine: Supervision    Transfers:  Sit to Stand: (intolerant today; currently limited by nausea, (vomiting/[place])  Functional Transfers  Bathroom Mobility: Dependent/total assistance(currently limited by nausea, (vomiting/diarrhea today))  Bed to Chair: (encouraged chair position of bed when tolerated)    Balance:  Sitting: Impaired(did sit up x 1, S,  but unable to maintain 2* N&V/diarrhea )    ADL Intervention:  Feeding  Feeding Assistance: Stand-by assistance(poor intake with vomiting today)    Grooming  Grooming Assistance: Minimum assistance(tactile cues, too fatigued to complete all tasks)         Lower Body Bathing  Bathing Assistance: Total assistance(dependent)(currently limited by nausea, (vomiting/diarrhea today))    Upper Body Dressing Assistance  Dressing Assistance: Maximum assistance(currently limited by nausea, (vomiting/diarrhea today))    Lower Body Dressing Assistance  Dressing Assistance: Total assistance(dependent)(currently limited by nausea, (vomiting/diarrhea today))    Toileting  Toileting Assistance: Total assistance(dependent)(currently limited by nausea, (vomiting/diarrhea today))    Cognitive Retraining  Attention to Task: Single task  Maintains Attention For (Time): Greater than 10 minutes  Following Commands: Follows one step commands/directions(oftenneeding more than 1 vc or tc)  Safety/Judgement: Decreased insight into deficits; Awareness of environment; Fall prevention    Therapeutic Exercises:   PLB trained with improvement in skills noted but still needs practice; anxiety appears to negatively affect RR during ADLs; trained patient to use \"hair combing\" as tool for increased UE activity levels    Pain:  Bottom sore from diarrhea    Activity Tolerance:   Fair, Poor, and 98% on 1L but feels SOB and panting with activity; improved with PLB  Please refer to the flowsheet for vital signs taken during this treatment. After treatment patient left in no apparent distress:   Supine in bed, Call bell within reach, and Side rails x 3    COMMUNICATION/COLLABORATION:   The patients plan of care was discussed with: Registered nurse.      Hernan Cooper OTR/L  Time Calculation: 32 mins

## 2020-09-24 NOTE — PROGRESS NOTES
TRANSITION OF CARE  RUR--10%  Disposition--Home with home health:[facility]  Transport--Friend or Family      CM reviewed case with treatment team during [address]. Patient continues with variable appetite and significant discomfort. Plan is for discharge with home health services. CM received consult for home health services. Patient agreed with the recommendation of PT, OT, and the hospitalist. CM offered patient choice of providers. Patient did not have a preference of provider and agreed to a referral to [facility]. CM placed Freedom of Choice form  on chart. CM sent referral via [employer] to [facility], and referral was accepted.  CM updated AVS.

## 2020-09-24 NOTE — PROGRESS NOTES
Bedside shift change report given to Apollo Adams (oncoming nurse) by Chencho Leach RN (offgoing nurse). Report included the following information SBAR, Kardex, MAR and Recent Results.

## 2020-09-25 LAB
ANION GAP SERPL CALC-SCNC: 7 MMOL/L (ref 5–15)
BUN SERPL-MCNC: 11 MG/DL (ref 6–20)
BUN/CREAT SERPL: 16 (ref 12–20)
CALCIUM SERPL-MCNC: 8.3 MG/DL (ref 8.5–10.1)
CHLORIDE SERPL-SCNC: 112 MMOL/L (ref 97–108)
CO2 SERPL-SCNC: 21 MMOL/L (ref 21–32)
CREAT SERPL-MCNC: 0.68 MG/DL (ref 0.55–1.02)
GLUCOSE SERPL-MCNC: 94 MG/DL (ref 65–100)
POTASSIUM SERPL-SCNC: 4 MMOL/L (ref 3.5–5.1)
SODIUM SERPL-SCNC: 140 MMOL/L (ref 136–145)

## 2020-09-25 PROCEDURE — 74011250637 HC RX REV CODE- 250/637: Performed by: HOSPITALIST

## 2020-09-25 PROCEDURE — 65270000029 HC RM PRIVATE

## 2020-09-25 PROCEDURE — 74011250636 HC RX REV CODE- 250/636: Performed by: INTERNAL MEDICINE

## 2020-09-25 PROCEDURE — 97535 SELF CARE MNGMENT TRAINING: CPT

## 2020-09-25 PROCEDURE — 36415 COLL VENOUS BLD VENIPUNCTURE: CPT

## 2020-09-25 PROCEDURE — 80048 BASIC METABOLIC PNL TOTAL CA: CPT

## 2020-09-25 PROCEDURE — 74011250637 HC RX REV CODE- 250/637: Performed by: NURSE PRACTITIONER

## 2020-09-25 PROCEDURE — 74011250637 HC RX REV CODE- 250/637: Performed by: INTERNAL MEDICINE

## 2020-09-25 PROCEDURE — 97116 GAIT TRAINING THERAPY: CPT

## 2020-09-25 RX ADMIN — LORAZEPAM 1.5 MG: 0.5 TABLET ORAL at 21:31

## 2020-09-25 RX ADMIN — FAMOTIDINE 20 MG: 20 TABLET ORAL at 09:37

## 2020-09-25 RX ADMIN — LISINOPRIL 20 MG: 20 TABLET ORAL at 09:37

## 2020-09-25 RX ADMIN — ONDANSETRON 4 MG: 4 TABLET, ORALLY DISINTEGRATING ORAL at 13:11

## 2020-09-25 RX ADMIN — FOLIC ACID 1 MG: 1 TABLET ORAL at 09:37

## 2020-09-25 RX ADMIN — Medication 10 ML: at 21:32

## 2020-09-25 RX ADMIN — ENOXAPARIN SODIUM 40 MG: 40 INJECTION SUBCUTANEOUS at 09:37

## 2020-09-25 RX ADMIN — FAMOTIDINE 20 MG: 20 TABLET ORAL at 17:05

## 2020-09-25 RX ADMIN — ONDANSETRON 4 MG: 4 TABLET, ORALLY DISINTEGRATING ORAL at 17:05

## 2020-09-25 RX ADMIN — ONDANSETRON 4 MG: 4 TABLET, ORALLY DISINTEGRATING ORAL at 07:02

## 2020-09-25 RX ADMIN — Medication 10 ML: at 07:01

## 2020-09-25 RX ADMIN — Medication 1 CAPSULE: at 09:37

## 2020-09-25 RX ADMIN — LEVOTHYROXINE SODIUM 200 MCG: 0.1 TABLET ORAL at 07:01

## 2020-09-25 NOTE — PROGRESS NOTES
[address] [facility] Adult  Hospitalist Group                                                                                          Hospitalist Progress Note  Burak Briggs DO  Answering service: [phone] OR [phone] from in house phone        Date of Service:  2020  NAME:  Jennifer Perea  :  1950  MRN:  [id number]    This documentation was facilitated by a Voice Recognition software and may contain inadvertent typographical errors. Admission Summary:   Per H&P   \"Candace Penaloza is a 78 y.o.  female who has a history of breast cancer, hypertension, rheumatoid arthritis on methotrexate presenting with a 3-week history of increasing fatigue. 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 She denies fevers or chills but since yesterday she became progressively short of breath. She also reports that she felt so weak that she could not get out of bed. She reported that she was having some nausea and some loose stools. She denies bleeding hematemesis, hematochezia or melena. She presents to the emergency room reporting that she was extremely dehydrated. In the ER she had a CT angiogram of the chest which was negative for embolism. But did show bilateral lower lobe patchy pneumonia with the possibility of COVID-19 pneumonia. \"        Interval history / Subjective: Follow-up pneumonia. Patient seen and examined this AM.  Notes that yesterday she had ongoing nausea with vomiting. Now on scheduled Zofran. Overnight, patient did fine. She appears overall weak from her hospitalization. We will continue to monitor. Concern that patient may decline if discharged home.     Assessment & Plan:   Bilateral pneumonia, bacterial versus viral:  -S/p Levaquin  -Negative COVID  -Weaned off supplemental oxygen  -Unable to tolerate AA nebs due to nausea  -Initiate incentive spirometer  -chest x-ray 9/24 with improvement  -PT/OT  -procalcitonin negative     Nausea/vomiting:  Dehydration:  -Schedule Zofran 3 times daily before meals  -Continue IV fluids  -Schedule Ensure  -Famotidine twice daily    Hypokalemia: Replete     Hypertension  -Continue home lisinopril    Hypothyroidism  -Continue home levothyroxine     Code Status: Full code      surrogate Decision Maker: Daughter     DVT Prophylaxis: On Lovenox     FUNCTIONAL STATUS PRIOR TO ADMISSION: Ambulates Independently     Patient is from: [facility]  Date Reviewed: 3/10/2020          Codes Class Noted POA    Pneumonia ICD-10-CM: J18.9  ICD-9-CM: 066  9/15/2020 Unknown        Suspected COVID-19 virus infection ICD-10-CM: Z20.828  ICD-9-CM: V01.79  9/15/2020 Unknown        Viral pneumonia ICD-10-CM: J12.9  ICD-9-CM: 480.9  9/14/2020 Unknown                Review of Systems:   Negative unless stated above      Vital Signs:    Last 24hrs VS reviewed since prior progress note. Most recent are:  Visit Vitals  /66 (BP 1 Location: Right arm, BP Patient Position: At rest)   Pulse 66   Temp 98.6 °F (37 °C)   Resp 18   Ht 5' 4\" (1.626 m)   Wt 94.2 kg (207 lb 10.8 oz)   SpO2 97%   BMI 35.65 kg/m²       No intake or output data in the 24 hours ending 09/25/20 1244     Physical Examination:             Constitutional:  Ill-appearing, cooperative, sitting on side of bed   HEENT:  Atraumatic. Oral mucosa moist,. Non icteric sclera. No pallor. Resp:  Crackles bilaterally, no wheezing. No accessory muscle use       CV:  Regular rhythm, normal rate, no murmurs, gallops, rubs    GI:  Soft, non distended, non tender.  normoactive bowel sounds, no hepatosplenomegaly         Musculoskeletal:  No edema, warm, 2+ pulses throughout    Neurologic:  Mental status:AAOx3,                 Data Review:    Review and/or order of clinical lab test  Review and/or order of tests in the radiology section of CPT  Review and/or order of tests in the medicine section of [facility]      Labs:     Recent Labs     09/24/20  0206   WBC 6.8   HGB 12.9   HCT 37.3   *     Recent Labs     09/25/20  0027 09/24/20  0206    139   K 4.0 3.3*   * 109*   CO2 21 24   BUN 11 10   CREA 0.68 0.56   GLU 94 101*   CA 8.3* 8.6     No results for input(s): ALT, AP, TBIL, TBILI, TP, ALB, GLOB, GGT, AML, LPSE in the last 72 hours. No lab exists for component: SGOT, GPT, AMYP, HLPSE  No results for input(s): INR, PTP, APTT, INREXT, INREXT in the last 72 hours. No results for input(s): FE, TIBC, PSAT, FERR in the last 72 hours. No results found for: FOL, RBCF   No results for input(s): PH, PCO2, PO2 in the last 72 hours. No results for input(s): CPK, CKNDX, TROIQ in the last 72 hours.     No lab exists for component: CPKMB  Lab Results   Component Value Date/Time    Cholesterol, total 187 12/12/2018 12:00 AM    HDL Cholesterol 55 12/12/2018 12:00 AM    LDL, calculated 103 (H) 12/12/2018 12:00 AM    Triglyceride 146 12/12/2018 12:00 AM    CHOL/HDL Ratio 4.6 11/07/2009 08:29 AM     No results found for: GLUCPOC  Lab Results   Component Value Date/Time    Color YELLOW/STRAW 09/14/2020 09:01 PM    Appearance CLEAR 09/14/2020 09:01 PM    Specific gravity 1.021 09/14/2020 09:01 PM    pH (UA) 7.5 09/14/2020 09:01 PM    Protein Negative 09/14/2020 09:01 PM    Glucose Negative 09/14/2020 09:01 PM    Ketone Negative 09/14/2020 09:01 PM    Bilirubin Negative 09/14/2020 09:01 PM    Urobilinogen 0.2 09/14/2020 09:01 PM    Nitrites Negative 09/14/2020 09:01 PM    Leukocyte Esterase TRACE (A) 09/14/2020 09:01 PM    Epithelial cells FEW 09/14/2020 09:01 PM    Bacteria 1+ (A) 09/14/2020 09:01 PM    WBC 0-4 09/14/2020 09:01 PM    RBC 0-5 09/14/2020 09:01 PM         Medications Reviewed:     Current Facility-Administered Medications   Medication Dose Route Frequency    ondansetron (ZOFRAN ODT) tablet 4 mg  4 mg Oral TIDAC    enoxaparin (LOVENOX) injection 40 mg  40 mg SubCUTAneous Q24H  lactobac ac& pc-s.therm-b.anim (ADAM Q/RISAQUAD)  1 Cap Oral DAILY    albuterol (PROVENTIL HFA, VENTOLIN HFA, PROAIR HFA) inhaler 2 Puff  2 Puff Inhalation Q4H PRN    lisinopriL (PRINIVIL, ZESTRIL) tablet 20 mg  20 mg Oral DAILY    guaiFENesin-codeine (ROBITUSSIN AC) 100-10 mg/5 mL solution 10 mL  10 mL Oral Q4H PRN    ondansetron (ZOFRAN) injection 4 mg  4 mg IntraVENous Q4H PRN    benzocaine-menthoL (CEPACOL) lozenge 1 Lozenge  1 Lozenge Mucous Membrane PRN    0.9% sodium chloride infusion  100 mL/hr IntraVENous CONTINUOUS    influenza vaccine 2020-21 (6 mos+)(PF) (FLUARIX/FLULAVAL/FLUZONE QUAD) injection 0.5 mL  0.5 mL IntraMUSCular PRIOR TO DISCHARGE    LORazepam (ATIVAN) tablet 1.5 mg  1.5 mg Oral QHS    acetaminophen (TYLENOL) tablet 650 mg  650 mg Oral Q6H PRN    Or    acetaminophen (TYLENOL) suppository 650 mg  650 mg Rectal Q6H PRN    sodium chloride (NS) flush 5-40 mL  5-40 mL IntraVENous Q8H    sodium chloride (NS) flush 5-40 mL  5-40 mL IntraVENous PRN    levothyroxine (SYNTHROID) tablet 200 mcg  200 mcg Oral 6am    [Held by provider] citalopram (CELEXA) tablet 40 mg  40 mg Oral DAILY    folic acid (FOLVITE) tablet 1 mg  1 mg Oral DAILY    famotidine (PEPCID) tablet 20 mg  20 mg Oral BID     ______________________________________________________________________  EXPECTED LENGTH OF STAY: 2d 14h  ACTUAL LENGTH OF STAY:          10    Medical decision making    I have reviewed flow sheets and previous day's notes  Patient has acute or chronic illness that posses a threat to bodily functions or life   Have Reviewed and ordered Clinical test  Have reviewed and ordered Radiology test  Have personally Reviewed imaging reports   High risk drug therapy as that require intensive monitoring for toxicity as in pressors, antibiotics, steroids and narcotic pain management.                 Zeke Horton, DO

## 2020-09-25 NOTE — ROUTINE PROCESS
Bedside shift change report given to ginny metcalf rn (oncoming nurse) by Tung Scott (offgoing nurse). Report included the following information SBAR and Kardex.

## 2020-09-25 NOTE — PROGRESS NOTES
Bedside shift change report given to Avelino Ellsworth (oncoming nurse) by eCdric Johnson  (offgoing nurse). Report included the following information SBAR, Kardex and MAR.

## 2020-09-25 NOTE — PROGRESS NOTES
CM verified patient has a qualifying hospital stay for [facility].        Sharonda Sandoval, BSN  [address] [place] [address]  Coordinator of Care Management  [phone]

## 2020-09-25 NOTE — PROGRESS NOTES
Spiritual Care Assessment/Progress Note  [facility]      NAME: Desirae Benitez      MRN: [id number]  AGE: 78 y.o. SEX: female  Sabianist Affiliation: No Christian   Language: English     9/25/2020     Total Time (in minutes): 5     Spiritual Assessment begun in [facility] through conversation with:         []Patient        [] Family    [] Friend(s)        Reason for Consult: Initial/Spiritual assessment, patient floor     Spiritual beliefs: (Please include comment if needed)     [] Identifies with a erik tradition:         [] Supported by a erik community:            [] Claims no spiritual orientation:           [] Seeking spiritual identity:                [] Adheres to an individual form of spirituality:           [x] Not able to assess:                           Identified resources for coping:      [] Prayer                               [] Music                  [] Guided Imagery     [] Family/friends                 [] Pet visits     [] Devotional reading                         [x] Unknown     [] Other:                                               Interventions offered during this visit: (See comments for more details)                Plan of Care:     [] Support spiritual and/or cultural needs    [] Support AMD and/or advance care planning process      [] Support grieving process   [] Coordinate Rites and/or Rituals    [] Coordination with community clergy   [] No spiritual needs identified at this time   [] Detailed Plan of Care below (See Comments)  [] Make referral to Music Therapy  [] Make referral to Pet Therapy     [] Make referral to Addiction services  [] Make referral to [facility]  [] Make referral to Spiritual Care Partner  [] No future visits requested        [x] Follow up visits as needed     Comments:  visit for initial spiritual assessment. Staff with patient providing care, appears family also present.   Will continue to follow up as needed and upon request as able.    Visited by Rev. Billie Mitchell MDiv, [facility], [facility]   paging service: [phone])

## 2020-09-25 NOTE — PROGRESS NOTES
CHONG- D/c to [facility] in the am.  Family to transport via personal vehicle. CM received a message from pt's nurse asking this CM to see the pt. CM met with pt and her sister, Gisell MADRID([phone]) to discuss her concerns. Pt and sister would like for pt to go to SNF rehab. CM informed them that CM would need to confer with the attending before proceeding. CM messaged Dr. Domingo Rogers about this and she is in agreement. CM again discussed with pt and her sister and they would like a referral to be sent to [facility]. CM sent a referral to [facility] via [employer] [place]. CHAD spoke with Татьяна Galloway at [facility] and she can accept this pt in the morning. During the above conversation, CM offered to complete a LTSS and pt declined. CM informed this pt and her sister that this pt has been accepted and they are in agreement with this plan. An envelope is on the chart. Nursing- On d/c  _Please place the Kardex, MAR and AVS in the envelope and give it to the family.  -Please call report to [phone]. [facility] is expecting this pt on Saturday, 9/26/2020. Betsy Quintanilla. ACM-SW

## 2020-09-25 NOTE — PROGRESS NOTES
Problem: Self Care Deficits Care Plan (Adult)  Goal: *Acute Goals and Plan of Care (Insert Text)  Description:   FUNCTIONAL STATUS PRIOR TO ADMISSION: Patient was independent and active without use of DME. Prior to 3 weeks ago she was mowing lawn and driving    HOME SUPPORT: The patient lived alone with friend to provide assistance. Occupational Therapy Goals  Initiated 9/22/2020    1. Patient will perform grooming and sponge bathing standing and sitting at the sink prn with modified independence and maintain oxygen sats > or = 92% on room air within 7 day(s). 2.  Patient will perform upper body dressing and lower body dressing with modified independence and maintain oxygen sats > or = 92% on room air within 7 day(s). 3.  Patient will perform simple home management with modified independence  and maintain oxygen sats > or = 92% on room airwithin 7 day(s). 4.  Patient will perform toilet transfers with modified independence into and out of bathroom and maintain oxygen sats > or = 92% on room air  within 7 day(s). 5.  Patient will perform all aspects of toileting with modified independence within 7 day(s). 6.  Patient will participate in upper extremity therapeutic exercise/activities with independence for 8 minutes with < or = 2 rest breaks and maintain oxygen sats > or = 92% on room air within 7 day(s). 7.  Patient will demonstrate pursed lip breathing during functional tasks without cues within 7 day(s). Outcome: Not Progressing Towards Goal     OCCUPATIONAL THERAPY TREATMENT  Patient: Holly Lujan (67 y.o. female)  Date: 9/25/2020  Diagnosis: Viral pneumonia [J12.9]  Pneumonia [J18.9]  Suspected COVID-19 virus infection [Z20.828]   <principal problem not specified>       Precautions: Fall  Chart, occupational therapy assessment, plan of care, and goals were reviewed. ASSESSMENT  Patient continues with skilled OT services and is not progressing towards goals.   Pt's progress is very limited by endurance, activity tolerance, difficulty with anterior reach to LE, balance, functional transfers, and difficulty with ADLs. Pt lives alone at baseline and does not have someone at home that can provide 24/7 supervision and A. Pt performing sit>stand from low surface with min-mod A with min vc's for hand placement and CGA in standing to perform LB dressing and bathing but continues to require max A 2/2 poor activity tolerance and visible SOB. O2 98% during activity. Pt requires frequent rest breaks to complete all ADLs. Pt transferred chair>bed with min A with RW and mod vc's for technique. Pt required mod A for LE management 2/2 fatigue with transfer. Bp113/56, pulse 69, and O2 99% after transfer. Pt is functioning below her baseline as pt was completely independent prior. Pt would benefit from SNF rehab at discharge to maximize functional independence with self care. If pt returns home pt will require HHOT and a caregiver 24/7 initially for safety and assist with ADLs and transfers. Current Level of Function Impacting Discharge (ADLs): max A LB dressing, min A with RW for transfers    Other factors to consider for discharge: Pt lives alone and was completely independent prior         PLAN :  Patient continues to benefit from skilled intervention to address the above impairments. Continue treatment per established plan of care. to address goals. Recommend with staff: OOB to [facility] with RW and min Ax1, OOB to chair     Recommend next OT session: standing tolerance for ADLs, functional transfers    Recommendation for discharge: (in order for the patient to meet his/her long term goals)  Therapy up to 5 days/week in SNF setting    This discharge recommendation:  Has been made in collaboration with the attending provider and/or case management    IF patient discharges home will need the following DME: TBD       SUBJECTIVE:   Patient stated i'm so independent. This knocked me off my feet.     OBJECTIVE DATA SUMMARY:   Cognitive/Behavioral Status:  Neurologic State: Alert  Orientation Level: Oriented X4                Functional Mobility and Transfers for ADLs:  Bed Mobility:  Supine to Sit: Supervision    Transfers:  Sit to Stand: Minimum assistance(low chair)     Bed to Chair: Minimum assistance(RW)    Balance:  Sitting: Intact  Standing: Intact; With support    ADL Intervention:       Grooming  Grooming Assistance: Set-up    Upper Body Bathing  Bathing Assistance: Moderate assistance  Position Performed: Seated in chair;Standing  Cues: Verbal cues provided  Adaptive Equipment: Walker    Lower Body Bathing  Bathing Assistance: Maximum assistance  Perineal  : Minimum assistance  Position Performed: Standing  Cues: Verbal cues provided  Adaptive Equipment: Walker  Lower Body : Maximum assistance  Position Performed: Standing  Cues: Verbal cues provided    Upper Body Dressing Assistance  Dressing Assistance: Moderate assistance  Hospital Gown: Moderate assistance    Lower Body Dressing Assistance  Dressing Assistance: Maximum assistance  Protective Undergarmet: Maximum assistance              Therapeutic Exercises:       Pain:  Pt denied pain    Activity Tolerance:   Fair, SpO2 stable on RA, requires frequent rest breaks, and observed SOB with activity  Please refer to the flowsheet for vital signs taken during this treatment. After treatment patient left in no apparent distress:   Supine in bed, Heels elevated for pressure relief, Call bell within reach, and Bed / chair alarm activated    COMMUNICATION/COLLABORATION:   The patients plan of care was discussed with: Registered nurseСветлана Cuevas  Time Calculation: 30 mins

## 2020-09-25 NOTE — PROGRESS NOTES
Problem: Mobility Impaired (Adult and Pediatric)  Goal: *Acute Goals and Plan of Care (Insert Text)  Description: FUNCTIONAL STATUS PRIOR TO ADMISSION: Patient was independent and active without use of DME. Normally works and is complete independent with mobility. HOME SUPPORT PRIOR TO ADMISSION: The patient lived alone with no local support. Has a few friends that can assist her as needed but intermittent support only. Physical Therapy Goals  Initiated 9/22/2020  1. Patient will move from supine to sit and sit to supine  in bed with modified independence within 7 day(s). 2.  Patient will transfer from bed to chair and chair to bed with modified independence using the least restrictive device within 7 day(s). 3.  Patient will perform sit to stand with modified independence within 7 day(s). 4.  Patient will ambulate with modified independence for 250 feet with the least restrictive device within 7 day(s). 5.  Patient will ascend/descend 4 stairs with 1 handrail(s) with modified independence within 7 day(s). Outcome: Progressing Towards Goal   PHYSICAL THERAPY TREATMENT  Patient: Jessica Whitney (79 y.o. female)  Date: 9/25/2020  Diagnosis: Viral pneumonia [J12.9]  Pneumonia [J18.9]  Suspected COVID-19 virus infection [Z20.828]   <principal problem not specified>       Precautions: Fall  Chart, physical therapy assessment, plan of care and goals were reviewed. ASSESSMENT  Patient continues with skilled PT services and is progressing towards goals. Patient demonstrates improvements in activity tolerance and able to ambulate increased distance. Pt required increased time 2/2 headache. Pt fairly steady with no significant LOB or buckling of knees noted. Pt's son present and supportive. Anticipate pt to progress well once symptom improve.      Current Level of Function Impacting Discharge (mobility/balance): supervision for ambulation x RW    Other factors to consider for discharge: supportive family, stairs         PLAN :  Patient continues to benefit from skilled intervention to address the above impairments. Continue treatment per established plan of care. to address goals. Recommendation for discharge: (in order for the patient to meet his/her long term goals)  Physical therapy at least 2 days/week in the home AND ensure assist and/or supervision for safety with stairs    This discharge recommendation:  Has not yet been discussed the attending provider and/or case management    IF patient discharges home will need the following DME: patient owns DME required for discharge       SUBJECTIVE:   Patient stated if I could just get my head together.     OBJECTIVE DATA SUMMARY:   Critical Behavior:  Neurologic State: Alert  Orientation Level: Oriented X4  Cognition: Follows commands, Decreased attention/concentration, Impaired decision making  Safety/Judgement: Decreased insight into deficits, Awareness of environment, Fall prevention  Functional Mobility Training:  Bed Mobility:     Supine to Sit: Supervision              Transfers:  Sit to Stand: Supervision  Stand to Sit: Supervision                             Balance:  Sitting: Intact  Standing: Intact; With support  Ambulation/Gait Training:  Distance (ft): 30 Feet (ft)  Assistive Device: Walker, rolling;Gait belt  Ambulation - Level of Assistance: Supervision; Adaptive equipment; Additional time        Gait Abnormalities: Decreased step clearance;Trunk sway increased        Base of Support: Narrowed     Speed/Fransisca: Shuffled; Slow  Step Length: Right shortened;Left shortened      Activity Tolerance:   Fair and requires rest breaks  Please refer to the flowsheet for vital signs taken during this treatment. After treatment patient left in no apparent distress:   Sitting in chair, Call bell within reach, and Caregiver / family present    COMMUNICATION/COLLABORATION:   The patients plan of care was discussed with: Registered nurse. Sveta Esparza, PT, DPT   Time Calculation: 12 mins

## 2020-09-25 NOTE — PROGRESS NOTES
Problem: Risk for Spread of Infection  Goal: Prevent transmission of infectious organism to others  Description: Prevent the transmission of infectious organisms to other patients, staff members, and visitors. Outcome: Progressing Towards Goal     Problem: Discharge Planning  Goal: *Discharge to safe environment  Outcome: Progressing Towards Goal     Problem: Breathing Pattern - Ineffective  Goal: *Absence of hypoxia  Outcome: Progressing Towards Goal     Problem: Falls - Risk of  Goal: *Absence of Falls  Description: Document Kemarjun Holloway Fall Risk and appropriate interventions in the flowsheet.   Outcome: Progressing Towards Goal  Note: Fall Risk Interventions:  Mobility Interventions: Bed/chair exit alarm, Communicate number of staff needed for ambulation/transfer, OT consult for ADLs, Patient to call before getting OOB, PT Consult for mobility concerns, Strengthening exercises (ROM-active/passive), Utilize walker, cane, or other assistive device         Medication Interventions: Bed/chair exit alarm, Evaluate medications/consider consulting pharmacy, Patient to call before getting OOB, Teach patient to arise slowly    Elimination Interventions: Bed/chair exit alarm, Call light in reach, Patient to call for help with toileting needs, Toilet paper/wipes in reach, Toileting schedule/hourly rounds    History of Falls Interventions: Bed/chair exit alarm, Door open when patient unattended, Investigate reason for fall, Room close to nurse's station

## 2020-09-26 VITALS
HEART RATE: 88 BPM | DIASTOLIC BLOOD PRESSURE: 58 MMHG | SYSTOLIC BLOOD PRESSURE: 112 MMHG | OXYGEN SATURATION: 95 % | WEIGHT: 207.67 LBS | TEMPERATURE: 97.3 F | HEIGHT: 64 IN | RESPIRATION RATE: 17 BRPM | BODY MASS INDEX: 35.45 KG/M2

## 2020-09-26 LAB
ANION GAP SERPL CALC-SCNC: 6 MMOL/L (ref 5–15)
BUN SERPL-MCNC: 13 MG/DL (ref 6–20)
BUN/CREAT SERPL: 20 (ref 12–20)
CALCIUM SERPL-MCNC: 8.3 MG/DL (ref 8.5–10.1)
CHLORIDE SERPL-SCNC: 111 MMOL/L (ref 97–108)
CO2 SERPL-SCNC: 25 MMOL/L (ref 21–32)
CREAT SERPL-MCNC: 0.65 MG/DL (ref 0.55–1.02)
GLUCOSE SERPL-MCNC: 101 MG/DL (ref 65–100)
MAGNESIUM SERPL-MCNC: 1.9 MG/DL (ref 1.6–2.4)
PHOSPHATE SERPL-MCNC: 4.2 MG/DL (ref 2.6–4.7)
POTASSIUM SERPL-SCNC: 3.2 MMOL/L (ref 3.5–5.1)
SODIUM SERPL-SCNC: 142 MMOL/L (ref 136–145)

## 2020-09-26 PROCEDURE — 36415 COLL VENOUS BLD VENIPUNCTURE: CPT

## 2020-09-26 PROCEDURE — 90471 IMMUNIZATION ADMIN: CPT

## 2020-09-26 PROCEDURE — 80048 BASIC METABOLIC PNL TOTAL CA: CPT

## 2020-09-26 PROCEDURE — 83735 ASSAY OF MAGNESIUM: CPT

## 2020-09-26 PROCEDURE — 74011250637 HC RX REV CODE- 250/637: Performed by: HOSPITALIST

## 2020-09-26 PROCEDURE — 90686 IIV4 VACC NO PRSV 0.5 ML IM: CPT | Performed by: HOSPITALIST

## 2020-09-26 PROCEDURE — 74011250636 HC RX REV CODE- 250/636: Performed by: INTERNAL MEDICINE

## 2020-09-26 PROCEDURE — 84100 ASSAY OF PHOSPHORUS: CPT

## 2020-09-26 PROCEDURE — 74011250636 HC RX REV CODE- 250/636: Performed by: HOSPITALIST

## 2020-09-26 PROCEDURE — 74011250637 HC RX REV CODE- 250/637: Performed by: INTERNAL MEDICINE

## 2020-09-26 PROCEDURE — 74011250637 HC RX REV CODE- 250/637: Performed by: NURSE PRACTITIONER

## 2020-09-26 RX ORDER — FAMOTIDINE 20 MG/1
20 TABLET, FILM COATED ORAL 2 TIMES DAILY
Qty: 40 TAB | Refills: 0 | Status: SHIPPED
Start: 2020-09-26 | End: 2021-04-23 | Stop reason: ALTCHOICE

## 2020-09-26 RX ORDER — LORAZEPAM 1 MG/1
TABLET ORAL
Qty: 6 TAB | Refills: 0 | Status: SHIPPED | OUTPATIENT
Start: 2020-09-26 | End: 2021-02-22

## 2020-09-26 RX ORDER — ONDANSETRON 4 MG/1
4 TABLET, ORALLY DISINTEGRATING ORAL
Qty: 20 TAB | Refills: 0 | Status: SHIPPED | OUTPATIENT
Start: 2020-09-26 | End: 2020-10-05 | Stop reason: ALTCHOICE

## 2020-09-26 RX ORDER — POTASSIUM CHLORIDE 750 MG/1
40 TABLET, FILM COATED, EXTENDED RELEASE ORAL
Status: COMPLETED | OUTPATIENT
Start: 2020-09-26 | End: 2020-09-26

## 2020-09-26 RX ADMIN — LISINOPRIL 20 MG: 20 TABLET ORAL at 09:11

## 2020-09-26 RX ADMIN — INFLUENZA VIRUS VACCINE 0.5 ML: 15; 15; 15; 15 SUSPENSION INTRAMUSCULAR at 10:40

## 2020-09-26 RX ADMIN — ONDANSETRON 4 MG: 4 TABLET, ORALLY DISINTEGRATING ORAL at 06:40

## 2020-09-26 RX ADMIN — LEVOTHYROXINE SODIUM 200 MCG: 0.1 TABLET ORAL at 06:40

## 2020-09-26 RX ADMIN — FOLIC ACID 1 MG: 1 TABLET ORAL at 09:10

## 2020-09-26 RX ADMIN — FAMOTIDINE 20 MG: 20 TABLET ORAL at 09:10

## 2020-09-26 RX ADMIN — ENOXAPARIN SODIUM 40 MG: 40 INJECTION SUBCUTANEOUS at 09:15

## 2020-09-26 RX ADMIN — Medication 1 CAPSULE: at 09:11

## 2020-09-26 RX ADMIN — Medication 10 ML: at 06:41

## 2020-09-26 RX ADMIN — POTASSIUM CHLORIDE 40 MEQ: 750 TABLET, FILM COATED, EXTENDED RELEASE ORAL at 09:12

## 2020-09-26 NOTE — DISCHARGE SUMMARY
Discharge Summary       PATIENT ID: Nicole Salinas  MRN: [id number]   YOB: 1950    DATE OF ADMISSION: 9/14/2020  7:22 PM    DATE OF DISCHARGE: 9/26/2020  PRIMARY CARE PROVIDER: Deepa Hebert MD     ATTENDING PHYSICIAN: Brandon Elmore DO   DISCHARGING PROVIDER: Brandon Elmore DO    To contact this individual call [phone] and ask the  to page. If unavailable ask to be transferred the Adult Hospitalist Department. CONSULTATIONS: None    PROCEDURES/SURGERIES: * No surgery found *    [address] COURSE:     24-year-old female with past medical history of breast cancer, HTN, RA on methotrexate, presenting to [facility] on 9/14/2020 for increasing fatigue and progressive shortness of breath. CTA done in the emergency department and concerning for bilateral lower lobe patchy pneumonia. Patient admitted for further evaluation. COVID-19 test negative. Treated with empiric antibiotics. Patient with slow improvement. Hospitalization complicated by ongoing nausea/vomiting/diarrhea that has now subsided. Patient overall stable for discharge but remains decompensated from her baseline. Patient discharged to skilled rehab facility. CTA chest w/ contrast 9/14/2020:  IMPRESSION:   1. No pulmonary embolism. 2. Bilateral lower lobe patchy pneumonia is nonspecific. COVID19 is possible  though not pathognomonic. 3. Right hilar lymphadenopathy is likely reactive to the pneumonia.     DISCHARGE DIAGNOSES / PLAN:      Bilateral pneumonia, bacterial versus viral:  Fever: resolved  -S/p Levaquin  -Negative COVID  -Weaned off supplemental oxygen  -continue incentive spirometer  -Repeat CT scan 4-6 weeks post discharge  -PT/OT- discharge to SNF  -procalcitonin negative      Nausea/vomiting/diarrhea: resolved  Dehydration:  -Zofran as needed  -Schedule Ensure     Hypokalemia: Repleted     Hypertension  -Continue home medications     Hypothyroidism  -Continue home levothyroxine     ADDITIONAL CARE RECOMMENDATIONS:     Discharge to rehab facility for ongoing care. Follow up with primary care provider post discharge. Recommend repeat CT chest in 4-6 weeks and recheck BMP (electrolytes) in 1 week. DIET: Regular, Ensure with meals    ACTIVITY: as tolerated      PENDING TEST RESULTS:   At the time of discharge the following test results are still pending: none    FOLLOW UP APPOINTMENTS:    Follow-up Information     Follow up With Specialties Details Why Contact Info    [facility]   Referred for home health services. Service to begin the day after discharge. Please call the agency if no contact by 12 noon the day after discharge. [address]  [phone]    Lin Agudelo MD Internal Medicine   [place] [address]  [phone]               DISCHARGE MEDICATIONS:  Current Discharge Medication List      START taking these medications    Details   famotidine (PEPCID) 20 mg tablet Take 1 Tab by mouth two (2) times a day. Qty: 40 Tab, Refills: 0      !! ondansetron (Zofran ODT) 4 mg disintegrating tablet Take 1 Tab by mouth every eight (8) hours as needed for Nausea or Vomiting. Qty: 20 Tab, Refills: 0       !! - Potential duplicate medications found. Please discuss with provider.       CONTINUE these medications which have CHANGED    Details   LORazepam (ATIVAN) 1 mg tablet TAKE 1 & 1/2 (ONE & ONE-HALF) TABLETS BY MOUTH NIGHTLY  Qty: 6 Tab, Refills: 0    Associated Diagnoses: Primary insomnia         CONTINUE these medications which have NOT CHANGED    Details   methotrexate 25 mg/mL chemo injection INJECT 1 ML UNDER SKIN ONCE A WEEK ON WEDNESDAY AS DIRECTED (DISCARD AND BEGIN A NEW VIAL EVERY 28 DAYS)  Qty: 12 mL, Refills: 1    Associated Diagnoses: Seronegative rheumatoid arthritis of multiple sites [facility]); PMR (polymyalgia rheumatica) ([facility] Long-term use of immunosuppressant medication      folic acid (FOLVITE) 1 mg tablet TAKE 1 TABLET BY MOUTH ONCE DAILY  Qty: 90 Tab, Refills: 1    Associated Diagnoses: Seronegative rheumatoid arthritis of multiple sites ([facility] Long-term use of immunosuppressant medication; PMR (polymyalgia rheumatica) ([facility])      zolpidem (AMBIEN) 5 mg tablet Take 1 Tab by mouth nightly as needed for Sleep. Max Daily Amount: 5 mg. Qty: 30 Tab, Refills: 3    Associated Diagnoses: Primary insomnia      diclofenac (VOLTAREN) 1 % gel Apply  to affected area four (4) times daily. Qty: 100 g, Refills: 2    Associated Diagnoses: Primary osteoarthritis of both hands      insulin syringe-needle U-100 1/2 mL 31 gauge x 15/64\" syrg Use to inject methotrexate once weekly  Qty: 100 Pen Needle, Refills: 0      lisinopril-hydroCHLOROthiazide (PRINZIDE, ZESTORETIC) 10-12.5 mg per tablet TAKE 1 TABLET BY MOUTH ONCE DAILY  Qty: 90 Tab, Refills: 3      levothyroxine (SYNTHROID) 200 mcg tablet TAKE ONE TABLET BY MOUTH ONCE DAILY BEFORE BREAKFAST  Qty: 90 Tab, Refills: 3      citalopram (CELEXA) 40 mg tablet TAKE ONE TABLET BY MOUTH DAILY  Qty: 90 Tab, Refills: 3      !! ondansetron (ZOFRAN ODT) 4 mg disintegrating tablet Take 1 Tab by mouth every eight (8) hours as needed for Nausea. Qty: 30 Tab, Refills: 1    Associated Diagnoses: Seronegative rheumatoid arthritis of multiple sites ([facility] Nausea       !! - Potential duplicate medications found. Please discuss with provider. STOP taking these medications       ciprofloxacin HCl (CIPRO) 250 mg tablet Comments:   Reason for Stopping:         letrozole (FEMARA) 2.5 mg tablet Comments:   Reason for Stopping:         ibuprofen (ADVIL) 200 mg tablet Comments:   Reason for Stopping:                 NOTIFY YOUR PHYSICIAN FOR ANY OF THE FOLLOWING:   Fever over 101 degrees for 24 hours.    Chest pain, shortness of breath, fever, chills, nausea, vomiting, diarrhea, change in mentation, falling, weakness, bleeding. Severe pain or pain not relieved by medications. Or, any other signs or symptoms that you may have questions about. DISPOSITION:    Home With:   OT  PT  HH  RN      x Long term SNF/Inpatient Rehab    Independent/assisted living    Hospice    Other:       PATIENT CONDITION AT DISCHARGE:     Functional status    Poor    x Deconditioned     Independent      Cognition   x  Lucid     Forgetful     Dementia      Catheters/lines (plus indication)    Reilly     PICC     PEG    x None      Code status   x  Full code     DNR      PHYSICAL EXAMINATION AT DISCHARGE:  Constitutional:  Ill-appearing, cooperative   HEENT:  Atraumatic. Oral mucosa moist,. Non icteric sclera. No pallor. Resp:  Crackles bilaterally, no wheezing. No accessory muscle use         CV:  Regular rhythm, normal rate, no murmurs, gallops, rubs    GI:  Soft, non distended, non tender.  normoactive bowel sounds, no hepatosplenomegaly           Musculoskeletal:  No edema, warm, 2+ pulses throughout    Neurologic:  Mental status: AAOx3               CHRONIC MEDICAL DIAGNOSES:  Problem List as of 9/26/2020 Date Reviewed: 3/10/2020          Codes Class Noted - Resolved    Pneumonia ICD-10-CM: J18.9  ICD-9-CM: 486  9/15/2020 - Present        Suspected COVID-19 virus infection ICD-10-CM: Z20.828  ICD-9-CM: V01.79  9/15/2020 - Present        Viral pneumonia ICD-10-CM: J12.9  ICD-9-CM: 480.9  9/14/2020 - Present        Smoldering myeloma ([facility] ICD-10-CM: C90.00  ICD-9-CM: 203.00  8/14/2019 - Present        Multiple myeloma ([facility] ICD-10-CM: C90.00  ICD-9-CM: 203.00  1/14/2019 - Present        Malignant neoplasm of left female breast ([facility] ICD-10-CM: D16.726  ICD-9-CM: 174.9  Unknown - Present        Long-term use of immunosuppressant medication ICD-10-CM: Z79.899  ICD-9-CM: V58.69  11/28/2018 - Present        Severe obesity ([facility] ICD-10-CM: E66.01  ICD-9-CM: 278.01  11/21/2018 - Present Seronegative rheumatoid arthritis of multiple sites [facility]) ICD-10-CM: M06.09  ICD-9-CM: 714.0  10/15/2018 - Present        Monoclonal gammopathy of unknown significance (MGUS) ICD-10-CM: D47.2  ICD-9-CM: 273.1  10/15/2018 - Present        Vitamin D deficiency ICD-10-CM: E55.9  ICD-9-CM: 268.9  10/15/2018 - Present        PMR (polymyalgia rheumatica) ([facility] ICD-10-CM: M35.3  ICD-9-CM: 040  10/4/2018 - Present        Iliotibial band syndrome of both sides ICD-10-CM: M76.31, M76.32  ICD-9-CM: 728.89  10/4/2018 - Present        Long term current use of non-steroidal anti-inflammatories (NSAID) ICD-10-CM: Z79.1  ICD-9-CM: V58.64  10/4/2018 - Present        Moderate major depression ([facility] ICD-10-CM: F32.1  ICD-9-CM: 296.22  10/1/2018 - Present        Graves disease ICD-10-CM: E05.00  ICD-9-CM: 242.00  7/16/2013 - Present    Overview Signed 7/16/2013 11:42 AM by Álvaro Banks MD     S/p rtx             HTN (hypertension) ICD-10-CM: I10  ICD-9-CM: 401.9  11/3/2011 - Present        Hypothyroidism ICD-10-CM: E03.9  ICD-9-CM: 244.9  11/2/2011 - Present        IBS (irritable bowel syndrome) ICD-10-CM: K58.9  ICD-9-CM: 564.1  11/2/2011 - Present    Overview Signed 11/2/2011  9:06 AM by Tami Hung     diarrhea             RESOLVED: Rheumatoid arthritis ([facility] ICD-10-CM: M06.9  ICD-9-CM: 714.0  Unknown - 1/28/2019              Greater than 30 minutes were spent with the patient on counseling and coordination of care    Signed:   Francine Ng DO  9/26/2020  10:15 AM

## 2020-09-26 NOTE — DISCHARGE INSTRUCTIONS
Discharge Instructions       PATIENT ID: Hansa Alegre  MRN: [id number]   YOB: 1950    DATE OF ADMISSION: 9/14/2020  7:22 PM    DATE OF DISCHARGE: 9/26/2020    PRIMARY CARE PROVIDER: Johnie Jimenez MD     ATTENDING PHYSICIAN: Suad Khanna DO  DISCHARGING PROVIDER: [facility]     To contact this individual call [phone] and ask the  to page. If unavailable ask to be transferred the Adult Hospitalist Department. DISCHARGE DIAGNOSES    Pneumonia     CONSULTATIONS: None    PROCEDURES/SURGERIES: * No surgery found *    PENDING TEST RESULTS:   At the time of discharge the following test results are still pending: none    FOLLOW UP APPOINTMENTS:   Follow-up Information     Follow up With Specialties Details Why Contact Info    [facility]   Referred for home health services. Service to begin the day after discharge. Please call the agency if no contact by 12 noon the day after discharge. [address] [facility] [place] [facility] [address]  [address] [place] [address], [address] [facility], MD Internal Medicine   [facility]  [phone]             ADDITIONAL CARE RECOMMENDATIONS:     Discharge to rehab facility for ongoing care. Recommend repeat CT chest in 4-6 weeks and recheck BMP (electrolytes) in 1 week. DIET: Regular    ACTIVITY: as tolerated       DISCHARGE MEDICATIONS:   See Medication Reconciliation Form    · It is important that you take the medication exactly as they are prescribed. · Keep your medication in the bottles provided by the pharmacist and keep a list of the medication names, dosages, and times to be taken in your wallet. · Do not take other medications without consulting your doctor. NOTIFY YOUR PHYSICIAN FOR ANY OF THE FOLLOWING:   Fever over 101 degrees for 24 hours.    Chest pain, shortness of breath, fever, chills, nausea, vomiting, diarrhea, change in mentation, falling, weakness, bleeding. Severe pain or pain not relieved by medications. Or, any other signs or symptoms that you may have questions about.         Signed:   Jaime Barrios DO  9/26/2020  10:17 AM

## 2020-09-26 NOTE — PROGRESS NOTES
Bedside and Verbal shift change report given to Carlos (oncoming nurse) by Guicho Owens (offgoing nurse). Report included the following information SBAR, Kardex and MAR.

## 2020-09-26 NOTE — PROGRESS NOTES
Report called to [employer] (Inspect Manager) and given to Marcelle Razo,       I have reviewed discharge instructions with the patient. The patient verbalized understanding. Current Discharge Medication List      START taking these medications    Details   famotidine (PEPCID) 20 mg tablet Take 1 Tab by mouth two (2) times a day. Qty: 40 Tab, Refills: 0      !! ondansetron (Zofran ODT) 4 mg disintegrating tablet Take 1 Tab by mouth every eight (8) hours as needed for Nausea or Vomiting. Qty: 20 Tab, Refills: 0       !! - Potential duplicate medications found. Please discuss with provider. CONTINUE these medications which have CHANGED    Details   LORazepam (ATIVAN) 1 mg tablet TAKE 1 & 1/2 (ONE & ONE-HALF) TABLETS BY MOUTH NIGHTLY  Qty: 6 Tab, Refills: 0    Associated Diagnoses: Primary insomnia         CONTINUE these medications which have NOT CHANGED    Details   methotrexate 25 mg/mL chemo injection INJECT 1 ML UNDER SKIN ONCE A WEEK ON WEDNESDAY AS DIRECTED (DISCARD AND BEGIN A NEW VIAL EVERY 28 DAYS)  Qty: 12 mL, Refills: 1    Associated Diagnoses: Seronegative rheumatoid arthritis of multiple sites ([facility] [address] PMR (polymyalgia rheumatica) ([facility] [address] Long-term use of immunosuppressant medication      folic acid (FOLVITE) 1 mg tablet TAKE 1 TABLET BY MOUTH ONCE DAILY  Qty: 90 Tab, Refills: 1    Associated Diagnoses: Seronegative rheumatoid arthritis of multiple sites ([facility] [address] Long-term use of immunosuppressant medication; PMR (polymyalgia rheumatica) ([facility])      zolpidem (AMBIEN) 5 mg tablet Take 1 Tab by mouth nightly as needed for Sleep. Max Daily Amount: 5 mg. Qty: 30 Tab, Refills: 3    Associated Diagnoses: Primary insomnia      diclofenac (VOLTAREN) 1 % gel Apply  to affected area four (4) times daily.   Qty: 100 g, Refills: 2    Associated Diagnoses: Primary osteoarthritis of both hands      insulin syringe-needle U-100 1/2 mL 31 gauge x 15/64\" syrg Use to inject methotrexate once weekly  Qty: 100 Pen Needle, Refills: 0 lisinopril-hydroCHLOROthiazide (PRINZIDE, ZESTORETIC) 10-12.5 mg per tablet TAKE 1 TABLET BY MOUTH ONCE DAILY  Qty: 90 Tab, Refills: 3      levothyroxine (SYNTHROID) 200 mcg tablet TAKE ONE TABLET BY MOUTH ONCE DAILY BEFORE BREAKFAST  Qty: 90 Tab, Refills: 3      citalopram (CELEXA) 40 mg tablet TAKE ONE TABLET BY MOUTH DAILY  Qty: 90 Tab, Refills: 3      !! ondansetron (ZOFRAN ODT) 4 mg disintegrating tablet Take 1 Tab by mouth every eight (8) hours as needed for Nausea. Qty: 30 Tab, Refills: 1    Associated Diagnoses: Seronegative rheumatoid arthritis of multiple sites ([facility] [address] Nausea       !! - Potential duplicate medications found. Please discuss with provider.       STOP taking these medications       ciprofloxacin HCl (CIPRO) 250 mg tablet Comments:   Reason for Stopping:         letrozole (FEMARA) 2.5 mg tablet Comments:   Reason for Stopping:         ibuprofen (ADVIL) 200 mg tablet Comments:   Reason for Stopping:

## 2020-09-28 ENCOUNTER — PATIENT OUTREACH (OUTPATIENT)
Dept: CASE MANAGEMENT | Age: 70
End: 2020-09-28

## 2020-09-28 NOTE — PROGRESS NOTES
Transition of care outreach postponed for 14 days due to patient's discharge to SNF. Per EMR patient discharged to [facility] [employer] Will continue to monitor patient's progress.

## 2020-10-05 ENCOUNTER — VIRTUAL VISIT (OUTPATIENT)
Dept: INTERNAL MEDICINE CLINIC | Age: 70
End: 2020-10-05
Payer: MEDICARE

## 2020-10-05 DIAGNOSIS — R53.81 DEBILITY: ICD-10-CM

## 2020-10-05 DIAGNOSIS — J18.9 PNEUMONIA OF BOTH LOWER LOBES DUE TO INFECTIOUS ORGANISM: Primary | ICD-10-CM

## 2020-10-05 PROCEDURE — 1090F PRES/ABSN URINE INCON ASSESS: CPT | Performed by: INTERNAL MEDICINE

## 2020-10-05 PROCEDURE — G9717 DOC PT DX DEP/BP F/U NT REQ: HCPCS | Performed by: INTERNAL MEDICINE

## 2020-10-05 PROCEDURE — G8427 DOCREV CUR MEDS BY ELIG CLIN: HCPCS | Performed by: INTERNAL MEDICINE

## 2020-10-05 PROCEDURE — G8536 NO DOC ELDER MAL SCRN: HCPCS | Performed by: INTERNAL MEDICINE

## 2020-10-05 PROCEDURE — 99213 OFFICE O/P EST LOW 20 MIN: CPT | Performed by: INTERNAL MEDICINE

## 2020-10-05 PROCEDURE — 1100F PTFALLS ASSESS-DOCD GE2>/YR: CPT | Performed by: INTERNAL MEDICINE

## 2020-10-05 PROCEDURE — G9899 SCRN MAM PERF RSLTS DOC: HCPCS | Performed by: INTERNAL MEDICINE

## 2020-10-05 PROCEDURE — G8756 NO BP MEASURE DOC: HCPCS | Performed by: INTERNAL MEDICINE

## 2020-10-05 PROCEDURE — 3288F FALL RISK ASSESSMENT DOCD: CPT | Performed by: INTERNAL MEDICINE

## 2020-10-05 PROCEDURE — G8417 CALC BMI ABV UP PARAM F/U: HCPCS | Performed by: INTERNAL MEDICINE

## 2020-10-05 PROCEDURE — 1111F DSCHRG MED/CURRENT MED MERGE: CPT | Performed by: INTERNAL MEDICINE

## 2020-10-05 PROCEDURE — 3017F COLORECTAL CA SCREEN DOC REV: CPT | Performed by: INTERNAL MEDICINE

## 2020-10-05 PROCEDURE — G8399 PT W/DXA RESULTS DOCUMENT: HCPCS | Performed by: INTERNAL MEDICINE

## 2020-10-05 NOTE — LETTER
NOTIFICATION RETURN TO WORK / SCHOOL 
 
10/5/2020 2:29 PM 
 
Ms. Nanci Lay [address] [place] [id number] To Whom It May Concern: 
 
Nanci Lay is currently under the care of Serena Childers.. She will return to work/school on: October 15, 2020 to work maximum 4-6 hours per day. If tolerated she can return to full time work on October 22, 2020. If there are questions or concerns please have the patient contact our office. Sincerely, Demarcus Ochoa MD

## 2020-10-05 NOTE — PROGRESS NOTES
1. Have you been to the ER, urgent care clinic since your last visit? Hospitalized since your last visit? yes [facility]  For Pneumonia      2. Have you seen or consulted any other health care providers outside of the [address] [place] [address] since your last visit? Include any pap smears or colon screening.  No  Chief Complaint   Patient presents with    Pneumonia     follow up     Please send link to [phone]

## 2020-10-05 NOTE — PROGRESS NOTES
Deloris Hawley, who was evaluated through a synchronous (real-time) audio-video encounter, and/or her healthcare decision maker, is aware that it is a billable service, with coverage as determined by her insurance carrier. She provided verbal consent to proceed: Yes, and patient identification was verified. It was conducted pursuant to the emergency declaration under the [address] [place] [address] and the [employer] Act. A caregiver was present when appropriate. Ability to conduct physical exam was limited. I was at home. The patient was at home. Deloris Hawley is a 78 y.o. female being evaluated by a Virtual Visit (video visit) encounter to address concerns as mentioned above. A caregiver was present when appropriate. Due to this being a TeleHealth encounter (During WZXXT-45 public health emergency), evaluation of the following organ systems was limited: Vitals/Constitutional/EENT/Resp/CV/GI//MS/Neuro/Skin/Heme-Lymph-Imm. Pursuant to the emergency declaration under the [address] [place] [address] and the [employer] Act, this Virtual Visit was conducted with patient's (and/or legal guardian's) consent, to reduce the risk of exposure to COVID-19 and provide necessary medical care. Services were provided through a video synchronous discussion virtually to substitute for in-person encounter. --Deshawn Barron MD on 10/5/2020 at 2:21 PM    An electronic signature was used to authenticate this note. HISTORY OF PRESENT ILLNESS  Deloris Hawley is a 78 y.o. female. HPI  This is not a CHONG as no nursing CHONG note completed. Pt admitted to [facility] [place] from 9/14-9/26/2020 for bilat lobe patchy pneumonia. Cultues and COVID negative. Improved with Levaquin and discharged to [facility].   She was d/padmaja from [facility] on 10/2/2020. D/c note is revewed with patient and summarized as aborve. Recommended f/u chest CT in 4-6 weeks. Getting winded with any activity. Continues to do her ADLs and exercises at home but very sob. No further coughing and wheezing. Diarrhea and vomiting has also resolved. Would like to try and return to work 10/15 half days then hopefully full time the following week if does well. Needs outpt PT. Friends helping her out with grocery shopping, etc.         Current Outpatient Medications:     LORazepam (ATIVAN) 1 mg tablet, TAKE 1 & 1/2 (ONE & ONE-HALF) TABLETS BY MOUTH NIGHTLY, Disp: 6 Tab, Rfl: 0    famotidine (PEPCID) 20 mg tablet, Take 1 Tab by mouth two (2) times a day., Disp: 40 Tab, Rfl: 0    methotrexate 25 mg/mL chemo injection, INJECT 1 ML UNDER SKIN ONCE A WEEK ON WEDNESDAY AS DIRECTED (DISCARD AND BEGIN A NEW VIAL EVERY 28 DAYS), Disp: 12 mL, Rfl: 1    folic acid (FOLVITE) 1 mg tablet, TAKE 1 TABLET BY MOUTH ONCE DAILY, Disp: 90 Tab, Rfl: 1    zolpidem (AMBIEN) 5 mg tablet, Take 1 Tab by mouth nightly as needed for Sleep. Max Daily Amount: 5 mg., Disp: 30 Tab, Rfl: 3    diclofenac (VOLTAREN) 1 % gel, Apply  to affected area four (4) times daily. , Disp: 100 g, Rfl: 2    insulin syringe-needle U-100 1/2 mL 31 gauge x 15/64\" syrg, Use to inject methotrexate once weekly, Disp: 100 Pen Needle, Rfl: 0    lisinopril-hydroCHLOROthiazide (PRINZIDE, ZESTORETIC) 10-12.5 mg per tablet, TAKE 1 TABLET BY MOUTH ONCE DAILY, Disp: 90 Tab, Rfl: 3    levothyroxine (SYNTHROID) 200 mcg tablet, TAKE ONE TABLET BY MOUTH ONCE DAILY BEFORE BREAKFAST, Disp: 90 Tab, Rfl: 3    citalopram (CELEXA) 40 mg tablet, TAKE ONE TABLET BY MOUTH DAILY, Disp: 90 Tab, Rfl: 3    ondansetron (ZOFRAN ODT) 4 mg disintegrating tablet, Take 1 Tab by mouth every eight (8) hours as needed for Nausea., Disp: 30 Tab, Rfl: 1    There were no vitals taken for this visit. No flowsheet data found. ROS.  See above  Physical Exam  Vitals signs reviewed. Constitutional:       Appearance: Normal appearance. HENT:      Head: Normocephalic and atraumatic. Neck:      Comments: Nml appearance  Pulmonary:      Effort: Pulmonary effort is normal.      Comments: nml rate  Neurological:      Mental Status: She is alert. ASSESSMENT and PLAN  bilat LL pneumonia - slowly improving but still with rodgers. Refer to PT - would like outpt instead of in home. Note to rtw written. Repeat CT in 3-4 weeks.     Orders Placed This Encounter    CT CHEST WO CONT    REFERRAL TO PHYSICAL THERAPY

## 2020-10-08 NOTE — PROGRESS NOTES
Physician Progress Note      PATIENT:               Candace Obrien  CSN #:                  [id number]  :                       1950  ADMIT DATE:       2020 7:22 PM  [address] DATE:        2020 1:07 PM  RESPONDING  PROVIDER #:        Inocente George DO          QUERY TEXT:    Marcus Lozada,    Patient admitted with pneumonia, documented to have dehydration, and noted to have low serum sodium on admission with treatment. After study, can the low sodium be further specified as    The medical record reflects the following:    Risk Factors: 69F admitted and treated for pneumonia and dehydration    Clinical Indicators:   Na+ 134  9/15 Na+ 137   Na+ 139   Na+ 133   Na+ 133   Na+ 132   Na+ 138    Treatment:  1L NS IV bolus ordered and given,  NS IV ordered and started 100 ml/hr and ran continuous until discharge,  1L NS IV bolus ordered and given, daily lab monitoring, recheck BMP in one week listed in discharge summary    Thank you    [facility] Documentation Improvement  Office Phone   Options provided:  -- clinically significant hyponatremia  -- low serum sodium not clinically significant  -- Other - I will add my own diagnosis  -- Disagree - Not applicable / Not valid  -- Disagree - Clinically unable to determine / Unknown  -- Refer to Clinical Documentation Reviewer    PROVIDER RESPONSE TEXT:    This patient has hyponatremia.     Query created by: Jean Pace on 2020 10:18 AM      Electronically signed by:  Inocente George DO 10/8/2020 9:02 AM

## 2020-10-12 ENCOUNTER — PATIENT OUTREACH (OUTPATIENT)
Dept: CASE MANAGEMENT | Age: 70
End: 2020-10-12

## 2020-10-12 NOTE — PROGRESS NOTES
[address] [place] [address] Dr Discharge Follow-Up      Date/Time:  10/12/2020 1:20 PM    Patient was admitted to [facility] on 2020 and discharged to [facility] on 2020. The patients discharge diagnosis was viral pneumonia. Patient was discharge on 10/2/2020 from [facility]. The discharge summary from the post acute facilty was not available at the time of outreach. Patient was contacted within 6 business days of discharge from the post acute facility. LPN Care Coordinator contacted the patient by telephone to perform post hospital discharge follow up. Verified name and  with patient as identifiers. Provided introduction to self, and explanation of the LPN Care Coordinator role. Patient received post acute facility discharge instructions. Patient  given an opportunity to ask questions and does not have any further questions or concerns at this time. The patient agrees to contact the PCP office for questions related to their healthcare. LPN Care Coordinator provided contact information for future reference. Advance Care Planning:   Does patient have an Advance Directive:  not on file     [address] orders at discharge: [address]: n/a   Date of initial visit: n/a     Patient reports she will start out patient physical therapy with [facility] on 10/14/2020. Durable Medical Equipment ordered at discharge: none  [address] received: n/a    Medication(s):   The post acute facility medication discharge list was not available for this call. Medication review was not  performed with patient, who verbalizes understanding of administration of home medications. There were no barriers to obtaining medications identified at this time. Patient attended hospital follow up appointment with Dr. Harshal Dumont on 10/5/2020 medication reconciliation was at time of visit.      [facility] [place] appointment(s):   Future Appointments   Date Time Provider Geoff Olga Lidia   10/29/2020  9:30 AM [facility] [place] [facility] ER 3 [facility] . [facility]    12/9/2020  9:40 AM Rusty June MD [facility]      Non-[facility] follow up appointment(s): n/a   [facility]:  information provided as a resource

## 2020-10-27 ENCOUNTER — PATIENT OUTREACH (OUTPATIENT)
Dept: CASE MANAGEMENT | Age: 70
End: 2020-10-27

## 2020-10-29 ENCOUNTER — HOSPITAL ENCOUNTER (OUTPATIENT)
Dept: CT IMAGING | Age: 70
Discharge: HOME | End: 2020-10-29
Attending: INTERNAL MEDICINE
Payer: MEDICARE

## 2020-10-29 DIAGNOSIS — J18.9 PNEUMONIA OF BOTH LOWER LOBES DUE TO INFECTIOUS ORGANISM: ICD-10-CM

## 2020-10-29 PROCEDURE — 71250 CT THORAX DX C-: CPT

## 2020-10-31 DIAGNOSIS — M35.3 PMR (POLYMYALGIA RHEUMATICA) (HCC): ICD-10-CM

## 2020-10-31 DIAGNOSIS — Z79.60 LONG-TERM USE OF IMMUNOSUPPRESSANT MEDICATION: ICD-10-CM

## 2020-10-31 DIAGNOSIS — M06.09 SERONEGATIVE RHEUMATOID ARTHRITIS OF MULTIPLE SITES (HCC): ICD-10-CM

## 2020-11-02 RX ORDER — METHOTREXATE 25 MG/ML
INJECTION, SOLUTION INTRA-ARTERIAL; INTRAMUSCULAR; INTRAVENOUS
Qty: 12 ML | Refills: 1 | Status: SHIPPED | OUTPATIENT
Start: 2020-11-02 | End: 2021-04-23 | Stop reason: SDUPTHER

## 2020-11-04 ENCOUNTER — TELEPHONE (OUTPATIENT)
Dept: INTERNAL MEDICINE CLINIC | Age: 70
End: 2020-11-04

## 2020-11-04 DIAGNOSIS — J18.9 PNEUMONIA OF BOTH LOWER LOBES DUE TO INFECTIOUS ORGANISM: Primary | ICD-10-CM

## 2020-11-05 RX ORDER — CITALOPRAM 40 MG/1
TABLET, FILM COATED ORAL
Qty: 90 TAB | Refills: 3 | Status: SHIPPED | OUTPATIENT
Start: 2020-11-05 | End: 2021-09-26

## 2020-11-05 RX ORDER — LISINOPRIL AND HYDROCHLOROTHIAZIDE 10; 12.5 MG/1; MG/1
TABLET ORAL
Qty: 90 TAB | Refills: 3 | Status: SHIPPED | OUTPATIENT
Start: 2020-11-05 | End: 2021-09-26

## 2020-11-24 ENCOUNTER — HOSPITAL ENCOUNTER (OUTPATIENT)
Dept: CT IMAGING | Age: 70
Discharge: HOME | End: 2020-11-24
Attending: INTERNAL MEDICINE
Payer: MEDICARE

## 2020-11-24 DIAGNOSIS — J18.9 PNEUMONIA OF BOTH LOWER LOBES DUE TO INFECTIOUS ORGANISM: ICD-10-CM

## 2020-11-24 PROCEDURE — 71250 CT THORAX DX C-: CPT

## 2020-12-09 ENCOUNTER — VIRTUAL VISIT (OUTPATIENT)
Dept: RHEUMATOLOGY | Age: 70
End: 2020-12-09
Payer: MEDICARE

## 2020-12-09 DIAGNOSIS — Z79.60 LONG-TERM USE OF IMMUNOSUPPRESSANT MEDICATION: ICD-10-CM

## 2020-12-09 DIAGNOSIS — M35.3 PMR (POLYMYALGIA RHEUMATICA) (HCC): ICD-10-CM

## 2020-12-09 DIAGNOSIS — J84.9 ILD (INTERSTITIAL LUNG DISEASE) (HCC): ICD-10-CM

## 2020-12-09 DIAGNOSIS — M06.09 SERONEGATIVE RHEUMATOID ARTHRITIS OF MULTIPLE SITES (HCC): Primary | ICD-10-CM

## 2020-12-09 PROCEDURE — G8399 PT W/DXA RESULTS DOCUMENT: HCPCS | Performed by: INTERNAL MEDICINE

## 2020-12-09 PROCEDURE — 1100F PTFALLS ASSESS-DOCD GE2>/YR: CPT | Performed by: INTERNAL MEDICINE

## 2020-12-09 PROCEDURE — G9899 SCRN MAM PERF RSLTS DOC: HCPCS | Performed by: INTERNAL MEDICINE

## 2020-12-09 PROCEDURE — 99215 OFFICE O/P EST HI 40 MIN: CPT | Performed by: INTERNAL MEDICINE

## 2020-12-09 PROCEDURE — G9717 DOC PT DX DEP/BP F/U NT REQ: HCPCS | Performed by: INTERNAL MEDICINE

## 2020-12-09 PROCEDURE — G8756 NO BP MEASURE DOC: HCPCS | Performed by: INTERNAL MEDICINE

## 2020-12-09 PROCEDURE — 1090F PRES/ABSN URINE INCON ASSESS: CPT | Performed by: INTERNAL MEDICINE

## 2020-12-09 PROCEDURE — 3288F FALL RISK ASSESSMENT DOCD: CPT | Performed by: INTERNAL MEDICINE

## 2020-12-09 PROCEDURE — G0463 HOSPITAL OUTPT CLINIC VISIT: HCPCS | Performed by: INTERNAL MEDICINE

## 2020-12-09 PROCEDURE — G8428 CUR MEDS NOT DOCUMENT: HCPCS | Performed by: INTERNAL MEDICINE

## 2020-12-09 PROCEDURE — 3017F COLORECTAL CA SCREEN DOC REV: CPT | Performed by: INTERNAL MEDICINE

## 2020-12-09 RX ORDER — PREDNISONE 5 MG/1
TABLET ORAL
Qty: 91 TAB | Refills: 0 | Status: SHIPPED | OUTPATIENT
Start: 2020-12-09 | End: 2021-01-04 | Stop reason: SDUPTHER

## 2020-12-09 NOTE — PROGRESS NOTES
REASON FOR VISIT    This is a follow-up visit for Ms. Eleno Yeh for     ICD-10-CM   1. Seronegative rheumatoid arthritis of multiple sites ([facility] M06.09   2. PMR (polymyalgia rheumatica) ([facility]) M35.3     The patient has consented for synchronous (real-time) Telemedicine (audio-video technology) on 12/9/2020 for their care to be delivered over telemedicine in place of their regularly scheduled office visit pursuant to the emergency declaration under the [facility] [place] [address] waiver authority and the [employer] Act, this Virtual  Visit was conducted, with patient's consent, to reduce the patient's risk of exposure to COVID-19 and provide continuity of care for an established patient. Services were provided through a video synchronous discussion virtually to substitute for in-person clinic visit. Inflammatory arthritis phenotype includes:  Anti-CCP positive: no  Rheumatoid factor positive: no  Erosive disease: no  Extra-articular manifestations include: Polymyalgia Rheumatica, smoldering myeloma, interstitial lung disease     Immunosuppression Screening (1/21/2020):  Quantiferon TB: negative  PPD:  Not performed  Hepatitis B: negative  Hepatitis C: negative    Therapy History includes:  Current DMARD therapy include: methotrexate 25 mg SubQ every Wednesday (4/29/2019 to present)  Prior DMARD therapy include: methotrexate 20 mg every Wednesday (10/15/2018 to 4/29/2019)  Discontinued DMARDs because of inefficacy: None  Discontinued DMARDs because of side effects: None    HISTORY OF PRESENT ILLNESS    Ms. Eleno Yeh returns for a follow-up. On her last visit, I continued methotrexate 25 mg subcutaneous every Wednesday with Zofran, which she has taken with good tolerance. She had a interval hospitalization for non-COVID pneumonia treated with antibiotics and she has been having a slow recovery.  She recalls that her joints were symptomatic around that time. She had dyspnea and coughing. She feels that the heat makes her breathing worse. Her most recent CT chest 11/24/2020 showed minimal residual peripheral groundglass opacities in left lower lobe which have slightly improved in the interval. No new areas of disease are identified. CT chest on 10/29/2020 showed There are groundglass opacities identified now in the periphery each upper lobe right greater than left and also in the lower lobes. Today, she feels okay. She complains of diffuse aching diffuse pain and stiffness lasting hours worse in the morning, getting out stairs, or walking up stairs. She feels better after a hot shower and worse at rest. She is having a hard time working due to fatigue. She gets dyspnea with exertion, such as raking. She has not seen pulmonary yet. She has not been on prednisone. She is able to regular ADLs if slow paced. If she is hot. She endorses dyspnea    She denies fever, weight loss, blurred vision, vision loss, oral ulcers, ankle swelling, dry cough, nausea, vomiting, dysphagia, abdominal pain, black or bloody stool, fall since last visit, rash, easy bruising and increased thirst.    Last toxicity monitoring by blood work was done on 9/18/2020 and did not reveal any significant adverse effects, except albumin 2.6 g/dL    Most recent inflammatory markers from 3/10/2020 revealed a ESR 9 mm/hr and CRP <1 mg/L. The patient has had any interval hospital admissions or viral PNA infections, but no surgeries. REVIEW OF SYSTEMS    A comprehensive review of systems was performed and pertinent results are documented in the HPI, review of systems is otherwise non-contributory. PAST MEDICAL HISTORY    She has a past medical history of Breast cancer, left ([address] Depression, Goiter, Hearing loss, Hypertension, Hypothyroid, IBS (irritable bowel syndrome), Menopause, Rheumatoid arthritis ([address] and S/P radiation therapy.     FAMILY HISTORY    Her family history includes Alcohol abuse in her brother; COPD in her brother and mother; Cancer in her father and mother; Diabetes in her brother and brother; Heart Disease in her brother, brother, paternal grandmother, and paternal uncle; Liver Disease in her brother. SOCIAL HISTORY    She reports that she has never smoked. She has never used smokeless tobacco. She reports current alcohol use of about 5.0 - 6.0 standard drinks of alcohol per week. She reports that she does not use drugs. MEDICATIONS    Current Outpatient Medications   Medication Sig    predniSONE (DELTASONE) 5 mg tablet 4 tabs daily for 7 days, 3 tabs for 7 days, 2 tabs for 14 days, 1 tab for 14 days    citalopram (CELEXA) 40 mg tablet TAKE 1 TABLET EVERY DAY    lisinopril-hydroCHLOROthiazide (PRINZIDE, ZESTORETIC) 10-12.5 mg per tablet TAKE 1 TABLET EVERY DAY    methotrexate 25 mg/mL chemo injection INJECT 1 ML UNDER SKIN ONCE A WEEK ON WEDNESDAY AS DIRECTED (DISCARD AND BEGIN A NEW VIAL EVERY 28 DAYS)    zolpidem (AMBIEN) 5 mg tablet TAKE 1 TABLET BY MOUTH NIGHTLY AS NEEDED FOR SLEEP MAX DAILY AMOUNT 5 MG    LORazepam (ATIVAN) 1 mg tablet TAKE 1 & 1/2 (ONE & ONE-HALF) TABLETS BY MOUTH NIGHTLY    famotidine (PEPCID) 20 mg tablet Take 1 Tab by mouth two (2) times a day.  folic acid (FOLVITE) 1 mg tablet TAKE 1 TABLET BY MOUTH ONCE DAILY    diclofenac (VOLTAREN) 1 % gel Apply  to affected area four (4) times daily.  insulin syringe-needle U-100 1/2 mL 31 gauge x 15/64\" syrg Use to inject methotrexate once weekly    levothyroxine (SYNTHROID) 200 mcg tablet TAKE ONE TABLET BY MOUTH ONCE DAILY BEFORE BREAKFAST    ondansetron (ZOFRAN ODT) 4 mg disintegrating tablet Take 1 Tab by mouth every eight (8) hours as needed for Nausea. No current facility-administered medications for this visit.       ALLERGIES    Allergies   Allergen Reactions    Keflex [Cephalexin] Rash    Sulfa (Sulfonamide Antibiotics) Rash    Monistat 1 [Tioconazole] Swelling       PHYSICAL EXAMINATION    There were no vitals taken for this visit. There is no height or weight on file to calculate BMI. General: Patient is alert, oriented x 3, not in acute distress    HEENT:   Sclerae are not injected and appear moist.  There is no alopecia. Chest:  Breathing comfortably at room air    Musculoskeletal exam:  A comprehensive musculoskeletal exam was NOT performed for all joints of each upper and lower extremity and assessed for swelling, tenderness and range of motion. Positive results are documented as below:    Previous Exam    Positive Finkelstein's on left with tenderness along the ABL EPB. Bilateral Sebastián and [place] nodes.     Z-Deformities:   no  [place]ck Deformities:  no  Boutonierre's Deformities:  no  Ulnar Deviation:   no  MCP Subluxation:  no     Joint Count 3/10/2020 1/21/2020 7/29/2019 4/29/2019 1/28/2019 11/28/2018 10/4/2018   Patient pain (0-100) 40 80 5 15 40 70 75   MHAQ 0.375 1 0.375 1 0.5 0 1   Left shoulder - Tender - 1 - - - 1 1   Left shoulder - Swollen - 0 - - - 1 0   Left elbow - Tender - 1 - - - - -   Left elbow - Swollen - 0 - - - - -   Left wrist- Tender 1 1 0 1 0 1 -   Left wrist- Swollen 0 1 0 1 0 1 -   Left 1st MCP - Tender - 1 - 1 - 1 1   Left 1st MCP - Swollen - 1 - 1 - 1 1   Left 2nd MCP - Tender 1 1 - 1 - 1 1   Left 2nd MCP - Swollen 0 1 - 1 - 1 1   Left 3rd MCP - Tender 1 1 - 1 - - 1   Left 3rd MCP - Swollen 0 0 - 1 - - 1   Left 4th MCP - Tender 1 1 - 1 - - -   Left 4th MCP - Swollen 0 0 - 0 - - -   Left 5th MCP - Tender 1 1 - 1 - - 1   Left 5th MCP - Swollen 0 0 - 0 - - 1   Left 2nd PIP - Tender 1 1 - - - 1 1   Left 2nd PIP - Swollen 0 1 - - - 1 1   Left 3rd PIP - Tender 1 1 - - - 1 1   Left 3rd PIP - Swollen 0 1 - 1 - 1 1   Left 4th PIP - Tender 1 - - 1 - - -   Left 4th PIP - Swollen 0 - - - - - -   Left 5th PIP - Tender 1 - - - - - -   Right shoulder - Tender - 1 - - - 1 1   Right shoulder - Swollen - 0 - - - 0 0   Right elbow - Tender - 1 - - - - -   Right elbow - Swollen - 0 - - - - -   Right wrist- Tender - 1 - 1 - - 1   Right wrist- Swollen - 1 - 1 - - 1   Right 1st MCP - Tender - - - 1 - 1 1   Right 1st MCP - Swollen - - - 1 - 1 1   Right 2nd MCP - Tender - 1 - - - 1 1   Right 2nd MCP - Swollen - 0 - - - 1 1   Right 3rd MCP - Tender - - - 0 - - -   Right 3rd MCP - Swollen - - - 1 - - -   Right 4th MCP - Tender - 1 - 1 - - -   Right 4th MCP - Swollen - 0 - 1 - - -   Right 5th MCP - Tender - 1 - - - - -   Right 5th MCP - Swollen - 0 - - - - -   Right thumb IP - Tender - - - - - 1 1   Right thumb IP - Swollen - - - - - 1 0   Right 2nd PIP - Tender - 1 - 1 - 1 1   Right 2nd PIP - Swollen - 1 - 1 - 1 1   Right 3rd PIP - Tender - 1 - - - - 1   Right 3rd PIP - Swollen - 1 - - - - 1   Right 4th PIP - Tender 1 1 - 1 - - 1   Right 4th PIP - Swollen 0 1 - 0 - - 1   Right 5th PIP - Tender - 1 - 1 - - 1   Right 5th PIP - Swollen - 1 - 1 - - 1   Tender Joint Count (Total) 10 20 0 13 0 11 16   Swollen Joint Count (Total) 0 10 0 11 0 10 13   Physician Assessment (0-10) 2 5 0 4 0 4 5   Patient Assessment (0-10) 4 8 1 1.5 2.5 5 7.5   CDAI Total (calculated) 16 43 1 29.5 2.5 30 41.5       DATA REVIEW    Laboratory     Recent laboratory results were reviewed, summarized, and discussed with the patient. Imaging    Musculoskeletal Ultrasound    None    Radiographs    Skeletal Survey 11/28/2018: Few small calvarial lucencies. No fractures or lytic lesions at risk for fracture. Bilateral Shoulder 10/04/2018: RIGHT: There is prominent AC joint degeneration with spurring and loss of joint space. There is some mild deformity, chronic in nature of the tuberosity. No fracture or dislocation, no bony erosion, the bone is normal in mineral contents. No soft tissue calcifications. LEFT: The bone is normal in mineral contents. There is some mild chronic deformity of the tuberosity and AC joint degeneration.  There are no soft tissue calcification bony erosion fracture or dislocation. Bilateral Hand 10/04/2018: RIGHT: no acute fracture or dislocation. Alignment is anatomic. Mild degenerative changes are seen in the interphalangeal joint of the thumb. No erosions are seen. No abnormality seen in the soft tissues. LEFT: no acute fracture or dislocation. Alignment is anatomic. Moderate to severe degenerative changes are present in the first [facility] joint. A cystic lucency in the ace of the first metacarpal likely represents a subchondral cyst. No clear erosions are seen. No abnormality seen in the soft tissues. Bilateral Foot 10/04/2018: RIGHT:  no fracture or other acute osseous or articular abnormality. A small plantar calcaneal heel spur is noted. No erosions or joint space narrowing are present. The soft tissues are within normal limits. LEFT: no acute fracture or dislocation. Alignment is anatomic. A small plantar calcaneal heel spur is noted. No erosions or joint space narrowing are present. . No abnormality is seen in the soft tissues.     CT Imaging    PET/CT Scan 1/24/2019: HEAD/NECK: No apparent foci of abnormal hypermetabolism. Cerebral evaluation is limited by normal intense activity. CHEST: No foci of abnormal hypermetabolism. The known small breast cancer at 12:00 in the left breast demonstrates no increased metabolic activity. ABDOMEN/PELVIS: No foci of abnormal hypermetabolism. SKELETON: No foci of abnormal hypermetabolism in the axial and visualized appendicular skeleton. Lower extremities:. Imaging of the lower extremities is unremarkable. There is muscular activity noted. CT Head without contrast 9/13/2017: Prior right occipital craniectomy. Encephalomalacia in the right cerebellar region. . There is no significant white matter disease. There is no intracranial hemorrhage, extra-axial collection, mass, mass effect or midline shift. The basilar cisterns are open. No acute infarct is identified.  The visualized portions of the paranasal sinuses and mastoid air cells are clear    MR Imaging    MRI Breasts with and without contrast 1/07/2019: There is minimal background parenchymal enhancement and the breasts are almost entirely fat. A few sub-5 mm foci of enhancement are scattered bilaterally. Right breast: No suspicious enhancing foci. No axillary or internal mammary chain lymphadenopathy. Left breast: A vague area of enhancement around the biopsy clip in the anterior third at 12:00 does not reach threshold enhancement. This measures approximately 9 x 9 mm, and correlates well with the small area of architectural distortion on mammography and subtle architectural distortion and shadowing on ultrasound. No axillary or internal mammary chain lymphadenopathy. DXA     DXA 5/12/2015: (excluded distal radius) lumbar spine L1-L4 T score 0.4 (BMD 1.241 g/cm2), left femoral neck T score: 0.5 (1.109 g/cm2), left total hip T score: 1.0 (1.139 g/cm2), right femoral neck T score: 0.2 (1.062 g/cm2), right total hip T score: 1.1 (1.145 g/cm2). FRAX score N/A % probability in 10 years for major osteoporotic fracture and N/A % 10 year probability of hip fracture. PATHOLOGY    Lymph node, left axilla, #1, sentinel node excision 1/29/2019: No evidence for malignancy in one node (0/1). Breast, left, lumpectomy 1/29/2019:  Invasive lobular carcinoma. Lobular carcinoma in situ     Bone Marrow Biopsy 12/19/2018: Bone marrow: Variably cellular marrow with mild monoclonal plasmacytosis. Trilineage hematopoiesis with maturation. Breast, left, needle core biopsy 12/03/2018: Invasive mammary carcinoma with ductal and lobular features [place] histologic grade 1. Largest glass slide measurement of invasive carcinoma: 8.5 mm. ER pos OH pos (weak) HER 2 neg. PROCEDURE     Kenalog 80 mg IM. (01/21/20)    ASSESSMENT AND PLAN    This is a follow-up visit for Ms. Maria De Jesus Barrientos.     1) Seronegative Rheumatoid Arthritis with secondary Polymyalgia Rheumatica and Interstitial Lung Disease. She is maintaind on methotrexate 25 mg SubQ every Wednesday with good tolerance but has been flaring since 9/2020. She apparently had an interval evolution of interstitial lung disease which may have been due to a non-COVID viral infection versus her Rheumatoid Arthritis. Her CT scan have showed improvement but not resolution of ground glass opacities. She is symptomatic. She has not been on prednisone or seen pulmonary. Her CDAI was 6 (previously 43, 1, 29.5, 2.5, 30, 41.5) with 10 tender and 0 swollen joints, consistent with moderate disease. I will start her on a short course of prednisone, called a prednisone taper, with 5 mg tablets. This regimen is to be taken as follows: 4 tabs (20 mg) for 7 days, 3 tabs (15 mg) for 7 days, 2 tabs (10 mg) for 14 days and then 1 tab (5 mg) for 14 days, and then stop. I will continue methotrexate    I will have her follow up in 4 weeks. 2) Polymyalgia Rheumatica. This resolved. 3) ILD. See #1. 4) Long Term Use of Immunosuppressants. The patient remains on immunomodulatory medications (methotrexate) and requires frequent toxicity monitoring by blood work. 5) Smoldering Myeloma/MGUS. Immunofixation shows IgA monoclonal protein with kappa light chain. M-spike 0.2. She was evaluated by Dr. Melville Cabot. Bone marrow biopsy showed smoldering myeloma. 6) Vitamin D Deficiency. Her vitamin D level was 25.7 (previously 20.1, 40.0, 32.0, 21.7). She is on weekly ergocalciferol 50,000.     7) Bilateral iliotibial Band Syndrome. This was not an active issue today. I will refer her to physical therapy if she is symptomatic. 8) Left Breast Cancer Stage 1. She is scheduled for lumpectomy. 9) De Quervain's Tenosynovitis of Left. I had referred her to orthopedic, Dr. Hetal Coles, per her request.     10) Bilateral Lower Extremity Weakness. I referred her to physical therapy.     The patient voiced understanding of the aforementioned assessment and plan. The patient has consented for synchronous (real-time) Telemedicine (audio-video technology) on 12/9/2020 for their care to be delivered over telemedicine in place of their regularly scheduled office visit pursuant to the emergency declaration under the [facility] waiver authority and the [employer] Act, this Virtual  Visit was conducted, with patient's consent, to reduce the patient's risk of exposure to COVID-19 and provide continuity of care for an established patient. Services were provided through a video synchronous discussion virtually to substitute for in-person clinic visit.     TODAY'S ORDERS    Orders Placed This Encounter    predniSONE (DELTASONE) 5 mg tablet     Future Appointments   Date Time Provider Geoff Olga Lidia   1/4/2021  4:20 PM Kathryn Coto MD [facility] BS AMB     Cassie Person MD, [address] [place] [address]    Adult Rheumatology   Rheumatology Ultrasound Certified  [facility]  A Part of [address]   Phone [phone]  Fax [phone]

## 2020-12-09 NOTE — Clinical Note
Marques Smith. I think she has interstitial lung disease. Will give prednisone. Repeat CT in 1/2021. Sander Brandon.

## 2020-12-28 ENCOUNTER — NURSE TRIAGE (OUTPATIENT)
Dept: OTHER | Facility: CLINIC | Age: 70
End: 2020-12-28

## 2020-12-28 ENCOUNTER — DOCUMENTATION ONLY (OUTPATIENT)
Dept: INTERNAL MEDICINE CLINIC | Age: 70
End: 2020-12-28

## 2020-12-28 NOTE — TELEPHONE ENCOUNTER
Brief description of triage: see below. Patient reports that since she was discharged from the hospital in September for pneumonia she has never got back to normal. She reports that she will randomly develop dizziness that leads to chest pain and shortness of breath. If she is able to sit down or lie down her symptoms improve and it takes her about 30 minutes to gather herself so she can get back to what she was doing. protocol indicates for patient to be seen by PCP in the office in the next day. If there are no appointments available or if patient is having one of her attacks she should call 911 and proceed to the ER. Pt voices understanding. No questions at this time. Patient agreeable to this plan. Care advice provided, patient verbalizes understanding; denies any other questions or concerns; instructed to call back for any new or worsening symptoms. Writer provided warm transfer to [facility] for appointment scheduling. Attention Provider: Thank you for allowing me to participate in the care of your patient. The patient was connected to triage in response to information provided to the ECC. Please do not respond through this encounter as the response is not directed to a shared pool. Reason for Disposition   Chest pain   Chest pain or 'angina' comes and goes and is happening more often (increasing in frequency) or getting worse (increasing in severity) (Exception: chest pains that last only a few seconds)    Answer Assessment - Initial Assessment Questions  1. RESPIRATORY STATUS: \"Describe your breathing? \" (e.g., wheezing, shortness of breath, unable to speak, severe coughing)      Shortness of breath     2. ONSET: \"When did this breathing problem begin? \"      September when she had pneumonia. 3. PATTERN \"Does the difficult breathing come and go, or has it been constant since it started? \"       Come and goes. 4. SEVERITY: \"How bad is your breathing? \" (e.g., mild, moderate, severe)     - MILD: No SOB at rest, mild SOB with walking, speaks normally in sentences, can lay down, no retractions, pulse < 100.     - MODERATE: SOB at rest, SOB with minimal exertion and prefers to sit, cannot lie down flat, speaks in phrases, mild retractions, audible wheezing, pulse 100-120.     - SEVERE: Very SOB at rest, speaks in single words, struggling to breathe, sitting hunched forward, retractions, pulse > 120       Mild pt wants to lay down. 5. RECURRENT SYMPTOM: \"Have you had difficulty breathing before? \" If so, ask: \"When was the last time? \" and \"What happened that time? \"       No     6. CARDIAC HISTORY: \"Do you have any history of heart disease? \" (e.g., heart attack, angina, bypass surgery, angioplasty)       No but she has a strong family history brother both  of a heart attack one at age 58 and one at age 62.     9. LUNG HISTORY: \"Do you have any history of lung disease? \"  (e.g., pulmonary embolus, asthma, emphysema)      No     8. CAUSE: \"What do you think is causing the breathing problem? \"       Lungs aren't able to take in enough air. 9. OTHER SYMPTOMS: \"Do you have any other symptoms? (e.g., dizziness, runny nose, cough, chest pain, fever)      Dizziness, chest pain. 10. PREGNANCY: \"Is there any chance you are pregnant? \" \"When was your last menstrual period? \"        No     11. TRAVEL: \"Have you traveled out of the country in the last month? \" (e.g., travel history, exposures)       No    Protocols used: BREATHING DIFFICULTY-ADULT-[place], CHEST PAIN-ADULT-[place]

## 2020-12-28 NOTE — PROGRESS NOTES
Writer received triage call for sob, dizziness, with wearing mask for more than 20 min. Patient having episodes of feeling hot and the feeling like she is going to vomit, patient has been feeling like this since she got out of the hospital for pneumonia. Patient is inquiring as to if she needs to see a pulmonologist. Don Chaparro spoke with Dr. Kylie Willard and was advised for patient to have VV tomorrow.

## 2020-12-29 ENCOUNTER — VIRTUAL VISIT (OUTPATIENT)
Dept: INTERNAL MEDICINE CLINIC | Age: 70
End: 2020-12-29
Payer: MEDICARE

## 2020-12-29 DIAGNOSIS — R06.02 SOB (SHORTNESS OF BREATH) ON EXERTION: Primary | ICD-10-CM

## 2020-12-29 PROCEDURE — 1100F PTFALLS ASSESS-DOCD GE2>/YR: CPT | Performed by: INTERNAL MEDICINE

## 2020-12-29 PROCEDURE — G8417 CALC BMI ABV UP PARAM F/U: HCPCS | Performed by: INTERNAL MEDICINE

## 2020-12-29 PROCEDURE — G9899 SCRN MAM PERF RSLTS DOC: HCPCS | Performed by: INTERNAL MEDICINE

## 2020-12-29 PROCEDURE — 99213 OFFICE O/P EST LOW 20 MIN: CPT | Performed by: INTERNAL MEDICINE

## 2020-12-29 PROCEDURE — G8536 NO DOC ELDER MAL SCRN: HCPCS | Performed by: INTERNAL MEDICINE

## 2020-12-29 PROCEDURE — G8756 NO BP MEASURE DOC: HCPCS | Performed by: INTERNAL MEDICINE

## 2020-12-29 PROCEDURE — G0463 HOSPITAL OUTPT CLINIC VISIT: HCPCS | Performed by: INTERNAL MEDICINE

## 2020-12-29 PROCEDURE — 1090F PRES/ABSN URINE INCON ASSESS: CPT | Performed by: INTERNAL MEDICINE

## 2020-12-29 PROCEDURE — 3288F FALL RISK ASSESSMENT DOCD: CPT | Performed by: INTERNAL MEDICINE

## 2020-12-29 PROCEDURE — G9717 DOC PT DX DEP/BP F/U NT REQ: HCPCS | Performed by: INTERNAL MEDICINE

## 2020-12-29 PROCEDURE — G8428 CUR MEDS NOT DOCUMENT: HCPCS | Performed by: INTERNAL MEDICINE

## 2020-12-29 PROCEDURE — G8399 PT W/DXA RESULTS DOCUMENT: HCPCS | Performed by: INTERNAL MEDICINE

## 2020-12-29 PROCEDURE — 3017F COLORECTAL CA SCREEN DOC REV: CPT | Performed by: INTERNAL MEDICINE

## 2020-12-29 RX ORDER — ALBUTEROL SULFATE 90 UG/1
2 AEROSOL, METERED RESPIRATORY (INHALATION)
Qty: 1 INHALER | Refills: 2 | Status: SHIPPED | OUTPATIENT
Start: 2020-12-29 | End: 2022-07-03

## 2020-12-29 NOTE — PROGRESS NOTES
Jesus Silva, who was evaluated through a synchronous (real-time) audio-video encounter, and/or her healthcare decision maker, is aware that it is a billable service, with coverage as determined by her insurance carrier. She provided verbal consent to proceed: Yes, and patient identification was verified. It was conducted pursuant to the emergency declaration under the [facility], [address] [place] [address] and the [employer] Act. A caregiver was present when appropriate. Ability to conduct physical exam was limited. I was at home. The patient was at home. HISTORY OF PRESENT ILLNESS  Jesus Silva is a 69 y.o. female. HPI   Hospitalized for multilobar pneumonia 9/14-9/26 followed by a rehab stay until 10/2. Had f/u CT scans at 6 weeks (end of October) and then 1 month later which showed slow resolution of the air space disease and residual scarring. She is seen today for continued episodes of sob. Back to working parttime but sedentary at job. Walks her dog 1/2 mile daily but has to stop and rest several times during walk due to sob. Also sob doing light house work. She has had 3 episodes of severe sob withfeeling overheated and sweating. Last was on Willa Jessica. Had to have assistance to sit down and took 20 minutes to catch her breath. Tends to be triggers by wearing her mask > 20 minutes. Back to work - in room with 2 other coworkers. > 10 feet apart so not wearing a mask. No issues at home. Yesterday changed sheets, did the dishes and became sob. Tends to occur when walking (grocery store or mall) with mask on for > 20 minutes. Feels like she cannot take in enough air. No coughing or wheezing. Having some pressure in chest sometimes with the sob.         Current Outpatient Medications:     citalopram (CELEXA) 40 mg tablet, TAKE 1 TABLET EVERY DAY, Disp: 90 Tab, Rfl: 3   lisinopril-hydroCHLOROthiazide (PRINZIDE, ZESTORETIC) 10-12.5 mg per tablet, TAKE 1 TABLET EVERY DAY, Disp: 90 Tab, Rfl: 3    methotrexate 25 mg/mL chemo injection, INJECT 1 ML UNDER SKIN ONCE A WEEK ON WEDNESDAY AS DIRECTED (DISCARD AND BEGIN A NEW VIAL EVERY 28 DAYS), Disp: 12 mL, Rfl: 1    zolpidem (AMBIEN) 5 mg tablet, TAKE 1 TABLET BY MOUTH NIGHTLY AS NEEDED FOR SLEEP MAX DAILY AMOUNT 5 MG, Disp: 30 Tab, Rfl: 3    LORazepam (ATIVAN) 1 mg tablet, TAKE 1 & 1/2 (ONE & ONE-HALF) TABLETS BY MOUTH NIGHTLY, Disp: 6 Tab, Rfl: 0    famotidine (PEPCID) 20 mg tablet, Take 1 Tab by mouth two (2) times a day., Disp: 40 Tab, Rfl: 0    diclofenac (VOLTAREN) 1 % gel, Apply  to affected area four (4) times daily. , Disp: 100 g, Rfl: 2    insulin syringe-needle U-100 1/2 mL 31 gauge x 15/64\" syrg, Use to inject methotrexate once weekly, Disp: 100 Pen Needle, Rfl: 0    levothyroxine (SYNTHROID) 200 mcg tablet, TAKE ONE TABLET BY MOUTH ONCE DAILY BEFORE BREAKFAST, Disp: 90 Tab, Rfl: 3    ondansetron (ZOFRAN ODT) 4 mg disintegrating tablet, Take 1 Tab by mouth every eight (8) hours as needed for Nausea., Disp: 30 Tab, Rfl: 1    folic acid (FOLVITE) 1 mg tablet, TAKE 1 TABLET BY MOUTH ONCE DAILY, Disp: 90 Tab, Rfl: 1    There were no vitals taken for this visit. ROS  See above  Physical Exam  Vitals signs reviewed. Constitutional:       Appearance: Normal appearance. HENT:      Head: Normocephalic and atraumatic. Neck:      Comments: nml appearance  Pulmonary:      Effort: Pulmonary effort is normal.      Comments: nml rate, talking in full sentences without difficulty. Neurological:      Mental Status: She is alert and oriented to person, place, and time. ASSESSMENT and PLAN  Sob - ? Residual lung dz/scarring from pneumonia vs panic vs deconditioning. Most likely a combination of all 3. Start Albuterol HFA prn. [facility].     Orders Placed This Encounter    albuterol (PROVENTIL HFA, VENTOLIN HFA, PROAIR HFA) 90 mcg/actuation inhaler

## 2020-12-29 NOTE — PROGRESS NOTES
Patient c/o weakness, joint pain, sob comes and goes(with activity), fatigue for about 2 months. Patient c/o dizzy while having mask on in public places for more than 20 minutes at a time, has had episodes in public places where assistance has been needed.

## 2021-01-04 ENCOUNTER — VIRTUAL VISIT (OUTPATIENT)
Dept: RHEUMATOLOGY | Age: 71
End: 2021-01-04
Payer: MEDICARE

## 2021-01-04 DIAGNOSIS — M06.09 SERONEGATIVE RHEUMATOID ARTHRITIS OF MULTIPLE SITES (HCC): Primary | ICD-10-CM

## 2021-01-04 DIAGNOSIS — J84.9 ILD (INTERSTITIAL LUNG DISEASE) (HCC): ICD-10-CM

## 2021-01-04 DIAGNOSIS — E55.9 VITAMIN D DEFICIENCY: ICD-10-CM

## 2021-01-04 DIAGNOSIS — M35.3 PMR (POLYMYALGIA RHEUMATICA) (HCC): ICD-10-CM

## 2021-01-04 PROCEDURE — G8756 NO BP MEASURE DOC: HCPCS | Performed by: INTERNAL MEDICINE

## 2021-01-04 PROCEDURE — G0463 HOSPITAL OUTPT CLINIC VISIT: HCPCS | Performed by: INTERNAL MEDICINE

## 2021-01-04 PROCEDURE — 1100F PTFALLS ASSESS-DOCD GE2>/YR: CPT | Performed by: INTERNAL MEDICINE

## 2021-01-04 PROCEDURE — 1090F PRES/ABSN URINE INCON ASSESS: CPT | Performed by: INTERNAL MEDICINE

## 2021-01-04 PROCEDURE — 99215 OFFICE O/P EST HI 40 MIN: CPT | Performed by: INTERNAL MEDICINE

## 2021-01-04 PROCEDURE — G8428 CUR MEDS NOT DOCUMENT: HCPCS | Performed by: INTERNAL MEDICINE

## 2021-01-04 PROCEDURE — G9717 DOC PT DX DEP/BP F/U NT REQ: HCPCS | Performed by: INTERNAL MEDICINE

## 2021-01-04 PROCEDURE — 3017F COLORECTAL CA SCREEN DOC REV: CPT | Performed by: INTERNAL MEDICINE

## 2021-01-04 PROCEDURE — 3288F FALL RISK ASSESSMENT DOCD: CPT | Performed by: INTERNAL MEDICINE

## 2021-01-04 PROCEDURE — G9899 SCRN MAM PERF RSLTS DOC: HCPCS | Performed by: INTERNAL MEDICINE

## 2021-01-04 PROCEDURE — G8399 PT W/DXA RESULTS DOCUMENT: HCPCS | Performed by: INTERNAL MEDICINE

## 2021-01-04 RX ORDER — PREDNISONE 5 MG/1
TABLET ORAL
Qty: 91 TAB | Refills: 0 | Status: SHIPPED | OUTPATIENT
Start: 2021-01-04 | End: 2021-02-18

## 2021-01-04 NOTE — PROGRESS NOTES
REASON FOR VISIT    This is a follow-up visit for Ms. Ne Bowles for     ICD-10-CM   1. Seronegative rheumatoid arthritis of multiple sites [facility] M06.09   2. PMR (polymyalgia rheumatica) ([facility]) M35.3     The patient has consented for synchronous (real-time) Telemedicine (audio-video technology) on 1/4/2021 for their care to be delivered over telemedicine in place of their regularly scheduled office visit pursuant to the emergency declaration under the [facility] [place] [address] waiver authority and the [employer] Act, this Virtual  Visit was conducted, with patient's consent, to reduce the patient's risk of exposure to COVID-19 and provide continuity of care for an established patient. Services were provided through a video synchronous discussion virtually to substitute for in-person clinic visit. Inflammatory arthritis phenotype includes:  Anti-CCP positive: no  Rheumatoid factor positive: no  Erosive disease: no  Extra-articular manifestations include: Polymyalgia Rheumatica, smoldering myeloma, interstitial lung disease     Immunosuppression Screening (1/21/2020):  Quantiferon TB: negative  PPD:  Not performed  Hepatitis B: negative  Hepatitis C: negative    Therapy History includes:  Current DMARD therapy include: methotrexate 25 mg SubQ every Wednesday (4/29/2019 to present)  Prior DMARD therapy include: methotrexate 20 mg every Wednesday (10/15/2018 to 4/29/2019)  Discontinued DMARDs because of inefficacy: None  Discontinued DMARDs because of side effects: None    HISTORY OF PRESENT ILLNESS    Ms. Ne Bowles returns for a follow-up. On her last visit, I continued methotrexate 25 mg subcutaneous every Wednesday with Zofran, which she has taken with good tolerance. I prescribed her a prednisone taper for flare of her Rheumatoid Arthritis and suspected related interstitial lung disease.  CT chest on 10/29/2020 showed There are groundglass opacities identified now in the periphery each upper lobe right greater than left and also in the lower lobes. Today, she feels much better. She no longer has aching pain or stiffness in her joints like before. Her mobility improved completely within a week. She denies pain, swelling, or stiffness now. Her breathing improved with prednisone. She continues to have dyspnea on exertion so she has slowed her pace. She gets dyspneic when she goes into a store wearing a mask, especially due to heat. She is not coughing to wheezing. She was prescribed a nebulizer and started it on 1/31/2020 but she not be successful using it again. She will see the pharmacist.     She will be seeing pulmonary Dr. Carlyle Alvarado on 1/20/2021. She endorses dyspnea    She denies fever, weight loss, blurred vision, vision loss, oral ulcers, ankle swelling, dry cough, nausea, vomiting, dysphagia, abdominal pain, black or bloody stool, fall since last visit, rash, easy bruising and increased thirst.    Last toxicity monitoring by blood work was done on 9/18/2020 and did not reveal any significant adverse effects, except albumin 2.6 g/dL    Most recent inflammatory markers from 3/10/2020 revealed a ESR 9 mm/hr and CRP <1 mg/L. The patient has not had any interval hospital admissions infections, or surgeries. REVIEW OF SYSTEMS    A comprehensive review of systems was performed and pertinent results are documented in the HPI, review of systems is otherwise non-contributory. PAST MEDICAL HISTORY    She has a past medical history of Breast cancer, left ([address] Depression, Goiter, Hearing loss, Hypertension, Hypothyroid, IBS (irritable bowel syndrome), Menopause, Rheumatoid arthritis ([address] and S/P radiation therapy.     FAMILY HISTORY    Her family history includes Alcohol abuse in her brother; COPD in her brother and mother; Cancer in her father and mother; Diabetes in her brother and brother; Heart Disease in her brother, brother, paternal grandmother, and paternal uncle; Liver Disease in her brother.    SOCIAL HISTORY    She reports that she has never smoked. She has never used smokeless tobacco. She reports current alcohol use of about 5.0 - 6.0 standard drinks of alcohol per week. She reports that she does not use drugs.    MEDICATIONS    Current Outpatient Medications   Medication Sig   • predniSONE (DELTASONE) 5 mg tablet 4 tabs daily for 7 days, 3 tabs for 7 days, 2 tabs for 14 days, 1 tab for 14 days   • albuterol (PROVENTIL HFA, VENTOLIN HFA, PROAIR HFA) 90 mcg/actuation inhaler Take 2 Puffs by inhalation every four (4) hours as needed for Wheezing.   • citalopram (CELEXA) 40 mg tablet TAKE 1 TABLET EVERY DAY   • lisinopril-hydroCHLOROthiazide (PRINZIDE, ZESTORETIC) 10-12.5 mg per tablet TAKE 1 TABLET EVERY DAY   • methotrexate 25 mg/mL chemo injection INJECT 1 ML UNDER SKIN ONCE A WEEK ON WEDNESDAY AS DIRECTED (DISCARD AND BEGIN A NEW VIAL EVERY 28 DAYS)   • zolpidem (AMBIEN) 5 mg tablet TAKE 1 TABLET BY MOUTH NIGHTLY AS NEEDED FOR SLEEP MAX DAILY AMOUNT 5 MG   • LORazepam (ATIVAN) 1 mg tablet TAKE 1 & 1/2 (ONE & ONE-HALF) TABLETS BY MOUTH NIGHTLY   • famotidine (PEPCID) 20 mg tablet Take 1 Tab by mouth two (2) times a day.   • folic acid (FOLVITE) 1 mg tablet TAKE 1 TABLET BY MOUTH ONCE DAILY   • diclofenac (VOLTAREN) 1 % gel Apply  to affected area four (4) times daily.   • insulin syringe-needle U-100 1/2 mL 31 gauge x 15/64\" syrg Use to inject methotrexate once weekly   • levothyroxine (SYNTHROID) 200 mcg tablet TAKE ONE TABLET BY MOUTH ONCE DAILY BEFORE BREAKFAST   • ondansetron (ZOFRAN ODT) 4 mg disintegrating tablet Take 1 Tab by mouth every eight (8) hours as needed for Nausea.     No current facility-administered medications for this visit.      ALLERGIES    Allergies   Allergen Reactions   • Keflex [Cephalexin] Rash   • Sulfa (Sulfonamide Antibiotics) Rash   • Monistat 1 [Tioconazole] Swelling       PHYSICAL  EXAMINATION    There were no vitals taken for this visit. There is no height or weight on file to calculate BMI. General: Patient is alert, oriented x 3, not in acute distress    HEENT:   Sclerae are not injected and appear moist.  There is no alopecia. Chest:  Breathing comfortably at room air    Musculoskeletal exam:  A comprehensive musculoskeletal exam was NOT performed for all joints of each upper and lower extremity and assessed for swelling, tenderness and range of motion. Positive results are documented as below:    Previous Exam    Positive Finkelstein's on left with tenderness along the ABL EPB. Bilateral Sebastián and [place]ber[place] nodes.     Z-Deformities:   no  [place]ott Neck Deformities:  no  Boutonierre's Deformities:  no  Ulnar Deviation:   no  MCP Subluxation:  no     Joint Count 3/10/2020 1/21/2020 7/29/2019 4/29/2019 1/28/2019 11/28/2018 10/4/2018   Patient pain (0-100) 40 80 5 15 40 70 75   MHAQ 0.375 1 0.375 1 0.5 0 1   Left shoulder - Tender - 1 - - - 1 1   Left shoulder - Swollen - 0 - - - 1 0   Left elbow - Tender - 1 - - - - -   Left elbow - Swollen - 0 - - - - -   Left wrist- Tender 1 1 0 1 0 1 -   Left wrist- Swollen 0 1 0 1 0 1 -   Left 1st MCP - Tender - 1 - 1 - 1 1   Left 1st MCP - Swollen - 1 - 1 - 1 1   Left 2nd MCP - Tender 1 1 - 1 - 1 1   Left 2nd MCP - Swollen 0 1 - 1 - 1 1   Left 3rd MCP - Tender 1 1 - 1 - - 1   Left 3rd MCP - Swollen 0 0 - 1 - - 1   Left 4th MCP - Tender 1 1 - 1 - - -   Left 4th MCP - Swollen 0 0 - 0 - - -   Left 5th MCP - Tender 1 1 - 1 - - 1   Left 5th MCP - Swollen 0 0 - 0 - - 1   Left 2nd PIP - Tender 1 1 - - - 1 1   Left 2nd PIP - Swollen 0 1 - - - 1 1   Left 3rd PIP - Tender 1 1 - - - 1 1   Left 3rd PIP - Swollen 0 1 - 1 - 1 1   Left 4th PIP - Tender 1 - - 1 - - -   Left 4th PIP - Swollen 0 - - - - - -   Left 5th PIP - Tender 1 - - - - - -   Right shoulder - Tender - 1 - - - 1 1   Right shoulder - Swollen - 0 - - - 0 0   Right elbow - Tender - 1 - - - - - Right elbow - Swollen - 0 - - - - -   Right wrist- Tender - 1 - 1 - - 1   Right wrist- Swollen - 1 - 1 - - 1   Right 1st MCP - Tender - - - 1 - 1 1   Right 1st MCP - Swollen - - - 1 - 1 1   Right 2nd MCP - Tender - 1 - - - 1 1   Right 2nd MCP - Swollen - 0 - - - 1 1   Right 3rd MCP - Tender - - - 0 - - -   Right 3rd MCP - Swollen - - - 1 - - -   Right 4th MCP - Tender - 1 - 1 - - -   Right 4th MCP - Swollen - 0 - 1 - - -   Right 5th MCP - Tender - 1 - - - - -   Right 5th MCP - Swollen - 0 - - - - -   Right thumb IP - Tender - - - - - 1 1   Right thumb IP - Swollen - - - - - 1 0   Right 2nd PIP - Tender - 1 - 1 - 1 1   Right 2nd PIP - Swollen - 1 - 1 - 1 1   Right 3rd PIP - Tender - 1 - - - - 1   Right 3rd PIP - Swollen - 1 - - - - 1   Right 4th PIP - Tender 1 1 - 1 - - 1   Right 4th PIP - Swollen 0 1 - 0 - - 1   Right 5th PIP - Tender - 1 - 1 - - 1   Right 5th PIP - Swollen - 1 - 1 - - 1   Tender Joint Count (Total) 10 20 0 13 0 11 16   Swollen Joint Count (Total) 0 10 0 11 0 10 13   Physician Assessment (0-10) 2 5 0 4 0 4 5   Patient Assessment (0-10) 4 8 1 1.5 2.5 5 7.5   CDAI Total (calculated) 16 43 1 29.5 2.5 30 41.5       DATA REVIEW    Laboratory     Recent laboratory results were reviewed, summarized, and discussed with the patient. Imaging    Musculoskeletal Ultrasound    None    Radiographs    Skeletal Survey 11/28/2018: Few small calvarial lucencies. No fractures or lytic lesions at risk for fracture. Bilateral Shoulder 10/04/2018: RIGHT: There is prominent AC joint degeneration with spurring and loss of joint space. There is some mild deformity, chronic in nature of the tuberosity. No fracture or dislocation, no bony erosion, the bone is normal in mineral contents. No soft tissue calcifications. LEFT: The bone is normal in mineral contents. There is some mild chronic deformity of the tuberosity and AC joint degeneration.  There are no soft tissue calcification bony erosion fracture or dislocation. Bilateral Hand 10/04/2018: RIGHT: no acute fracture or dislocation. Alignment is anatomic. Mild degenerative changes are seen in the interphalangeal joint of the thumb. No erosions are seen. No abnormality seen in the soft tissues. LEFT: no acute fracture or dislocation. Alignment is anatomic. Moderate to severe degenerative changes are present in the first [facility] joint. A cystic lucency in the ace of the first metacarpal likely represents a subchondral cyst. No clear erosions are seen. No abnormality seen in the soft tissues. Bilateral Foot 10/04/2018: RIGHT:  no fracture or other acute osseous or articular abnormality. A small plantar calcaneal heel spur is noted. No erosions or joint space narrowing are present. The soft tissues are within normal limits. LEFT: no acute fracture or dislocation. Alignment is anatomic. A small plantar calcaneal heel spur is noted. No erosions or joint space narrowing are present. . No abnormality is seen in the soft tissues.     CT Imaging    PET/CT Scan 1/24/2019: HEAD/NECK: No apparent foci of abnormal hypermetabolism. Cerebral evaluation is limited by normal intense activity. CHEST: No foci of abnormal hypermetabolism. The known small breast cancer at 12:00 in the left breast demonstrates no increased metabolic activity. ABDOMEN/PELVIS: No foci of abnormal hypermetabolism. SKELETON: No foci of abnormal hypermetabolism in the axial and visualized appendicular skeleton. Lower extremities:. Imaging of the lower extremities is unremarkable. There is muscular activity noted. CT Head without contrast 9/13/2017: Prior right occipital craniectomy. Encephalomalacia in the right cerebellar region. . There is no significant white matter disease. There is no intracranial hemorrhage, extra-axial collection, mass, mass effect or midline shift. The basilar cisterns are open. No acute infarct is identified.  The visualized portions of the paranasal sinuses and mastoid air cells are clear    MR Imaging    MRI Breasts with and without contrast 1/07/2019: There is minimal background parenchymal enhancement and the breasts are almost entirely fat. A few sub-5 mm foci of enhancement are scattered bilaterally. Right breast: No suspicious enhancing foci. No axillary or internal mammary chain lymphadenopathy. Left breast: A vague area of enhancement around the biopsy clip in the anterior third at 12:00 does not reach threshold enhancement. This measures approximately 9 x 9 mm, and correlates well with the small area of architectural distortion on mammography and subtle architectural distortion and shadowing on ultrasound. No axillary or internal mammary chain lymphadenopathy. DXA     DXA 5/12/2015: (excluded distal radius) lumbar spine L1-L4 T score 0.4 (BMD 1.241 g/cm2), left femoral neck T score: 0.5 (1.109 g/cm2), left total hip T score: 1.0 (1.139 g/cm2), right femoral neck T score: 0.2 (1.062 g/cm2), right total hip T score: 1.1 (1.145 g/cm2). FRAX score N/A % probability in 10 years for major osteoporotic fracture and N/A % 10 year probability of hip fracture. PATHOLOGY    Lymph node, left axilla, #1, sentinel node excision 1/29/2019: No evidence for malignancy in one node (0/1). Breast, left, lumpectomy 1/29/2019:  Invasive lobular carcinoma. Lobular carcinoma in situ     Bone Marrow Biopsy 12/19/2018: Bone marrow: Variably cellular marrow with mild monoclonal plasmacytosis. Trilineage hematopoiesis with maturation. Breast, left, needle core biopsy 12/03/2018: Invasive mammary carcinoma with ductal and lobular features Favor Deedee histologic grade 1. Largest glass slide measurement of invasive carcinoma: 8.5 mm. ER pos RI pos (weak) HER 2 neg. PROCEDURE     Kenalog 80 mg IM. (01/21/20)    ASSESSMENT AND PLAN    This is a follow-up visit for Ms. Boykinyn Carlos.     1) Seronegative Rheumatoid Arthritis with secondary Polymyalgia Rheumatica and Interstitial Lung Disease. She is maintaind on methotrexate 25 mg SubQ every Wednesday with good tolerance but has been flaring since 9/2020. She apparently had an interval evolution of interstitial lung disease which may have been due to a non-COVID viral infection versus her Rheumatoid Arthritis. Her CT scan have showed improvement but not resolution of ground glass opacities. I placed her on a prednisone taper with some benefit in her dyspnea. Her dyspnea is worse with heat. I asked her to resume 20 mg daily for 4 weeks and then taper. She is scheduled to see Dr. Antonieta King on 1/20/2021. I asked her to see Dr. Jn Griffith or Mary Banuelos who specialize in interstitial lung disease. Dr. Antonieta King specialized in pulmonary hypertension. She no longer has inflammatory joint pain on prednisone. Her CDAI was previously 6 (previously 43, 1, 29.5, 2.5, 30, 41.5) with 10 tender and 0 swollen joints, consistent with moderate disease. I will continue methotrexate    I will have her follow up in 4 weeks. Labs ordered and mailed. 2) Polymyalgia Rheumatica. This resolved. 3) ILD. See #1. 4) Long Term Use of Immunosuppressants. The patient remains on immunomodulatory medications (methotrexate) and requires frequent toxicity monitoring by blood work. Labs ordered and mailed. 5) Smoldering Myeloma/MGUS. Immunofixation shows IgA monoclonal protein with kappa light chain. M-spike 0.2. She was evaluated by Dr. Allyson Pollack. Bone marrow biopsy showed smoldering myeloma. 6) Vitamin D Deficiency. Her vitamin D level was 25.7 (previously 20.1, 40.0, 32.0, 21.7). She is on weekly ergocalciferol 50,000.     7) Bilateral iliotibial Band Syndrome. This was not an active issue today. I will refer her to physical therapy if she is symptomatic. 8) Left Breast Cancer Stage 1. She is scheduled for lumpectomy. 9) De Quervain's Tenosynovitis of Left.  I had referred her to orthopedic, Dr. Meeta Bowen, per her request.     10) Bilateral Lower Extremity Weakness. I referred her to physical therapy. The patient voiced understanding of the aforementioned assessment and plan. The patient has consented for synchronous (real-time) Telemedicine (audio-video technology) on 1/4/2021 for their care to be delivered over telemedicine in place of their regularly scheduled office visit pursuant to the emergency declaration under the [facility] [place] [address] [facility] authority and the [employer] Act, this Virtual  Visit was conducted, with patient's consent, to reduce the patient's risk of exposure to COVID-19 and provide continuity of care for an established patient. Services were provided through a video synchronous discussion virtually to substitute for in-person clinic visit.     TODAY'S ORDERS    Orders Placed This Encounter    QUANTIFERON-TB GOLD PLUS    CBC WITH AUTOMATED DIFF    METABOLIC PANEL, COMPREHENSIVE    C REACTIVE PROTEIN, QT    SED RATE (ESR)    VITAMIN D, 25 HYDROXY    METHOTREXATE POLYGLUTAMATES    CHRONIC HEPATITIS PANEL    predniSONE (DELTASONE) 5 mg tablet     Future Appointments   Date Time Provider Geoff Olga Lidia   1/4/2021  4:20 PM Gita Marin MD [facility] BS AMB   3/5/2021  8:40 AM Josephine Hernandez MD [facility] BS AMB     Tracy Hinds MD, [address] [facility]    Adult Rheumatology   Rheumatology Ultrasound Certified  [facility]  A Part of [address]   Phone [phone]  Fax [phone]

## 2021-01-08 RX ORDER — LEVOTHYROXINE SODIUM 200 UG/1
TABLET ORAL
Qty: 90 TAB | Refills: 3 | Status: SHIPPED | OUTPATIENT
Start: 2021-01-08 | End: 2022-01-26

## 2021-01-12 ENCOUNTER — HOSPITAL ENCOUNTER (OUTPATIENT)
Dept: LAB | Age: 71
Discharge: HOME | End: 2021-01-12
Payer: MEDICARE

## 2021-01-12 PROCEDURE — 86480 TB TEST CELL IMMUN MEASURE: CPT

## 2021-01-12 PROCEDURE — 86803 HEPATITIS C AB TEST: CPT

## 2021-01-12 PROCEDURE — 85651 RBC SED RATE NONAUTOMATED: CPT

## 2021-01-12 PROCEDURE — 82306 VITAMIN D 25 HYDROXY: CPT

## 2021-01-12 PROCEDURE — 85025 COMPLETE CBC W/AUTO DIFF WBC: CPT

## 2021-01-12 PROCEDURE — 86140 C-REACTIVE PROTEIN: CPT

## 2021-01-12 PROCEDURE — 80204 DRUG ASSAY METHOTREXATE: CPT

## 2021-01-12 PROCEDURE — 36415 COLL VENOUS BLD VENIPUNCTURE: CPT

## 2021-01-12 PROCEDURE — 80053 COMPREHEN METABOLIC PANEL: CPT

## 2021-01-26 LAB
25(OH)D3+25(OH)D2 SERPL-MCNC: 16.8 NG/ML (ref 30–100)
ALBUMIN SERPL-MCNC: 4.4 G/DL (ref 3.7–4.7)
ALBUMIN/GLOB SERPL: 1.7 {RATIO} (ref 1.2–2.2)
ALP SERPL-CCNC: 202 IU/L (ref 39–117)
ALT SERPL-CCNC: 24 IU/L (ref 0–32)
AST SERPL-CCNC: 16 IU/L (ref 0–40)
BASOPHILS # BLD AUTO: 0.1 X10E3/UL (ref 0–0.2)
BASOPHILS NFR BLD AUTO: 1 %
BILIRUB SERPL-MCNC: 0.3 MG/DL (ref 0–1.2)
BUN SERPL-MCNC: 20 MG/DL (ref 8–27)
BUN/CREAT SERPL: 24 (ref 12–28)
CALCIUM SERPL-MCNC: 9.2 MG/DL (ref 8.7–10.3)
CHLORIDE SERPL-SCNC: 102 MMOL/L (ref 96–106)
CO2 SERPL-SCNC: 23 MMOL/L (ref 20–29)
COMMENT, 144067: NORMAL
CREAT SERPL-MCNC: 0.85 MG/DL (ref 0.57–1)
CRP SERPL-MCNC: <1 MG/L (ref 0–10)
EOSINOPHIL # BLD AUTO: 0 X10E3/UL (ref 0–0.4)
EOSINOPHIL NFR BLD AUTO: 0 %
ERYTHROCYTE [DISTWIDTH] IN BLOOD BY AUTOMATED COUNT: 13.8 % (ref 11.7–15.4)
ERYTHROCYTE [SEDIMENTATION RATE] IN BLOOD BY WESTERGREN METHOD: 14 MM/HR (ref 0–40)
GAMMA INTERFERON BACKGROUND BLD IA-ACNC: 0.03 IU/ML
GLOBULIN SER CALC-MCNC: 2.6 G/DL (ref 1.5–4.5)
GLUCOSE SERPL-MCNC: 95 MG/DL (ref 65–99)
HBV CORE AB SERPL QL IA: NEGATIVE
HBV CORE IGM SERPL QL IA: NEGATIVE
HBV E AB SERPL QL IA: NEGATIVE
HBV E AG SERPL QL IA: NEGATIVE
HBV SURFACE AB SER QL: REACTIVE
HBV SURFACE AG SERPL QL IA: NEGATIVE
HCT VFR BLD AUTO: 43.3 % (ref 34–46.6)
HCV AB S/CO SERPL IA: <0.1 S/CO RATIO (ref 0–0.9)
HGB BLD-MCNC: 15 G/DL (ref 11.1–15.9)
IMM GRANULOCYTES # BLD AUTO: 0.1 X10E3/UL (ref 0–0.1)
IMM GRANULOCYTES NFR BLD AUTO: 1 %
LYMPHOCYTES # BLD AUTO: 0.8 X10E3/UL (ref 0.7–3.1)
LYMPHOCYTES NFR BLD AUTO: 7 %
M TB IFN-G BLD-IMP: NEGATIVE
M TB IFN-G CD4+ BCKGRND COR BLD-ACNC: 0.06 IU/ML
MCH RBC QN AUTO: 32.1 PG (ref 26.6–33)
MCHC RBC AUTO-ENTMCNC: 34.6 G/DL (ref 31.5–35.7)
MCV RBC AUTO: 93 FL (ref 79–97)
METHOTREXATE PG1: 108.31 NMOL/L RBC
METHOTREXATE PG2: 24.94 NMOL/L RBC
METHOTREXATE PG3: 105.94 NMOL/L RBC
METHOTREXATE PG4: 96.67 NMOL/L RBC
METHOTREXATE PG5: 48.69 NMOL/L RBC
METHOTREXATE-PG TOTAL: 384.56 NMOL/L RBC
MITOGEN IGNF BLD-ACNC: 8.89 IU/ML
MONOCYTES # BLD AUTO: 0.7 X10E3/UL (ref 0.1–0.9)
MONOCYTES NFR BLD AUTO: 6 %
MTXPGSRA INTERPRETATION: NORMAL
NEUTROPHILS # BLD AUTO: 9.6 X10E3/UL (ref 1.4–7)
NEUTROPHILS NFR BLD AUTO: 85 %
PLATELET # BLD AUTO: 405 X10E3/UL (ref 150–450)
POTASSIUM SERPL-SCNC: 4.8 MMOL/L (ref 3.5–5.2)
PROT SERPL-MCNC: 7 G/DL (ref 6–8.5)
QUANTIFERON INCUBATION, QF1T: NORMAL
QUANTIFERON TB2 AG: 0.03 IU/ML
RBC # BLD AUTO: 4.68 X10E6/UL (ref 3.77–5.28)
SERVICE CMNT-IMP: NORMAL
SODIUM SERPL-SCNC: 138 MMOL/L (ref 134–144)
WBC # BLD AUTO: 11.2 X10E3/UL (ref 3.4–10.8)

## 2021-01-26 NOTE — PROGRESS NOTES
[facility]  Urgent Care Encounter      CHIEF COMPLAINT       Chief Complaint   Patient presents with    Other       Nurses Notes reviewed and I agree except as noted in the HPI. HISTORY OF PRESENT ILLNESS   Evelyn Villeda is a 1 y.o. male who presents To urgent care, advised father for complaints of redness and irritation of the glans penis. Patient's father states that the child had been complaining of burning with urination, father states that the child's mother noticed that the glans penis was extremely red and tender to touch today. Father denies fever, chills, nausea, vomiting, diarrhea. Child is otherwise healthy he is eating and drinking normally and having normal urine output. REVIEW OF SYSTEMS     Review of Systems   Constitutional: Negative for activity change, appetite change, chills, crying, fatigue, fever, irritability and unexpected weight change. HENT: Negative for congestion, drooling, ear discharge, ear pain, facial swelling, rhinorrhea, sore throat, trouble swallowing and voice change. Eyes: Negative for pain, discharge, redness and itching. Respiratory: Negative for cough, wheezing and stridor. Cardiovascular: Negative for chest pain, leg swelling and cyanosis. Gastrointestinal: Negative for abdominal pain, constipation, diarrhea, nausea and vomiting. Endocrine: Negative. Genitourinary: Positive for penile pain and penile swelling. Negative for decreased urine volume, difficulty urinating, dysuria, enuresis, hematuria, scrotal swelling and testicular pain. Musculoskeletal: Negative for arthralgias, back pain, gait problem, joint swelling, myalgias, neck pain and neck stiffness. Skin: Negative. Allergic/Immunologic: Negative. Neurological: Negative. Hematological: Negative.         PAST MEDICAL HISTORY         Diagnosis Date    Abnormal findings on  screening     Asthma     Large for gestational age        SURGICAL HISTORY The results were reviewed. Elevated WBC 11.2 from prednisone. Vitamin D is low (16.8). please take prescribed weekly vitamin D called ergocalciferol 50,000. Stop daily supplement. All remaining labs are normal/negative. Patient  has no past surgical history on file. CURRENT MEDICATIONS       Previous Medications    ALBUTEROL (PROVENTIL) (2.5 MG/3ML) 0.083% NEBULIZER SOLUTION    Take 3 mLs by nebulization every 6 hours as needed for Wheezing    BUDESONIDE (PULMICORT) 0.5 MG/2ML NEBULIZER SUSPENSION    Take 2 mLs by nebulization 2 times daily       ALLERGIES     Patient is has No Known Allergies. FAMILY HISTORY     Patient's family history is not on file. SOCIAL HISTORY     Patient  reports that he is a non-smoker but has been exposed to tobacco smoke. He has never used smokeless tobacco.    PHYSICAL EXAM     ED TRIAGE VITALS   , Temp: 98.4 °F (36.9 °C), Heart Rate: 109, Resp: 24, SpO2: 100 %  Physical Exam   Constitutional: He appears well-developed and well-nourished. He is active. No distress. Genitourinary: Testes normal. Uncircumcised. Penile erythema, penile tenderness and penile swelling (glans penis is extremely erythematous and tender with foreskin retraction.) present. No phimosis or paraphimosis. Penis exhibits no lesions. No discharge found. Musculoskeletal: Normal range of motion. Neurological: He is alert. Skin: Skin is warm and dry. Capillary refill takes less than 3 seconds.        DIAGNOSTIC RESULTS   Labs:  Results for orders placed or performed during the hospital encounter of 09/04/18   UA without Microscopic Reflex C&S   Result Value Ref Range    Glucose, Urine Negative NEGATIVE mg/dl    Bilirubin Urine Negative NEGATIVE    Ketones, Urine Negative NEGATIVE    Specific Gravity, UA 1.020 1.002 - 1.03    Blood, Urine Negative NEGATIVE    pH, UA 7.50 5.0 - 9.0    Protein, UA Negative NEGATIVE mg/dl    Urobilinogen, Urine 0.20 0.0 - 1.0 eu/dl    Nitrate, UA Negative NEGATIVE    LEUKOCYTES, UA Small (A) NEGATIVE    Color, UA Yellow STRAW-YELL    Character, Urine Clear CLEAR-SL C    REFLEX TO URINE C & S INDICATED        IMAGING:  No orders to display     URGENT CARE COURSE:     Vitals:    09/04/18 1938   Pulse: 109   Resp: 24   Temp: 98.4 °F (36.9 °C)   SpO2: 100%   Weight: 40 lb (18.1 kg)       Medications - No data to display  PROCEDURES:  None  FINAL IMPRESSION      1. Balanitis        DISPOSITION/PLAN   DISPOSITION Decision To Discharge 09/04/2018 07:48:52 PM  Father is encouraged to retract the foreskin and cleanse area daily. Urine has small amount leukocytes will wait for the culture to treat. Patient given educational materials - see patient instructions. Discussed use, benefit, and side effects of prescribed medications. All patient questions answered. Pt voiced understanding. Reviewed health maintenance. Patient agreed with treatment plan. Follow up as directed.        PATIENT REFERRED TO:  Pastor Soulier, MD  [place] [address]  [phone]    In 2 days  If symptoms worsen    DISCHARGE MEDICATIONS:  New Prescriptions    CLOTRIMAZOLE (LOTRIMIN) 1 % CREAM    Apply topically 2 times daily for 7 days Apply topically 2 times daily.      Current Discharge Medication List          KIRSTOPHER Adkins CNP, APRN - CNP  09/04/18 1956

## 2021-02-01 ENCOUNTER — VIRTUAL VISIT (OUTPATIENT)
Dept: RHEUMATOLOGY | Age: 71
End: 2021-02-01
Payer: MEDICARE

## 2021-02-01 DIAGNOSIS — J84.9 ILD (INTERSTITIAL LUNG DISEASE) (HCC): ICD-10-CM

## 2021-02-01 DIAGNOSIS — M06.09 SERONEGATIVE RHEUMATOID ARTHRITIS OF MULTIPLE SITES (HCC): Primary | ICD-10-CM

## 2021-02-01 DIAGNOSIS — M35.3 PMR (POLYMYALGIA RHEUMATICA) (HCC): ICD-10-CM

## 2021-02-01 PROCEDURE — G0463 HOSPITAL OUTPT CLINIC VISIT: HCPCS | Performed by: INTERNAL MEDICINE

## 2021-02-01 PROCEDURE — G8399 PT W/DXA RESULTS DOCUMENT: HCPCS | Performed by: INTERNAL MEDICINE

## 2021-02-01 PROCEDURE — G8756 NO BP MEASURE DOC: HCPCS | Performed by: INTERNAL MEDICINE

## 2021-02-01 PROCEDURE — G9717 DOC PT DX DEP/BP F/U NT REQ: HCPCS | Performed by: INTERNAL MEDICINE

## 2021-02-01 PROCEDURE — 1090F PRES/ABSN URINE INCON ASSESS: CPT | Performed by: INTERNAL MEDICINE

## 2021-02-01 PROCEDURE — G8428 CUR MEDS NOT DOCUMENT: HCPCS | Performed by: INTERNAL MEDICINE

## 2021-02-01 PROCEDURE — 1100F PTFALLS ASSESS-DOCD GE2>/YR: CPT | Performed by: INTERNAL MEDICINE

## 2021-02-01 PROCEDURE — 99215 OFFICE O/P EST HI 40 MIN: CPT | Performed by: INTERNAL MEDICINE

## 2021-02-01 PROCEDURE — 3017F COLORECTAL CA SCREEN DOC REV: CPT | Performed by: INTERNAL MEDICINE

## 2021-02-01 PROCEDURE — G9899 SCRN MAM PERF RSLTS DOC: HCPCS | Performed by: INTERNAL MEDICINE

## 2021-02-01 PROCEDURE — 3288F FALL RISK ASSESSMENT DOCD: CPT | Performed by: INTERNAL MEDICINE

## 2021-02-01 RX ORDER — ADALIMUMAB 40MG/0.4ML
40 KIT SUBCUTANEOUS
Qty: 2 KIT | Refills: 11 | Status: SHIPPED | OUTPATIENT
Start: 2021-02-01 | End: 2021-11-11

## 2021-02-01 NOTE — PROGRESS NOTES
REASON FOR VISIT    This is a follow-up visit for Ms. Donaldo Bravo for     ICD-10-CM   1. Seronegative rheumatoid arthritis of multiple sites ([facility] M06.09   2. PMR (polymyalgia rheumatica) ([facility]) M35.3     The patient has consented for synchronous (real-time) Telemedicine (audio-video technology) on 2/1/2021 for their care to be delivered over telemedicine in place of their regularly scheduled office visit pursuant to the emergency declaration under the [facility] [place] [address] waiver authority and the [employer] Act, this Virtual  Visit was conducted, with patient's consent, to reduce the patient's risk of exposure to COVID-19 and provide continuity of care for an established patient. Services were provided through a video synchronous discussion virtually to substitute for in-person clinic visit. Inflammatory arthritis phenotype includes:  Anti-CCP positive: no  Rheumatoid factor positive: no  Erosive disease: no  Extra-articular manifestations include: Polymyalgia Rheumatica, smoldering myeloma, interstitial lung disease     Immunosuppression Screening (1/12/2021): Quantiferon TB: negative  PPD:  Not performed  Hepatitis B: negative  Hepatitis C: negative    Therapy History includes:  Current DMARD therapy include: methotrexate 25 mg SubQ every Wednesday (4/29/2019 to present)  Prior DMARD therapy include: methotrexate 20 mg every Wednesday (10/15/2018 to 4/29/2019)  Discontinued DMARDs because of inefficacy: None  Discontinued DMARDs because of side effects: None    HISTORY OF PRESENT ILLNESS    Ms. Donaldo Bravo returns for a follow-up. On her last visit, I continued methotrexate 25 mg subcutaneous every Wednesday with Zofran, which she has taken with good tolerance. I asked her to resume 20 mg daily for 4 weeks and then taper for interstitial lung disease.  She developed ulcers on her mouth/gum, lips and genital so reduced her prednisone to 10 mg daily which resolved. Today, she feels much much better. She has a little dyspnea and cough. She feels her dyspnea is normal and related to deconditioning. She reports having intermittent hot flashes from the prednisone. She denies pain or swelling in her joints. She has some stiffness in her fingers lasting 15 minutes. She has developed a trigger finger in her thumb. She will be seeing pulmonary Dr. Bhavesh Orellana on 2/02/2021    She endorses dyspnea    She denies fever, weight loss, blurred vision, vision loss, oral ulcers, ankle swelling, dry cough, nausea, vomiting, dysphagia, abdominal pain, black or bloody stool, fall since last visit, rash, easy bruising and increased thirst.    Last toxicity monitoring by blood work was done on 1/12/2021 and did not reveal any significant adverse effects, except WBC 11.2 (prednisone)    Most recent inflammatory markers from 1/12/2021 revealed a ESR 14 mm/hr and CRP <1 mg/L. The patient has not had any interval hospital admissions infections, or surgeries. REVIEW OF SYSTEMS    A comprehensive review of systems was performed and pertinent results are documented in the HPI, review of systems is otherwise non-contributory. PAST MEDICAL HISTORY    She has a past medical history of Breast cancer, left ([address] Depression, Goiter, Hearing loss, Hypertension, Hypothyroid, IBS (irritable bowel syndrome), Menopause, Rheumatoid arthritis ([address] and S/P radiation therapy. FAMILY HISTORY    Her family history includes Alcohol abuse in her brother; COPD in her brother and mother; Cancer in her father and mother; Diabetes in her brother and brother; Heart Disease in her brother, brother, paternal grandmother, and paternal uncle; Liver Disease in her brother. SOCIAL HISTORY    She reports that she has never smoked. She has never used smokeless tobacco. She reports current alcohol use of about 5.0 - 6.0 standard drinks of alcohol per week. She reports that she does not use drugs. MEDICATIONS    Current Outpatient Medications   Medication Sig    adalimumab (Humira,CF, Pen) 40 mg/0.4 mL injection pen 0.4 mL by SubCUTAneous route every fourteen (14) days. Indications: rheumatoid arthritis    levothyroxine (SYNTHROID) 200 mcg tablet TAKE ONE TABLET BY MOUTH ONCE DAILY BEFORE BREAKFAST    predniSONE (DELTASONE) 5 mg tablet 4 tabs daily for 7 days, 3 tabs for 7 days, 2 tabs for 14 days, 1 tab for 14 days    albuterol (PROVENTIL HFA, VENTOLIN HFA, PROAIR HFA) 90 mcg/actuation inhaler Take 2 Puffs by inhalation every four (4) hours as needed for Wheezing.  citalopram (CELEXA) 40 mg tablet TAKE 1 TABLET EVERY DAY    lisinopril-hydroCHLOROthiazide (PRINZIDE, ZESTORETIC) 10-12.5 mg per tablet TAKE 1 TABLET EVERY DAY    methotrexate 25 mg/mL chemo injection INJECT 1 ML UNDER SKIN ONCE A WEEK ON WEDNESDAY AS DIRECTED (DISCARD AND BEGIN A NEW VIAL EVERY 28 DAYS)    zolpidem (AMBIEN) 5 mg tablet TAKE 1 TABLET BY MOUTH NIGHTLY AS NEEDED FOR SLEEP MAX DAILY AMOUNT 5 MG    LORazepam (ATIVAN) 1 mg tablet TAKE 1 & 1/2 (ONE & ONE-HALF) TABLETS BY MOUTH NIGHTLY    famotidine (PEPCID) 20 mg tablet Take 1 Tab by mouth two (2) times a day.  folic acid (FOLVITE) 1 mg tablet TAKE 1 TABLET BY MOUTH ONCE DAILY    diclofenac (VOLTAREN) 1 % gel Apply  to affected area four (4) times daily.  insulin syringe-needle U-100 1/2 mL 31 gauge x 15/64\" syrg Use to inject methotrexate once weekly    ondansetron (ZOFRAN ODT) 4 mg disintegrating tablet Take 1 Tab by mouth every eight (8) hours as needed for Nausea. No current facility-administered medications for this visit. ALLERGIES    Allergies   Allergen Reactions    Keflex [Cephalexin] Rash    Sulfa (Sulfonamide Antibiotics) Rash    Monistat 1 [Tioconazole] Swelling       PHYSICAL EXAMINATION    There were no vitals taken for this visit. There is no height or weight on file to calculate BMI.     General: Patient is alert, oriented x 3, not in acute distress    HEENT:   Sclerae are not injected and appear moist.  There is no alopecia. Chest:  Breathing comfortably at room air    Musculoskeletal exam:  A comprehensive musculoskeletal exam was NOT performed for all joints of each upper and lower extremity and assessed for swelling, tenderness and range of motion. Positive results are documented as below:    Previous Exam    Positive Finkelstein's on left with tenderness along the ABL EPB. Bilateral Sebastián and [place] nodes.     Z-Deformities:   no  [place]ck Deformities:  no  Boutonierre's Deformities:  no  Ulnar Deviation:   no  MCP Subluxation:  no     Joint Count 3/10/2020 1/21/2020 7/29/2019 4/29/2019 1/28/2019 11/28/2018 10/4/2018   Patient pain (0-100) 40 80 5 15 40 70 75   MHAQ 0.375 1 0.375 1 0.5 0 1   Left shoulder - Tender - 1 - - - 1 1   Left shoulder - Swollen - 0 - - - 1 0   Left elbow - Tender - 1 - - - - -   Left elbow - Swollen - 0 - - - - -   Left wrist- Tender 1 1 0 1 0 1 -   Left wrist- Swollen 0 1 0 1 0 1 -   Left 1st MCP - Tender - 1 - 1 - 1 1   Left 1st MCP - Swollen - 1 - 1 - 1 1   Left 2nd MCP - Tender 1 1 - 1 - 1 1   Left 2nd MCP - Swollen 0 1 - 1 - 1 1   Left 3rd MCP - Tender 1 1 - 1 - - 1   Left 3rd MCP - Swollen 0 0 - 1 - - 1   Left 4th MCP - Tender 1 1 - 1 - - -   Left 4th MCP - Swollen 0 0 - 0 - - -   Left 5th MCP - Tender 1 1 - 1 - - 1   Left 5th MCP - Swollen 0 0 - 0 - - 1   Left 2nd PIP - Tender 1 1 - - - 1 1   Left 2nd PIP - Swollen 0 1 - - - 1 1   Left 3rd PIP - Tender 1 1 - - - 1 1   Left 3rd PIP - Swollen 0 1 - 1 - 1 1   Left 4th PIP - Tender 1 - - 1 - - -   Left 4th PIP - Swollen 0 - - - - - -   Left 5th PIP - Tender 1 - - - - - -   Right shoulder - Tender - 1 - - - 1 1   Right shoulder - Swollen - 0 - - - 0 0   Right elbow - Tender - 1 - - - - -   Right elbow - Swollen - 0 - - - - -   Right wrist- Tender - 1 - 1 - - 1   Right wrist- Swollen - 1 - 1 - - 1   Right 1st MCP - Tender - - - 1 - 1 1   Right 1st MCP - Swollen - - - 1 - 1 1   Right 2nd MCP - Tender - 1 - - - 1 1   Right 2nd MCP - Swollen - 0 - - - 1 1   Right 3rd MCP - Tender - - - 0 - - -   Right 3rd MCP - Swollen - - - 1 - - -   Right 4th MCP - Tender - 1 - 1 - - -   Right 4th MCP - Swollen - 0 - 1 - - -   Right 5th MCP - Tender - 1 - - - - -   Right 5th MCP - Swollen - 0 - - - - -   Right thumb IP - Tender - - - - - 1 1   Right thumb IP - Swollen - - - - - 1 0   Right 2nd PIP - Tender - 1 - 1 - 1 1   Right 2nd PIP - Swollen - 1 - 1 - 1 1   Right 3rd PIP - Tender - 1 - - - - 1   Right 3rd PIP - Swollen - 1 - - - - 1   Right 4th PIP - Tender 1 1 - 1 - - 1   Right 4th PIP - Swollen 0 1 - 0 - - 1   Right 5th PIP - Tender - 1 - 1 - - 1   Right 5th PIP - Swollen - 1 - 1 - - 1   Tender Joint Count (Total) 10 20 0 13 0 11 16   Swollen Joint Count (Total) 0 10 0 11 0 10 13   Physician Assessment (0-10) 2 5 0 4 0 4 5   Patient Assessment (0-10) 4 8 1 1.5 2.5 5 7.5   CDAI Total (calculated) 16 43 1 29.5 2.5 30 41.5       DATA REVIEW    Laboratory     Recent laboratory results were reviewed, summarized, and discussed with the patient. Imaging    Musculoskeletal Ultrasound    None    Radiographs    Skeletal Survey 11/28/2018: Few small calvarial lucencies. No fractures or lytic lesions at risk for fracture. Bilateral Shoulder 10/04/2018: RIGHT: There is prominent AC joint degeneration with spurring and loss of joint space. There is some mild deformity, chronic in nature of the tuberosity. No fracture or dislocation, no bony erosion, the bone is normal in mineral contents. No soft tissue calcifications. LEFT: The bone is normal in mineral contents. There is some mild chronic deformity of the tuberosity and AC joint degeneration. There are no soft tissue calcification bony erosion fracture or dislocation. Bilateral Hand 10/04/2018: RIGHT: no acute fracture or dislocation. Alignment is anatomic.  Mild degenerative changes are seen in the interphalangeal joint of the thumb. No erosions are seen. No abnormality seen in the soft tissues. LEFT: no acute fracture or dislocation. Alignment is anatomic. Moderate to severe degenerative changes are present in the first [facility] joint. A cystic lucency in the ace of the first metacarpal likely represents a subchondral cyst. No clear erosions are seen. No abnormality seen in the soft tissues. Bilateral Foot 10/04/2018: RIGHT:  no fracture or other acute osseous or articular abnormality. A small plantar calcaneal heel spur is noted. No erosions or joint space narrowing are present. The soft tissues are within normal limits. LEFT: no acute fracture or dislocation. Alignment is anatomic. A small plantar calcaneal heel spur is noted. No erosions or joint space narrowing are present. . No abnormality is seen in the soft tissues.     CT Imaging    PET/CT Scan 1/24/2019: HEAD/NECK: No apparent foci of abnormal hypermetabolism. Cerebral evaluation is limited by normal intense activity. CHEST: No foci of abnormal hypermetabolism. The known small breast cancer at 12:00 in the left breast demonstrates no increased metabolic activity. ABDOMEN/PELVIS: No foci of abnormal hypermetabolism. SKELETON: No foci of abnormal hypermetabolism in the axial and visualized appendicular skeleton. Lower extremities:. Imaging of the lower extremities is unremarkable. There is muscular activity noted. CT Head without contrast 9/13/2017: Prior right occipital craniectomy. Encephalomalacia in the right cerebellar region. . There is no significant white matter disease. There is no intracranial hemorrhage, extra-axial collection, mass, mass effect or midline shift. The basilar cisterns are open. No acute infarct is identified. The visualized portions of the paranasal sinuses and mastoid air cells are clear    MR Imaging    MRI Breasts with and without contrast 1/07/2019:  There is minimal background parenchymal enhancement and the breasts are almost entirely fat. A few sub-5 mm foci of enhancement are scattered bilaterally. Right breast: No suspicious enhancing foci. No axillary or internal mammary chain lymphadenopathy. Left breast: A vague area of enhancement around the biopsy clip in the anterior third at 12:00 does not reach threshold enhancement. This measures approximately 9 x 9 mm, and correlates well with the small area of architectural distortion on mammography and subtle architectural distortion and shadowing on ultrasound. No axillary or internal mammary chain lymphadenopathy. DXA     DXA 5/12/2015: (excluded distal radius) lumbar spine L1-L4 T score 0.4 (BMD 1.241 g/cm2), left femoral neck T score: 0.5 (1.109 g/cm2), left total hip T score: 1.0 (1.139 g/cm2), right femoral neck T score: 0.2 (1.062 g/cm2), right total hip T score: 1.1 (1.145 g/cm2). FRAX score N/A % probability in 10 years for major osteoporotic fracture and N/A % 10 year probability of hip fracture. PATHOLOGY    Lymph node, left axilla, #1, sentinel node excision 1/29/2019: No evidence for malignancy in one node (0/1). Breast, left, lumpectomy 1/29/2019:  Invasive lobular carcinoma. Lobular carcinoma in situ     Bone Marrow Biopsy 12/19/2018: Bone marrow: Variably cellular marrow with mild monoclonal plasmacytosis. Trilineage hematopoiesis with maturation. Breast, left, needle core biopsy 12/03/2018: Invasive mammary carcinoma with ductal and lobular features Favor Alden histologic grade 1. Largest glass slide measurement of invasive carcinoma: 8.5 mm. ER pos ID pos (weak) HER 2 neg. PROCEDURE     Kenalog 80 mg IM. (01/21/20)    ASSESSMENT AND PLAN    This is a follow-up visit for Ms. Leandro Long. 1) Seronegative Rheumatoid Arthritis with secondary Polymyalgia Rheumatica and Interstitial Lung Disease. She is maintaind on methotrexate 25 mg SubQ every Wednesday with good tolerance but had been flaring since 9/2020.  She apparently had an interval evolution of interstitial lung disease which may have been due to a non-COVID viral infection versus her Rheumatoid Arthritis. Her CT scan have showed improvement but not resolution of ground glass opacities. I was able to control her joint flare on prednisone and was managing her interstitial lung disease which may be due to Rheumatoid Arthritis. She lowered her prednisone from 20 mg to 10 mg daily due orogenital sores, possibly fungal, that resoved. She is scheduled to see Dr. Reese Galeana tomorrow on 2/02/2021. I called and spoke to him about her presentation. She no longer has inflammatory joint pain on prednisone. Her CDAI was previously 6 (previously 43, 1, 29.5, 2.5, 30, 41.5) with 10 tender and 0 swollen joints, consistent with moderate disease. I discussed with her about advancing therapy to a biologic (small particle versus anti-TNF). We discussed the potential adverse effects, which include infections, such as upper respiratory infections, latent TB reactivation, viral hepatitis activation, and routes of administration (oral versus infusion versus subcutaneous). The patient was informed about the low risk for lymphoma based on at least 5 years of post-market data and the likely inherent relation between chronic autoimmune diseases, such as Rheumatoid Arthritis, and lymphoma. The patient was informed these medications co-pay are subject to the patient's insurance coverage and we will not know until it has been submitted to the insurance company. She preferred Humira. An order will be submitted today today. I will continue methotrexate    I ordered a repeat CT chest since she still has dyspnea to evaluate for active GGO. I will have her follow up in 4 weeks. 2) Polymyalgia Rheumatica. This resolved. 3) ILD. See #1. 4) Long Term Use of Immunosuppressants.  The patient remains on immunomodulatory medications (methotrexate) and requires frequent toxicity monitoring by blood work. Labs ordered and mailed. 5) Smoldering Myeloma/MGUS. Immunofixation shows IgA monoclonal protein with kappa light chain. M-spike 0.2. She was evaluated by Dr. Romy Fermin. Bone marrow biopsy showed smoldering myeloma. 6) Vitamin D Deficiency. Her vitamin D level was 16.8 (previously 25.7, 20.1, 40.0, 32.0, 21.7). She is on weekly ergocalciferol 50,000.     7) Bilateral iliotibial Band Syndrome. This was not an active issue today. I will refer her to physical therapy if she is symptomatic. 8) Left Breast Cancer Stage 1. She was scheduled for lumpectomy. 9) De Quervain's Tenosynovitis of Left. I had referred her to orthopedic, Dr. Maggi Ying, per her request.     10) Bilateral Lower Extremity Weakness. I had referred her to physical therapy. The patient voiced understanding of the aforementioned assessment and plan. The patient has consented for synchronous (real-time) Telemedicine (audio-video technology) on 2/1/2021 for their care to be delivered over telemedicine in place of their regularly scheduled office visit pursuant to the emergency declaration under the [facility] waiver authority and the [employer] Act, this Virtual  Visit was conducted, with patient's consent, to reduce the patient's risk of exposure to COVID-19 and provide continuity of care for an established patient. Services were provided through a video synchronous discussion virtually to substitute for in-person clinic visit.     TODAY'S ORDERS    Orders Placed This Encounter    CT CHEST WO CONT    adalimumab (Humira,CF, Pen) 40 mg/0.4 mL injection pen     Future Appointments   Date Time Provider Geoff Duggan   3/22/2021  8:40 AM Shabbir Sharpe MD [facility] BS AMB   7/20/2021  9:00 AM RAD ONC THERAPY  Chad Keller MD, [address] [place] [address]    Adult Rheumatology   Rheumatology Ultrasound Certified  [facility] Jordin [place]ll[place], [address] [facility]   Phone [phone]  Fax [phone]

## 2021-02-02 ENCOUNTER — DOCUMENTATION ONLY (OUTPATIENT)
Dept: PHARMACY | Age: 71
End: 2021-02-02

## 2021-02-02 NOTE — PROGRESS NOTES
[facility] at [address] [facility] Update    Date: 02/02/21    [facility] 1950    Medication: Humira Pen 40 mg/0.4 ml    Prior Authorization: through 12/31/21    Co-pay $1900.40     Patient has a high co-pay on their prescription. Pharmacy staff contacted the patient and advised them to call the [facility] at [phone] to discuss potentially obtaining the medication through the  directly or other treatment options, if appropriate. Pharmacist answered any questions that the patient had.     Zoie Vaughan, [address] at [place] [address] [place], [address]  phone: [phone]   fax: [phone]

## 2021-02-04 ENCOUNTER — HOSPITAL ENCOUNTER (OUTPATIENT)
Dept: CT IMAGING | Age: 71
Discharge: HOME | End: 2021-02-04
Attending: INTERNAL MEDICINE
Payer: MEDICARE

## 2021-02-04 DIAGNOSIS — M06.09 SERONEGATIVE RHEUMATOID ARTHRITIS OF MULTIPLE SITES (HCC): ICD-10-CM

## 2021-02-04 DIAGNOSIS — J84.9 ILD (INTERSTITIAL LUNG DISEASE) (HCC): ICD-10-CM

## 2021-02-04 PROCEDURE — 71250 CT THORAX DX C-: CPT

## 2021-02-05 NOTE — PROGRESS NOTES
The results were reviewed. Resolution of interstitial lung disease. Fatty liver is noted like before.

## 2021-03-25 RX ORDER — LETROZOLE 2.5 MG/1
2.5 TABLET, FILM COATED ORAL DAILY
Qty: 30 TAB | Refills: 3 | Status: SHIPPED | OUTPATIENT
Start: 2021-03-25 | End: 2021-05-12 | Stop reason: SDUPTHER

## 2021-03-28 ENCOUNTER — DOCUMENTATION ONLY (OUTPATIENT)
Dept: ONCOLOGY | Age: 71
End: 2021-03-28

## 2021-03-31 ENCOUNTER — TELEPHONE (OUTPATIENT)
Dept: ONCOLOGY | Age: 71
End: 2021-03-31

## 2021-03-31 NOTE — TELEPHONE ENCOUNTER
2x call patient and LVM for pt to call us to schedule a fu apt so she can get her medication refilled,

## 2021-04-23 ENCOUNTER — OFFICE VISIT (OUTPATIENT)
Dept: RHEUMATOLOGY | Age: 71
End: 2021-04-23
Payer: MEDICARE

## 2021-04-23 VITALS
SYSTOLIC BLOOD PRESSURE: 123 MMHG | WEIGHT: 196 LBS | HEART RATE: 66 BPM | TEMPERATURE: 98 F | DIASTOLIC BLOOD PRESSURE: 74 MMHG | BODY MASS INDEX: 33.64 KG/M2 | RESPIRATION RATE: 18 BRPM

## 2021-04-23 DIAGNOSIS — Z79.60 LONG-TERM USE OF IMMUNOSUPPRESSANT MEDICATION: ICD-10-CM

## 2021-04-23 DIAGNOSIS — M06.09 SERONEGATIVE RHEUMATOID ARTHRITIS OF MULTIPLE SITES (HCC): Primary | ICD-10-CM

## 2021-04-23 DIAGNOSIS — J84.9 ILD (INTERSTITIAL LUNG DISEASE) (HCC): ICD-10-CM

## 2021-04-23 DIAGNOSIS — M35.3 PMR (POLYMYALGIA RHEUMATICA) (HCC): ICD-10-CM

## 2021-04-23 DIAGNOSIS — E55.9 VITAMIN D DEFICIENCY: ICD-10-CM

## 2021-04-23 PROCEDURE — 3017F COLORECTAL CA SCREEN DOC REV: CPT | Performed by: INTERNAL MEDICINE

## 2021-04-23 PROCEDURE — G0463 HOSPITAL OUTPT CLINIC VISIT: HCPCS | Performed by: INTERNAL MEDICINE

## 2021-04-23 PROCEDURE — G8754 DIAS BP LESS 90: HCPCS | Performed by: INTERNAL MEDICINE

## 2021-04-23 PROCEDURE — G9717 DOC PT DX DEP/BP F/U NT REQ: HCPCS | Performed by: INTERNAL MEDICINE

## 2021-04-23 PROCEDURE — G8427 DOCREV CUR MEDS BY ELIG CLIN: HCPCS | Performed by: INTERNAL MEDICINE

## 2021-04-23 PROCEDURE — G8536 NO DOC ELDER MAL SCRN: HCPCS | Performed by: INTERNAL MEDICINE

## 2021-04-23 PROCEDURE — 1090F PRES/ABSN URINE INCON ASSESS: CPT | Performed by: INTERNAL MEDICINE

## 2021-04-23 PROCEDURE — 1100F PTFALLS ASSESS-DOCD GE2>/YR: CPT | Performed by: INTERNAL MEDICINE

## 2021-04-23 PROCEDURE — 3288F FALL RISK ASSESSMENT DOCD: CPT | Performed by: INTERNAL MEDICINE

## 2021-04-23 PROCEDURE — G9899 SCRN MAM PERF RSLTS DOC: HCPCS | Performed by: INTERNAL MEDICINE

## 2021-04-23 PROCEDURE — G8417 CALC BMI ABV UP PARAM F/U: HCPCS | Performed by: INTERNAL MEDICINE

## 2021-04-23 PROCEDURE — 99215 OFFICE O/P EST HI 40 MIN: CPT | Performed by: INTERNAL MEDICINE

## 2021-04-23 PROCEDURE — G8752 SYS BP LESS 140: HCPCS | Performed by: INTERNAL MEDICINE

## 2021-04-23 PROCEDURE — G8399 PT W/DXA RESULTS DOCUMENT: HCPCS | Performed by: INTERNAL MEDICINE

## 2021-04-23 RX ORDER — SYRINGE-NEEDLE,INSULIN,0.5 ML 30 GX5/16"
1 SYRINGE, EMPTY DISPOSABLE MISCELLANEOUS
Qty: 12 SYRINGE | Refills: 5 | Status: SHIPPED | OUTPATIENT
Start: 2021-04-24 | End: 2022-06-15 | Stop reason: SDUPTHER

## 2021-04-23 RX ORDER — METHOTREXATE 25 MG/ML
25 INJECTION, SOLUTION INTRA-ARTERIAL; INTRAMUSCULAR; INTRAVENOUS
Qty: 12 ML | Refills: 1 | Status: SHIPPED | OUTPATIENT
Start: 2021-04-23 | End: 2021-09-30

## 2021-04-23 NOTE — LETTER
4/23/2021 Patient: Tisha Perry YOB: 1950 Date of Visit: 4/23/2021 Niki Cameron MD 
[address] [address] [id number] [address] [employer] Dear Niki Cameron MD, Thank you for referring Ms. Jr Cortez to [address] [place] [address] for evaluation. My notes for this consultation are attached. If you have questions, please do not hesitate to call me. I look forward to following your patient along with you.  
 
 
Sincerely, 
 
Joanie Parrish MD

## 2021-04-28 ENCOUNTER — HOSPITAL ENCOUNTER (OUTPATIENT)
Dept: LAB | Age: 71
Discharge: HOME | End: 2021-04-28
Payer: MEDICARE

## 2021-04-28 PROCEDURE — 36415 COLL VENOUS BLD VENIPUNCTURE: CPT

## 2021-04-28 PROCEDURE — 80053 COMPREHEN METABOLIC PANEL: CPT

## 2021-04-28 PROCEDURE — 85025 COMPLETE CBC W/AUTO DIFF WBC: CPT

## 2021-04-28 PROCEDURE — 82306 VITAMIN D 25 HYDROXY: CPT

## 2021-04-28 PROCEDURE — 85651 RBC SED RATE NONAUTOMATED: CPT

## 2021-04-28 PROCEDURE — 86140 C-REACTIVE PROTEIN: CPT

## 2021-04-29 LAB
25(OH)D3+25(OH)D2 SERPL-MCNC: 24.9 NG/ML (ref 30–100)
ALBUMIN SERPL-MCNC: 4.5 G/DL (ref 3.7–4.7)
ALBUMIN/GLOB SERPL: 1.6 {RATIO} (ref 1.2–2.2)
ALP SERPL-CCNC: 183 IU/L (ref 39–117)
ALT SERPL-CCNC: 23 IU/L (ref 0–32)
AST SERPL-CCNC: 27 IU/L (ref 0–40)
BASOPHILS # BLD AUTO: 0.1 X10E3/UL (ref 0–0.2)
BASOPHILS NFR BLD AUTO: 1 %
BILIRUB SERPL-MCNC: 0.5 MG/DL (ref 0–1.2)
BUN SERPL-MCNC: 15 MG/DL (ref 8–27)
BUN/CREAT SERPL: 16 (ref 12–28)
CALCIUM SERPL-MCNC: 9.7 MG/DL (ref 8.7–10.3)
CHLORIDE SERPL-SCNC: 103 MMOL/L (ref 96–106)
CO2 SERPL-SCNC: 22 MMOL/L (ref 20–29)
CREAT SERPL-MCNC: 0.94 MG/DL (ref 0.57–1)
CRP SERPL-MCNC: 2 MG/L (ref 0–10)
EOSINOPHIL # BLD AUTO: 0.1 X10E3/UL (ref 0–0.4)
EOSINOPHIL NFR BLD AUTO: 1 %
ERYTHROCYTE [DISTWIDTH] IN BLOOD BY AUTOMATED COUNT: 11.5 % (ref 11.7–15.4)
ERYTHROCYTE [SEDIMENTATION RATE] IN BLOOD BY WESTERGREN METHOD: 5 MM/HR (ref 0–40)
GLOBULIN SER CALC-MCNC: 2.9 G/DL (ref 1.5–4.5)
GLUCOSE SERPL-MCNC: 110 MG/DL (ref 65–99)
HCT VFR BLD AUTO: 42.1 % (ref 34–46.6)
HGB BLD-MCNC: 14.1 G/DL (ref 11.1–15.9)
IMM GRANULOCYTES # BLD AUTO: 0 X10E3/UL (ref 0–0.1)
IMM GRANULOCYTES NFR BLD AUTO: 0 %
LYMPHOCYTES # BLD AUTO: 1.7 X10E3/UL (ref 0.7–3.1)
LYMPHOCYTES NFR BLD AUTO: 27 %
MCH RBC QN AUTO: 30.1 PG (ref 26.6–33)
MCHC RBC AUTO-ENTMCNC: 33.5 G/DL (ref 31.5–35.7)
MCV RBC AUTO: 90 FL (ref 79–97)
MONOCYTES # BLD AUTO: 0.4 X10E3/UL (ref 0.1–0.9)
MONOCYTES NFR BLD AUTO: 7 %
NEUTROPHILS # BLD AUTO: 3.9 X10E3/UL (ref 1.4–7)
NEUTROPHILS NFR BLD AUTO: 64 %
PLATELET # BLD AUTO: 320 X10E3/UL (ref 150–450)
POTASSIUM SERPL-SCNC: 4.1 MMOL/L (ref 3.5–5.2)
PROT SERPL-MCNC: 7.4 G/DL (ref 6–8.5)
RBC # BLD AUTO: 4.69 X10E6/UL (ref 3.77–5.28)
SODIUM SERPL-SCNC: 140 MMOL/L (ref 134–144)
WBC # BLD AUTO: 6.2 X10E3/UL (ref 3.4–10.8)

## 2021-05-11 ENCOUNTER — DOCUMENTATION ONLY (OUTPATIENT)
Dept: RHEUMATOLOGY | Age: 71
End: 2021-05-11

## 2021-05-11 NOTE — PROGRESS NOTES
Spoke with Ivy Menjivar at [address], and patient has been approved for [facility] patient assistance until 12/31/2021.

## 2021-05-12 ENCOUNTER — OFFICE VISIT (OUTPATIENT)
Dept: ONCOLOGY | Age: 71
End: 2021-05-12
Payer: MEDICARE

## 2021-05-12 VITALS
BODY MASS INDEX: 32.27 KG/M2 | WEIGHT: 188 LBS | TEMPERATURE: 97.9 F | SYSTOLIC BLOOD PRESSURE: 110 MMHG | RESPIRATION RATE: 18 BRPM | DIASTOLIC BLOOD PRESSURE: 68 MMHG | HEART RATE: 61 BPM | OXYGEN SATURATION: 98 %

## 2021-05-12 DIAGNOSIS — Z78.0 POST-MENOPAUSAL: ICD-10-CM

## 2021-05-12 DIAGNOSIS — D47.2 MGUS (MONOCLONAL GAMMOPATHY OF UNKNOWN SIGNIFICANCE): ICD-10-CM

## 2021-05-12 DIAGNOSIS — Z79.811 AROMATASE INHIBITOR USE: ICD-10-CM

## 2021-05-12 DIAGNOSIS — C50.912 MALIGNANT NEOPLASM OF LEFT FEMALE BREAST, UNSPECIFIED ESTROGEN RECEPTOR STATUS, UNSPECIFIED SITE OF BREAST (HCC): Primary | ICD-10-CM

## 2021-05-12 PROCEDURE — G8752 SYS BP LESS 140: HCPCS | Performed by: INTERNAL MEDICINE

## 2021-05-12 PROCEDURE — G9899 SCRN MAM PERF RSLTS DOC: HCPCS | Performed by: INTERNAL MEDICINE

## 2021-05-12 PROCEDURE — G0463 HOSPITAL OUTPT CLINIC VISIT: HCPCS | Performed by: REGISTERED NURSE

## 2021-05-12 PROCEDURE — G8536 NO DOC ELDER MAL SCRN: HCPCS | Performed by: INTERNAL MEDICINE

## 2021-05-12 PROCEDURE — 3017F COLORECTAL CA SCREEN DOC REV: CPT | Performed by: INTERNAL MEDICINE

## 2021-05-12 PROCEDURE — 99214 OFFICE O/P EST MOD 30 MIN: CPT | Performed by: INTERNAL MEDICINE

## 2021-05-12 PROCEDURE — G8427 DOCREV CUR MEDS BY ELIG CLIN: HCPCS | Performed by: INTERNAL MEDICINE

## 2021-05-12 PROCEDURE — 3288F FALL RISK ASSESSMENT DOCD: CPT | Performed by: INTERNAL MEDICINE

## 2021-05-12 PROCEDURE — G9717 DOC PT DX DEP/BP F/U NT REQ: HCPCS | Performed by: INTERNAL MEDICINE

## 2021-05-12 PROCEDURE — G8754 DIAS BP LESS 90: HCPCS | Performed by: INTERNAL MEDICINE

## 2021-05-12 PROCEDURE — 1090F PRES/ABSN URINE INCON ASSESS: CPT | Performed by: INTERNAL MEDICINE

## 2021-05-12 PROCEDURE — 1100F PTFALLS ASSESS-DOCD GE2>/YR: CPT | Performed by: INTERNAL MEDICINE

## 2021-05-12 PROCEDURE — G8417 CALC BMI ABV UP PARAM F/U: HCPCS | Performed by: INTERNAL MEDICINE

## 2021-05-12 PROCEDURE — G8399 PT W/DXA RESULTS DOCUMENT: HCPCS | Performed by: INTERNAL MEDICINE

## 2021-05-12 RX ORDER — LETROZOLE 2.5 MG/1
2.5 TABLET, FILM COATED ORAL DAILY
Qty: 90 TAB | Refills: 3 | Status: SHIPPED | OUTPATIENT
Start: 2021-05-12 | End: 2022-04-06 | Stop reason: SDUPTHER

## 2021-05-12 NOTE — PROGRESS NOTES
[facility] [place] at [place] [address] [address], [address], [place]: [phone]  F: [phone]    Reason for Visit:   Sarah Moya is a 69 y.o. female who is seen for follow up of    1)  Paraproteinemia- Smoldering Myeloma  2)  Left breast ER pos VT pos (weak) HER 2 neg stage I Breast cancer- 12/2018    TREATMENT HISTORY  1/29/19-left lumpectomy with sentinel lymph node biopsy. 1.8 cm invasive lobular carcinoma, grade 1, 1 of one negative lymph nodes. Oncotype low risk. Completed RT  4/19 started Letrozole    History of Present Illness:   Patient is a 69 y.o. with seronegative rheumatoid arthritis and secondary polymyalgia rheumatica who is seen for follow-up of smoldering myeloma and lobular breast cancer. She has not followed up since 2019    She presents for follow-up on adjuvant Letrozole. She is tolerating this. She has no hot flashes, no joint aches. No new lumps or bumps. She has depression and follows with Dr. Aric Coleman. She did not have her labs done. She has no other complaints today. Past Medical History:   Diagnosis Date    [facility] [address]     Depression     Goiter     Hearing loss     bilateral    Hypertension     Hypothyroid     IBS (irritable bowel syndrome)     Menopause     Rheumatoid arthritis ([facility] [address]     S/P radiation therapy       Past Surgical History:   Procedure Laterality Date    HX BREAST LUMPECTOMY Left 1/29/2019    LEFT BREAST LUMPECTOMY WITH ULTRASOUND, LEFT BREAST SENTINEL NODE BIOPSY (11a) performed by Ulisses Chaudhary MD at [address] HX CATARACT REMOVAL Bilateral     HX [address]  7/97    brain lesion removed ? infectious      Social History     Tobacco Use    Smoking status: Never Smoker    Smokeless tobacco: Never Used   Substance Use Topics    Alcohol use:  Yes     Alcohol/week: 5.0 - 6.0 standard drinks     Types: 5 Glasses of wine per week      Family History   Problem Relation Age of Onset    Cancer Mother         lung    COPD Mother     Cancer Father         lung    Heart Disease Brother         CABGx3    Diabetes Brother     Heart Disease Brother     Diabetes Brother     COPD Brother     Liver Disease Brother         cirrhosis    Alcohol abuse Brother     Heart Disease Paternal Uncle         CAD    Heart Disease Paternal Grandmother         CAD    Anesth Problems Neg Hx      Current Outpatient Medications   Medication Sig    Insulin Syringe-Needle U-100 1 mL 30 gauge x 5/16 syrg 1 Each by Does Not Apply route Every Saturday. To be used for methotrexate solution    methotrexate 25 mg/mL chemo injection 1 mL by SubCUTAneous route Every Friday.  letrozole (FEMARA) 2.5 mg tablet Take 1 Tab by mouth daily.  LORazepam (ATIVAN) 1 mg tablet TAKE 1 & 1/2 (ONE & ONE-HALF) TABLETS BY MOUTH NIGHTLY    zolpidem (AMBIEN) 5 mg tablet TAKE 1 TABLET BY MOUTH NIGHTLY AS NEEDED FOR SLEEP MAX  DAILY  AMOUNT  IS  5MG    adalimumab (Humira,CF, Pen) 40 mg/0.4 mL injection pen 0.4 mL by SubCUTAneous route every fourteen (14) days. Indications: rheumatoid arthritis    levothyroxine (SYNTHROID) 200 mcg tablet TAKE ONE TABLET BY MOUTH ONCE DAILY BEFORE BREAKFAST    albuterol (PROVENTIL HFA, VENTOLIN HFA, PROAIR HFA) 90 mcg/actuation inhaler Take 2 Puffs by inhalation every four (4) hours as needed for Wheezing.  citalopram (CELEXA) 40 mg tablet TAKE 1 TABLET EVERY DAY    lisinopril-hydroCHLOROthiazide (PRINZIDE, ZESTORETIC) 10-12.5 mg per tablet TAKE 1 TABLET EVERY DAY    folic acid (FOLVITE) 1 mg tablet TAKE 1 TABLET BY MOUTH ONCE DAILY    diclofenac (VOLTAREN) 1 % gel Apply  to affected area four (4) times daily.  ondansetron (ZOFRAN ODT) 4 mg disintegrating tablet Take 1 Tab by mouth every eight (8) hours as needed for Nausea. No current facility-administered medications for this visit.        Allergies   Allergen Reactions    Keflex [Cephalexin] Rash    Sulfa (Sulfonamide Antibiotics) Rash    Monistat 1 [Tioconazole] Swelling        Review of Systems: A complete review of systems was obtained, negative except as described above. Physical Exam:     Visit Vitals  /68 (BP 1 Location: Right arm, BP Patient Position: Sitting)   Pulse 61   Temp 97.9 °F (36.6 °C)   Resp 18   Wt 188 lb (85.3 kg)   SpO2 98%   BMI 32.27 kg/m²     ECOG PS: 0  General: No distress  Eyes: PERRL, anicteric sclerae  HENT: Atraumatic  Neck: Supple  Breast: No palpable lumps or adenopathy, no skin changes. MS: Normal gait and station. Digits without clubbing or cyanosis. Skin: No rashes, ecchymoses, or petechiae. Normal temperature, turgor, and texture. Psych: Alert, oriented, appropriate affect, normal judgment/insight    Results:     Lab Results   Component Value Date/Time    WBC 6.2 04/28/2021 03:50 PM    HGB 14.1 04/28/2021 03:50 PM    HCT 42.1 04/28/2021 03:50 PM    PLATELET 013 26/19/4823 03:50 PM    MCV 90 04/28/2021 03:50 PM    ABS. NEUTROPHILS 3.9 04/28/2021 03:50 PM     Lab Results   Component Value Date/Time    Sodium 140 04/28/2021 03:50 PM    Potassium 4.1 04/28/2021 03:50 PM    Chloride 103 04/28/2021 03:50 PM    CO2 22 04/28/2021 03:50 PM    Glucose 110 (H) 04/28/2021 03:50 PM    BUN 15 04/28/2021 03:50 PM    Creatinine 0.94 04/28/2021 03:50 PM    GFR est AA 71 04/28/2021 03:50 PM    GFR est non-AA 61 04/28/2021 03:50 PM    Calcium 9.7 04/28/2021 03:50 PM     Lab Results   Component Value Date/Time    Bilirubin, total 0.5 04/28/2021 03:50 PM    ALT (SGPT) 23 04/28/2021 03:50 PM    Alk.  phosphatase 183 (H) 04/28/2021 03:50 PM    Protein, total 7.4 04/28/2021 03:50 PM    Albumin 4.5 04/28/2021 03:50 PM    Globulin 4.1 (H) 09/18/2020 12:07 AM     Lab Results   Component Value Date/Time    Ferritin 753 (H) 09/16/2020 06:14 AM     09/14/2020 11:18 PM    Beta-2 Microglobulin, serum 1.8 11/21/2018 02:47 PM    Sed rate (ESR) 5 04/28/2021 03:50 PM    C-Reactive Protein, Qt 2 04/28/2021 03:50 PM    TSH 0.024 (L) 07/07/2020 10:54 AM    M-Jere 0.2 (H) 01/21/2020 11:45 AM    M-Jere 0.4 (H) 08/08/2019 08:10 AM    Lipase 204 12/23/2016 01:31 PM     Lab Results   Component Value Date/Time    INR 1.1 09/18/2020 12:07 AM    aPTT 33.9 (H) 09/18/2020 12:07 AM    D-dimer 1.01 (H) 09/21/2020 02:41 AM    Fibrinogen 552 (H) 09/18/2020 12:07 AM     Records reviewed and summarized above. Pathology report(s) reviewed    Bone marrow: 12/19/18  Variably cellular marrow with mild monoclonal plasmacytosis. Trilineage hematopoiesis with maturation. See comment. Peripheral blood:   Unremarkable peripheral blood. See CBC data. Comment   The bone marrow is variably cellular ranging <10% to 60% to reveal mild monoclonal, kappa restricted plasmacytosis (10%). Trilineage hematopoiesis with adequate maturation is identified      Interpretation:   Del(1p): Not Detected   Dup(1q): Not Detected   Gains(5, 9, 15): DETECTED   Del(13q): Not Detected   Del(17p)(TP53): Not Detected (See Below)   IGH(Rearrangement): Not Detected   Testing performed by [facility] and interpreted by Vivian Cervantes M.D. Please see scanned outside report in [address] [facility] for complete details. Breast biopsy 12/3/18  Breast, left, needle core biopsy:   Invasive mammary carcinoma with ductal and lobular features   Favor Deedee histologic grade 1   Largest glass slide measurement of invasive carcinoma: 8.5 mm     1/29/19  Lumpectomy and SLN     TUMOR   Tumor Size: 1.8   Histologic Type:  Invasive lobular carcinoma   Histologic Grade (1-3)   Glandular (Acinar)/Tubular Differentiation: 3   Nuclear Pleomorphism: 1   Mitotic Rate: 1   Overall ndGndrndanddndend:nd nd2nd Lymph Nodes with Macrometastases (>2 mm): 0   Lymph Nodes with Micrometastases (>0.2 mm to 2 mm and/or >200 cells): 0 Number of Lymph Nodes with Isolated Tumor Cells (?0.2 mm and ?200 cells)#: 0   Total Number of Lymph Nodes Examined: 1   Number of Rolla Nodes Examined: 1   3. Breast, left, anterior margin, excision:   Atypical lobular hyperplasia     Radiology report(s) reviewed       Skeletal survey 11/28/18  IMPRESSION  IMPRESSION: Few small calvarial lucencies. No fractures or lytic lesions at risk  for fracture.     Mammogram 11/18    There is persistent architectural distortion within the middle third of the left  breast upper outer quadrant at 1:00. No suspicious calcifications are  identified. There is no skin thickening or nipple retraction.      Left breast ultrasound was performed of the 1:00 location, 5 cm from the nipple. There is a sonographic correlate to the architectural distortion, with  irregular hypoechoic shadowing tissue, measuring up to 9 mm. Ultrasound-guided  biopsy is recommended.     IMPRESSION  IMPRESSION:     1.  BI-RADS Assessment Category 4: Suspicious abnormality - Biopsy should be  performed in the absence of clinical contraindication. Architectural distortion  in the upper outer left breast, with a sonographic correlate. Ultrasound-guided  core needle biopsy is recommended. PET CT  Negative for any lytic lesions or uptake. Assessment:   1) Paraproteinemia- Smoldering Myeloma    Small IgA M spike  No end organ damage- Bone survey suggests few calvarial lucencies-but there are no corresponding lesions on the PET/CT. Together she likely has smoldering myeloma     Reviewed the spectrum of plasma cell disorders and implications of diagnosis today as she has not been seen for almost 2 years. She understands that smoldering myeloma has about a > 40% risk of transforming into myeloma in the ensuing years. At this time we will continue with observation. Last labs 8/8/19 stable- she did not have labs done for this visit and will get them done this week.      2) Left breast cancer  9 mm lesion on Mammo/USG  %, MS 3% HER2 mignon negative  Status post lumpectomy and sentinel lymph node biopsy on 1/29/19  T1cN0 invasive lobular carcinoma-stage I  Tumor is 1.8 cm  Grade 1  Margins with atypical lobular hyper  Oncotype DX low risk    Completed adjuvant RT  Tolerating adjuvant Letrozole since 4/19  Planning 5 years    Discussed lifestyle, she is practicing weight bearing  DEXA 6/19 reviewed and normal, she is taking VD and exercising     Exam unremarkable  Mammogram reviewed     3)  Rheumatoid arthritis  Per Dr. Ayse Wilkinson    4) Depression  Per Dr. Bettyjo Kehr:     · Continue letrozole   · Continue VD  · Counseled in Life style changes   · DEXA ordered - due 6/2021   · Mammogram due August 2021- ordered by breast surgery   · Cbc, cmp, gammopathy now  And then in 6 months  · Skeletal survey in 6 months ordered     RTC in 6 months with cbc, cmp, gammopathy    I appreciate the opportunity to participate in Ms. Yanet Cortez's care. I performed a history and physical examination of the patient and discussed his management with the NPP.  I reviewed the NPP note and agree with the documented findings and plan of care  Breast cancer on adjuvant Letrozole- tolerating well- Breast exam reassuring  Counseled on exercise and VD    SMM- Re counseled on disease physiology, observation and labs      Signed By: Juliet Colon MD

## 2021-05-12 NOTE — PROGRESS NOTES
Dariel Whittington is a 69 y.o. female    Chief Complaint   Patient presents with    Follow-up     medication refill       1. Have you been to the ER, urgent care clinic since your last visit? Hospitalized since your last visit? Yes, [place]    2. Have you seen or consulted any other health care providers outside of the [address] [place] [address] since your last visit? Include any pap smears or colon screening.  No     Patient states she has had both doses of the covid vaccine

## 2021-06-10 ENCOUNTER — HOSPITAL ENCOUNTER (OUTPATIENT)
Dept: MAMMOGRAPHY | Age: 71
Discharge: HOME | End: 2021-06-10
Attending: REGISTERED NURSE
Payer: MEDICARE

## 2021-06-10 DIAGNOSIS — C50.912 MALIGNANT NEOPLASM OF LEFT FEMALE BREAST, UNSPECIFIED ESTROGEN RECEPTOR STATUS, UNSPECIFIED SITE OF BREAST (HCC): ICD-10-CM

## 2021-06-10 DIAGNOSIS — Z78.0 POST-MENOPAUSAL: ICD-10-CM

## 2021-06-10 PROCEDURE — 77080 DXA BONE DENSITY AXIAL: CPT

## 2021-07-29 ENCOUNTER — TRANSCRIBE ORDER (OUTPATIENT)
Dept: SCHEDULING | Age: 71
End: 2021-07-29

## 2021-07-29 DIAGNOSIS — C50.412 MALIGNANT NEOPLASM OF UPPER-OUTER QUADRANT OF LEFT FEMALE BREAST (HCC): Primary | ICD-10-CM

## 2021-08-10 ENCOUNTER — OFFICE VISIT (OUTPATIENT)
Dept: RHEUMATOLOGY | Age: 71
End: 2021-08-10
Payer: MEDICARE

## 2021-08-10 VITALS
RESPIRATION RATE: 18 BRPM | WEIGHT: 176 LBS | SYSTOLIC BLOOD PRESSURE: 131 MMHG | HEART RATE: 72 BPM | TEMPERATURE: 98.3 F | DIASTOLIC BLOOD PRESSURE: 65 MMHG | BODY MASS INDEX: 30.21 KG/M2

## 2021-08-10 DIAGNOSIS — M35.3 PMR (POLYMYALGIA RHEUMATICA) (HCC): ICD-10-CM

## 2021-08-10 DIAGNOSIS — M06.09 SERONEGATIVE RHEUMATOID ARTHRITIS OF MULTIPLE SITES (HCC): Primary | ICD-10-CM

## 2021-08-10 DIAGNOSIS — J84.9 ILD (INTERSTITIAL LUNG DISEASE) (HCC): ICD-10-CM

## 2021-08-10 DIAGNOSIS — Z79.60 LONG-TERM USE OF IMMUNOSUPPRESSANT MEDICATION: ICD-10-CM

## 2021-08-10 DIAGNOSIS — E55.9 VITAMIN D DEFICIENCY: ICD-10-CM

## 2021-08-10 PROCEDURE — 1100F PTFALLS ASSESS-DOCD GE2>/YR: CPT | Performed by: INTERNAL MEDICINE

## 2021-08-10 PROCEDURE — G8399 PT W/DXA RESULTS DOCUMENT: HCPCS | Performed by: INTERNAL MEDICINE

## 2021-08-10 PROCEDURE — G8536 NO DOC ELDER MAL SCRN: HCPCS | Performed by: INTERNAL MEDICINE

## 2021-08-10 PROCEDURE — G8752 SYS BP LESS 140: HCPCS | Performed by: INTERNAL MEDICINE

## 2021-08-10 PROCEDURE — 1090F PRES/ABSN URINE INCON ASSESS: CPT | Performed by: INTERNAL MEDICINE

## 2021-08-10 PROCEDURE — G0463 HOSPITAL OUTPT CLINIC VISIT: HCPCS | Performed by: INTERNAL MEDICINE

## 2021-08-10 PROCEDURE — G8417 CALC BMI ABV UP PARAM F/U: HCPCS | Performed by: INTERNAL MEDICINE

## 2021-08-10 PROCEDURE — 3017F COLORECTAL CA SCREEN DOC REV: CPT | Performed by: INTERNAL MEDICINE

## 2021-08-10 PROCEDURE — G9717 DOC PT DX DEP/BP F/U NT REQ: HCPCS | Performed by: INTERNAL MEDICINE

## 2021-08-10 PROCEDURE — G9899 SCRN MAM PERF RSLTS DOC: HCPCS | Performed by: INTERNAL MEDICINE

## 2021-08-10 PROCEDURE — G8427 DOCREV CUR MEDS BY ELIG CLIN: HCPCS | Performed by: INTERNAL MEDICINE

## 2021-08-10 PROCEDURE — G8754 DIAS BP LESS 90: HCPCS | Performed by: INTERNAL MEDICINE

## 2021-08-10 PROCEDURE — 99215 OFFICE O/P EST HI 40 MIN: CPT | Performed by: INTERNAL MEDICINE

## 2021-08-10 PROCEDURE — 3288F FALL RISK ASSESSMENT DOCD: CPT | Performed by: INTERNAL MEDICINE

## 2021-08-10 RX ORDER — IBUPROFEN 800 MG/1
800 TABLET ORAL DAILY
COMMUNITY

## 2021-08-10 RX ORDER — UPADACITINIB 15 MG/1
15 TABLET, EXTENDED RELEASE ORAL DAILY
Qty: 28 TABLET | Refills: 0 | Status: SHIPPED | COMMUNITY
Start: 2021-08-10 | End: 2021-09-09 | Stop reason: SDUPTHER

## 2021-08-10 NOTE — LETTER
8/10/2021    Patient: Sarahi Soto   YOB: 1950   Date of Visit: 8/10/2021     Sarah Carrasquillo MD  [id number]  [address]    Dear Sarah Carrasquillo MD,      Thank you for referring Ms. Jairo Cortez to [address] [place] [address] for evaluation. My notes for this consultation are attached. If you have questions, please do not hesitate to call me. I look forward to following your patient along with you.       Sincerely,    Terry Ibarra MD

## 2021-08-10 NOTE — PROGRESS NOTES
REASON FOR VISIT    This is a follow-up visit for Ms. Mio Landers for     ICD-10-CM   1. Seronegative rheumatoid arthritis of multiple sites ([facility] M06.09   2. PMR (polymyalgia rheumatica) ([facility]) M35.3     Inflammatory arthritis phenotype includes:  Anti-CCP positive: no  Rheumatoid factor positive: no  Erosive disease: no  Extra-articular manifestations include: Polymyalgia Rheumatica, smoldering myeloma, interstitial lung disease     Immunosuppression Screening (1/12/2021): Quantiferon TB: negative  PPD:  Not performed  Hepatitis B: negative  Hepatitis C: negative    Therapy History includes:  Current DMARD therapy include: methotrexate 25 mg SubQ every Friday (4/29/2019 to 2/27/2021; stopped by mistake; 4/23/2021 to present), Humira 40 mg every days (2/27/2021 to 8/10/2021) - Rinvoq 15 mg daily (SAMPLE: 8/10/2021)  Prior DMARD therapy include: methotrexate 20 mg every Wednesday (10/15/2018 to 4/29/2019)  Discontinued DMARDs because of inefficacy: None  Discontinued DMARDs because of side effects: None    HISTORY OF PRESENT ILLNESS    Ms. Mio Landers returns for a follow-up. On her last visit, I asked her to resume methotrexate 25 mg subcutaneous every Friday and continued Humira 40 mg every 14 days, which she has taken with good tolerance. Today, she feels good. She reports that every day her body hurts (hands and knees). She thinks she needs knee replacement. She has morning stiffness in her left hand lasting 10 minutes without pain but she does feel like it is swollen. It is not bothering her right now and this is not daily. She is not sure if it is dietary. She has soreness around her right CMC. She denies morning pain or stiffness in her right hand.     She denies fever, weight loss, blurred vision, vision loss, oral ulcers, ankle swelling, dry cough, dyspnea, nausea, vomiting, dysphagia, abdominal pain, black or bloody stool, fall since last visit, rash, easy bruising and increased 13-Apr-2018 16:22 thirst.    Most recent toxicity monitoring by blood work was done on 4/28/2021 and did not reveal any significant adverse effects, except . I reviewed the patient's interval medical history, surgical history, medications, and allergies. The patient has not had any interval hospital admissions infections, or surgeries. REVIEW OF SYSTEMS    A comprehensive review of systems was performed and pertinent results are documented in the HPI, review of systems is otherwise non-contributory. PAST MEDICAL HISTORY    She has a past medical history of Breast cancer, left ([address] Depression, Goiter, Hearing loss, Hypertension, Hypothyroid, IBS (irritable bowel syndrome), Menopause, Rheumatoid arthritis ([address] and S/P radiation therapy. FAMILY HISTORY    Her family history includes Alcohol abuse in her brother; COPD in her brother and mother; Cancer in her father and mother; Diabetes in her brother and brother; Heart Disease in her brother, brother, paternal grandmother, and paternal uncle; Liver Disease in her brother. SOCIAL HISTORY    She reports that she has never smoked. She has never used smokeless tobacco. She reports current alcohol use of about 5.0 - 6.0 standard drinks of alcohol per week. She reports that she does not use drugs. MEDICATIONS    Current Outpatient Medications   Medication Sig    ibuprofen (MOTRIN) 800 mg tablet Take 800 mg by mouth daily.  upadacitinib (Rinvoq) 15 mg Tb24 Take 15 mg by mouth daily. Indications: rheumatoid arthritis    LORazepam (ATIVAN) 1 mg tablet TAKE 1 & 1/2 (ONE & ONE-HALF) TABLETS BY MOUTH NIGHTLY    letrozole (FEMARA) 2.5 mg tablet Take 1 Tab by mouth daily.  Insulin Syringe-Needle U-100 1 mL 30 gauge x 5/16 syrg 1 Each by Does Not Apply route Every Saturday. To be used for methotrexate solution    methotrexate 25 mg/mL chemo injection 1 mL by SubCUTAneous route Every Friday.     adalimumab (Humira,CF, Pen) 40 mg/0.4 mL injection pen 0.4 mL by SubCUTAneous route every fourteen (14) days. Indications: rheumatoid arthritis    levothyroxine (SYNTHROID) 200 mcg tablet TAKE ONE TABLET BY MOUTH ONCE DAILY BEFORE BREAKFAST    albuterol (PROVENTIL HFA, VENTOLIN HFA, PROAIR HFA) 90 mcg/actuation inhaler Take 2 Puffs by inhalation every four (4) hours as needed for Wheezing.  citalopram (CELEXA) 40 mg tablet TAKE 1 TABLET EVERY DAY    lisinopril-hydroCHLOROthiazide (PRINZIDE, ZESTORETIC) 10-12.5 mg per tablet TAKE 1 TABLET EVERY DAY    folic acid (FOLVITE) 1 mg tablet TAKE 1 TABLET BY MOUTH ONCE DAILY    diclofenac (VOLTAREN) 1 % gel Apply  to affected area four (4) times daily. No current facility-administered medications for this visit. ALLERGIES    Allergies   Allergen Reactions    Keflex [Cephalexin] Rash    Sulfa (Sulfonamide Antibiotics) Rash    Monistat 1 [Tioconazole] Swelling       PHYSICAL EXAMINATION    Visit Vitals  /65   Pulse 72   Temp 98.3 °F (36.8 °C)   Resp 18   Wt 176 lb (79.8 kg)   BMI 30.21 kg/m²     Body mass index is 30.21 kg/m². General: Patient is alert, oriented x 3, not in acute distress    HEENT:   Sclerae are not injected and appear moist.  There is no alopecia. Cardiovascular:  Heart is regular rate and rhythm, no murmurs. Chest:  Lungs are clear to auscultation bilaterally. Extremities:  Free of clubbing, cyanosis, edema    Musculoskeletal exam:  A comprehensive musculoskeletal exam was performed for all joints of each upper and lower extremity and assessed for swelling, tenderness and range of motion. Positive results are documented as below:    Positive Finkelstein's on left with tenderness along the ABL EPB (RESOLVED s/p release)  Bilateral Sebastián and [place] nodes.     Z-Deformities:   no  [place]ck Deformities:  no  Boutonierre's Deformities:  no  Ulnar Deviation:   no  MCP Subluxation:  no     Joint Count 8/10/2021 4/23/2021 3/10/2020 1/21/2020 7/29/2019 4/29/2019 1/28/2019   Patient pain (0-100) 50 60 40 80 5 15 40   MHAQ 0.625 0.25 0.375 1 0.375 1 0.5   Left shoulder - Tender - - - 1 - - -   Left shoulder - Swollen - - - 0 - - -   Left elbow - Tender - - - 1 - - -   Left elbow - Swollen - - - 0 - - -   Left wrist- Tender 1 1 1 1 0 1 0   Left wrist- Swollen 0 1 0 1 0 1 0   Left 1st MCP - Tender 1 0 - 1 - 1 -   Left 1st MCP - Swollen 1 1 - 1 - 1 -   Left 2nd MCP - Tender 1 0 1 1 - 1 -   Left 2nd MCP - Swollen 1 1 0 1 - 1 -   Left 3rd MCP - Tender 1 1 1 1 - 1 -   Left 3rd MCP - Swollen 0 0 0 0 - 1 -   Left 4th MCP - Tender - 1 1 1 - 1 -   Left 4th MCP - Swollen - 0 0 0 - 0 -   Left 5th MCP - Tender - - 1 1 - 1 -   Left 5th MCP - Swollen - - 0 0 - 0 -   Left thumb IP - Tender 1 - - - - - -   Left thumb IP - Swollen 0 - - - - - -   Left 2nd PIP - Tender 1 1 1 1 - - -   Left 2nd PIP - Swollen 1 1 0 1 - - -   Left 3rd PIP - Tender 1 1 1 1 - - -   Left 3rd PIP - Swollen 1 1 0 1 - 1 -   Left 4th PIP - Tender 1 - 1 - - 1 -   Left 4th PIP - Swollen 0 - 0 - - - -   Left 5th PIP - Tender 1 - 1 - - - -   Left 5th PIP - Swollen 0 - - - - - -   Right shoulder - Tender - - - 1 - - -   Right shoulder - Swollen - - - 0 - - -   Right elbow - Tender - - - 1 - - -   Right elbow - Swollen - - - 0 - - -   Right wrist- Tender 1 1 - 1 - 1 -   Right wrist- Swollen 0 1 - 1 - 1 -   Right 1st MCP - Tender - 1 - - - 1 -   Right 1st MCP - Swollen - 1 - - - 1 -   Right 2nd MCP - Tender - - - 1 - - -   Right 2nd MCP - Swollen - - - 0 - - -   Right 3rd MCP - Tender - - - - - 0 -   Right 3rd MCP - Swollen - - - - - 1 -   Right 4th MCP - Tender - - - 1 - 1 -   Right 4th MCP - Swollen - - - 0 - 1 -   Right 5th MCP - Tender - 0 - 1 - - -   Right 5th MCP - Swollen - 1 - 0 - - -   Right thumb IP - Tender - 1 - - - - -   Right thumb IP - Swollen - 1 - - - - -   Right 2nd PIP - Tender 1 1 - 1 - 1 -   Right 2nd PIP - Swollen 0 1 - 1 - 1 -   Right 3rd PIP - Tender - 1 - 1 - - -   Right 3rd PIP - Swollen - 1 - 1 - - -   Right 4th PIP - Tender - 0 1 1 - 1 -   Right 4th PIP - Swollen - 1 0 1 - 0 -   Right 5th PIP - Tender - - - 1 - 1 -   Right 5th PIP - Swollen - - - 1 - 1 -   Tender Joint Count (Total) 11 10 10 20 0 13 0   Swollen Joint Count (Total) 4 12 0 10 0 11 0   Physician Assessment (0-10) 3 3 2 5 0 4 0   Patient Assessment (0-10) 4 5 4 8 1 1.5 2.5   CDAI Total (calculated) 22 30 16 43 1 29.5 2.5       DATA REVIEW    Laboratory     Recent laboratory results were reviewed, summarized, and discussed with the patient. Imaging    Musculoskeletal Ultrasound    None    Radiographs    Skeletal Survey 11/28/2018: Few small calvarial lucencies. No fractures or lytic lesions at risk for fracture. Bilateral Shoulder 10/04/2018: RIGHT: There is prominent AC joint degeneration with spurring and loss of joint space. There is some mild deformity, chronic in nature of the tuberosity. No fracture or dislocation, no bony erosion, the bone is normal in mineral contents. No soft tissue calcifications. LEFT: The bone is normal in mineral contents. There is some mild chronic deformity of the tuberosity and AC joint degeneration. There are no soft tissue calcification bony erosion fracture or dislocation. Bilateral Hand 10/04/2018: RIGHT: no acute fracture or dislocation. Alignment is anatomic. Mild degenerative changes are seen in the interphalangeal joint of the thumb. No erosions are seen. No abnormality seen in the soft tissues. LEFT: no acute fracture or dislocation. Alignment is anatomic. Moderate to severe degenerative changes are present in the first [facility] joint. A cystic lucency in the ace of the first metacarpal likely represents a subchondral cyst. No clear erosions are seen. No abnormality seen in the soft tissues. Bilateral Foot 10/04/2018: RIGHT:  no fracture or other acute osseous or articular abnormality. A small plantar calcaneal heel spur is noted. No erosions or joint space narrowing are present. The soft tissues are within normal limits. LEFT: no acute fracture or dislocation. Alignment is anatomic. A small plantar calcaneal heel spur is noted. No erosions or joint space narrowing are present. . No abnormality is seen in the soft tissues.     CT Imaging    CT Chest without contrast 2/04/2021: CHEST WALL: Left breast lesion is stable in appearance. THYROID: No nodule. MEDIASTINUM: No mass or lymphadenopathy. RIKY: No mass or lymphadenopathy. THORACIC AORTA: Atherosclerotic change. MAIN PULMONARY ARTERY: Normal in caliber. TRACHEA/BRONCHI: Patent. ESOPHAGUS: No wall thickening or dilatation. HEART: Trace atherosclerotic change in the LAD. Normal in size. PLEURA: No effusion or pneumothorax. LUNGS: No suspicious pulmonary mass or nodule. Trace scarring in the lingula. INCIDENTALLY IMAGED UPPER ABDOMEN: Hepatic steatosis. Postcholecystectomy. BONES: Chronic mild loss of vertebral body height. Anterior disc osteophytes. PET/CT Scan 1/24/2019: HEAD/NECK: No apparent foci of abnormal hypermetabolism. Cerebral evaluation is limited by normal intense activity. CHEST: No foci of abnormal hypermetabolism. The known small breast cancer at 12:00 in the left breast demonstrates no increased metabolic activity. ABDOMEN/PELVIS: No foci of abnormal hypermetabolism. SKELETON: No foci of abnormal hypermetabolism in the axial and visualized appendicular skeleton. Lower extremities:. Imaging of the lower extremities is unremarkable. There is muscular activity noted. CT Head without contrast 9/13/2017: Prior right occipital craniectomy. Encephalomalacia in the right cerebellar region. . There is no significant white matter disease. There is no intracranial hemorrhage, extra-axial collection, mass, mass effect or midline shift. The basilar cisterns are open. No acute infarct is identified. The visualized portions of the paranasal sinuses and mastoid air cells are clear    MR Imaging    MRI Breasts with and without contrast 1/07/2019:  There is minimal background parenchymal enhancement and the breasts are almost entirely fat. A few sub-5 mm foci of enhancement are scattered bilaterally. Right breast: No suspicious enhancing foci. No axillary or internal mammary chain lymphadenopathy. Left breast: A vague area of enhancement around the biopsy clip in the anterior third at 12:00 does not reach threshold enhancement. This measures approximately 9 x 9 mm, and correlates well with the small area of architectural distortion on mammography and subtle architectural distortion and shadowing on ultrasound. No axillary or internal mammary chain lymphadenopathy. DXA     DXA 6/10/2021: (excluded None) lumbar spine L1-L4 T score -1.2 (BMD 1.050 g/cm2), left femoral neck T score: -0.9 (0.906 g/cm2), left total hip T score: -0.6 (0.933 g/cm2), right femoral neck T score: -0.9 (0.908 g/cm2), right total hip T score: -0.3 (0.966 g/cm2), and distal one third left radius T score -1.0 (BMD 0.789 g/cm2). FRAX score 11.1 % probability in 10 years for major osteoporotic fracture and 1.3 % 10 year probability of hip fracture. DXA 5/12/2015: (excluded distal radius) lumbar spine L1-L4 T score 0.4 (BMD 1.241 g/cm2), left femoral neck T score: 0.5 (1.109 g/cm2), left total hip T score: 1.0 (1.139 g/cm2), right femoral neck T score: 0.2 (1.062 g/cm2), right total hip T score: 1.1 (1.145 g/cm2). FRAX score N/A % probability in 10 years for major osteoporotic fracture and N/A % 10 year probability of hip fracture. PATHOLOGY    Lymph node, left axilla, #1, sentinel node excision 1/29/2019: No evidence for malignancy in one node (0/1). Breast, left, lumpectomy 1/29/2019:  Invasive lobular carcinoma. Lobular carcinoma in situ     Bone Marrow Biopsy 12/19/2018: Bone marrow: Variably cellular marrow with mild monoclonal plasmacytosis. Trilineage hematopoiesis with maturation. Breast, left, needle core biopsy 12/03/2018:  Invasive mammary carcinoma with ductal and lobular features Favor [place] histologic grade 1. Largest glass slide measurement of invasive carcinoma: 8.5 mm. ER pos [place] pos (weak) HER 2 neg. PROCEDURE     Kenalog 80 mg IM. (01/21/20)    ASSESSMENT AND PLAN    This is a follow-up visit for Ms. Shira Smith. 1) Seronegative Rheumatoid Arthritis with secondary Polymyalgia Rheumatica and Interstitial Lung Disease. She is maintaind on methotrexate 25 mg SubQ every Wednesday and Humira 40 mg every 14 days with good tolerance. She continues to have left hand symptoms and tenderness. She no longer has interstitial lung disease symptoms. Her CDAI was 22 (previously 30, 6, 43, 1, 29.5, 2.5, 30, 41.5) with 11 tender and 4 swollen joints, consistent with high disease activity. I gave her a sample of Rinvoq and asked her to let me know how she feels before the end of the second bottle. I asked her to hold Humira while on Rinvoq but to continue methotrexate. Labs ordered. 2) Polymyalgia Rheumatica. This resolved. 3) ILD. See #1. 4) Long Term Use of Immunosuppressants. The patient remains on high risk immunomodulatory medications (methotrexate, Humira --> Rinvoq) and requires frequent toxicity monitoring by blood work to evaluate for toxicities. Respective labs were ordered (see below orders for details). 5) Smoldering Myeloma/MGUS. Immunofixation shows IgA monoclonal protein with kappa light chain. M-spike 0.2. She was evaluated by Dr. Forest Shell. Bone marrow biopsy showed smoldering myeloma. 6) Vitamin D Deficiency. Her vitamin D level was 24.9 (previously 16.8, 25.7, 20.1, 40.0, 32.0, 21.7). She is on weekly ergocalciferol 50,000. I will check her level. 7) Bilateral iliotibial Band Syndrome. This was not an active issue today. I will refer her to physical therapy if she is symptomatic. 8) Left Breast Cancer Stage 1. She was scheduled for lumpectomy. 9) De Quervain's Tenosynovitis of Left.  I had referred her to orthopedic, Dr. Aziza Burciaga, per her request.     10) Bilateral Lower Extremity Weakness. This was not an active issue today. I had referred her to physical therapy. 11) Bilateral Knee Pain. I asked her to see Dr. Aminah Mustafa. The patient voiced understanding of the aforementioned assessment and plan. A total of 42 minutes was spent on this visit, reviewing interval notes, interval testing results, ordering tests, refilling medications, documenting the findings in the note, patient education, counseling, and coordination of care as described above. All questions asked and answered.     TODAY'S ORDERS    Orders Placed This Encounter    CBC WITH AUTOMATED DIFF    METABOLIC PANEL, COMPREHENSIVE    C REACTIVE PROTEIN, QT    SED RATE (ESR)    ADALIMUMAB+AB    METHOTREXATE POLYGLUTAMATES    VITAMIN D, 25 HYDROXY    upadacitinib (Rinvoq) 15 mg Tb24     Future Appointments   Date Time Provider Goeff Duggan   8/11/2021  3:15 PM [facility] 1 [facility]    8/11/2021  4:00 PM [facility] 1 [facility]   11/3/2021  9:00 AM [facility] XR OP 3 [facility]   11/11/2021  9:40 AM Martin Hernandez MD [facility] BS AMB   11/12/2021  8:45 AM Winifred Thomason  [facility] BS AMB     Hany Rand MD, [address] [facility]    Adult Rheumatology   Rheumatology Ultrasound Certified  [facility]  A Part of [facility], [address]   Phone [phone]  Fax [phone]

## 2021-08-11 ENCOUNTER — HOSPITAL ENCOUNTER (OUTPATIENT)
Dept: MRI IMAGING | Age: 71
Discharge: HOME | End: 2021-08-11
Attending: RADIOLOGY
Payer: MEDICARE

## 2021-08-11 DIAGNOSIS — C50.412 MALIGNANT NEOPLASM OF UPPER-OUTER QUADRANT OF LEFT FEMALE BREAST (HCC): ICD-10-CM

## 2021-08-11 PROCEDURE — 74011636320 HC RX REV CODE- 636/320

## 2021-08-11 PROCEDURE — 72158 MRI LUMBAR SPINE W/O & W/DYE: CPT

## 2021-08-11 PROCEDURE — 72157 MRI CHEST SPINE W/O & W/DYE: CPT

## 2021-08-11 PROCEDURE — A9576 INJ PROHANCE MULTIPACK: HCPCS

## 2021-08-11 RX ADMIN — GADOTERIDOL 15 ML: 279.3 INJECTION, SOLUTION INTRAVENOUS at 16:27

## 2021-08-20 LAB
25(OH)D3+25(OH)D2 SERPL-MCNC: 26.6 NG/ML (ref 30–100)
ADALIMUMAB DRUG LEVEL, 503866: 5.2 UG/ML
ALBUMIN SERPL-MCNC: 4.2 G/DL (ref 3.7–4.7)
ALBUMIN/GLOB SERPL: 1.5 {RATIO} (ref 1.2–2.2)
ALP SERPL-CCNC: 164 IU/L (ref 48–121)
ALT SERPL-CCNC: 25 IU/L (ref 0–32)
ANTI-ADALIMUMAB ANTIBODY, 503867: 374 NG/ML
AST SERPL-CCNC: 23 IU/L (ref 0–40)
BASOPHILS # BLD AUTO: 0.1 X10E3/UL (ref 0–0.2)
BASOPHILS NFR BLD AUTO: 1 %
BILIRUB SERPL-MCNC: 0.4 MG/DL (ref 0–1.2)
BUN SERPL-MCNC: 18 MG/DL (ref 8–27)
BUN/CREAT SERPL: 27 (ref 12–28)
CALCIUM SERPL-MCNC: 9.3 MG/DL (ref 8.7–10.3)
CHLORIDE SERPL-SCNC: 104 MMOL/L (ref 96–106)
CO2 SERPL-SCNC: 23 MMOL/L (ref 20–29)
CREAT SERPL-MCNC: 0.66 MG/DL (ref 0.57–1)
CRP SERPL-MCNC: 4 MG/L (ref 0–10)
EOSINOPHIL # BLD AUTO: 0.1 X10E3/UL (ref 0–0.4)
EOSINOPHIL NFR BLD AUTO: 2 %
ERYTHROCYTE [DISTWIDTH] IN BLOOD BY AUTOMATED COUNT: 13.8 % (ref 11.7–15.4)
ERYTHROCYTE [SEDIMENTATION RATE] IN BLOOD BY WESTERGREN METHOD: 15 MM/HR (ref 0–40)
GLOBULIN SER CALC-MCNC: 2.8 G/DL (ref 1.5–4.5)
GLUCOSE SERPL-MCNC: 108 MG/DL (ref 65–99)
HCT VFR BLD AUTO: 38.2 % (ref 34–46.6)
HGB BLD-MCNC: 12.5 G/DL (ref 11.1–15.9)
IMM GRANULOCYTES # BLD AUTO: 0 X10E3/UL (ref 0–0.1)
IMM GRANULOCYTES NFR BLD AUTO: 1 %
LYMPHOCYTES # BLD AUTO: 0.9 X10E3/UL (ref 0.7–3.1)
LYMPHOCYTES NFR BLD AUTO: 17 %
MCH RBC QN AUTO: 30.1 PG (ref 26.6–33)
MCHC RBC AUTO-ENTMCNC: 32.7 G/DL (ref 31.5–35.7)
MCV RBC AUTO: 92 FL (ref 79–97)
METHOTREXATE PG1: 60 NMOL/L RBC
METHOTREXATE PG2: 25 NMOL/L RBC
METHOTREXATE PG3: 130 NMOL/L RBC
METHOTREXATE PG4: 109 NMOL/L RBC
METHOTREXATE PG5: 67 NMOL/L RBC
METHOTREXATE-PG TOTAL: 391 NMOL/L RBC
MONOCYTES # BLD AUTO: 0.6 X10E3/UL (ref 0.1–0.9)
MONOCYTES NFR BLD AUTO: 11 %
MTXPGSRA INTERPRETATION: NORMAL
NEUTROPHILS # BLD AUTO: 3.6 X10E3/UL (ref 1.4–7)
NEUTROPHILS NFR BLD AUTO: 68 %
PLATELET # BLD AUTO: 298 X10E3/UL (ref 150–450)
POTASSIUM SERPL-SCNC: 3.9 MMOL/L (ref 3.5–5.2)
PROT SERPL-MCNC: 7 G/DL (ref 6–8.5)
RBC # BLD AUTO: 4.15 X10E6/UL (ref 3.77–5.28)
SODIUM SERPL-SCNC: 139 MMOL/L (ref 134–144)
WBC # BLD AUTO: 5.3 X10E3/UL (ref 3.4–10.8)

## 2021-08-20 NOTE — PROGRESS NOTES
The results were reviewed. , improved. High anti-Humira antibody. Vitamin D 26.6. All remaining labs are normal/negative.

## 2021-09-09 ENCOUNTER — DOCUMENTATION ONLY (OUTPATIENT)
Dept: PHARMACY | Age: 71
End: 2021-09-09

## 2021-09-09 ENCOUNTER — PATIENT MESSAGE (OUTPATIENT)
Dept: RHEUMATOLOGY | Age: 71
End: 2021-09-09

## 2021-09-09 ENCOUNTER — DOCUMENTATION ONLY (OUTPATIENT)
Dept: RHEUMATOLOGY | Age: 71
End: 2021-09-09

## 2021-09-09 DIAGNOSIS — M06.09 SERONEGATIVE RHEUMATOID ARTHRITIS OF MULTIPLE SITES (HCC): Primary | ICD-10-CM

## 2021-09-09 RX ORDER — UPADACITINIB 15 MG/1
15 TABLET, EXTENDED RELEASE ORAL DAILY
Qty: 28 EACH | Refills: 0 | Status: SHIPPED | COMMUNITY
Start: 2021-09-09 | End: 2021-10-14 | Stop reason: SDUPTHER

## 2021-09-09 RX ORDER — UPADACITINIB 15 MG/1
15 TABLET, EXTENDED RELEASE ORAL DAILY
Qty: 30 EACH | Refills: 11 | Status: SHIPPED | OUTPATIENT
Start: 2021-09-09 | End: 2022-02-15 | Stop reason: SDUPTHER

## 2021-09-09 NOTE — TELEPHONE ENCOUNTER
Neli Angulo RN 9/9/2021 9:18 AM EDT      ----- Message -----  From: Malick Sy  Sent: 9/9/2021 7:51 AM EDT  To: [facility] Nurse Pool  Subject: Non-Urgent Medical Question     Hands and wrist are better , thank you

## 2021-09-26 RX ORDER — CITALOPRAM 40 MG/1
TABLET, FILM COATED ORAL
Qty: 90 TABLET | Refills: 3 | Status: SHIPPED | OUTPATIENT
Start: 2021-09-26

## 2021-09-26 RX ORDER — LISINOPRIL AND HYDROCHLOROTHIAZIDE 10; 12.5 MG/1; MG/1
TABLET ORAL
Qty: 90 TABLET | Refills: 3 | Status: SHIPPED | OUTPATIENT
Start: 2021-09-26 | End: 2022-09-13

## 2021-09-29 DIAGNOSIS — M06.09 SERONEGATIVE RHEUMATOID ARTHRITIS OF MULTIPLE SITES (HCC): ICD-10-CM

## 2021-09-30 RX ORDER — METHOTREXATE 25 MG/ML
INJECTION, SOLUTION INTRA-ARTERIAL; INTRAMUSCULAR; INTRAVENOUS
Qty: 12 ML | Refills: 1 | Status: SHIPPED | OUTPATIENT
Start: 2021-09-30 | End: 2022-02-15 | Stop reason: SDUPTHER

## 2021-10-14 RX ORDER — UPADACITINIB 15 MG/1
15 TABLET, EXTENDED RELEASE ORAL DAILY
Qty: 28 EACH | Refills: 0 | Status: SHIPPED | COMMUNITY
Start: 2021-10-14 | End: 2021-11-11 | Stop reason: SDUPTHER

## 2021-11-03 ENCOUNTER — HOSPITAL ENCOUNTER (OUTPATIENT)
Dept: GENERAL RADIOLOGY | Age: 71
Discharge: HOME | End: 2021-11-03
Attending: REGISTERED NURSE
Payer: MEDICARE

## 2021-11-03 DIAGNOSIS — D47.2 MGUS (MONOCLONAL GAMMOPATHY OF UNKNOWN SIGNIFICANCE): ICD-10-CM

## 2021-11-03 PROCEDURE — 77075 RADEX OSSEOUS SURVEY COMPL: CPT

## 2021-11-08 ENCOUNTER — TELEPHONE (OUTPATIENT)
Dept: ONCOLOGY | Age: 71
End: 2021-11-08

## 2021-11-11 ENCOUNTER — OFFICE VISIT (OUTPATIENT)
Dept: RHEUMATOLOGY | Age: 71
End: 2021-11-11
Payer: MEDICARE

## 2021-11-11 VITALS
DIASTOLIC BLOOD PRESSURE: 80 MMHG | RESPIRATION RATE: 18 BRPM | HEART RATE: 76 BPM | WEIGHT: 178 LBS | SYSTOLIC BLOOD PRESSURE: 131 MMHG | HEIGHT: 64 IN | BODY MASS INDEX: 30.39 KG/M2

## 2021-11-11 DIAGNOSIS — Z11.59 ENCOUNTER FOR SCREENING FOR OTHER VIRAL DISEASES: ICD-10-CM

## 2021-11-11 DIAGNOSIS — Z79.60 LONG-TERM USE OF IMMUNOSUPPRESSANT MEDICATION: ICD-10-CM

## 2021-11-11 DIAGNOSIS — R79.9 ABNORMAL FINDING OF BLOOD CHEMISTRY, UNSPECIFIED: ICD-10-CM

## 2021-11-11 DIAGNOSIS — M35.3 PMR (POLYMYALGIA RHEUMATICA) (HCC): ICD-10-CM

## 2021-11-11 DIAGNOSIS — M06.09 SERONEGATIVE RHEUMATOID ARTHRITIS OF MULTIPLE SITES (HCC): Primary | ICD-10-CM

## 2021-11-11 DIAGNOSIS — E55.9 VITAMIN D DEFICIENCY: ICD-10-CM

## 2021-11-11 PROCEDURE — G8752 SYS BP LESS 140: HCPCS | Performed by: INTERNAL MEDICINE

## 2021-11-11 PROCEDURE — 1090F PRES/ABSN URINE INCON ASSESS: CPT | Performed by: INTERNAL MEDICINE

## 2021-11-11 PROCEDURE — G9899 SCRN MAM PERF RSLTS DOC: HCPCS | Performed by: INTERNAL MEDICINE

## 2021-11-11 PROCEDURE — G8536 NO DOC ELDER MAL SCRN: HCPCS | Performed by: INTERNAL MEDICINE

## 2021-11-11 PROCEDURE — G8399 PT W/DXA RESULTS DOCUMENT: HCPCS | Performed by: INTERNAL MEDICINE

## 2021-11-11 PROCEDURE — G9717 DOC PT DX DEP/BP F/U NT REQ: HCPCS | Performed by: INTERNAL MEDICINE

## 2021-11-11 PROCEDURE — 99215 OFFICE O/P EST HI 40 MIN: CPT | Performed by: INTERNAL MEDICINE

## 2021-11-11 PROCEDURE — G8427 DOCREV CUR MEDS BY ELIG CLIN: HCPCS | Performed by: INTERNAL MEDICINE

## 2021-11-11 PROCEDURE — G8417 CALC BMI ABV UP PARAM F/U: HCPCS | Performed by: INTERNAL MEDICINE

## 2021-11-11 PROCEDURE — 1101F PT FALLS ASSESS-DOCD LE1/YR: CPT | Performed by: INTERNAL MEDICINE

## 2021-11-11 PROCEDURE — G8754 DIAS BP LESS 90: HCPCS | Performed by: INTERNAL MEDICINE

## 2021-11-11 PROCEDURE — G0463 HOSPITAL OUTPT CLINIC VISIT: HCPCS | Performed by: INTERNAL MEDICINE

## 2021-11-11 PROCEDURE — 3017F COLORECTAL CA SCREEN DOC REV: CPT | Performed by: INTERNAL MEDICINE

## 2021-11-11 RX ORDER — ERGOCALCIFEROL 1.25 MG/1
50000 CAPSULE ORAL
Qty: 12 CAPSULE | Refills: 4 | Status: SHIPPED | OUTPATIENT
Start: 2021-11-11

## 2021-11-11 NOTE — LETTER
11/11/2021    Patient: Emily Brochure   YOB: 1950   Date of Visit: 11/11/2021     Shobha Muse MD  [id number]  [place]    Dear Shobha Muse MD,      Thank you for referring Ms. Ananth Cortez to [address] [place] [address] for evaluation. My notes for this consultation are attached. If you have questions, please do not hesitate to call me. I look forward to following your patient along with you.       Sincerely,    Izabella Chandler MD

## 2021-11-11 NOTE — PROGRESS NOTES
REASON FOR VISIT    This is a follow-up visit for Ms. Yousif Lorenzo for     ICD-10-CM   1. Seronegative rheumatoid arthritis of multiple sites ([facility] M06.09   2. PMR (polymyalgia rheumatica) ([facility]) M35.3     Inflammatory arthritis phenotype includes:  Anti-CCP positive: no  Rheumatoid factor positive: no  Erosive disease: no  Extra-articular manifestations include: Polymyalgia Rheumatica, smoldering myeloma, interstitial lung disease     Immunosuppression Screening (1/12/2021): Quantiferon TB: negative  PPD:  Not performed  Hepatitis B: negative  Hepatitis C: negative    Therapy History includes:  Current DMARD therapy include: methotrexate 25 mg SubQ every Friday (4/29/2019 to 2/27/2021; stopped by mistake; 4/23/2021 to present), Humira 40 mg every days (2/27/2021 to 8/10/2021) - Rinvoq 15 mg daily (SAMPLE: 8/10/2021 to present)  Prior DMARD therapy include: methotrexate 20 mg every Wednesday (10/15/2018 to 4/29/2019)  Discontinued DMARDs because of inefficacy: None  Discontinued DMARDs because of side effects: None    HISTORY OF PRESENT ILLNESS    Ms. Yousif Lorenzo returns for a follow-up. On her last visit, I continued methotrexate 25 mg subcutaneous every Friday and but due to loss of benefit on Humira 40 mg every 14 days, I gave her a sample of Rinvoq and asked her to let me know how she feels before the end of the second bottle. I asked her to hold Humira while on Rinvoq but to continue methotrexate. She felt better on Rinvoq but has not received her doses from [employer] yet. Her application was approved but she has not received them yet. Today, she feels good. She denies pain, swelling, or stiffness in her joints while on Rinvoq. She has complains of pain in her knees going up and down the stairs. If she misses methotrexate, she feels worse.      She denies fever, weight loss, blurred vision, vision loss, oral ulcers, ankle swelling, dry cough, dyspnea, nausea, vomiting, dysphagia, abdominal pain, black or bloody stool, fall since last visit, rash, easy bruising and increased thirst.    Most recent toxicity monitoring by blood work was done on 8/11/2021 and did not reveal any significant adverse effects, except . I reviewed the patient's interval medical history, surgical history, medications, and allergies. The patient has not had any interval hospital admissions infections, or surgeries. REVIEW OF SYSTEMS    A comprehensive review of systems was performed and pertinent results are documented in the HPI, review of systems is otherwise non-contributory. PAST MEDICAL HISTORY    She has a past medical history of Breast cancer, left ([address] Depression, Goiter, Hearing loss, Hypertension, Hypothyroid, IBS (irritable bowel syndrome), Menopause, Rheumatoid arthritis ([address] and S/P radiation therapy. FAMILY HISTORY    Her family history includes Alcohol abuse in her brother; COPD in her brother and mother; Cancer in her father and mother; Diabetes in her brother and brother; Heart Disease in her brother, brother, paternal grandmother, and paternal uncle; Liver Disease in her brother. SOCIAL HISTORY    She reports that she has never smoked. She has never used smokeless tobacco. She reports current alcohol use of about 5.0 - 6.0 standard drinks of alcohol per week. She reports that she does not use drugs. MEDICATIONS    Current Outpatient Medications   Medication Sig    ergocalciferol (ERGOCALCIFEROL) 1,250 mcg (50,000 unit) capsule Take 1 Capsule by mouth every seven (7) days. Indications: vitamin D deficiency (high dose therapy)    methotrexate 25 mg/mL chemo injection INJECT 1 ML SUBCUTANEOUSLY EVERY FRIDAY    citalopram (CELEXA) 40 mg tablet TAKE 1 TABLET EVERY DAY    lisinopril-hydroCHLOROthiazide (PRINZIDE, ZESTORETIC) 10-12.5 mg per tablet TAKE 1 TABLET EVERY DAY    upadacitinib (Rinvoq) 15 mg Tb24 Take 15 mg by mouth daily.  Indications: rheumatoid arthritis    ibuprofen (MOTRIN) 800 mg tablet Take 800 mg by mouth daily.  LORazepam (ATIVAN) 1 mg tablet TAKE 1 & 1/2 (ONE & ONE-HALF) TABLETS BY MOUTH NIGHTLY    letrozole (FEMARA) 2.5 mg tablet Take 1 Tab by mouth daily.  Insulin Syringe-Needle U-100 1 mL 30 gauge x 5/16 syrg 1 Each by Does Not Apply route Every Saturday. To be used for methotrexate solution    levothyroxine (SYNTHROID) 200 mcg tablet TAKE ONE TABLET BY MOUTH ONCE DAILY BEFORE BREAKFAST    albuterol (PROVENTIL HFA, VENTOLIN HFA, PROAIR HFA) 90 mcg/actuation inhaler Take 2 Puffs by inhalation every four (4) hours as needed for Wheezing.  folic acid (FOLVITE) 1 mg tablet TAKE 1 TABLET BY MOUTH ONCE DAILY    diclofenac (VOLTAREN) 1 % gel Apply  to affected area four (4) times daily. No current facility-administered medications for this visit. ALLERGIES    Allergies   Allergen Reactions    Keflex [Cephalexin] Rash    Sulfa (Sulfonamide Antibiotics) Rash    Monistat 1 [Tioconazole] Swelling       PHYSICAL EXAMINATION    Visit Vitals  /80   Pulse 76   Resp 18   Ht 5' 4\" (1.626 m)   Wt 178 lb (80.7 kg)   BMI 30.55 kg/m²     Body mass index is 30.55 kg/m². General: Patient is alert, oriented x 3, not in acute distress    HEENT:   Sclerae are not injected and appear moist.  There is no alopecia. Cardiovascular:  Heart is regular rate and rhythm, no murmurs. Chest:  Lungs are clear to auscultation bilaterally. Extremities:  Free of clubbing, cyanosis, edema    Musculoskeletal exam:  A comprehensive musculoskeletal exam was performed for all joints of each upper and lower extremity and assessed for swelling, tenderness and range of motion. Positive results are documented as below:    Positive Finkelstein's on left with tenderness along the ABL EPB (RESOLVED s/p release)  Bilateral Sebastián and [place] nodes.     Z-Deformities:   no  [place] Neck Deformities:  no  Boutonierre's Deformities:  no  Ulnar Deviation:   no  MCP Subluxation:  no Joint Count 11/11/2021 8/10/2021 4/23/2021 3/10/2020 1/21/2020 7/29/2019 4/29/2019   Patient pain (0-100) 35 50 60 40 80 5 15   MHAQ 0.375 0.625 0.25 0.375 1 0.375 1   Left shoulder - Tender - - - - 1 - -   Left shoulder - Swollen - - - - 0 - -   Left elbow - Tender - - - - 1 - -   Left elbow - Swollen - - - - 0 - -   Left wrist- Tender 0 1 1 1 1 0 1   Left wrist- Swollen 1 0 1 0 1 0 1   Left 1st MCP - Tender - 1 0 - 1 - 1   Left 1st MCP - Swollen - 1 1 - 1 - 1   Left 2nd MCP - Tender - 1 0 1 1 - 1   Left 2nd MCP - Swollen - 1 1 0 1 - 1   Left 3rd MCP - Tender - 1 1 1 1 - 1   Left 3rd MCP - Swollen - 0 0 0 0 - 1   Left 4th MCP - Tender - - 1 1 1 - 1   Left 4th MCP - Swollen - - 0 0 0 - 0   Left 5th MCP - Tender - - - 1 1 - 1   Left 5th MCP - Swollen - - - 0 0 - 0   Left thumb IP - Tender - 1 - - - - -   Left thumb IP - Swollen - 0 - - - - -   Left 2nd PIP - Tender 1 1 1 1 1 - -   Left 2nd PIP - Swollen 0 1 1 0 1 - -   Left 3rd PIP - Tender - 1 1 1 1 - -   Left 3rd PIP - Swollen - 1 1 0 1 - 1   Left 4th PIP - Tender - 1 - 1 - - 1   Left 4th PIP - Swollen - 0 - 0 - - -   Left 5th PIP - Tender - 1 - 1 - - -   Left 5th PIP - Swollen - 0 - - - - -   Right shoulder - Tender - - - - 1 - -   Right shoulder - Swollen - - - - 0 - -   Right elbow - Tender - - - - 1 - -   Right elbow - Swollen - - - - 0 - -   Right wrist- Tender 1 1 1 - 1 - 1   Right wrist- Swollen 1 0 1 - 1 - 1   Right 1st MCP - Tender - - 1 - - - 1   Right 1st MCP - Swollen - - 1 - - - 1   Right 2nd MCP - Tender - - - - 1 - -   Right 2nd MCP - Swollen - - - - 0 - -   Right 3rd MCP - Tender - - - - - - 0   Right 3rd MCP - Swollen - - - - - - 1   Right 4th MCP - Tender - - - - 1 - 1   Right 4th MCP - Swollen - - - - 0 - 1   Right 5th MCP - Tender - - 0 - 1 - -   Right 5th MCP - Swollen - - 1 - 0 - -   Right thumb IP - Tender - - 1 - - - -   Right thumb IP - Swollen - - 1 - - - -   Right 2nd PIP - Tender - 1 1 - 1 - 1   Right 2nd PIP - Swollen - 0 1 - 1 - 1 Right 3rd PIP - Tender - - 1 - 1 - -   Right 3rd PIP - Swollen - - 1 - 1 - -   Right 4th PIP - Tender - - 0 1 1 - 1   Right 4th PIP - Swollen - - 1 0 1 - 0   Right 5th PIP - Tender - - - - 1 - 1   Right 5th PIP - Swollen - - - - 1 - 1   Tender Joint Count (Total) 2 11 10 10 20 0 13   Swollen Joint Count (Total) 2 4 12 0 10 0 11   Physician Assessment (0-10) 2 3 3 2 5 0 4   Patient Assessment (0-10) 6 4 5 4 8 1 1.5   CDAI Total (calculated) 12 22 30 16 43 1 29.5       DATA REVIEW    Laboratory     Recent laboratory results were reviewed, summarized, and discussed with the patient. Imaging    Musculoskeletal Ultrasound    None    Radiographs    Skeletal Survey 11/28/2018: Few small calvarial lucencies. No fractures or lytic lesions at risk for fracture. Bilateral Shoulder 10/04/2018: RIGHT: There is prominent AC joint degeneration with spurring and loss of joint space. There is some mild deformity, chronic in nature of the tuberosity. No fracture or dislocation, no bony erosion, the bone is normal in mineral contents. No soft tissue calcifications. LEFT: The bone is normal in mineral contents. There is some mild chronic deformity of the tuberosity and AC joint degeneration. There are no soft tissue calcification bony erosion fracture or dislocation. Bilateral Hand 10/04/2018: RIGHT: no acute fracture or dislocation. Alignment is anatomic. Mild degenerative changes are seen in the interphalangeal joint of the thumb. No erosions are seen. No abnormality seen in the soft tissues. LEFT: no acute fracture or dislocation. Alignment is anatomic. Moderate to severe degenerative changes are present in the first [facility] [place] joint. A cystic lucency in the ace of the first metacarpal likely represents a subchondral cyst. No clear erosions are seen. No abnormality seen in the soft tissues. Bilateral Foot 10/04/2018: RIGHT:  no fracture or other acute osseous or articular abnormality.  A small plantar calcaneal heel spur is noted. No erosions or joint space narrowing are present. The soft tissues are within normal limits. LEFT: no acute fracture or dislocation. Alignment is anatomic. A small plantar calcaneal heel spur is noted. No erosions or joint space narrowing are present. . No abnormality is seen in the soft tissues.     CT Imaging    CT Chest without contrast 2/04/2021: CHEST WALL: Left breast lesion is stable in appearance. THYROID: No nodule. MEDIASTINUM: No mass or lymphadenopathy. RIKY: No mass or lymphadenopathy. THORACIC AORTA: Atherosclerotic change. MAIN PULMONARY ARTERY: Normal in caliber. TRACHEA/BRONCHI: Patent. ESOPHAGUS: No wall thickening or dilatation. HEART: Trace atherosclerotic change in the LAD. Normal in size. PLEURA: No effusion or pneumothorax. LUNGS: No suspicious pulmonary mass or nodule. Trace scarring in the lingula. INCIDENTALLY IMAGED UPPER ABDOMEN: Hepatic steatosis. Postcholecystectomy. BONES: Chronic mild loss of vertebral body height. Anterior disc osteophytes. PET/CT Scan 1/24/2019: HEAD/NECK: No apparent foci of abnormal hypermetabolism. Cerebral evaluation is limited by normal intense activity. CHEST: No foci of abnormal hypermetabolism. The known small breast cancer at 12:00 in the left breast demonstrates no increased metabolic activity. ABDOMEN/PELVIS: No foci of abnormal hypermetabolism. SKELETON: No foci of abnormal hypermetabolism in the axial and visualized appendicular skeleton. Lower extremities:. Imaging of the lower extremities is unremarkable. There is muscular activity noted. CT Head without contrast 9/13/2017: Prior right occipital craniectomy. Encephalomalacia in the right cerebellar region. . There is no significant white matter disease. There is no intracranial hemorrhage, extra-axial collection, mass, mass effect or midline shift. The basilar cisterns are open. No acute infarct is identified.  The visualized portions of the paranasal sinuses and mastoid air cells are clear    MR Imaging    MRI Breasts with and without contrast 1/07/2019: There is minimal background parenchymal enhancement and the breasts are almost entirely fat. A few sub-5 mm foci of enhancement are scattered bilaterally. Right breast: No suspicious enhancing foci. No axillary or internal mammary chain lymphadenopathy. Left breast: A vague area of enhancement around the biopsy clip in the anterior third at 12:00 does not reach threshold enhancement. This measures approximately 9 x 9 mm, and correlates well with the small area of architectural distortion on mammography and subtle architectural distortion and shadowing on ultrasound. No axillary or internal mammary chain lymphadenopathy. DXA     DXA 6/10/2021: (excluded None) lumbar spine L1-L4 T score -1.2 (BMD 1.050 g/cm2), left femoral neck T score: -0.9 (0.906 g/cm2), left total hip T score: -0.6 (0.933 g/cm2), right femoral neck T score: -0.9 (0.908 g/cm2), right total hip T score: -0.3 (0.966 g/cm2), and distal one third left radius T score -1.0 (BMD 0.789 g/cm2). FRAX score 11.1 % probability in 10 years for major osteoporotic fracture and 1.3 % 10 year probability of hip fracture. DXA 5/12/2015: (excluded distal radius) lumbar spine L1-L4 T score 0.4 (BMD 1.241 g/cm2), left femoral neck T score: 0.5 (1.109 g/cm2), left total hip T score: 1.0 (1.139 g/cm2), right femoral neck T score: 0.2 (1.062 g/cm2), right total hip T score: 1.1 (1.145 g/cm2). FRAX score N/A % probability in 10 years for major osteoporotic fracture and N/A % 10 year probability of hip fracture. PATHOLOGY    Lymph node, left axilla, #1, sentinel node excision 1/29/2019: No evidence for malignancy in one node (0/1). Breast, left, lumpectomy 1/29/2019:  Invasive lobular carcinoma. Lobular carcinoma in situ     Bone Marrow Biopsy 12/19/2018: Bone marrow: Variably cellular marrow with mild monoclonal plasmacytosis. Trilineage hematopoiesis with maturation.      Breast, left, needle core biopsy 12/03/2018: Invasive mammary carcinoma with ductal and lobular features Favor [place] histologic grade 1. Largest glass slide measurement of invasive carcinoma: 8.5 mm. ER pos MA pos (weak) HER 2 neg. PROCEDURE     Kenalog 80 mg IM. (01/21/20)    ASSESSMENT AND PLAN    This is a follow-up visit for Ms. Kait Singh. 1) Seronegative Rheumatoid Arthritis with secondary Polymyalgia Rheumatica and Interstitial Lung Disease. She is maintaind on methotrexate 25 mg SubQ every Wednesday and Rinvoq 15 mg daily, which she has taken with good tolerance. She was approved form [employer] assistance but has not contacted [employer] yet. I asked her to do so today. She has missed methotrexate before and noticed worsening pain. She has been feeling much better on Rinvoq. I gave her the number to [employer] to contact. Her interstitial lung disease resolved. Her CDAI was 12 (previously 22, 30, 6, 43, 1, 29.5, 2.5, 30, 41.5) with 2 tender and 2 swollen joints, consistent with moderate disease activity. I will continue treatment. Labs ordered. 2) Polymyalgia Rheumatica. This resolved. 3) ILD. This resolved. 4) Long Term Use of Immunosuppressants. The patient remains on high risk immunomodulatory medications (methotrexate, Rinvoq) and requires frequent toxicity monitoring by blood work to evaluate for toxicities. Respective labs were ordered (see below orders for details). 5) Smoldering Myeloma/MGUS. Immunofixation shows IgA monoclonal protein with kappa light chain. M-spike 0.2. She was evaluated by Dr. Miguel Angel Martinez. Bone marrow biopsy showed smoldering myeloma. 6) Vitamin D Deficiency. Her vitamin D level was 26.6 (previously 24.9, 16.8, 25.7, 20.1, 40.0, 32.0, 21.7). She has not been adherent on weekly ergocalciferol 50,000. I refilled it. 7) Bilateral iliotibial Band Syndrome. This was not an active issue today. I will refer her to physical therapy if she is symptomatic.      8) Left Breast Cancer Stage 1.      9) De Quervain's Tenosynovitis of Left. I had referred her to orthopedic, Dr. Meeta Bowen, per her request.     10) Bilateral Lower Extremity Weakness. This was not an active issue today. I had referred her to physical therapy. 11) Bilateral Knee Pain. I asked her to see Dr. Greta Law. The patient voiced understanding of the aforementioned assessment and plan. A total of 42 minutes was spent on this visit, reviewing interval notes, interval testing results, ordering tests, refilling medications, documenting the findings in the note, patient education, counseling, and coordination of care as described above. All questions asked and answered.     TODAY'S ORDERS    Orders Placed This Encounter    QUANTIFERON-TB GOLD PLUS    LEFLUNOMIDE METABOLITE    METHOTREXATE POLYGLUTAMATES    CBC WITH AUTOMATED DIFF    METABOLIC PANEL, COMPREHENSIVE    LIPID PANEL    HEP B SURFACE AG    HEP B SURFACE AB    HBV CORE AB, IGG/IGM    HEPATITIS C AB    HEPATITIS BE AB    ergocalciferol (ERGOCALCIFEROL) 1,250 mcg (50,000 unit) capsule     Future Appointments   Date Time Provider Geoff Duggan   2/15/2022  8:20 AM Raymond Moya MD AOCR BS AMB     Elias Cook MD, [address]    Adult Rheumatology   Rheumatology Ultrasound Certified  [facility]  A [facility] of [facility], [address] [place] [address]   Phone [phone]  Fax [phone]

## 2021-12-08 DIAGNOSIS — C50.912 MALIGNANT NEOPLASM OF LEFT FEMALE BREAST, UNSPECIFIED ESTROGEN RECEPTOR STATUS, UNSPECIFIED SITE OF BREAST (HCC): Primary | ICD-10-CM

## 2021-12-08 DIAGNOSIS — Z85.3 HISTORY OF BREAST CANCER: ICD-10-CM

## 2021-12-14 ENCOUNTER — OFFICE VISIT (OUTPATIENT)
Dept: INTERNAL MEDICINE CLINIC | Age: 71
End: 2021-12-14
Payer: MEDICARE

## 2021-12-14 VITALS
RESPIRATION RATE: 20 BRPM | HEIGHT: 64 IN | WEIGHT: 182 LBS | BODY MASS INDEX: 31.07 KG/M2 | SYSTOLIC BLOOD PRESSURE: 132 MMHG | HEART RATE: 86 BPM | TEMPERATURE: 98.2 F | DIASTOLIC BLOOD PRESSURE: 58 MMHG | OXYGEN SATURATION: 98 %

## 2021-12-14 DIAGNOSIS — Z23 NEED FOR VACCINATION: ICD-10-CM

## 2021-12-14 DIAGNOSIS — Z00.00 MEDICARE ANNUAL WELLNESS VISIT, SUBSEQUENT: Primary | ICD-10-CM

## 2021-12-14 DIAGNOSIS — M54.9 UPPER BACK PAIN: ICD-10-CM

## 2021-12-14 PROCEDURE — G8417 CALC BMI ABV UP PARAM F/U: HCPCS | Performed by: PHYSICIAN ASSISTANT

## 2021-12-14 PROCEDURE — G8536 NO DOC ELDER MAL SCRN: HCPCS | Performed by: PHYSICIAN ASSISTANT

## 2021-12-14 PROCEDURE — G0463 HOSPITAL OUTPT CLINIC VISIT: HCPCS | Performed by: PHYSICIAN ASSISTANT

## 2021-12-14 PROCEDURE — G8427 DOCREV CUR MEDS BY ELIG CLIN: HCPCS | Performed by: PHYSICIAN ASSISTANT

## 2021-12-14 PROCEDURE — G9899 SCRN MAM PERF RSLTS DOC: HCPCS | Performed by: PHYSICIAN ASSISTANT

## 2021-12-14 PROCEDURE — G8399 PT W/DXA RESULTS DOCUMENT: HCPCS | Performed by: PHYSICIAN ASSISTANT

## 2021-12-14 PROCEDURE — G9717 DOC PT DX DEP/BP F/U NT REQ: HCPCS | Performed by: PHYSICIAN ASSISTANT

## 2021-12-14 PROCEDURE — 3017F COLORECTAL CA SCREEN DOC REV: CPT | Performed by: PHYSICIAN ASSISTANT

## 2021-12-14 PROCEDURE — 1101F PT FALLS ASSESS-DOCD LE1/YR: CPT | Performed by: PHYSICIAN ASSISTANT

## 2021-12-14 PROCEDURE — 99213 OFFICE O/P EST LOW 20 MIN: CPT | Performed by: PHYSICIAN ASSISTANT

## 2021-12-14 PROCEDURE — G0439 PPPS, SUBSEQ VISIT: HCPCS | Performed by: PHYSICIAN ASSISTANT

## 2021-12-14 PROCEDURE — 1090F PRES/ABSN URINE INCON ASSESS: CPT | Performed by: PHYSICIAN ASSISTANT

## 2021-12-14 PROCEDURE — G8752 SYS BP LESS 140: HCPCS | Performed by: PHYSICIAN ASSISTANT

## 2021-12-14 PROCEDURE — G8754 DIAS BP LESS 90: HCPCS | Performed by: PHYSICIAN ASSISTANT

## 2021-12-14 RX ORDER — CYCLOBENZAPRINE HCL 5 MG
5 TABLET ORAL
Qty: 14 TABLET | Refills: 0 | Status: SHIPPED | OUTPATIENT
Start: 2021-12-14 | End: 2022-04-05 | Stop reason: ALTCHOICE

## 2021-12-14 NOTE — PATIENT INSTRUCTIONS
Medicare Wellness Visit, Female     The best way to live healthy is to have a lifestyle where you eat a well-balanced diet, exercise regularly, limit alcohol use, and quit all forms of tobacco/nicotine, if applicable. Regular preventive services are another way to keep healthy. Preventive services (vaccines, screening tests, monitoring & exams) can help personalize your care plan, which helps you manage your own care. Screening tests can find health problems at the earliest stages, when they are easiest to treat. [facility] follows the current, evidence-based guidelines published by the [facility] T[facility] Singh ([facility]) when recommending preventive services for our patients. Because we follow these guidelines, sometimes recommendations change over time as research supports it. (For example, mammograms used to be recommended annually. Even though Medicare will still pay for an annual mammogram, the newer guidelines recommend a mammogram every two years for women of average risk). Of course, you and your doctor may decide to screen more often for some diseases, based on your risk and your co-morbidities (chronic disease you are already diagnosed with). Preventive services for you include:  - Medicare offers their members a free annual wellness visit, which is time for you and your primary care provider to discuss and plan for your preventive service needs. Take advantage of this benefit every year!  -All adults over the age of 72 should receive the recommended pneumonia vaccines. Current USPSTF guidelines recommend a series of two vaccines for the best pneumonia protection.   -All adults should have a flu vaccine yearly and a tetanus vaccine every 10 years.   -All adults age 48 and older should receive the shingles vaccines (series of two vaccines).       -All adults age 38-68 who are overweight should have a diabetes screening test once every three years.   -All adults born between [birth year] and 1966 should be screened once for Hepatitis C.  -Other screening tests and preventive services for persons with diabetes include: an eye exam to screen for diabetic retinopathy, a kidney function test, a foot exam, and stricter control over your cholesterol.   -Cardiovascular screening for adults with routine risk involves an electrocardiogram (ECG) at intervals determined by your doctor.   -Colorectal cancer screenings should be done for adults age 54-65 with no increased risk factors for colorectal cancer. There are a number of acceptable methods of screening for this type of cancer. Each test has its own benefits and drawbacks. Discuss with your doctor what is most appropriate for you during your annual wellness visit. The different tests include: colonoscopy (considered the best screening method), a fecal occult blood test, a fecal DNA test, and sigmoidoscopy.    -A bone mass density test is recommended when a woman turns 65 to screen for osteoporosis. This test is only recommended one time, as a screening. Some providers will use this same test as a disease monitoring tool if you already have osteoporosis. -Breast cancer screenings are recommended every other year for women of normal risk, age 54-69.  -Cervical cancer screenings for women over age 72 are only recommended with certain risk factors.      Here is a list of your current Health Maintenance items (your personalized list of preventive services) with a due date:  Health Maintenance Due   Topic Date Due    DTaP/Tdap/Td  (1 - Tdap) Never done    Pneumococcal Vaccine 65+ years at Risk (2 of 2 - PCV13) 05/05/2016    Shingles Vaccine (2 of 2) 11/05/2020    Annual Well Visit  05/22/2021    Mammogram  08/28/2021    Yearly Flu Vaccine (1) 09/01/2021

## 2021-12-14 NOTE — PROGRESS NOTES
1. Have you been to the ER, urgent care clinic since your last visit? Hospitalized since your last visit? No    2. Have you seen or consulted any other health care providers outside of the [address] [place] [address] since your last visit? Include any pap smears or colon screening. No      Health Maintenance Due   Topic Date Due    COVID-19 Vaccine (1) Never done    DTaP/Tdap/Td series (1 - Tdap) Never done    Pneumococcal 65+ yrs at Risk Vaccine (2 of 2 - PCV13) 05/05/2016    Shingrix Vaccine Age 50> (2 of 2) 11/05/2020    Medicare Yearly Exam  05/22/2021    Breast Cancer Screen Mammogram  08/28/2021    Flu Vaccine (1) 09/01/2021     Patient here for Medicare Wellness. Patient c/o pain in her back, its been better since she is on vacation this week, will follow up with Dr. Caty Teixeira if it continues. Patient has been informed of any other discussion outside the parameters of the physical could result in other charges including a co pay, patient verbalized understanding.

## 2021-12-14 NOTE — PROGRESS NOTES
This is the Subsequent Medicare Annual Wellness Exam, performed 12 months or more after the Initial AWV or the last Subsequent AWV    I have reviewed the patient's medical history in detail and updated the computerized patient record. Assessment/Plan   Education and counseling provided:  Are appropriate based on today's review and evaluation  Pneumococcal Vaccine    1. Upper back pain  -     cyclobenzaprine (FLEXERIL) 5 mg tablet; Take 1 Tablet by mouth nightly., Normal, Disp-14 Tablet, R-0  2. Need for vaccination  -     pneumococcal 13 rita conj dip (PREVNAR-13) 0.5 mL syrg injection; 0.5 mL by IntraMUSCular route once for 1 dose., Print, Disp-0.5 mL, R-0       Depression Risk Factor Screening     3 most recent PHQ Screens 12/14/2021   Little interest or pleasure in doing things Not at all   Feeling down, depressed, irritable, or hopeless Not at all   Total Score PHQ 2 0       Alcohol Risk Screen    Do you average more than 1 drink per night or more than 7 drinks a week:  No    On any one occasion in the past three months have you have had more than 3 drinks containing alcohol:  No        Functional Ability and Level of Safety    Hearing: The patient wears hearing aids. patient got new hearing aids. She believes she got them May 2021. Activities of Daily Living: The home contains: rails on back porch to get up the steps. she did both but primarliy use the back door. Patient does total self care      Ambulation: with mild difficulty and patient states she could probably use a knee and hip replacement at some point     Fall Risk:  Fall Risk Assessment, last 12 mths 11/11/2021   Able to walk? Yes   Fall in past 12 months? 0   Do you feel unsteady? -   Are you worried about falling 0   Is TUG test greater than 12 seconds? -   Is the gait abnormal? -   Number of falls in past 12 months -   Fall with injury?  -      Abuse Screen:  Patient is not abused       Cognitive Screening    Has your family/caregiver stated any concerns about your memory: no         Health Maintenance Due     Health Maintenance Due   Topic Date Due    DTaP/Tdap/Td series (1 - Tdap) Never done    Pneumococcal 65+ yrs at Risk Vaccine (2 of 2 - PCV13) 05/05/2016    Shingrix Vaccine Age 50> (2 of 2) 11/05/2020    Medicare Yearly Exam  05/22/2021    Breast Cancer Screen Mammogram  08/28/2021    Flu Vaccine (1) 09/01/2021       Patient Care Team   Patient Care Team:  Roberto Rodriguez MD as PCP - General (Internal Medicine)  Roberto Rodriguez MD as PCP - [address] [place] [address] [facility]  Ifeanyi Castro MD (Gastroenterology)  Luis Antonio Montes MD (Breast Surgery)  Luis Antonio Montes MD (Breast Surgery)  Scott Giordano MD as Physician (Radiation Oncology)  Zohra Floyd MD as Consulting Provider (Pulmonary Disease)  Zohra Floyd MD as Consulting Provider (Pulmonary Disease)  Lili Espinoza MD as Consulting Provider (Pulmonary Disease)  Gina Dubon MD (Rheumatology)    History     Patient Active Problem List   Diagnosis Code    Hypothyroidism E03.9    IBS (irritable bowel syndrome) K58.9    HTN (hypertension) I10    Graves disease E05.00    Moderate major depression ([address] F32.1    PMR (polymyalgia rheumatica) (HCC) M35.3    Iliotibial band syndrome of both sides M76.31, M76.32    Long term current use of non-steroidal anti-inflammatories (NSAID) Z79.1    Seronegative rheumatoid arthritis of multiple sites ([facility] M06.09    Monoclonal gammopathy of unknown significance (MGUS) D47.2    Vitamin D deficiency E55.9    Severe obesity ([address] E66.01    Long-term use of immunosuppressant medication Z79.899    Malignant neoplasm of left female breast ([address] C50.912    Multiple myeloma ([address] C90.00    Smoldering myeloma ([address] C90.00    Viral pneumonia J12.9    Pneumonia J18.9    Suspected COVID-19 virus infection Z20.822     Past Medical History:   Diagnosis Date    Breast cancer, left ([address]     Depression     Goiter     Hearing loss     bilateral    Hypertension     Hypothyroid     IBS (irritable bowel syndrome)     Menopause     Rheumatoid arthritis ([facility]     S/P radiation therapy       Past Surgical History:   Procedure Laterality Date    HX BREAST LUMPECTOMY Left 1/29/2019    LEFT BREAST LUMPECTOMY WITH ULTRASOUND, LEFT BREAST SENTINEL NODE BIOPSY (11a) performed by Tim Martin MD at [address] HX CATARACT REMOVAL Bilateral     [address]X [address]  7/97    brain lesion removed ? infectious     Current Outpatient Medications   Medication Sig Dispense Refill    cyclobenzaprine (FLEXERIL) 5 mg tablet Take 1 Tablet by mouth nightly. 14 Tablet 0    pneumococcal 13 rita conj dip (PREVNAR-13) 0.5 mL syrg injection 0.5 mL by IntraMUSCular route once for 1 dose. 0.5 mL 0    LORazepam (ATIVAN) 1 mg tablet TAKE 1 & 1/2 (ONE & ONE-HALF) TABLETS BY MOUTH NIGHTLY - last fill before visit. Please call office to schedule visit. 45 Tablet 0    ergocalciferol (ERGOCALCIFEROL) 1,250 mcg (50,000 unit) capsule Take 1 Capsule by mouth every seven (7) days. Indications: vitamin D deficiency (high dose therapy) 12 Capsule 4    methotrexate 25 mg/mL chemo injection INJECT 1 ML SUBCUTANEOUSLY EVERY FRIDAY 12 mL 1    citalopram (CELEXA) 40 mg tablet TAKE 1 TABLET EVERY DAY 90 Tablet 3    lisinopril-hydroCHLOROthiazide (PRINZIDE, ZESTORETIC) 10-12.5 mg per tablet TAKE 1 TABLET EVERY DAY 90 Tablet 3    upadacitinib (Rinvoq) 15 mg Tb24 Take 15 mg by mouth daily. Indications: rheumatoid arthritis 30 Each 11    ibuprofen (MOTRIN) 800 mg tablet Take 800 mg by mouth daily.  letrozole (FEMARA) 2.5 mg tablet Take 1 Tab by mouth daily. 90 Tab 3    Insulin Syringe-Needle U-100 1 mL 30 gauge x 5/16 syrg 1 Each by Does Not Apply route Every Saturday.  To be used for methotrexate solution 12 Syringe 5    levothyroxine (SYNTHROID) 200 mcg tablet TAKE ONE TABLET BY MOUTH ONCE DAILY BEFORE BREAKFAST 90 Tab 3    albuterol (PROVENTIL HFA, VENTOLIN HFA, PROAIR HFA) 90 mcg/actuation inhaler Take 2 Puffs by inhalation every four (4) hours as needed for Wheezing. 1 Inhaler 2    folic acid (FOLVITE) 1 mg tablet TAKE 1 TABLET BY MOUTH ONCE DAILY 90 Tab 1    diclofenac (VOLTAREN) 1 % gel Apply  to affected area four (4) times daily. 100 g 2     Allergies   Allergen Reactions    Keflex [Cephalexin] Rash    Sulfa (Sulfonamide Antibiotics) Rash    Monistat 1 [Tioconazole] Swelling       Family History   Problem Relation Age of Onset    Cancer Mother         lung    COPD Mother     Cancer Father         lung    Heart Disease Brother         CABGx3    Diabetes Brother     Heart Disease Brother     Diabetes Brother     COPD Brother     Liver Disease Brother         cirrhosis    Alcohol abuse Brother     Heart Disease Paternal Uncle         CAD    Heart Disease Paternal Grandmother         CAD    Anesth Problems Neg Hx      Social History     Tobacco Use    Smoking status: Never Smoker    Smokeless tobacco: Never Used   Substance Use Topics    Alcohol use:  Yes     Alcohol/week: 5.0 - 6.0 standard drinks     Types: 5 Glasses of wine per week         Sherron Rios PA-C

## 2021-12-14 NOTE — PROGRESS NOTES
Chief Complaint   Patient presents with    Annual Wellness Visit    Back Pain     she is a 69y.o. year old female who presents for evaluation. The patient presents to the office for her medicare wellness and for back pain. She reports she has been having back pain recently. She reports she believes it is due to repetitive motion at work. She reports the pain is located primarily on the right side near her shoulder blade toward the middle of her back. Reviewed PmHx, RxHx, FmHx, SocHx, AllgHx and updated and dated in the chart. Review of Systems - negative except as listed above    Objective:     Vitals:    12/14/21 1351   BP: (!) 132/58   Pulse: 86   Resp: 20   Temp: 98.2 °F (36.8 °C)   TempSrc: Temporal   SpO2: 98%   Weight: 182 lb (82.6 kg)   Height: 5' 4\" (1.626 m)     Physical Examination: General appearance - alert, well appearing, and in no distress  Mental status - normal mood, behavior, speech, dress, motor activity, and thought processes  Musculoskeletal - thoracic- tenderness with palpation along the paravertebralis. Mild hypertonicity right >left  Skin - sun damage skin    Assessment/ Plan:   Diagnoses and all orders for this visit:    1. Upper back pain  -     cyclobenzaprine (FLEXERIL) 5 mg tablet; Take 1 Tablet by mouth nightly. 2. Need for vaccination  -     pneumococcal 13 rita conj dip (PREVNAR-13) 0.5 mL syrg injection; 0.5 mL by IntraMUSCular route once for 1 dose. I would like for the patient to try a low dose flexeril to see if this helps her back pain. A prescription has been given to the patient to get her prevnar vaccine. The patient is to let us know if her back pains persist. She may benefit from some physical therapy. I have discussed the diagnosis with the patient and the intended plan as seen in the above orders. The patient has received an after-visit summary and questions were answered concerning future plans.      Medication Side Effects and Warnings were discussed with patient: yes  Patient Labs were reviewed and or requested: yes  Patient Past Records were reviewed and or requested  yes    Sherron Rios PA-C

## 2021-12-17 ENCOUNTER — HOSPITAL ENCOUNTER (OUTPATIENT)
Dept: MAMMOGRAPHY | Age: 71
Discharge: HOME | End: 2021-12-17
Attending: REGISTERED NURSE
Payer: MEDICARE

## 2021-12-17 DIAGNOSIS — C50.912 MALIGNANT NEOPLASM OF LEFT FEMALE BREAST, UNSPECIFIED ESTROGEN RECEPTOR STATUS, UNSPECIFIED SITE OF BREAST (HCC): ICD-10-CM

## 2021-12-17 DIAGNOSIS — Z85.3 HISTORY OF BREAST CANCER: ICD-10-CM

## 2021-12-17 PROCEDURE — 77062 BREAST TOMOSYNTHESIS BI: CPT

## 2021-12-20 ENCOUNTER — HOSPITAL ENCOUNTER (OUTPATIENT)
Dept: GENERAL RADIOLOGY | Age: 71
Discharge: HOME | End: 2021-12-20
Payer: MEDICARE

## 2021-12-20 ENCOUNTER — TRANSCRIBE ORDER (OUTPATIENT)
Dept: GENERAL RADIOLOGY | Age: 71
End: 2021-12-20

## 2021-12-20 DIAGNOSIS — C50.919 BREAST CANCER (HCC): Primary | ICD-10-CM

## 2021-12-20 DIAGNOSIS — C50.919 BREAST CANCER (HCC): ICD-10-CM

## 2021-12-20 DIAGNOSIS — M89.8X1 CHRONIC SCAPULAR PAIN: ICD-10-CM

## 2021-12-20 DIAGNOSIS — G89.29 CHRONIC SCAPULAR PAIN: ICD-10-CM

## 2021-12-20 PROCEDURE — 71101 X-RAY EXAM UNILAT RIBS/CHEST: CPT

## 2021-12-20 PROCEDURE — 73010 X-RAY EXAM OF SHOULDER BLADE: CPT

## 2021-12-23 ENCOUNTER — TRANSCRIBE ORDER (OUTPATIENT)
Dept: SCHEDULING | Age: 71
End: 2021-12-23

## 2021-12-23 DIAGNOSIS — C50.412 MALIGNANT NEOPLASM OF UPPER-OUTER QUADRANT OF LEFT FEMALE BREAST (HCC): Primary | ICD-10-CM

## 2021-12-23 DIAGNOSIS — Z17.0 ESTROGEN RECEPTOR POSITIVE: Primary | ICD-10-CM

## 2021-12-24 LAB
ALBUMIN SERPL-MCNC: 4.3 G/DL (ref 3.7–4.7)
ALBUMIN/GLOB SERPL: 1.4 {RATIO} (ref 1.2–2.2)
ALP SERPL-CCNC: 155 IU/L (ref 44–121)
ALT SERPL-CCNC: 20 IU/L (ref 0–32)
AST SERPL-CCNC: 19 IU/L (ref 0–40)
BASOPHILS # BLD AUTO: 0.1 X10E3/UL (ref 0–0.2)
BASOPHILS NFR BLD AUTO: 1 %
BILIRUB SERPL-MCNC: 0.4 MG/DL (ref 0–1.2)
BUN SERPL-MCNC: 14 MG/DL (ref 8–27)
BUN/CREAT SERPL: 24 (ref 12–28)
CALCIUM SERPL-MCNC: 9.8 MG/DL (ref 8.7–10.3)
CHLORIDE SERPL-SCNC: 102 MMOL/L (ref 96–106)
CHOLEST SERPL-MCNC: 173 MG/DL (ref 100–199)
CO2 SERPL-SCNC: 21 MMOL/L (ref 20–29)
CREAT SERPL-MCNC: 0.58 MG/DL (ref 0.57–1)
EOSINOPHIL # BLD AUTO: 0.1 X10E3/UL (ref 0–0.4)
EOSINOPHIL NFR BLD AUTO: 2 %
ERYTHROCYTE [DISTWIDTH] IN BLOOD BY AUTOMATED COUNT: 13.2 % (ref 11.7–15.4)
GAMMA INTERFERON BACKGROUND BLD IA-ACNC: 0 IU/ML
GLOBULIN SER CALC-MCNC: 3 G/DL (ref 1.5–4.5)
GLUCOSE SERPL-MCNC: 84 MG/DL (ref 65–99)
HBV CORE AB SERPL QL IA: NEGATIVE
HBV CORE IGM SERPL QL IA: NEGATIVE
HBV E AB SERPL QL IA: NEGATIVE
HBV SURFACE AB SER QL: REACTIVE
HBV SURFACE AG SERPL QL IA: NEGATIVE
HCT VFR BLD AUTO: 40 % (ref 34–46.6)
HCV AB S/CO SERPL IA: 0.1 S/CO RATIO (ref 0–0.9)
HDLC SERPL-MCNC: 61 MG/DL
HGB BLD-MCNC: 13.2 G/DL (ref 11.1–15.9)
IMM GRANULOCYTES # BLD AUTO: 0 X10E3/UL (ref 0–0.1)
IMM GRANULOCYTES NFR BLD AUTO: 1 %
LDLC SERPL CALC-MCNC: 90 MG/DL (ref 0–99)
LEFLUNOMIDE SERPL-MCNC: <20 NG/ML
LYMPHOCYTES # BLD AUTO: 1.3 X10E3/UL (ref 0.7–3.1)
LYMPHOCYTES NFR BLD AUTO: 21 %
M TB IFN-G BLD-IMP: NEGATIVE
M TB IFN-G CD4+ BCKGRND COR BLD-ACNC: 0 IU/ML
MCH RBC QN AUTO: 31.8 PG (ref 26.6–33)
MCHC RBC AUTO-ENTMCNC: 33 G/DL (ref 31.5–35.7)
MCV RBC AUTO: 96 FL (ref 79–97)
METHOTREXATE PG1: 47 NMOL/L RBC
METHOTREXATE PG2: 28 NMOL/L RBC
METHOTREXATE PG3: 126 NMOL/L RBC
METHOTREXATE PG4: 104 NMOL/L RBC
METHOTREXATE PG5: 72 NMOL/L RBC
METHOTREXATE-PG TOTAL: 377 NMOL/L RBC
MITOGEN IGNF BLD-ACNC: 5.46 IU/ML
MONOCYTES # BLD AUTO: 0.6 X10E3/UL (ref 0.1–0.9)
MONOCYTES NFR BLD AUTO: 10 %
MTXPGSRA INTERPRETATION: NORMAL
NEUTROPHILS # BLD AUTO: 4.1 X10E3/UL (ref 1.4–7)
NEUTROPHILS NFR BLD AUTO: 65 %
PLATELET # BLD AUTO: 310 X10E3/UL (ref 150–450)
POTASSIUM SERPL-SCNC: 4.5 MMOL/L (ref 3.5–5.2)
PROT SERPL-MCNC: 7.3 G/DL (ref 6–8.5)
QUANTIFERON INCUBATION, QF1T: NORMAL
QUANTIFERON TB2 AG: 0 IU/ML
RBC # BLD AUTO: 4.15 X10E6/UL (ref 3.77–5.28)
SERVICE CMNT-IMP: NORMAL
SODIUM SERPL-SCNC: 138 MMOL/L (ref 134–144)
TRIGL SERPL-MCNC: 124 MG/DL (ref 0–149)
VLDLC SERPL CALC-MCNC: 22 MG/DL (ref 5–40)
WBC # BLD AUTO: 6.2 X10E3/UL (ref 3.4–10.8)

## 2021-12-26 NOTE — PROGRESS NOTES
The results were reviewed. Leflunomide level ordered in error. Elevated . All remaining labs are normal/negative.

## 2022-01-07 ENCOUNTER — TELEPHONE (OUTPATIENT)
Dept: INTERNAL MEDICINE CLINIC | Age: 72
End: 2022-01-07

## 2022-01-07 RX ORDER — AMOXICILLIN 500 MG/1
500 CAPSULE ORAL 3 TIMES DAILY
Qty: 21 CAPSULE | Refills: 0 | Status: SHIPPED | OUTPATIENT
Start: 2022-01-07 | End: 2022-01-14

## 2022-01-07 NOTE — TELEPHONE ENCOUNTER
----- Message from Edda Yañez LPN sent at 7/7/0444  4:28 PM EST -----  Regarding: FW: Need anabiotic    ----- Message -----  From: Sudhakar Ambrose  Sent: 1/7/2022   3:57 PM EST  To: Thermon Frater Nurse Wilsey  Subject: Need anabiotic                                   Need anabiotic I have an inflamed sits in the middle of my back , I had a friend cut into it and it did help get rid of a lot of infection but cant get down far enough to get it all , I know it needs to be cut out meaning I will need to see a general surgeon , I had the same things many years ago , this is just under my bra and that doesnt help any ,I use [address] , # should be in my file , Thank you Sudhakar Ambrose

## 2022-01-24 ENCOUNTER — TELEPHONE (OUTPATIENT)
Dept: SURGERY | Age: 72
End: 2022-01-24

## 2022-01-24 NOTE — TELEPHONE ENCOUNTER
Called patient and left a voicemail to remind her of her upcoming appointment, our cancellation policy, our late policy and no visitor rule. Unable to ask COVID-19 screening questions due to no answer.

## 2022-01-25 ENCOUNTER — OFFICE VISIT (OUTPATIENT)
Dept: SURGERY | Age: 72
End: 2022-01-25

## 2022-01-25 VITALS
OXYGEN SATURATION: 98 % | HEIGHT: 64 IN | BODY MASS INDEX: 31.41 KG/M2 | HEART RATE: 74 BPM | DIASTOLIC BLOOD PRESSURE: 68 MMHG | WEIGHT: 184 LBS | SYSTOLIC BLOOD PRESSURE: 122 MMHG

## 2022-01-25 DIAGNOSIS — L72.3 SEBACEOUS CYST: Primary | ICD-10-CM

## 2022-01-25 PROCEDURE — G8752 SYS BP LESS 140: HCPCS | Performed by: SURGERY

## 2022-01-25 PROCEDURE — G8754 DIAS BP LESS 90: HCPCS | Performed by: SURGERY

## 2022-01-25 PROCEDURE — G8536 NO DOC ELDER MAL SCRN: HCPCS | Performed by: SURGERY

## 2022-01-25 PROCEDURE — G9899 SCRN MAM PERF RSLTS DOC: HCPCS | Performed by: SURGERY

## 2022-01-25 PROCEDURE — 99203 OFFICE O/P NEW LOW 30 MIN: CPT | Performed by: SURGERY

## 2022-01-25 PROCEDURE — G8399 PT W/DXA RESULTS DOCUMENT: HCPCS | Performed by: SURGERY

## 2022-01-25 PROCEDURE — 1090F PRES/ABSN URINE INCON ASSESS: CPT | Performed by: SURGERY

## 2022-01-25 PROCEDURE — G8417 CALC BMI ABV UP PARAM F/U: HCPCS | Performed by: SURGERY

## 2022-01-25 PROCEDURE — G8427 DOCREV CUR MEDS BY ELIG CLIN: HCPCS | Performed by: SURGERY

## 2022-01-25 PROCEDURE — 1101F PT FALLS ASSESS-DOCD LE1/YR: CPT | Performed by: SURGERY

## 2022-01-25 PROCEDURE — 3017F COLORECTAL CA SCREEN DOC REV: CPT | Performed by: SURGERY

## 2022-01-25 PROCEDURE — G9717 DOC PT DX DEP/BP F/U NT REQ: HCPCS | Performed by: SURGERY

## 2022-01-25 NOTE — PROGRESS NOTES
1. Have you been to the ER, urgent care clinic since your last visit? Hospitalized since your last visit? No    2. Have you seen or consulted any other health care providers outside of the [address] [place] [address] since your last visit? Include any pap smears or colon screening.  No

## 2022-01-25 NOTE — PROGRESS NOTES
General Surgery Office Consultation / H & P    CC: Back cyst  History of Present Illness:      Cris Brower is a 66 y.o. female who presents with back cyst.    Patient reports that for years she has had these 2 bumps on her back. She will intermittently squeeze them and have drainage of thick material.  Recently in the last few weeks she had inflammation and pain with the cyst under her bra latch. She was placed on amoxicillin per her primary and there was some drainage that happened. This has since healed up. No pain currently. Pain is 0 out of 10. No provoking or alleviating factors. No radiation. She is trying to avoid recurrence in the future and is here to have the core of the lesions excised. Past Medical History:   Diagnosis Date    Breast cancer, left ([address]     Depression     Goiter     Hearing loss     bilateral    Hypertension     Hypothyroid     IBS (irritable bowel syndrome)     Menopause     Rheumatoid arthritis ([address]     S/P radiation therapy      Past Surgical History:   Procedure Laterality Date    HX BREAST LUMPECTOMY Left 1/29/2019    LEFT BREAST LUMPECTOMY WITH ULTRASOUND, LEFT BREAST SENTINEL NODE BIOPSY (11a) performed by Dee Llanes MD at [address] HX CATARACT REMOVAL Bilateral     HX [address]  7/97    brain lesion removed ?  infectious      Family History   Problem Relation Age of Onset   [place] Cancer Mother         lung    COPD Mother     Cancer Father         lung    Heart Disease Brother         CABGx3    Diabetes Brother     Heart Disease Brother     Diabetes Brother     COPD Brother     Liver Disease Brother         cirrhosis    Alcohol abuse Brother     Heart Disease Paternal Uncle         CAD    Heart Disease Paternal Grandmother         CAD    Breast Cancer Paternal Grandmother     Anesth Problems Neg Hx      Social History     Socioeconomic History    Marital status:  Tobacco Use    Smoking status: Never Smoker    Smokeless tobacco: Never Used   Substance and Sexual Activity    Alcohol use: Yes     Alcohol/week: 5.0 - 6.0 standard drinks     Types: 5 Glasses of wine per week    Drug use: No    Sexual activity: Yes     Partners: Male      Prior to Admission medications    Medication Sig Start Date End Date Taking? Authorizing Provider   cyclobenzaprine (FLEXERIL) 5 mg tablet Take 1 Tablet by mouth nightly. 12/14/21  Yes Sherron Rios PA-C   ergocalciferol (ERGOCALCIFEROL) 1,250 mcg (50,000 unit) capsule Take 1 Capsule by mouth every seven (7) days. Indications: vitamin D deficiency (high dose therapy) 11/11/21  Yes Gallo Zhang MD   methotrexate 25 mg/mL chemo injection INJECT 1 ML SUBCUTANEOUSLY EVERY FRIDAY 9/30/21  Yes Gallo Zhang MD   citalopram (CELEXA) 40 mg tablet TAKE 1 TABLET EVERY DAY 9/26/21  Yes Sarah Quintana MD   lisinopril-hydroCHLOROthiazide (PRINZIDE, ZESTORETIC) 10-12.5 mg per tablet TAKE 1 TABLET EVERY DAY 9/26/21  Yes Sarah Quintana MD   upadacitinib (Rinvoq) 15 mg Tb24 Take 15 mg by mouth daily. Indications: rheumatoid arthritis 9/9/21  Yes Gallo Zhang MD   ibuprofen (MOTRIN) 800 mg tablet Take 800 mg by mouth daily. Yes Provider, Historical   letrozole (FEMARA) 2.5 mg tablet Take 1 Tab by mouth daily. 5/12/21  Yes Avinash Israel NP   levothyroxine (SYNTHROID) 200 mcg tablet TAKE ONE TABLET BY MOUTH ONCE DAILY BEFORE BREAKFAST 1/8/21  Yes Sarah Quintana MD   albuterol (PROVENTIL HFA, VENTOLIN HFA, PROAIR HFA) 90 mcg/actuation inhaler Take 2 Puffs by inhalation every four (4) hours as needed for Wheezing. 12/29/20  Yes Sarah Quintana MD   folic acid (FOLVITE) 1 mg tablet TAKE 1 TABLET BY MOUTH ONCE DAILY 6/9/20  Yes Gallo Zhang MD   diclofenac (VOLTAREN) 1 % gel Apply  to affected area four (4) times daily.  3/10/20  Yes Gallo Zhang MD   LORazepam (ATIVAN) 1 mg tablet TAKE 1 & 1/2 (ONE & ONE-HALF) TABLETS BY MOUTH NIGHTLY . APPOINTMENT REQUIRED FOR FUTURE REFILLS  Patient not taking: Reported on 1/25/2022 1/18/22   Cortez Schultz MD   Insulin Syringe-Needle U-100 1 mL 30 gauge x 5/16 syrg 1 Each by Does Not Apply route Every Saturday. To be used for methotrexate solution  Patient not taking: Reported on 1/25/2022 4/24/21   Angel Shah MD     Allergies   Allergen Reactions    Keflex [Cephalexin] Rash    Sulfa (Sulfonamide Antibiotics) Rash    Monistat 1 [Tioconazole] Swelling       Review of Systems:  Constitutional: No fever or chills  Neurologic: No headache  Eyes: No scleral icterus or irritated eyes  Nose: No nasal pain or drainage  Mouth: No oral lesions or sore throat  Cardiac: No palpations or chest pain  Pulmonary: No cough or shortness of breath  Gastrointestinal: No nausea, emesis, diarrhea, or constipation  Genitourinary: No dysuria  Musculoskeletal: No muscle or joint tenderness  Skin: Back cyst  Psychiatric: No anxiety or depressed mood    Physical Exam:     Visit Vitals  /68 (BP 1 Location: Right arm, BP Patient Position: Sitting, BP Cuff Size: Large adult)   Pulse 74   Ht 5' 4\" (1.626 m)   Wt 184 lb (83.5 kg)   SpO2 98%   BMI 31.58 kg/m²     General: No acute distress, conversant  Eyes: PERRLA, no scleral icterus  HENT: Normocephalic without oral lesions  Neck: Trachea midline without LAD  Cardiac: Normal pulse rate and rhythm  Pulmonary: Symmetric chest rise with normal effort  GI: Soft, NT, ND, no hernia, no splenomegaly  Skin: 1 cm slightly erythematous blackhead cyst under her central bra line and another 1 along her midline mid lower back  Extremities: No edema or joint stiffness  Psych: Appropriate mood and affect    Assessment:     76-year-old female with sebaceous cyst    Plan: Both cyst seem amenable to local and excision in the procedure room. We discussed the risk of bleeding and infection which are low. No further antibiotics at this time.   We will schedule her at her nearest convenience.     Signed By: Paul Gustafson MD  Bariatric and General Surgeon  [facility]    January 25, 2022

## 2022-01-26 RX ORDER — LEVOTHYROXINE SODIUM 200 UG/1
TABLET ORAL
Qty: 90 TABLET | Refills: 3 | Status: SHIPPED | OUTPATIENT
Start: 2022-01-26

## 2022-02-01 ENCOUNTER — OFFICE VISIT (OUTPATIENT)
Dept: SURGERY | Age: 72
End: 2022-02-01
Payer: MEDICARE

## 2022-02-01 ENCOUNTER — HOSPITAL ENCOUNTER (OUTPATIENT)
Dept: LAB | Age: 72
Discharge: HOME | End: 2022-02-01

## 2022-02-01 VITALS
BODY MASS INDEX: 31.58 KG/M2 | DIASTOLIC BLOOD PRESSURE: 75 MMHG | HEART RATE: 69 BPM | OXYGEN SATURATION: 99 % | SYSTOLIC BLOOD PRESSURE: 136 MMHG | WEIGHT: 184 LBS

## 2022-02-01 DIAGNOSIS — L72.0 EPIDERMOID CYST OF SKIN OF BACK: Primary | ICD-10-CM

## 2022-02-01 PROCEDURE — 11402 EXC TR-EXT B9+MARG 1.1-2 CM: CPT | Performed by: SURGERY

## 2022-02-01 NOTE — PROGRESS NOTES
1. Have you been to the ER, urgent care clinic since your last visit? Hospitalized since your last visit? No    2. Have you seen or consulted any other health care providers outside of the [address] [place] [address] since your last visit? Include any pap smears or colon screening.  No

## 2022-02-01 NOTE — PROGRESS NOTES
PROCEDURE NOTE    Date of Procedure: 2/1/2022    Preoperative Diagnosis: Sebaceous cyst of back x2  Postoperative Diagnosis: Same    Procedure: Excision of back sebaceous cyst, two separate excisions    Surgeon: Esteban Lopez MD    Assistant(s): Niraj Huber LPN    Anesthesia: Local    Indications: 76-year-old female with a history of back sebaceous cysts here for excision    Findings: 1 x 1 cm sebaceous cyst under her bra line, 1 x 1 cm sebaceous cyst lower midline back    Description of Operation: Fiorella Bueno was identified. Informed consent was obtained after a complete discussion of risks, benefits and alternatives to surgery were had with the patient. The patient was then prepped and draped in the usual sterile fashion. The patient was injected with local anesthesia around both separate cyst.    Used a 15 blade scalpel to making skin incision around the cyst.  I then used a blade to sharply dissect down through the dermis and cutaneous fat to excise the cyst under the bra line. I ensured hemostasis and closed the deep dermal layer/subcutaneous layer with multiple interrupted 3-0 Vicryl sutures. I then used a 3-0 Monocryl for the skin. I then made another incision around the lower back cyst in an elliptical fashion. I dissected down with the 15 blade and removed the core from the subcutaneous fat. I then used multiple 3-0 interrupted Vicryl sutures to close the deep dermal/subcutaneous layer of tissue. I then used a 3-0 Monocryl for the skin. I cleaned the skin and applied Dermabond to both incisions. At the end of the procedure all instrument, needle, and sponge counts were correct. The patient tolerated the procedure well.       Estimated Blood Loss: 5 mL    Specimens: Two back cysts sent in once jar    Complications: None    Implants: None      Esteban Lopez MD  Bariatric and General Surgeon  [facility]  2/1/2022

## 2022-02-07 NOTE — PROGRESS NOTES
Chief Complaint   Patient presents with   [facility] Annual Wellness Visit       1. Have you been to the ER, urgent care clinic since your last visit? Hospitalized since your last visit? No    2. Have you seen or consulted any other health care providers outside of the [address] [place] [address] since your last visit? Include any pap smears or colon screening.  No REASON FOR VISIT    This is a follow-up visit for Ms. Roverto Avila for     ICD-10-CM   1. Seronegative rheumatoid arthritis of multiple sites ([facility] M06.09   2. PMR (polymyalgia rheumatica) ([facility]) M35.3     Inflammatory arthritis phenotype includes:  Anti-CCP positive: no  Rheumatoid factor positive: no  Erosive disease: no  Extra-articular manifestations include: Polymyalgia Rheumatica, smoldering myeloma, interstitial lung disease     Immunosuppression Screening (1/12/2021): Quantiferon TB: negative  PPD:  Not performed  Hepatitis B: negative  Hepatitis C: negative    Therapy History includes:  Current DMARD therapy include: methotrexate 25 mg SubQ every Wednesday (4/29/2019 to 2/27/2021; stopped by mistake), Humira 40 mg every days (2/27/2021 to present)  Prior DMARD therapy include: methotrexate 20 mg every Wednesday (10/15/2018 to 4/29/2019)  Discontinued DMARDs because of inefficacy: None  Discontinued DMARDs because of side effects: None    HISTORY OF PRESENT ILLNESS    Ms. Roverto Avila returns for a follow-up. On her last visit, I continued methotrexate 25 mg subcutaneous every Wednesday with Zofran, which she has taken with good tolerance. I started Humira 40 mg every 14 days (approved Co-pay $1900.40). she stopped methotrexate when she started Humira by mistake. I ordered a repeat CT chest since she still had dyspnea to evaluate for active GGO, which showed resolution of her pulmonary disease. Today, she feels better. Dr. Trenton Tesfaye did trigger release of the right [facility] and injection in her left [facility] which helped. She denies pain, swelling, or stiffness in her joints. She feels her breathing is much better. She no longer has coughing or dyspnea    She endorses interval fall without fracture, easy bruising.     She denies fever, weight loss, blurred vision, vision loss, oral ulcers, ankle swelling, dry cough, dyspnea, nausea, vomiting, dysphagia, abdominal pain, black or bloody stool, rash and increased thirst.    Most recent toxicity monitoring by blood work was done on 1/12/2021 and did not reveal any significant adverse effects, except WBC 11.2 (prednisone)    Most recent inflammatory markers from 1/12/2021 revealed a ESR 14 mm/hr and CRP <1 mg/L. The patient has not had any interval hospital admissions infections, or surgeries (except trigger release)    REVIEW OF SYSTEMS    A comprehensive review of systems was performed and pertinent results are documented in the HPI, review of systems is otherwise non-contributory. PAST MEDICAL HISTORY    She has a past medical history of Breast cancer, left ([address] Depression, Goiter, Hearing loss, Hypertension, Hypothyroid, IBS (irritable bowel syndrome), Menopause, Rheumatoid arthritis ([address] and S/P radiation therapy. FAMILY HISTORY    Her family history includes Alcohol abuse in her brother; COPD in her brother and mother; Cancer in her father and mother; Diabetes in her brother and brother; Heart Disease in her brother, brother, paternal grandmother, and paternal uncle; Liver Disease in her brother. SOCIAL HISTORY    She reports that she has never smoked. She has never used smokeless tobacco. She reports current alcohol use of about 5.0 - 6.0 standard drinks of alcohol per week. She reports that she does not use drugs. MEDICATIONS    Current Outpatient Medications   Medication Sig    [START ON 4/24/2021] Insulin Syringe-Needle U-100 1 mL 30 gauge x 5/16 syrg 1 Each by Does Not Apply route Every Saturday. To be used for methotrexate solution    methotrexate 25 mg/mL chemo injection 1 mL by SubCUTAneous route Every Friday.  letrozole (FEMARA) 2.5 mg tablet Take 1 Tab by mouth daily.  LORazepam (ATIVAN) 1 mg tablet TAKE 1 & 1/2 (ONE & ONE-HALF) TABLETS BY MOUTH NIGHTLY    adalimumab (Humira,CF, Pen) 40 mg/0.4 mL injection pen 0.4 mL by SubCUTAneous route every fourteen (14) days.  Indications: rheumatoid arthritis    levothyroxine (SYNTHROID) 200 mcg tablet TAKE ONE TABLET BY MOUTH ONCE DAILY BEFORE BREAKFAST    albuterol (PROVENTIL HFA, VENTOLIN HFA, PROAIR HFA) 90 mcg/actuation inhaler Take 2 Puffs by inhalation every four (4) hours as needed for Wheezing.  citalopram (CELEXA) 40 mg tablet TAKE 1 TABLET EVERY DAY    lisinopril-hydroCHLOROthiazide (PRINZIDE, ZESTORETIC) 10-12.5 mg per tablet TAKE 1 TABLET EVERY DAY    folic acid (FOLVITE) 1 mg tablet TAKE 1 TABLET BY MOUTH ONCE DAILY    diclofenac (VOLTAREN) 1 % gel Apply  to affected area four (4) times daily.  ondansetron (ZOFRAN ODT) 4 mg disintegrating tablet Take 1 Tab by mouth every eight (8) hours as needed for Nausea.  zolpidem (AMBIEN) 5 mg tablet TAKE 1 TABLET BY MOUTH NIGHTLY AS NEEDED FOR SLEEP MAX  DAILY  AMOUNT  IS  5MG     No current facility-administered medications for this visit. ALLERGIES    Allergies   Allergen Reactions    Keflex [Cephalexin] Rash    Sulfa (Sulfonamide Antibiotics) Rash    Monistat 1 [Tioconazole] Swelling       PHYSICAL EXAMINATION    Visit Vitals  /74   Pulse 66   Temp 98 °F (36.7 °C)   Resp 18   Wt 196 lb (88.9 kg)   BMI 33.64 kg/m²     Body mass index is 33.64 kg/m². General: Patient is alert, oriented x 3, not in acute distress    HEENT:   Sclerae are not injected and appear moist.  There is no alopecia. Cardiovascular:  Heart is regular rate and rhythm, no murmurs. Chest:  Lungs are clear to auscultation bilaterally. Extremities:  Free of clubbing, cyanosis, edema    Musculoskeletal exam:  A comprehensive musculoskeletal exam was performed for all joints of each upper and lower extremity and assessed for swelling, tenderness and range of motion. Positive results are documented as below:    Positive Finkelstein's on left with tenderness along the ABL EPB (RESOLVED s/p release)  Bilateral Sebasti[place] and [place] nodes.     Z-Deformities:   no  [place]ck Deformities:  no  Boutonierre's Deformities:  no  Ulnar Deviation:   no  MCP Subluxation:  no     Joint Count 4/23/2021 3/10/2020 1/21/2020 7/29/2019 4/29/2019 1/28/2019 11/28/2018   Patient pain (0-100) 60 40 80 5 15 40 70   MHAQ 0.25 0.375 1 0.375 1 0.5 0   Left shoulder - Tender - - 1 - - - 1   Left shoulder - Swollen - - 0 - - - 1   Left elbow - Tender - - 1 - - - -   Left elbow - Swollen - - 0 - - - -   Left wrist- Tender 1 1 1 0 1 0 1   Left wrist- Swollen 1 0 1 0 1 0 1   Left 1st MCP - Tender 0 - 1 - 1 - 1   Left 1st MCP - Swollen 1 - 1 - 1 - 1   Left 2nd MCP - Tender 0 1 1 - 1 - 1   Left 2nd MCP - Swollen 1 0 1 - 1 - 1   Left 3rd MCP - Tender 1 1 1 - 1 - -   Left 3rd MCP - Swollen 0 0 0 - 1 - -   Left 4th MCP - Tender 1 1 1 - 1 - -   Left 4th MCP - Swollen 0 0 0 - 0 - -   Left 5th MCP - Tender - 1 1 - 1 - -   Left 5th MCP - Swollen - 0 0 - 0 - -   Left 2nd PIP - Tender 1 1 1 - - - 1   Left 2nd PIP - Swollen 1 0 1 - - - 1   Left 3rd PIP - Tender 1 1 1 - - - 1   Left 3rd PIP - Swollen 1 0 1 - 1 - 1   Left 4th PIP - Tender - 1 - - 1 - -   Left 4th PIP - Swollen - 0 - - - - -   Left 5th PIP - Tender - 1 - - - - -   Right shoulder - Tender - - 1 - - - 1   Right shoulder - Swollen - - 0 - - - 0   Right elbow - Tender - - 1 - - - -   Right elbow - Swollen - - 0 - - - -   Right wrist- Tender 1 - 1 - 1 - -   Right wrist- Swollen 1 - 1 - 1 - -   Right 1st MCP - Tender 1 - - - 1 - 1   Right 1st MCP - Swollen 1 - - - 1 - 1   Right 2nd MCP - Tender - - 1 - - - 1   Right 2nd MCP - Swollen - - 0 - - - 1   Right 3rd MCP - Tender - - - - 0 - -   Right 3rd MCP - Swollen - - - - 1 - -   Right 4th MCP - Tender - - 1 - 1 - -   Right 4th MCP - Swollen - - 0 - 1 - -   Right 5th MCP - Tender 0 - 1 - - - -   Right 5th MCP - Swollen 1 - 0 - - - -   Right thumb IP - Tender 1 - - - - - 1   Right thumb IP - Swollen 1 - - - - - 1   Right 2nd PIP - Tender 1 - 1 - 1 - 1   Right 2nd PIP - Swollen 1 - 1 - 1 - 1   Right 3rd PIP - Tender 1 - 1 - - - -   Right 3rd PIP - Swollen 1 - 1 - - - -   Right 4th PIP - Tender 0 1 1 - 1 - -   Right 4th PIP - Swollen 1 0 1 - 0 - -   Right 5th PIP - Tender - - 1 - 1 - -   Right 5th PIP - Swollen - - 1 - 1 - -   Tender Joint Count (Total) 10 10 20 0 13 0 11   Swollen Joint Count (Total) 12 0 10 0 11 0 10   Physician Assessment (0-10) 3 2 5 0 4 0 4   Patient Assessment (0-10) 5 4 8 1 1.5 2.5 5   CDAI Total (calculated) 30 16 43 1 29.5 2.5 30       DATA REVIEW    Laboratory     Recent laboratory results were reviewed, summarized, and discussed with the patient. Imaging    Musculoskeletal Ultrasound    None    Radiographs    Skeletal Survey 11/28/2018: Few small calvarial lucencies. No fractures or lytic lesions at risk for fracture. Bilateral Shoulder 10/04/2018: RIGHT: There is prominent AC joint degeneration with spurring and loss of joint space. There is some mild deformity, chronic in nature of the tuberosity. No fracture or dislocation, no bony erosion, the bone is normal in mineral contents. No soft tissue calcifications. LEFT: The bone is normal in mineral contents. There is some mild chronic deformity of the tuberosity and AC joint degeneration. There are no soft tissue calcification bony erosion fracture or dislocation. Bilateral Hand 10/04/2018: RIGHT: no acute fracture or dislocation. Alignment is anatomic. Mild degenerative changes are seen in the interphalangeal joint of the thumb. No erosions are seen. No abnormality seen in the soft tissues. LEFT: no acute fracture or dislocation. Alignment is anatomic. Moderate to severe degenerative changes are present in the first [facility] joint. A cystic lucency in the ace of the first metacarpal likely represents a subchondral cyst. No clear erosions are seen. No abnormality seen in the soft tissues. Bilateral Foot 10/04/2018: RIGHT:  no fracture or other acute osseous or articular abnormality. A small plantar calcaneal heel spur is noted. No erosions or joint space narrowing are present.  The soft tissues are within normal limits. LEFT: no acute fracture or dislocation. Alignment is anatomic. A small plantar calcaneal heel spur is noted. No erosions or joint space narrowing are present. . No abnormality is seen in the soft tissues.     CT Imaging    CT Chest without contrast 2/04/2021: CHEST WALL: Left breast lesion is stable in appearance. THYROID: No nodule. MEDIASTINUM: No mass or lymphadenopathy. RIKY: No mass or lymphadenopathy. THORACIC AORTA: Atherosclerotic change. MAIN PULMONARY ARTERY: Normal in caliber. TRACHEA/BRONCHI: Patent. ESOPHAGUS: No wall thickening or dilatation. HEART: Trace atherosclerotic change in the LAD. Normal in size. PLEURA: No effusion or pneumothorax. LUNGS: No suspicious pulmonary mass or nodule. Trace scarring in the lingula. INCIDENTALLY IMAGED UPPER ABDOMEN: Hepatic steatosis. Postcholecystectomy. BONES: Chronic mild loss of vertebral body height. Anterior disc osteophytes. PET/CT Scan 1/24/2019: HEAD/NECK: No apparent foci of abnormal hypermetabolism. Cerebral evaluation is limited by normal intense activity. CHEST: No foci of abnormal hypermetabolism. The known small breast cancer at 12:00 in the left breast demonstrates no increased metabolic activity. ABDOMEN/PELVIS: No foci of abnormal hypermetabolism. SKELETON: No foci of abnormal hypermetabolism in the axial and visualized appendicular skeleton. Lower extremities:. Imaging of the lower extremities is unremarkable. There is muscular activity noted. CT Head without contrast 9/13/2017: Prior right occipital craniectomy. Encephalomalacia in the right cerebellar region. . There is no significant white matter disease. There is no intracranial hemorrhage, extra-axial collection, mass, mass effect or midline shift. The basilar cisterns are open. No acute infarct is identified. The visualized portions of the paranasal sinuses and mastoid air cells are clear    MR Imaging    MRI Breasts with and without contrast 1/07/2019:  There is minimal background parenchymal enhancement and the breasts are almost entirely fat. A few sub-5 mm foci of enhancement are scattered bilaterally. Right breast: No suspicious enhancing foci. No axillary or internal mammary chain lymphadenopathy. Left breast: A vague area of enhancement around the biopsy clip in the anterior third at 12:00 does not reach threshold enhancement. This measures approximately 9 x 9 mm, and correlates well with the small area of architectural distortion on mammography and subtle architectural distortion and shadowing on ultrasound. No axillary or internal mammary chain lymphadenopathy. DXA     DXA 5/12/2015: (excluded distal radius) lumbar spine L1-L4 T score 0.4 (BMD 1.241 g/cm2), left femoral neck T score: 0.5 (1.109 g/cm2), left total hip T score: 1.0 (1.139 g/cm2), right femoral neck T score: 0.2 (1.062 g/cm2), right total hip T score: 1.1 (1.145 g/cm2). FRAX score N/A % probability in 10 years for major osteoporotic fracture and N/A % 10 year probability of hip fracture. PATHOLOGY    Lymph node, left axilla, #1, sentinel node excision 1/29/2019: No evidence for malignancy in one node (0/1). Breast, left, lumpectomy 1/29/2019:  Invasive lobular carcinoma. Lobular carcinoma in situ     Bone Marrow Biopsy 12/19/2018: Bone marrow: Variably cellular marrow with mild monoclonal plasmacytosis. Trilineage hematopoiesis with maturation. Breast, left, needle core biopsy 12/03/2018: Invasive mammary carcinoma with ductal and lobular features Favor [place] histologic grade 1. Largest glass slide measurement of invasive carcinoma: 8.5 mm. ER pos AR pos (weak) HER 2 neg. PROCEDURE     Kenalog 80 mg IM. (01/21/20)    ASSESSMENT AND PLAN    This is a follow-up visit for Ms. Gerard Silverman. 1) Seronegative Rheumatoid Arthritis with secondary Polymyalgia Rheumatica and Interstitial Lung Disease.  She is maintaind on Humira 40 mg every 14 days with good tolerance and feels well, but she mistakenly stopped methotrexate 25 mg SubQ every Wednesday with good tolerance. Her most recent CT chest showed resolution of her interstitial lung disease. She no longer has respiratory symptoms. Her CDAI was 30 (previously 6, 43, 1, 29.5, 2.5, 30, 41.5) with 10 tender and 12 swollen joints, consistent with high disease activity. I asked her to resume methotrexate. Labs ordered. 2) Polymyalgia Rheumatica. This resolved. 3) ILD. See #1. 4) Long Term Use of Immunosuppressants. The patient remains on high risk immunomodulatory medications (Humira) and requires frequent toxicity monitoring by blood work to evaluate for toxicities. Respective labs were ordered (see below orders for details). 5) Smoldering Myeloma/MGUS. Immunofixation shows IgA monoclonal protein with kappa light chain. M-spike 0.2. She was evaluated by Dr. Adria Ventura. Bone marrow biopsy showed smoldering myeloma. 6) Vitamin D Deficiency. Her vitamin D level was 16.8 (previously 25.7, 20.1, 40.0, 32.0, 21.7). She is on weekly ergocalciferol 50,000. I will check his level. 7) Bilateral iliotibial Band Syndrome. This was not an active issue today. I will refer her to physical therapy if she is symptomatic. 8) Left Breast Cancer Stage 1. She was scheduled for lumpectomy. 9) De Quervain's Tenosynovitis of Left. I had referred her to orthopedic, Dr. Sharonda Noble, per her request.     10) Bilateral Lower Extremity Weakness. I had referred her to physical therapy. The patient voiced understanding of the aforementioned assessment and plan. A total of 45 minutes was spent on this visit, reviewing interval notes, interval testing results, ordering tests, refilling medications, documenting the findings in the note, patient education, counseling, and coordination of care as described above. All questions asked and answered.     TODAY'S ORDERS    Orders Placed This Encounter    CBC WITH AUTOMATED DIFF    METABOLIC PANEL, COMPREHENSIVE  C REACTIVE PROTEIN, QT    SED RATE (ESR)    VITAMIN D, 25 HYDROXY    Insulin Syringe-Needle U-100 1 mL 30 gauge x 5/16 syrg    methotrexate 25 mg/mL chemo injection     Future Appointments   Date Time Provider Geoff Duggan   5/12/2021  8:45 AM Ingrid Larson  [facility] BS AMB   7/20/2021  9:00 AM RAD ONC THERAPY [address] [facility]    8/10/2021  9:00 AM Marysol Hernandez MD [facility] BS AMB     Consuela Kayser, MD, [address] [place] [address]    Adult Rheumatology   Rheumatology Ultrasound Certified  [address] [facility] [address]  [facility], [address] [facility], [address]   Phone [phone]  Fax [phone]

## 2022-02-15 ENCOUNTER — OFFICE VISIT (OUTPATIENT)
Dept: RHEUMATOLOGY | Age: 72
End: 2022-02-15

## 2022-02-15 ENCOUNTER — OFFICE VISIT (OUTPATIENT)
Dept: SURGERY | Age: 72
End: 2022-02-15
Payer: MEDICARE

## 2022-02-15 VITALS
SYSTOLIC BLOOD PRESSURE: 153 MMHG | OXYGEN SATURATION: 99 % | WEIGHT: 182 LBS | DIASTOLIC BLOOD PRESSURE: 79 MMHG | BODY MASS INDEX: 31.07 KG/M2 | HEART RATE: 69 BPM | HEIGHT: 64 IN

## 2022-02-15 VITALS
BODY MASS INDEX: 31.21 KG/M2 | OXYGEN SATURATION: 97 % | SYSTOLIC BLOOD PRESSURE: 139 MMHG | WEIGHT: 182.8 LBS | RESPIRATION RATE: 20 BRPM | HEIGHT: 64 IN | DIASTOLIC BLOOD PRESSURE: 86 MMHG | TEMPERATURE: 98 F | HEART RATE: 70 BPM

## 2022-02-15 DIAGNOSIS — Z09 POSTOPERATIVE EXAMINATION: Primary | ICD-10-CM

## 2022-02-15 PROCEDURE — 99024 POSTOP FOLLOW-UP VISIT: CPT | Performed by: SURGERY

## 2022-02-15 NOTE — PROGRESS NOTES
1. Have you been to the ER, urgent care clinic since your last visit? Hospitalized since your last visit? No    2. Have you seen or consulted any other health care providers outside of the [address] [place] [address] since your last visit? Include any pap smears or colon screening.  No

## 2022-02-15 NOTE — PROGRESS NOTES
Surgery Progress Note    2/15/2022    CC: Postoperative state    Subjective:     Patient doing well after excision of 2 back lesions    Constitutional: No fever or chills  Neurologic: No headache  Eyes: No scleral icterus or irritated eyes  Nose: No nasal pain or drainage  Mouth: No oral lesions or sore throat  Cardiac: No palpations or chest pain  Pulmonary: No cough or shortness of breath  Gastrointestinal: No nausea, emesis, diarrhea, or constipation  Genitourinary: No dysuria  Musculoskeletal: No muscle or joint tenderness  Skin: No rashes or lesions  Psychiatric: No anxiety or depressed mood    Objective:     Visit Vitals  BP (!) 153/79 (BP 1 Location: Left upper arm, BP Patient Position: Sitting, BP Cuff Size: Large adult)   Pulse 69   Ht 5' 4\" (1.626 m)   Wt 182 lb (82.6 kg)   SpO2 99%   BMI 31.24 kg/m²       General: No acute distress, conversant  Eyes: PERRLA, no scleral icterus  HENT: Normocephalic without oral lesions  Neck: Trachea midline without LAD  Cardiac: Normal pulse rate and rhythm  Pulmonary: Symmetric chest rise with normal effort  GI: Soft, NT, ND, no hernia, no splenomegaly  Skin: Wounds healing well  Extremities: No edema or joint stiffness  Psych: Appropriate mood and affect    Assessment:     76-year-old female doing well after excision of sebaceous cyst    Plan:     Pathology reviewedbenign  Follow-up as needed    Esteban Lopez MD  Bariatric and General Surgeon  [facility]

## 2022-02-15 NOTE — PROGRESS NOTES
REASON FOR VISIT    This is a follow-up visit for Ms. Terry Valenzuela for     ICD-10-CM   1. Seronegative rheumatoid arthritis of multiple sites ([facility] M06.09   2. PMR (polymyalgia rheumatica) ([facility]) M35.3     Inflammatory arthritis phenotype includes:  Anti-CCP positive: no  Rheumatoid factor positive: no  Erosive disease: no  Extra-articular manifestations include: Polymyalgia Rheumatica, smoldering myeloma, interstitial lung disease     Immunosuppression Screening (1/12/2021): Quantiferon TB: negative  PPD:  Not performed  Hepatitis B: negative  Hepatitis C: negative    Therapy History includes:  Current DMARD therapy include: methotrexate 25 mg SubQ every Friday (4/29/2019 to 2/27/2021; stopped by mistake; 4/23/2021 to present), Humira 40 mg every days (2/27/2021 to 8/10/2021) - Rinvoq 15 mg daily (SAMPLE: 8/10/2021 to present)  Prior DMARD therapy include: methotrexate 20 mg every Wednesday (10/15/2018 to 4/29/2019)  Discontinued DMARDs because of inefficacy: None  Discontinued DMARDs because of side effects: None    HISTORY OF PRESENT ILLNESS    Ms. Terry Valenzuela returns for a follow-up. On her last visit, I continued methotrexate 25 mg subcutaneous every Friday and but due to loss of benefit on Humira 40 mg every 14 days, I gave her a sample of Rinvoq and asked her to let me know how she feels before the end of the second bottle. I asked her to hold Humira while on Rinvoq but to continue methotrexate. She felt better on Rinvoq but has not received her doses from [employer] yet. Her application was approved but she has not received them yet. Today, she feels good. She denies pain, swelling, or stiffness in her joints while on Rinvoq. She has complains of pain in her knees going up and down the stairs. If she misses methotrexate, she feels worse.      She denies fever, weight loss, blurred vision, vision loss, oral ulcers, ankle swelling, dry cough, dyspnea, nausea, vomiting, dysphagia, abdominal pain, black or bloody stool, fall since last visit, rash, easy bruising and increased thirst.    Most recent toxicity monitoring by blood work was done on 8/11/2021 and did not reveal any significant adverse effects, except . I reviewed the patient's interval medical history, surgical history, medications, and allergies. The patient has not had any interval hospital admissions infections, or surgeries. REVIEW OF SYSTEMS    A comprehensive review of systems was performed and pertinent results are documented in the HPI, review of systems is otherwise non-contributory. PAST MEDICAL HISTORY    She has a past medical history of Breast cancer, left ([address] Depression, Goiter, Hearing loss, Hypertension, Hypothyroid, IBS (irritable bowel syndrome), Menopause, Rheumatoid arthritis ([address] and S/P radiation therapy. FAMILY HISTORY    Her family history includes Alcohol abuse in her brother; Breast Cancer in her paternal grandmother; COPD in her brother and mother; Cancer in her father and mother; Diabetes in her brother and brother; Heart Disease in her brother, brother, paternal grandmother, and paternal uncle; Liver Disease in her brother. SOCIAL HISTORY    She reports that she has never smoked. She has never used smokeless tobacco. She reports current alcohol use of about 5.0 - 6.0 standard drinks of alcohol per week. She reports that she does not use drugs. MEDICATIONS    Current Outpatient Medications   Medication Sig    levothyroxine (SYNTHROID) 200 mcg tablet TAKE ONE TABLET BY MOUTH ONCE DAILY BEFORE BREAKFAST    LORazepam (ATIVAN) 1 mg tablet TAKE 1 & 1/2 (ONE & ONE-HALF) TABLETS BY MOUTH NIGHTLY . APPOINTMENT REQUIRED FOR FUTURE REFILLS    cyclobenzaprine (FLEXERIL) 5 mg tablet Take 1 Tablet by mouth nightly.  ergocalciferol (ERGOCALCIFEROL) 1,250 mcg (50,000 unit) capsule Take 1 Capsule by mouth every seven (7) days.  Indications: vitamin D deficiency (high dose therapy)    methotrexate 25 mg/mL chemo injection INJECT 1 ML SUBCUTANEOUSLY EVERY FRIDAY    citalopram (CELEXA) 40 mg tablet TAKE 1 TABLET EVERY DAY    lisinopril-hydroCHLOROthiazide (PRINZIDE, ZESTORETIC) 10-12.5 mg per tablet TAKE 1 TABLET EVERY DAY    upadacitinib (Rinvoq) 15 mg Tb24 Take 15 mg by mouth daily. Indications: rheumatoid arthritis    ibuprofen (MOTRIN) 800 mg tablet Take 800 mg by mouth daily.  letrozole (FEMARA) 2.5 mg tablet Take 1 Tab by mouth daily.  Insulin Syringe-Needle U-100 1 mL 30 gauge x 5/16 syrg 1 Each by Does Not Apply route Every Saturday. To be used for methotrexate solution    albuterol (PROVENTIL HFA, VENTOLIN HFA, PROAIR HFA) 90 mcg/actuation inhaler Take 2 Puffs by inhalation every four (4) hours as needed for Wheezing.  folic acid (FOLVITE) 1 mg tablet TAKE 1 TABLET BY MOUTH ONCE DAILY    diclofenac (VOLTAREN) 1 % gel Apply  to affected area four (4) times daily. No current facility-administered medications for this visit. ALLERGIES    Allergies   Allergen Reactions    Keflex [Cephalexin] Rash    Sulfa (Sulfonamide Antibiotics) Rash    Monistat 1 [Tioconazole] Swelling       PHYSICAL EXAMINATION    Visit Vitals  /86 (BP 1 Location: Right arm, BP Patient Position: Sitting)   Pulse 70   Temp 98 °F (36.7 °C) (Oral)   Resp 20   Ht 5' 4\" (1.626 m)   Wt 182 lb 12.8 oz (82.9 kg)   SpO2 97%   BMI 31.38 kg/m²     Body mass index is 31.38 kg/m². General: Patient is alert, oriented x 3, not in acute distress    HEENT:   Sclerae are not injected and appear moist.  There is no alopecia. Cardiovascular:  Heart is regular rate and rhythm, no murmurs. Chest:  Lungs are clear to auscultation bilaterally. Extremities:  Free of clubbing, cyanosis, edema    Musculoskeletal exam:  A comprehensive musculoskeletal exam was performed for all joints of each upper and lower extremity and assessed for swelling, tenderness and range of motion.  Positive results are documented as below:    Positive Finkelstein's on left with tenderness along the ABL EPB (RESOLVED s/p release)  Bilateral Sebastián and [place] nodes.     Z-Deformities:   no  [place]ck Deformities:  no  Boutonierre's Deformities:  no  Ulnar Deviation:   no  MCP Subluxation:  no     Joint Count 11/11/2021 8/10/2021 4/23/2021 3/10/2020 1/21/2020 7/29/2019 4/29/2019   Patient pain (0-100) 35 50 60 40 80 5 15   MHAQ 0.375 0.625 0.25 0.375 1 0.375 1   Left shoulder - Tender - - - - 1 - -   Left shoulder - Swollen - - - - 0 - -   Left elbow - Tender - - - - 1 - -   Left elbow - Swollen - - - - 0 - -   Left wrist- Tender 0 1 1 1 1 0 1   Left wrist- Swollen 1 0 1 0 1 0 1   Left 1st MCP - Tender - 1 0 - 1 - 1   Left 1st MCP - Swollen - 1 1 - 1 - 1   Left 2nd MCP - Tender - 1 0 1 1 - 1   Left 2nd MCP - Swollen - 1 1 0 1 - 1   Left 3rd MCP - Tender - 1 1 1 1 - 1   Left 3rd MCP - Swollen - 0 0 0 0 - 1   Left 4th MCP - Tender - - 1 1 1 - 1   Left 4th MCP - Swollen - - 0 0 0 - 0   Left 5th MCP - Tender - - - 1 1 - 1   Left 5th MCP - Swollen - - - 0 0 - 0   Left thumb IP - Tender - 1 - - - - -   Left thumb IP - Swollen - 0 - - - - -   Left 2nd PIP - Tender 1 1 1 1 1 - -   Left 2nd PIP - Swollen 0 1 1 0 1 - -   Left 3rd PIP - Tender - 1 1 1 1 - -   Left 3rd PIP - Swollen - 1 1 0 1 - 1   Left 4th PIP - Tender - 1 - 1 - - 1   Left 4th PIP - Swollen - 0 - 0 - - -   Left 5th PIP - Tender - 1 - 1 - - -   Left 5th PIP - Swollen - 0 - - - - -   Right shoulder - Tender - - - - 1 - -   Right shoulder - Swollen - - - - 0 - -   Right elbow - Tender - - - - 1 - -   Right elbow - Swollen - - - - 0 - -   Right wrist- Tender 1 1 1 - 1 - 1   Right wrist- Swollen 1 0 1 - 1 - 1   Right 1st MCP - Tender - - 1 - - - 1   Right 1st MCP - Swollen - - 1 - - - 1   Right 2nd MCP - Tender - - - - 1 - -   Right 2nd MCP - Swollen - - - - 0 - -   Right 3rd MCP - Tender - - - - - - 0   Right 3rd MCP - Swollen - - - - - - 1   Right 4th MCP - Tender - - - - 1 - 1 Right 4th MCP - Swollen - - - - 0 - 1   Right 5th MCP - Tender - - 0 - 1 - -   Right 5th MCP - Swollen - - 1 - 0 - -   Right thumb IP - Tender - - 1 - - - -   Right thumb IP - Swollen - - 1 - - - -   Right 2nd PIP - Tender - 1 1 - 1 - 1   Right 2nd PIP - Swollen - 0 1 - 1 - 1   Right 3rd PIP - Tender - - 1 - 1 - -   Right 3rd PIP - Swollen - - 1 - 1 - -   Right 4th PIP - Tender - - 0 1 1 - 1   Right 4th PIP - Swollen - - 1 0 1 - 0   Right 5th PIP - Tender - - - - 1 - 1   Right 5th PIP - Swollen - - - - 1 - 1   Tender Joint Count (Total) 2 11 10 10 20 0 13   Swollen Joint Count (Total) 2 4 12 0 10 0 11   Physician Assessment (0-10) 2 3 3 2 5 0 4   Patient Assessment (0-10) 6 4 5 4 8 1 1.5   CDAI Total (calculated) 12 22 30 16 43 1 29.5       DATA REVIEW    Laboratory     Recent laboratory results were reviewed, summarized, and discussed with the patient. Imaging    Musculoskeletal Ultrasound    None    Radiographs    Skeletal Survey 11/28/2018: Few small calvarial lucencies. No fractures or lytic lesions at risk for fracture. Bilateral Shoulder 10/04/2018: RIGHT: There is prominent AC joint degeneration with spurring and loss of joint space. There is some mild deformity, chronic in nature of the tuberosity. No fracture or dislocation, no bony erosion, the bone is normal in mineral contents. No soft tissue calcifications. LEFT: The bone is normal in mineral contents. There is some mild chronic deformity of the tuberosity and AC joint degeneration. There are no soft tissue calcification bony erosion fracture or dislocation. Bilateral Hand 10/04/2018: RIGHT: no acute fracture or dislocation. Alignment is anatomic. Mild degenerative changes are seen in the interphalangeal joint of the thumb. No erosions are seen. No abnormality seen in the soft tissues. LEFT: no acute fracture or dislocation. Alignment is anatomic. Moderate to severe degenerative changes are present in the first [facility] joint.  A cystic lucency in the ace of the first metacarpal likely represents a subchondral cyst. No clear erosions are seen. No abnormality seen in the soft tissues. Bilateral Foot 10/04/2018: RIGHT:  no fracture or other acute osseous or articular abnormality. A small plantar calcaneal heel spur is noted. No erosions or joint space narrowing are present. The soft tissues are within normal limits. LEFT: no acute fracture or dislocation. Alignment is anatomic. A small plantar calcaneal heel spur is noted. No erosions or joint space narrowing are present. . No abnormality is seen in the soft tissues.     CT Imaging    CT Chest without contrast 2/04/2021: CHEST WALL: Left breast lesion is stable in appearance. THYROID: No nodule. MEDIASTINUM: No mass or lymphadenopathy. RIKY: No mass or lymphadenopathy. THORACIC AORTA: Atherosclerotic change. MAIN PULMONARY ARTERY: Normal in caliber. TRACHEA/BRONCHI: Patent. ESOPHAGUS: No wall thickening or dilatation. HEART: Trace atherosclerotic change in the LAD. Normal in size. PLEURA: No effusion or pneumothorax. LUNGS: No suspicious pulmonary mass or nodule. Trace scarring in the lingula. INCIDENTALLY IMAGED UPPER ABDOMEN: Hepatic steatosis. Postcholecystectomy. BONES: Chronic mild loss of vertebral body height. Anterior disc osteophytes. PET/CT Scan 1/24/2019: HEAD/NECK: No apparent foci of abnormal hypermetabolism. Cerebral evaluation is limited by normal intense activity. CHEST: No foci of abnormal hypermetabolism. The known small breast cancer at 12:00 in the left breast demonstrates no increased metabolic activity. ABDOMEN/PELVIS: No foci of abnormal hypermetabolism. SKELETON: No foci of abnormal hypermetabolism in the axial and visualized appendicular skeleton. Lower extremities:. Imaging of the lower extremities is unremarkable. There is muscular activity noted. CT Head without contrast 9/13/2017: Prior right occipital craniectomy. Encephalomalacia in the right cerebellar region. . There is no significant white matter disease. There is no intracranial hemorrhage, extra-axial collection, mass, mass effect or midline shift. The basilar cisterns are open. No acute infarct is identified. The visualized portions of the paranasal sinuses and mastoid air cells are clear    MR Imaging    MRI Breasts with and without contrast 1/07/2019: There is minimal background parenchymal enhancement and the breasts are almost entirely fat. A few sub-5 mm foci of enhancement are scattered bilaterally. Right breast: No suspicious enhancing foci. No axillary or internal mammary chain lymphadenopathy. Left breast: A vague area of enhancement around the biopsy clip in the anterior third at 12:00 does not reach threshold enhancement. This measures approximately 9 x 9 mm, and correlates well with the small area of architectural distortion on mammography and subtle architectural distortion and shadowing on ultrasound. No axillary or internal mammary chain lymphadenopathy. DXA     DXA 6/10/2021: (excluded None) lumbar spine L1-L4 T score -1.2 (BMD 1.050 g/cm2), left femoral neck T score: -0.9 (0.906 g/cm2), left total hip T score: -0.6 (0.933 g/cm2), right femoral neck T score: -0.9 (0.908 g/cm2), right total hip T score: -0.3 (0.966 g/cm2), and distal one third left radius T score -1.0 (BMD 0.789 g/cm2). FRAX score 11.1 % probability in 10 years for major osteoporotic fracture and 1.3 % 10 year probability of hip fracture. DXA 5/12/2015: (excluded distal radius) lumbar spine L1-L4 T score 0.4 (BMD 1.241 g/cm2), left femoral neck T score: 0.5 (1.109 g/cm2), left total hip T score: 1.0 (1.139 g/cm2), right femoral neck T score: 0.2 (1.062 g/cm2), right total hip T score: 1.1 (1.145 g/cm2). FRAX score N/A % probability in 10 years for major osteoporotic fracture and N/A % 10 year probability of hip fracture.     PATHOLOGY    Lymph node, left axilla, #1, sentinel node excision 1/29/2019: No evidence for malignancy in one node (0/1). Breast, left, lumpectomy 1/29/2019:  Invasive lobular carcinoma. Lobular carcinoma in situ     Bone Marrow Biopsy 12/19/2018: Bone marrow: Variably cellular marrow with mild monoclonal plasmacytosis. Trilineage hematopoiesis with maturation. Breast, left, needle core biopsy 12/03/2018: Invasive mammary carcinoma with ductal and lobular features Favor [place] histologic grade 1. Largest glass slide measurement of invasive carcinoma: 8.5 mm. ER pos HI pos (weak) HER 2 neg. PROCEDURE     Kenalog 80 mg IM. (01/21/20)    ASSESSMENT AND PLAN    This is a follow-up visit for Ms. Pearl Larkin. 1) Seronegative Rheumatoid Arthritis with secondary Polymyalgia Rheumatica and Interstitial Lung Disease. She is maintaind on methotrexate 25 mg SubQ every Wednesday and Rinvoq 15 mg daily, which she has taken with good tolerance. She was approved form [employer] assistance but has not contacted [employer] yet. I asked her to do so today. She has missed methotrexate before and noticed worsening pain. She has been feeling much better on Rinvoq. I gave her the number to [employer] to contact. Her interstitial lung disease resolved. Her CDAI was 12 (previously 22, 30, 6, 43, 1, 29.5, 2.5, 30, 41.5) with 2 tender and 2 swollen joints, consistent with moderate disease activity. I will continue treatment. Labs ordered. 2) Polymyalgia Rheumatica. This resolved. 3) ILD. This resolved. 4) Long Term Use of Immunosuppressants. The patient remains on high risk immunomodulatory medications (methotrexate, Rinvoq) and requires frequent toxicity monitoring by blood work to evaluate for toxicities. Respective labs were ordered (see below orders for details). 5) Smoldering Myeloma/MGUS. Immunofixation shows IgA monoclonal protein with kappa light chain. M-spike 0.2. She was evaluated by Dr. Rosa Maria Olson. Bone marrow biopsy showed smoldering myeloma. 6) Vitamin D Deficiency.  Her vitamin D level was 26.6 (previously 24.9, 16.8, 25.7, 20.1, 40.0, 32.0, 21.7). She has not been adherent on weekly ergocalciferol 50,000. I refilled it. 7) Bilateral iliotibial Band Syndrome. This was not an active issue today. I will refer her to physical therapy if she is symptomatic. 8) Left Breast Cancer Stage 1.      9) De Quervain's Tenosynovitis of Left. I had referred her to orthopedic, Dr. Ne Huffman, per her request.     10) Bilateral Lower Extremity Weakness. This was not an active issue today. I had referred her to physical therapy. 11) Bilateral Knee Pain. I asked her to see Dr. Meri Paz. The patient voiced understanding of the aforementioned assessment and plan. A total of 42 minutes was spent on this visit, reviewing interval notes, interval testing results, ordering tests, refilling medications, documenting the findings in the note, patient education, counseling, and coordination of care as described above. All questions asked and answered. TODAY'S ORDERS    No orders of the defined types were placed in this encounter. Future Appointments   Date Time Provider Geoff Duggan   2/15/2022  9:30 AM Connor Hoover MD [facility] BS MARIA EUGENIA Shaikh MD    Adult Rheumatology   Rheumatology Ultrasound Certified  [facility]  A Part of [address]   Phone [phone]  Fax [phone]  This encounter was created in error - please disregard.

## 2022-02-15 NOTE — PROGRESS NOTES
Chief Complaint   Patient presents with    Arthritis     eft, knee, hip     1. Have you been to the ER, urgent care clinic since your last visit? Hospitalized since your last visit? No    2. Have you seen or consulted any other health care providers outside of the [address] [place] [address] since your last visit? Include any pap smears or colon screening.  No

## 2022-02-16 ENCOUNTER — DOCUMENTATION ONLY (OUTPATIENT)
Dept: PHARMACY | Age: 72
End: 2022-02-16

## 2022-02-16 NOTE — PROGRESS NOTES
[facility] at [address] [facility] Update    Date: 02/16/22    Vincent Meade [id number]    Medication: Rinvoq 15 mg    Prior Authorization: through 12/31/22    Co-pay $[id number]     Patient has a high co-pay on their prescription. Pharmacy staff contacted the patient and informed them that the [facility] at [phone] will reach out to discuss potentially obtaining the medication through the  directly or other treatment options, if appropriate.     Frances Blood, [address] at [employer] [facility],  Nadege Rodriguez   [place], [address]  phone: [phone]   fax: [phone]

## 2022-03-18 PROBLEM — M76.31 ILIOTIBIAL BAND SYNDROME OF BOTH SIDES: Status: ACTIVE | Noted: 2018-10-04

## 2022-03-18 PROBLEM — F32.1 MODERATE MAJOR DEPRESSION (HCC): Status: ACTIVE | Noted: 2018-10-01

## 2022-03-18 PROBLEM — M76.32 ILIOTIBIAL BAND SYNDROME OF BOTH SIDES: Status: ACTIVE | Noted: 2018-10-04

## 2022-03-18 PROBLEM — D47.2 SMOLDERING MYELOMA: Status: ACTIVE | Noted: 2019-08-14

## 2022-03-18 PROBLEM — E55.9 VITAMIN D DEFICIENCY: Status: ACTIVE | Noted: 2018-10-15

## 2022-03-18 PROBLEM — E66.01 SEVERE OBESITY (HCC): Status: ACTIVE | Noted: 2018-11-21

## 2022-03-19 PROBLEM — M35.3 PMR (POLYMYALGIA RHEUMATICA) (HCC): Status: ACTIVE | Noted: 2018-10-04

## 2022-03-19 PROBLEM — Z79.1 LONG TERM CURRENT USE OF NON-STEROIDAL ANTI-INFLAMMATORIES (NSAID): Status: ACTIVE | Noted: 2018-10-04

## 2022-03-19 PROBLEM — D47.2 MONOCLONAL GAMMOPATHY OF UNKNOWN SIGNIFICANCE (MGUS): Status: ACTIVE | Noted: 2018-10-15

## 2022-03-19 PROBLEM — J12.9 VIRAL PNEUMONIA: Status: ACTIVE | Noted: 2020-09-14

## 2022-03-19 PROBLEM — Z20.822 SUSPECTED COVID-19 VIRUS INFECTION: Status: ACTIVE | Noted: 2020-09-15

## 2022-03-19 PROBLEM — J18.9 PNEUMONIA: Status: ACTIVE | Noted: 2020-09-15

## 2022-03-19 PROBLEM — C90.00 MULTIPLE MYELOMA (HCC): Status: ACTIVE | Noted: 2019-01-14

## 2022-03-19 PROBLEM — Z79.60 LONG-TERM USE OF IMMUNOSUPPRESSANT MEDICATION: Status: ACTIVE | Noted: 2018-11-28

## 2022-03-20 PROBLEM — M06.09 SERONEGATIVE RHEUMATOID ARTHRITIS OF MULTIPLE SITES (HCC): Status: ACTIVE | Noted: 2018-10-15

## 2022-04-04 NOTE — PROGRESS NOTES
HISTORY OF PRESENT ILLNESS  Malick Sy is a 66 y.o. female. HPI  Here for \"coritisone shot\". Pain left hip and knee. Worse at night. Using diclofenac gel which helps temporarily. Has not seen orthopedist at this time. Pain starts in left buttocks. When walking pain radiates to lateral hip and down to lateral leg. Worse when driving. No groin pain. Entire knee is painful. Feels that knees \"will break in half\" getting to low seat. Hard to get out of chair. Better standing and lying on left side at night. Taking Ibuprofen 800mg and 1300 Tylenol bid. Has to walk up and down stair sideways. No pain on right. Mild twinge right knee from old meniscus stair. Has been on PT x 2 which helps for a while but then pain recurs. MRI of lumbar spine in August 2021 showed  Multilevel disc and facet degenerative change is present. There is mild [place] left foraminal stenosis at L3-L4. RA - seeing a new doctor 6/15, previous rheumatologist has left the practice.         Current Outpatient Medications   Medication Instructions    albuterol (PROVENTIL HFA, VENTOLIN HFA, PROAIR HFA) 90 mcg/actuation inhaler 2 Puffs, Inhalation, EVERY 4 HOURS AS NEEDED    citalopram (CELEXA) 40 mg tablet TAKE 1 TABLET EVERY DAY    diclofenac (VOLTAREN) 1 % gel Topical, 4 TIMES DAILY    ergocalciferol (ERGOCALCIFEROL) 50,000 Units, Oral, EVERY 7 DAYS    folic acid (FOLVITE) 1 mg tablet TAKE 1 TABLET BY MOUTH ONCE DAILY    ibuprofen (MOTRIN) 800 mg, Oral, DAILY    Insulin Syringe-Needle U-100 1 mL 30 gauge x 5/16 syrg 1 Each, Does Not Apply, EVERY SATURDAY, To be used for methotrexate solution    letrozole (FEMARA) 2.5 mg, Oral, DAILY    levothyroxine (SYNTHROID) 200 mcg tablet TAKE ONE TABLET BY MOUTH ONCE DAILY BEFORE BREAKFAST    lisinopril-hydroCHLOROthiazide (PRINZIDE, ZESTORETIC) 10-12.5 mg per tablet TAKE 1 TABLET EVERY DAY    LORazepam (ATIVAN) 1 mg tablet TAKE 1 & 1/2 (ONE & ONE-HALF) TABLETS BY MOUTH NIGHTLY . APPOINTMENT REQUIRED FOR FUTURE REFILLS    methotrexate 25 mg/mL chemo injection INJECT 1 ML SUBCUTANEOUSLY EVERY FRIDAY    Rinvoq 15 mg, Oral, DAILY       Visit Vitals  /68 (BP 1 Location: Right arm, BP Patient Position: Sitting, BP Cuff Size: Adult)   Pulse 72   Temp 97.6 °F (36.4 °C)   Resp 24   Wt 187 lb (84.8 kg)   SpO2 99%   BMI 32.10 kg/m²       ROS  See above  Physical Exam  Vitals reviewed. Constitutional:       Appearance: Normal appearance. HENT:      Head: Normocephalic and atraumatic. Musculoskeletal:      Comments: lspine nontender. Tenderness left paraspinal muscles and left buttocks. Tenderness lateral left hip, lateral left upper leg and lateral left knee. Good rom of hip. With tight hamstrings bilat. Good rom of knee with minimal crepitance   Neurological:      Mental Status: She is alert. ASSESSMENT and PLAN  Diagnoses and all orders for this visit:    1. Chronic left-sided low back pain with left-sided sciatica  -     REFERRAL TO ORTHOPEDICS  -     methylPREDNISolone (Medrol, Osmani,) 4 mg tablet; Take as directed  -     methocarbamoL (ROBAXIN) 500 mg tablet; Take 1 Tablet by mouth nightly as needed for Muscle Spasm(s).

## 2022-04-05 ENCOUNTER — OFFICE VISIT (OUTPATIENT)
Dept: INTERNAL MEDICINE CLINIC | Age: 72
End: 2022-04-05
Payer: MEDICARE

## 2022-04-05 VITALS
TEMPERATURE: 97.6 F | OXYGEN SATURATION: 99 % | BODY MASS INDEX: 32.1 KG/M2 | DIASTOLIC BLOOD PRESSURE: 68 MMHG | RESPIRATION RATE: 24 BRPM | SYSTOLIC BLOOD PRESSURE: 126 MMHG | WEIGHT: 187 LBS | HEART RATE: 72 BPM

## 2022-04-05 DIAGNOSIS — G89.29 CHRONIC LEFT-SIDED LOW BACK PAIN WITH LEFT-SIDED SCIATICA: Primary | ICD-10-CM

## 2022-04-05 DIAGNOSIS — M54.42 CHRONIC LEFT-SIDED LOW BACK PAIN WITH LEFT-SIDED SCIATICA: Primary | ICD-10-CM

## 2022-04-05 PROCEDURE — 3017F COLORECTAL CA SCREEN DOC REV: CPT | Performed by: INTERNAL MEDICINE

## 2022-04-05 PROCEDURE — G8417 CALC BMI ABV UP PARAM F/U: HCPCS | Performed by: INTERNAL MEDICINE

## 2022-04-05 PROCEDURE — G0463 HOSPITAL OUTPT CLINIC VISIT: HCPCS | Performed by: INTERNAL MEDICINE

## 2022-04-05 PROCEDURE — 1101F PT FALLS ASSESS-DOCD LE1/YR: CPT | Performed by: INTERNAL MEDICINE

## 2022-04-05 PROCEDURE — G8754 DIAS BP LESS 90: HCPCS | Performed by: INTERNAL MEDICINE

## 2022-04-05 PROCEDURE — G8752 SYS BP LESS 140: HCPCS | Performed by: INTERNAL MEDICINE

## 2022-04-05 PROCEDURE — G9899 SCRN MAM PERF RSLTS DOC: HCPCS | Performed by: INTERNAL MEDICINE

## 2022-04-05 PROCEDURE — G8427 DOCREV CUR MEDS BY ELIG CLIN: HCPCS | Performed by: INTERNAL MEDICINE

## 2022-04-05 PROCEDURE — G8536 NO DOC ELDER MAL SCRN: HCPCS | Performed by: INTERNAL MEDICINE

## 2022-04-05 PROCEDURE — G9717 DOC PT DX DEP/BP F/U NT REQ: HCPCS | Performed by: INTERNAL MEDICINE

## 2022-04-05 PROCEDURE — G8399 PT W/DXA RESULTS DOCUMENT: HCPCS | Performed by: INTERNAL MEDICINE

## 2022-04-05 PROCEDURE — 99213 OFFICE O/P EST LOW 20 MIN: CPT | Performed by: INTERNAL MEDICINE

## 2022-04-05 PROCEDURE — 1090F PRES/ABSN URINE INCON ASSESS: CPT | Performed by: INTERNAL MEDICINE

## 2022-04-05 RX ORDER — METHOCARBAMOL 500 MG/1
500 TABLET, FILM COATED ORAL
Qty: 20 TABLET | Refills: 0 | Status: SHIPPED | OUTPATIENT
Start: 2022-04-05 | End: 2022-07-03

## 2022-04-05 RX ORDER — METHYLPREDNISOLONE 4 MG/1
TABLET ORAL
Qty: 1 DOSE PACK | Refills: 0 | Status: SHIPPED | OUTPATIENT
Start: 2022-04-05 | End: 2022-07-03

## 2022-04-05 NOTE — PROGRESS NOTES
Identified pt with two pt identifiers(name and ). Chief Complaint   Patient presents with    Other     Cortisone Shot    Medication Evaluation     Would like to retry Keflex       Vitals:    22 1226   BP: 126/68   Pulse: 72   Resp: 24   Temp: 97.6 °F (36.4 °C)   SpO2: 99%   Weight: 187 lb (84.8 kg)   PainSc:   7      Health Maintenance Due   Topic    DTaP/Tdap/Td series (1 - Tdap)    Pneumococcal 65+ yrs at Risk Vaccine (2 of 2 - PCV13)    Shingrix Vaccine Age 49> (2 of 2)    COVID-19 Vaccine (4 - Booster for Rust Peter series)       Depression Screening:  :     3 most recent PHQ Screens 2/15/2022   Little interest or pleasure in doing things Not at all   Feeling down, depressed, irritable, or hopeless Not at all   Total Score PHQ 2 0        Fall Risk Assessment:  :     Fall Risk Assessment, last 12 mths 2/15/2022   Able to walk? Yes   Fall in past 12 months? 0   Do you feel unsteady? 1   Are you worried about falling 1   Is TUG test greater than 12 seconds? -   Is the gait abnormal? 0   Number of falls in past 12 months -   Fall with injury? -       Abuse Screening:  :     Abuse Screening Questionnaire 5/15/2019   Do you ever feel afraid of your partner? N   Are you in a relationship with someone who physically or mentally threatens you? N   Is it safe for you to go home? Y        Coordination of Care Questionnaire:  :     1. Have you been to the ER, urgent care clinic since your last visit? Hospitalized since your last visit? No    2. Have you seen or consulted any other health care providers outside of the [address] [place] [address] since your last visit? Include any pap smears or colon screening.  No

## 2022-04-06 ENCOUNTER — TELEPHONE (OUTPATIENT)
Dept: ONCOLOGY | Age: 72
End: 2022-04-06

## 2022-04-06 DIAGNOSIS — C50.912 MALIGNANT NEOPLASM OF LEFT FEMALE BREAST, UNSPECIFIED ESTROGEN RECEPTOR STATUS, UNSPECIFIED SITE OF BREAST (HCC): ICD-10-CM

## 2022-04-06 RX ORDER — LETROZOLE 2.5 MG/1
2.5 TABLET, FILM COATED ORAL DAILY
Qty: 90 TABLET | Refills: 0 | Status: SHIPPED | OUTPATIENT
Start: 2022-04-06 | End: 2022-07-10

## 2022-04-06 NOTE — TELEPHONE ENCOUNTER
Oral Hormone therapy     Kaila Bell is a  66 y. o.female  diagnosed with breast cancer . Ms. Shira Smith is being treated with letrozole. Medication name: Letrozole    Dose:  2.5 mg   Frequency: daily    Ordering provider: Teto Perez MD  Start date: 4/2019        Refill sent for 90 day supply and message sent to front for follow-up to be scheduled. Last OV 5/12/21  Office visit cancelled in 11/2021, as patient did not have labs done and patient was to reschedule. Everton Alicea PHARMD, BCOP, BCPS    For Pharmacy Admin Tracking Only     CPA in place:  Yes   Recommendation Provided To: Provider: 1 via Note to Provider    Intervention Detail: Refill(s) Provided     Intervention Accepted By: Provider: 1   Time Spent (min): 10

## 2022-04-07 RX ORDER — SODIUM CHLORIDE 0.9 % (FLUSH) 0.9 %
5-10 SYRINGE (ML) INJECTION AS NEEDED
OUTPATIENT
Start: 2022-10-09

## 2022-04-07 RX ORDER — HEPARIN 100 UNIT/ML
500 SYRINGE INTRAVENOUS AS NEEDED
Status: CANCELLED | OUTPATIENT
Start: 2022-04-12

## 2022-04-07 RX ORDER — HEPARIN 100 UNIT/ML
500 SYRINGE INTRAVENOUS AS NEEDED
OUTPATIENT
Start: 2022-10-09

## 2022-04-07 RX ORDER — SODIUM CHLORIDE 9 MG/ML
10 INJECTION INTRAMUSCULAR; INTRAVENOUS; SUBCUTANEOUS AS NEEDED
OUTPATIENT
Start: 2022-10-09

## 2022-04-07 RX ORDER — SODIUM CHLORIDE 0.9 % (FLUSH) 0.9 %
5-10 SYRINGE (ML) INJECTION AS NEEDED
Status: CANCELLED | OUTPATIENT
Start: 2022-04-12

## 2022-04-07 RX ORDER — SODIUM CHLORIDE 9 MG/ML
10 INJECTION INTRAMUSCULAR; INTRAVENOUS; SUBCUTANEOUS AS NEEDED
Status: CANCELLED | OUTPATIENT
Start: 2022-04-12

## 2022-04-12 ENCOUNTER — HOSPITAL ENCOUNTER (OUTPATIENT)
Dept: INFUSION THERAPY | Age: 72
Discharge: HOME | End: 2022-04-12
Payer: MEDICARE

## 2022-04-12 VITALS
RESPIRATION RATE: 18 BRPM | DIASTOLIC BLOOD PRESSURE: 73 MMHG | OXYGEN SATURATION: 100 % | SYSTOLIC BLOOD PRESSURE: 139 MMHG | HEART RATE: 63 BPM | TEMPERATURE: 97 F

## 2022-04-12 DIAGNOSIS — C90.00 MULTIPLE MYELOMA, REMISSION STATUS UNSPECIFIED (HCC): Primary | ICD-10-CM

## 2022-04-12 LAB
ALBUMIN SERPL-MCNC: 3.8 G/DL (ref 3.5–5)
ALBUMIN/GLOB SERPL: 1.1 {RATIO} (ref 1.1–2.2)
ALP SERPL-CCNC: 164 U/L (ref 45–117)
ALT SERPL-CCNC: 25 U/L (ref 12–78)
ANION GAP SERPL CALC-SCNC: 4 MMOL/L (ref 5–15)
AST SERPL-CCNC: 14 U/L (ref 15–37)
BASOPHILS # BLD: 0.1 K/UL (ref 0–0.1)
BASOPHILS NFR BLD: 1 % (ref 0–1)
BILIRUB SERPL-MCNC: 0.5 MG/DL (ref 0.2–1)
BUN SERPL-MCNC: 24 MG/DL (ref 6–20)
BUN/CREAT SERPL: 32 (ref 12–20)
CALCIUM SERPL-MCNC: 9.4 MG/DL (ref 8.5–10.1)
CHLORIDE SERPL-SCNC: 107 MMOL/L (ref 97–108)
CO2 SERPL-SCNC: 27 MMOL/L (ref 21–32)
CREAT SERPL-MCNC: 0.76 MG/DL (ref 0.55–1.02)
DIFFERENTIAL METHOD BLD: ABNORMAL
EOSINOPHIL # BLD: 0.1 K/UL (ref 0–0.4)
EOSINOPHIL NFR BLD: 1 % (ref 0–7)
ERYTHROCYTE [DISTWIDTH] IN BLOOD BY AUTOMATED COUNT: 13.6 % (ref 11.5–14.5)
GLOBULIN SER CALC-MCNC: 3.5 G/DL (ref 2–4)
GLUCOSE SERPL-MCNC: 92 MG/DL (ref 65–100)
HCT VFR BLD AUTO: 38 % (ref 35–47)
HGB BLD-MCNC: 13 G/DL (ref 11.5–16)
IMM GRANULOCYTES # BLD AUTO: 0 K/UL (ref 0–0.04)
IMM GRANULOCYTES NFR BLD AUTO: 1 % (ref 0–0.5)
LYMPHOCYTES # BLD: 1.2 K/UL (ref 0.8–3.5)
LYMPHOCYTES NFR BLD: 21 % (ref 12–49)
MCH RBC QN AUTO: 31.6 PG (ref 26–34)
MCHC RBC AUTO-ENTMCNC: 34.2 G/DL (ref 30–36.5)
MCV RBC AUTO: 92.5 FL (ref 80–99)
MONOCYTES # BLD: 0.5 K/UL (ref 0–1)
MONOCYTES NFR BLD: 8 % (ref 5–13)
NEUTS SEG # BLD: 4 K/UL (ref 1.8–8)
NEUTS SEG NFR BLD: 68 % (ref 32–75)
NRBC # BLD: 0 K/UL (ref 0–0.01)
NRBC BLD-RTO: 0 PER 100 WBC
PLATELET # BLD AUTO: 320 K/UL (ref 150–400)
PMV BLD AUTO: 8.9 FL (ref 8.9–12.9)
POTASSIUM SERPL-SCNC: 4.1 MMOL/L (ref 3.5–5.1)
PROT SERPL-MCNC: 7.3 G/DL (ref 6.4–8.2)
RBC # BLD AUTO: 4.11 M/UL (ref 3.8–5.2)
SODIUM SERPL-SCNC: 138 MMOL/L (ref 136–145)
WBC # BLD AUTO: 5.9 K/UL (ref 3.6–11)

## 2022-04-12 PROCEDURE — 82784 ASSAY IGA/IGD/IGG/IGM EACH: CPT

## 2022-04-12 PROCEDURE — 85025 COMPLETE CBC W/AUTO DIFF WBC: CPT

## 2022-04-12 PROCEDURE — 36415 COLL VENOUS BLD VENIPUNCTURE: CPT

## 2022-04-12 PROCEDURE — 80053 COMPREHEN METABOLIC PANEL: CPT

## 2022-04-12 NOTE — PROGRESS NOTES
[facility] [place] Lab Visit:        0900:  Pt arrived ambulatory to [facility] in stable condition, for lab draw. Labs drawn peripherally from left AC arm and sent for processing. Pt tolerated well. Visit Vitals  /73 (BP 1 Location: Right upper arm, BP Patient Position: Sitting)   Pulse 63   Temp 97 °F (36.1 °C)   Resp 18   SpO2 100%       0915: No new concerns voiced. D/cd home ambulatory in no distress. Pt aware of next [facility] appointment scheduled. Labs available in CC once resulted.

## 2022-04-14 DIAGNOSIS — C50.912 MALIGNANT NEOPLASM OF LEFT FEMALE BREAST, UNSPECIFIED ESTROGEN RECEPTOR STATUS, UNSPECIFIED SITE OF BREAST (HCC): Primary | ICD-10-CM

## 2022-04-14 LAB
ALBUMIN SERPL ELPH-MCNC: 3.9 G/DL (ref 2.9–4.4)
ALBUMIN/GLOB SERPL: 1.3 {RATIO} (ref 0.7–1.7)
ALPHA1 GLOB SERPL ELPH-MCNC: 0.2 G/DL (ref 0–0.4)
ALPHA2 GLOB SERPL ELPH-MCNC: 0.5 G/DL (ref 0.4–1)
B-GLOBULIN SERPL ELPH-MCNC: 1.3 G/DL (ref 0.7–1.3)
GAMMA GLOB SERPL ELPH-MCNC: 1.2 G/DL (ref 0.4–1.8)
GLOBULIN SER-MCNC: 3.2 G/DL (ref 2.2–3.9)
IGA SERPL-MCNC: 514 MG/DL (ref 64–422)
IGG SERPL-MCNC: 1405 MG/DL (ref 586–1602)
IGM SERPL-MCNC: 59 MG/DL (ref 26–217)
INTERPRETATION SERPL IEP-IMP: ABNORMAL
KAPPA LC FREE SER-MCNC: 19.5 MG/L (ref 3.3–19.4)
KAPPA LC FREE/LAMBDA FREE SER: 2.6 {RATIO} (ref 0.26–1.65)
LAMBDA LC FREE SERPL-MCNC: 7.5 MG/L (ref 5.7–26.3)
M PROTEIN SERPL ELPH-MCNC: 0.3 G/DL
PROT SERPL-MCNC: 7.1 G/DL (ref 6–8.5)

## 2022-04-18 ENCOUNTER — TELEPHONE (OUTPATIENT)
Dept: ONCOLOGY | Age: 72
End: 2022-04-18

## 2022-04-19 ENCOUNTER — TELEPHONE (OUTPATIENT)
Dept: ONCOLOGY | Age: 72
End: 2022-04-19

## 2022-05-12 ENCOUNTER — OFFICE VISIT (OUTPATIENT)
Dept: ONCOLOGY | Age: 72
End: 2022-05-12
Payer: MEDICARE

## 2022-05-12 VITALS
HEART RATE: 76 BPM | RESPIRATION RATE: 19 BRPM | TEMPERATURE: 98.5 F | BODY MASS INDEX: 32.37 KG/M2 | WEIGHT: 189.6 LBS | HEIGHT: 64 IN | OXYGEN SATURATION: 98 % | DIASTOLIC BLOOD PRESSURE: 69 MMHG | SYSTOLIC BLOOD PRESSURE: 138 MMHG

## 2022-05-12 DIAGNOSIS — C90.00 MULTIPLE MYELOMA, REMISSION STATUS UNSPECIFIED (HCC): ICD-10-CM

## 2022-05-12 DIAGNOSIS — C50.912 MALIGNANT NEOPLASM OF LEFT FEMALE BREAST, UNSPECIFIED ESTROGEN RECEPTOR STATUS, UNSPECIFIED SITE OF BREAST (HCC): Primary | ICD-10-CM

## 2022-05-12 PROCEDURE — 3017F COLORECTAL CA SCREEN DOC REV: CPT | Performed by: INTERNAL MEDICINE

## 2022-05-12 PROCEDURE — G8427 DOCREV CUR MEDS BY ELIG CLIN: HCPCS | Performed by: INTERNAL MEDICINE

## 2022-05-12 PROCEDURE — 99214 OFFICE O/P EST MOD 30 MIN: CPT | Performed by: INTERNAL MEDICINE

## 2022-05-12 PROCEDURE — 1090F PRES/ABSN URINE INCON ASSESS: CPT | Performed by: INTERNAL MEDICINE

## 2022-05-12 PROCEDURE — 1101F PT FALLS ASSESS-DOCD LE1/YR: CPT | Performed by: INTERNAL MEDICINE

## 2022-05-12 PROCEDURE — G8752 SYS BP LESS 140: HCPCS | Performed by: INTERNAL MEDICINE

## 2022-05-12 PROCEDURE — G9899 SCRN MAM PERF RSLTS DOC: HCPCS | Performed by: INTERNAL MEDICINE

## 2022-05-12 PROCEDURE — G8536 NO DOC ELDER MAL SCRN: HCPCS | Performed by: INTERNAL MEDICINE

## 2022-05-12 PROCEDURE — G8417 CALC BMI ABV UP PARAM F/U: HCPCS | Performed by: INTERNAL MEDICINE

## 2022-05-12 PROCEDURE — G8399 PT W/DXA RESULTS DOCUMENT: HCPCS | Performed by: INTERNAL MEDICINE

## 2022-05-12 PROCEDURE — G9717 DOC PT DX DEP/BP F/U NT REQ: HCPCS | Performed by: INTERNAL MEDICINE

## 2022-05-12 PROCEDURE — G8754 DIAS BP LESS 90: HCPCS | Performed by: INTERNAL MEDICINE

## 2022-05-12 PROCEDURE — G0463 HOSPITAL OUTPT CLINIC VISIT: HCPCS | Performed by: INTERNAL MEDICINE

## 2022-05-12 NOTE — PROGRESS NOTES
[facility] [place] [facility] [place] [address] [address], [address] [address], [facility]: [phone]  F: [phone]    Reason for Visit:   Yamilet Stephen is a 66 y.o. female who is seen for follow up of    1)  Paraproteinemia- Smoldering Myeloma  2)  Left breast ER pos NC pos (weak) HER 2 neg stage I Breast cancer- 12/2018    TREATMENT HISTORY  1/29/19-left lumpectomy with sentinel lymph node biopsy. 1.8 cm invasive lobular carcinoma, grade 1, 1 of one negative lymph nodes. Oncotype low risk. Completed RT  4/19 started Letrozole    History of Present Illness:   Patient is a 66 y.o. with seronegative rheumatoid arthritis and secondary polymyalgia rheumatica who is seen for follow-up of smoldering myeloma and lobular breast cancer. She has left hip pain and is thought to have sciatica for 1 year. She is going to see Ortho. She presents for follow-up on adjuvant Letrozole. She is tolerating this. She has no hot flashes, no joint aches. No new lumps or bumps. She sees Dr. Kemar Campos. Past Medical History:   Diagnosis Date    Breast cancer, left ([address]     Depression     Goiter     Hearing loss     bilateral    Hypertension     Hypothyroid     IBS (irritable bowel syndrome)     Menopause     Rheumatoid arthritis ([address]     S/P radiation therapy       Past Surgical History:   Procedure Laterality Date    HX BREAST LUMPECTOMY Left 1/29/2019    LEFT BREAST LUMPECTOMY WITH ULTRASOUND, LEFT BREAST SENTINEL NODE BIOPSY (11a) performed by Anderson South MD at [address] HX CATARACT REMOVAL Bilateral     HX [address]  7/97    brain lesion removed ? infectious      Social History     Tobacco Use    Smoking status: Never Smoker    Smokeless tobacco: Never Used   Substance Use Topics    Alcohol use:  Yes     Alcohol/week: 5.0 standard drinks     Types: 5 Glasses of wine per week      Family History   Problem Relation Age of Onset  Cancer Mother         Lung    COPD Mother     Cancer Father         Lung    Heart Disease Brother         CABGx3    Diabetes Brother     Heart Disease Brother     Diabetes Brother     COPD Brother     Liver Disease Brother         cirrhosis    Alcohol abuse Brother     Heart Disease Paternal Uncle         CAD    Heart Disease Paternal Grandmother         CAD    Breast Cancer Paternal Grandmother     Anesth Problems Neg Hx      Current Outpatient Medications   Medication Sig    letrozole (FEMARA) 2.5 mg tablet Take 1 Tablet by mouth daily.  methotrexate 25 mg/mL chemo injection INJECT 1 ML SUBCUTANEOUSLY EVERY FRIDAY    upadacitinib (Rinvoq) 15 mg Tb24 Take 15 mg by mouth daily. Indications: rheumatoid arthritis    levothyroxine (SYNTHROID) 200 mcg tablet TAKE ONE TABLET BY MOUTH ONCE DAILY BEFORE BREAKFAST    LORazepam (ATIVAN) 1 mg tablet TAKE 1 & 1/2 (ONE & ONE-HALF) TABLETS BY MOUTH NIGHTLY . APPOINTMENT REQUIRED FOR FUTURE REFILLS    ergocalciferol (ERGOCALCIFEROL) 1,250 mcg (50,000 unit) capsule Take 1 Capsule by mouth every seven (7) days. Indications: vitamin D deficiency (high dose therapy)    citalopram (CELEXA) 40 mg tablet TAKE 1 TABLET EVERY DAY    lisinopril-hydroCHLOROthiazide (PRINZIDE, ZESTORETIC) 10-12.5 mg per tablet TAKE 1 TABLET EVERY DAY    ibuprofen (MOTRIN) 800 mg tablet Take 800 mg by mouth daily.  Insulin Syringe-Needle U-100 1 mL 30 gauge x 5/16 syrg 1 Each by Does Not Apply route Every Saturday. To be used for methotrexate solution    folic acid (FOLVITE) 1 mg tablet TAKE 1 TABLET BY MOUTH ONCE DAILY    diclofenac (VOLTAREN) 1 % gel Apply  to affected area four (4) times daily.  methylPREDNISolone (Medrol, Osmani,) 4 mg tablet Take as directed    methocarbamoL (ROBAXIN) 500 mg tablet Take 1 Tablet by mouth nightly as needed for Muscle Spasm(s).     albuterol (PROVENTIL HFA, VENTOLIN HFA, PROAIR HFA) 90 mcg/actuation inhaler Take 2 Puffs by inhalation every four (4) hours as needed for Wheezing. No current facility-administered medications for this visit. Allergies   Allergen Reactions    Keflex [Cephalexin] Rash    Sulfa (Sulfonamide Antibiotics) Rash    Monistat 1 [Tioconazole] Swelling        Review of Systems: A complete review of systems was obtained, negative except as described above. Physical Exam:     Visit Vitals  /69 (BP 1 Location: Left upper arm, BP Patient Position: Sitting, BP Cuff Size: Adult)   Pulse 76   Temp 98.5 °F (36.9 °C) (Temporal)   Resp 19   Ht 5' 4\" (1.626 m)   Wt 189 lb 9.6 oz (86 kg)   SpO2 98%   BMI 32.54 kg/m²     ECOG PS: 0  General: No distress  Eyes: PERRL, anicteric sclerae  HENT: Atraumatic  Neck: Supple  Breast: No palpable lumps or adenopathy, no skin changes. MS: Normal gait and station. Digits without clubbing or cyanosis. Skin: No rashes, ecchymoses, or petechiae. Normal temperature, turgor, and texture. Psych: Alert, oriented, appropriate affect, normal judgment/insight    Results:     Lab Results   Component Value Date/Time    WBC 5.9 04/12/2022 09:04 AM    HGB 13.0 04/12/2022 09:04 AM    HCT 38.0 04/12/2022 09:04 AM    PLATELET 341 18/61/1112 09:04 AM    MCV 92.5 04/12/2022 09:04 AM    ABS. NEUTROPHILS 4.0 04/12/2022 09:04 AM     Lab Results   Component Value Date/Time    Sodium 138 04/12/2022 09:04 AM    Potassium 4.1 04/12/2022 09:04 AM    Chloride 107 04/12/2022 09:04 AM    CO2 27 04/12/2022 09:04 AM    Glucose 92 04/12/2022 09:04 AM    BUN 24 (H) 04/12/2022 09:04 AM    Creatinine 0.76 04/12/2022 09:04 AM    GFR est AA >60 04/12/2022 09:04 AM    GFR est non-AA >60 04/12/2022 09:04 AM    Calcium 9.4 04/12/2022 09:04 AM     Lab Results   Component Value Date/Time    Bilirubin, total 0.5 04/12/2022 09:04 AM    ALT (SGPT) 25 04/12/2022 09:04 AM    Alk.  phosphatase 164 (H) 04/12/2022 09:04 AM    Protein, total 7.1 04/12/2022 09:33 AM    Albumin 3.8 04/12/2022 09:04 AM    Globulin 3.5 04/12/2022 09:04 AM Lab Results   Component Value Date/Time    Ferritin 753 (H) 09/16/2020 06:14 AM     09/14/2020 11:18 PM    Beta-2 Microglobulin, serum 1.8 11/21/2018 02:47 PM    Sed rate (ESR) 2 04/12/2022 09:33 AM    C-Reactive Protein, Qt <1 04/12/2022 09:33 AM    TSH 0.024 (L) 07/07/2020 10:54 AM    M-Jere 0.3 (H) 04/12/2022 09:33 AM    M-Jere 0.2 (H) 01/21/2020 11:45 AM    Lipase 204 12/23/2016 01:31 PM    Hep C Virus Ab 0.1 12/13/2021 12:17 PM     Lab Results   Component Value Date/Time    INR 1.1 09/18/2020 12:07 AM    aPTT 33.9 (H) 09/18/2020 12:07 AM    D-dimer 1.01 (H) 09/21/2020 02:41 AM    Fibrinogen 552 (H) 09/18/2020 12:07 AM     Records reviewed and summarized above. Pathology report(s) reviewed    Bone marrow: 12/19/18  Variably cellular marrow with mild monoclonal plasmacytosis. Trilineage hematopoiesis with maturation. See comment. Peripheral blood:   Unremarkable peripheral blood. See CBC data. Comment   The bone marrow is variably cellular ranging <10% to 60% to reveal mild monoclonal, kappa restricted plasmacytosis (10%). Trilineage hematopoiesis with adequate maturation is identified      Interpretation:   Del(1p): Not Detected   Dup(1q): Not Detected   Gains(5, 9, 15): DETECTED   Del(13q): Not Detected   Del(17p)(TP53): Not Detected (See Below)   IGH(Rearrangement): Not Detected   Testing performed by [employer] and interpreted by Bernie Burciaga M.D. Please see scanned outside report in [address] [facility] for complete details. Breast biopsy 12/3/18  Breast, left, needle core biopsy:   Invasive mammary carcinoma with ductal and lobular features   Favor Deedee histologic grade 1   Largest glass slide measurement of invasive carcinoma: 8.5 mm     1/29/19  Lumpectomy and SLN     TUMOR   Tumor Size: 1.8   Histologic Type:  Invasive lobular carcinoma   Histologic Grade (1-3)   Glandular (Acinar)/Tubular Differentiation: 3   Nuclear Pleomorphism: 1   Mitotic Rate: 1 Overall ndGndrndanddndend:nd nd2nd Lymph Nodes with Macrometastases (>2 mm): 0   Lymph Nodes with Micrometastases (>0.2 mm to 2 mm and/or >200 cells): 0 Number of Lymph Nodes with Isolated Tumor Cells (?0.2 mm and ?200 cells)#: 0   Total Number of Lymph Nodes Examined: 1   Number of [place] Nodes Examined: 1   3. Breast, left, anterior margin, excision:   Atypical lobular hyperplasia     Radiology report(s) reviewed       DEXA 6/2021    Osteopenia    Mammogram 12/2021:  Normal    PET CT  Negative for any lytic lesions or uptake. Skeletal survey 11/2021  Negative     Assessment:   1) Paraproteinemia- Smoldering Myeloma    Small IgA M spike  No end organ damage- Bone survey suggests few calvarial lucencies-but there are no corresponding lesions on the PET/CT. BM bx 2018 showed 10% plasma cells with trisomies  Together she likely has smoldering myeloma     Reviewed the spectrum of plasma cell disorders and implications of diagnosis    She understands that smoldering myeloma has about a > 40% risk of transforming into myeloma in the ensuing years. At this time we will continue with observation.      Last labs  4/2022 stable-    2) Left breast cancer  9 mm lesion on Mammo/USG  %, CT 3% HER2 mignon negative  Status post lumpectomy and sentinel lymph node biopsy on 1/29/19  T1cN0 invasive lobular carcinoma-stage I  Tumor is 1.8 cm  Grade 1  Margins with atypical lobular hyper  Oncotype DX low risk    Completed adjuvant RT  Tolerating adjuvant Letrozole since 4/19  Planning 5 years    Discussed lifestyle, she is practicing weight bearing  DEXA 6/21 reviewed and shows osteopenia, she is taking VD and exercising     Exam unremarkable  Mammogram reviewed 12/2021 and reassuring    3)  Rheumatoid arthritis      4) Depression  Per Dr. Glenys Brittle    5) Left hip pain  Sees Ortho  I have asked her to let me know if they do any imaging    Plan:     · Continue letrozole   · Continue VD  · Counseled in Life style changes   · DEXA  - due 6/2023 · Mammogram due Dec 2022- ordered by breast surgery   · Cbc, cmp, gammopathy  in 6 months  · Follow with Ortho  · Skeletal survey - consider 11/2023    RTC in 6 months with cbc, cmp, gammopathy    I appreciate the opportunity to participate in Ms. Laith Cortez's care.       Signed By: Darryle Amsterdam, MD

## 2022-05-12 NOTE — PROGRESS NOTES
Identified pt with two pt identifiers(name and ). Reviewed record in preparation for visit and have obtained necessary documentation. Chief Complaint   Patient presents with    Follow-up      Vitals:    22 0939   BP: 138/69   Pulse: 76   Resp: 19   Temp: 98.5 °F (36.9 °C)   TempSrc: Temporal   SpO2: 98%   Weight: 189 lb 9.6 oz (86 kg)   Height: 5' 4\" (1.626 m)   PainSc:   0 - No pain       Allergies   Allergen Reactions    Keflex [Cephalexin] Rash    Sulfa (Sulfonamide Antibiotics) Rash    Monistat 1 [Tioconazole] Swelling       Current Outpatient Medications   Medication Instructions    albuterol (PROVENTIL HFA, VENTOLIN HFA, PROAIR HFA) 90 mcg/actuation inhaler 2 Puffs, Inhalation, EVERY 4 HOURS AS NEEDED    citalopram (CELEXA) 40 mg tablet TAKE 1 TABLET EVERY DAY    diclofenac (VOLTAREN) 1 % gel Topical, 4 TIMES DAILY    ergocalciferol (ERGOCALCIFEROL) 50,000 Units, Oral, EVERY 7 DAYS    folic acid (FOLVITE) 1 mg tablet TAKE 1 TABLET BY MOUTH ONCE DAILY    ibuprofen (MOTRIN) 800 mg, Oral, DAILY    Insulin Syringe-Needle U-100 1 mL 30 gauge x 5/16 syrg 1 Each, Does Not Apply, EVERY SATURDAY, To be used for methotrexate solution    letrozole (FEMARA) 2.5 mg, Oral, DAILY    levothyroxine (SYNTHROID) 200 mcg tablet TAKE ONE TABLET BY MOUTH ONCE DAILY BEFORE BREAKFAST    lisinopril-hydroCHLOROthiazide (PRINZIDE, ZESTORETIC) 10-12.5 mg per tablet TAKE 1 TABLET EVERY DAY    LORazepam (ATIVAN) 1 mg tablet TAKE 1 & 1/2 (ONE & ONE-HALF) TABLETS BY MOUTH NIGHTLY . APPOINTMENT REQUIRED FOR FUTURE REFILLS    methocarbamoL (ROBAXIN) 500 mg, Oral, BEDTIME PRN    methotrexate 25 mg/mL chemo injection INJECT 1 ML SUBCUTANEOUSLY EVERY FRIDAY    methylPREDNISolone (Medrol, Osmani,) 4 mg tablet Take as directed    Rinvoq 15 mg, Oral, DAILY       Health Maintenance Review: Patient reminded of \"due or due soon\" health maintenance.  I have asked the patient to contact his/her primary care provider (PCP) for follow-up on his/her health maintenance. Immunization History   Administered Date(s) Administered    COVID-19, Pfizer Purple top, DILUTE for use, 12+ yrs, 30mcg/0.3mL dose 03/06/2021, 03/27/2021, 10/18/2021    Influenza Vaccine 10/10/2013, 10/14/2015, 10/15/2018    Influenza Vaccine (Quad) PF (>6 Mo Flulaval, Fluarix, and >3 Dory Barnett, Fluzone [id number]) 09/26/2020    Influenza Vaccine Split 09/26/2012    Influenza Vaccine Whole 10/22/2011    Pneumococcal Polysaccharide (PPSV-23) 05/05/2015    Zoster Recombinant 09/10/2020           Coordination of Care Questionnaire:  :   1) Have you been to an emergency room, urgent care, or hospitalized since your last visit? If yes, where when, and reason for visit? no       2. Have seen or consulted any other health care provider since your last visit? If yes, where when, and reason for visit? NO      Patient is accompanied by self I have received verbal consent from Renita Caro to discuss any/all medical information while they are present in the room.

## 2022-05-24 ENCOUNTER — OFFICE VISIT (OUTPATIENT)
Dept: ORTHOPEDIC SURGERY | Age: 72
End: 2022-05-24
Payer: MEDICARE

## 2022-05-24 ENCOUNTER — OFFICE VISIT (OUTPATIENT)
Dept: ORTHOPEDIC SURGERY | Age: 72
End: 2022-05-24

## 2022-05-24 VITALS — HEIGHT: 64 IN | WEIGHT: 180 LBS | BODY MASS INDEX: 30.73 KG/M2

## 2022-05-24 VITALS — WEIGHT: 180 LBS | BODY MASS INDEX: 30.73 KG/M2 | HEIGHT: 64 IN

## 2022-05-24 DIAGNOSIS — M17.11 ARTHRITIS OF RIGHT KNEE: ICD-10-CM

## 2022-05-24 DIAGNOSIS — G89.29 CHRONIC LEFT-SIDED LOW BACK PAIN WITH LEFT-SIDED SCIATICA: Primary | ICD-10-CM

## 2022-05-24 DIAGNOSIS — M70.62 GREATER TROCHANTERIC BURSITIS OF LEFT HIP: ICD-10-CM

## 2022-05-24 DIAGNOSIS — M54.42 CHRONIC LEFT-SIDED LOW BACK PAIN WITH LEFT-SIDED SCIATICA: Primary | ICD-10-CM

## 2022-05-24 DIAGNOSIS — M25.562 CHRONIC PAIN OF LEFT KNEE: ICD-10-CM

## 2022-05-24 DIAGNOSIS — M70.62 TROCHANTERIC BURSITIS OF LEFT HIP: Primary | ICD-10-CM

## 2022-05-24 DIAGNOSIS — G89.29 CHRONIC PAIN OF LEFT KNEE: ICD-10-CM

## 2022-05-24 PROCEDURE — 3017F COLORECTAL CA SCREEN DOC REV: CPT | Performed by: ORTHOPAEDIC SURGERY

## 2022-05-24 PROCEDURE — G8536 NO DOC ELDER MAL SCRN: HCPCS | Performed by: ORTHOPAEDIC SURGERY

## 2022-05-24 PROCEDURE — 20610 DRAIN/INJ JOINT/BURSA W/O US: CPT | Performed by: PHYSICIAN ASSISTANT

## 2022-05-24 PROCEDURE — G8536 NO DOC ELDER MAL SCRN: HCPCS | Performed by: PHYSICIAN ASSISTANT

## 2022-05-24 PROCEDURE — G9899 SCRN MAM PERF RSLTS DOC: HCPCS | Performed by: ORTHOPAEDIC SURGERY

## 2022-05-24 PROCEDURE — G8756 NO BP MEASURE DOC: HCPCS | Performed by: ORTHOPAEDIC SURGERY

## 2022-05-24 PROCEDURE — G8399 PT W/DXA RESULTS DOCUMENT: HCPCS | Performed by: ORTHOPAEDIC SURGERY

## 2022-05-24 PROCEDURE — G8427 DOCREV CUR MEDS BY ELIG CLIN: HCPCS | Performed by: PHYSICIAN ASSISTANT

## 2022-05-24 PROCEDURE — 3017F COLORECTAL CA SCREEN DOC REV: CPT | Performed by: PHYSICIAN ASSISTANT

## 2022-05-24 PROCEDURE — G8417 CALC BMI ABV UP PARAM F/U: HCPCS | Performed by: ORTHOPAEDIC SURGERY

## 2022-05-24 PROCEDURE — G8427 DOCREV CUR MEDS BY ELIG CLIN: HCPCS | Performed by: ORTHOPAEDIC SURGERY

## 2022-05-24 PROCEDURE — G8417 CALC BMI ABV UP PARAM F/U: HCPCS | Performed by: PHYSICIAN ASSISTANT

## 2022-05-24 PROCEDURE — G8756 NO BP MEASURE DOC: HCPCS | Performed by: PHYSICIAN ASSISTANT

## 2022-05-24 PROCEDURE — G9717 DOC PT DX DEP/BP F/U NT REQ: HCPCS | Performed by: PHYSICIAN ASSISTANT

## 2022-05-24 PROCEDURE — 1090F PRES/ABSN URINE INCON ASSESS: CPT | Performed by: ORTHOPAEDIC SURGERY

## 2022-05-24 PROCEDURE — 99204 OFFICE O/P NEW MOD 45 MIN: CPT | Performed by: ORTHOPAEDIC SURGERY

## 2022-05-24 PROCEDURE — G9717 DOC PT DX DEP/BP F/U NT REQ: HCPCS | Performed by: ORTHOPAEDIC SURGERY

## 2022-05-24 PROCEDURE — 99024 POSTOP FOLLOW-UP VISIT: CPT | Performed by: PHYSICIAN ASSISTANT

## 2022-05-24 PROCEDURE — G8399 PT W/DXA RESULTS DOCUMENT: HCPCS | Performed by: PHYSICIAN ASSISTANT

## 2022-05-24 PROCEDURE — 1101F PT FALLS ASSESS-DOCD LE1/YR: CPT | Performed by: PHYSICIAN ASSISTANT

## 2022-05-24 PROCEDURE — 1090F PRES/ABSN URINE INCON ASSESS: CPT | Performed by: PHYSICIAN ASSISTANT

## 2022-05-24 PROCEDURE — 1101F PT FALLS ASSESS-DOCD LE1/YR: CPT | Performed by: ORTHOPAEDIC SURGERY

## 2022-05-24 PROCEDURE — G9899 SCRN MAM PERF RSLTS DOC: HCPCS | Performed by: PHYSICIAN ASSISTANT

## 2022-05-24 RX ORDER — TRIAMCINOLONE ACETONIDE 40 MG/ML
40 INJECTION, SUSPENSION INTRA-ARTICULAR; INTRAMUSCULAR ONCE
Status: COMPLETED | OUTPATIENT
Start: 2022-05-24 | End: 2022-05-24

## 2022-05-24 RX ADMIN — TRIAMCINOLONE ACETONIDE 40 MG: 40 INJECTION, SUSPENSION INTRA-ARTICULAR; INTRAMUSCULAR at 13:44

## 2022-05-24 NOTE — LETTER
5/24/2022    Patient: Bridget Jones   YOB: 1950   Date of Visit: 5/24/2022     Aria Hayes MD  [id number]  [facility]    Dear Aria Hayes MD,      Thank you for referring Ms. Juany Cortez to [facility] for evaluation. My notes for this consultation are attached. If you have questions, please do not hesitate to call me. I look forward to following your patient along with you.       Sincerely,    Ari Cooper MD

## 2022-05-24 NOTE — PROGRESS NOTES
Renita Caro (: 1950) is a 66 y.o. female patient, here for evaluation of the following chief complaint(s):  Hip Pain (left hip pain) and Knee Pain (left knee pain)       ASSESSMENT/PLAN:  Below is the assessment and plan developed based on review of pertinent history, physical exam, labs, studies, and medications. 34-year-old female comes in today for 2 issues. She has point tenderness over her left lateral hip. Hip joint space appears well-preserved. Symptoms most likely consistent with trochanteric bursitis. Discussed findings with patient. Patient would like to try a steroid injection. After verbal consent was obtained I injected 40 mg triamcinolone into the left trochanteric bursa using sterile technique. Patient tolerated well. Encouraged physical therapy. States she is also having intermittent pains in her knees. Right greater than left. Right knee is medially based. X-rays show severe medial joint space narrowing and osteophyte formation. She would like to try an injection. After verbal consent was obtained I injected 40 mg triamcinolone into the right knee joint using sterile technique. Patient tolerated well. Again encouraged physical therapy. Plans to follow-up as needed. She is also seeing our spine team for radiculopathy-like symptoms on the left. 1. Trochanteric bursitis of left hip  -     DRAIN/INJECT INTERMEDIATE JOINT/BURSA  -     triamcinolone acetonide (KENALOG-40) 40 mg/mL injection 40 mg; 40 mg, Intra artICUlar, ONCE, 1 dose, On Tue 22 at 1400  2. Chronic pain of left knee  -     XR KNEE LT MIN 4 V; Future  3. Arthritis of right knee  -     DRAIN/INJECT LARGE JOINT/BURSA  -     triamcinolone acetonide (KENALOG-40) 40 mg/mL injection 40 mg; 40 mg, Intra artICUlar, ONCE, 1 dose, On 22 at 1400      Encounter Diagnoses   Name Primary?     Chronic pain of left knee     Arthritis of right knee     Trochanteric bursitis of left hip Yes No follow-ups on file. SUBJECTIVE/OBJECTIVE:  Ayo John (: 1950) is a 66 y.o. female who presents today for the following:  Chief Complaint   Patient presents with    Hip Pain     left hip pain    Knee Pain     left knee pain       76-year-old female comes in today for relation of point tenderness over lateral hip. Has been intermittently intermittently going on for the last couple of months. She also has some radiculopathy like pain on that side, she is being followed by our spine team for this. Difficulty sleeping on that side at night. Denies any mechanism of injury. Affects her daily. She also complains of some knee pain that is medially based in nature. Has bothered her for years. Has tried some over-the-counter medications with no real relief in symptoms. IMAGING:  XR Results (most recent):  Results from Appointment encounter on 22    XR KNEE LT MIN 4 V    Narrative  Please left knee x-rays ordered and personally reviewed. Patient with mild medial joint space narrowing of the left knee. Remainder of joint space appears well-preserved. Patient noted to have severe medial joint space narrowing on the right knee. Remainder of joint space appears well-preserved. Allergies   Allergen Reactions    Keflex [Cephalexin] Rash    Sulfa (Sulfonamide Antibiotics) Rash    Monistat 1 [Tioconazole] Swelling       Current Outpatient Medications   Medication Sig    LORazepam (ATIVAN) 1 mg tablet TAKE 1 & 1/2 (ONE & ONE-HALF) TABLETS BY MOUTH NIGHTLY .  letrozole (FEMARA) 2.5 mg tablet Take 1 Tablet by mouth daily.  methotrexate 25 mg/mL chemo injection INJECT 1 ML SUBCUTANEOUSLY EVERY FRIDAY    upadacitinib (Rinvoq) 15 mg Tb24 Take 15 mg by mouth daily.  Indications: rheumatoid arthritis    levothyroxine (SYNTHROID) 200 mcg tablet TAKE ONE TABLET BY MOUTH ONCE DAILY BEFORE BREAKFAST    ergocalciferol (ERGOCALCIFEROL) 1,250 mcg (50,000 unit) capsule Take 1 Capsule by mouth every seven (7) days. Indications: vitamin D deficiency (high dose therapy)    citalopram (CELEXA) 40 mg tablet TAKE 1 TABLET EVERY DAY    lisinopril-hydroCHLOROthiazide (PRINZIDE, ZESTORETIC) 10-12.5 mg per tablet TAKE 1 TABLET EVERY DAY    ibuprofen (MOTRIN) 800 mg tablet Take 800 mg by mouth daily.  Insulin Syringe-Needle U-100 1 mL 30 gauge x 5/16 syrg 1 Each by Does Not Apply route Every Saturday. To be used for methotrexate solution    folic acid (FOLVITE) 1 mg tablet TAKE 1 TABLET BY MOUTH ONCE DAILY    diclofenac (VOLTAREN) 1 % gel Apply  to affected area four (4) times daily.  methylPREDNISolone (Medrol, Osmani,) 4 mg tablet Take as directed    methocarbamoL (ROBAXIN) 500 mg tablet Take 1 Tablet by mouth nightly as needed for Muscle Spasm(s).  albuterol (PROVENTIL HFA, VENTOLIN HFA, PROAIR HFA) 90 mcg/actuation inhaler Take 2 Puffs by inhalation every four (4) hours as needed for Wheezing. Current Facility-Administered Medications   Medication    triamcinolone acetonide (KENALOG-40) 40 mg/mL injection 40 mg    triamcinolone acetonide (KENALOG-40) 40 mg/mL injection 40 mg       Past Medical History:   Diagnosis Date    Breast cancer, left (HCC)     Depression     Goiter     Hearing loss     bilateral    Hypertension     Hypothyroid     IBS (irritable bowel syndrome)     Menopause     [address] [address]     S/P radiation therapy         Past Surgical History:   Procedure Laterality Date    HX BREAST LUMPECTOMY Left 1/29/2019    LEFT BREAST LUMPECTOMY WITH ULTRASOUND, LEFT BREAST SENTINEL NODE BIOPSY (11a) performed by Shimon Herrera MD at [address] HX CATARACT REMOVAL Bilateral     HX CHOLECYSTECTOMY  1997    NEUROLOGICAL PROCEDURE UNLISTED  7/97    brain lesion removed ?  infectious       Family History   Problem Relation Age of Onset    Cancer Mother         Lung    COPD Mother     Cancer Father         Lung    Heart Disease Brother CABGx3    Diabetes Brother     Heart Disease Brother     Diabetes Brother     COPD Brother     Liver Disease Brother         cirrhosis    Alcohol abuse Brother     Heart Disease Paternal Uncle         CAD    Heart Disease Paternal Grandmother         CAD    Breast Cancer Paternal Grandmother     Anesth Problems Neg Hx         Social History     Tobacco Use    Smoking status: Never Smoker    Smokeless tobacco: Never Used   Substance Use Topics    Alcohol use: Yes     Alcohol/week: 5.0 standard drinks     Types: 5 Glasses of wine per week        All systems reviewed x 12 and were negative with the exception of None      No flowsheet data found. Vitals:  Ht 5' 4\" (1.626 m)   Wt 180 lb (81.6 kg)   BMI 30.90 kg/m²    Body mass index is 30.9 kg/m². Physical Exam    General: NAD, well developed, well nourished. Cardiac: Extremities well perfused. Respiratory: Nonlabored breathing. LLE: Mild antalgic gait. Negative stinchfield. 0-100 degrees of flexion. >20 degrees internal rotation, >30 degrees external rotation. Negative VIJAY. No pain with flexion adduction and internal rotation. Point tender over trochanteric bursa. Motor strength grossly intact. RLE: Mild antalgic gait. No effusion noted. No previous incisions noted. ROM 0-120 degrees. Grossly stable to varus/valgus stress and anterior/posterior drawer tests. Medial joint line tenderness. Motor grossly intact. Vascular: Palpable pedal pulses, equal bilaterally. Skin: Warm well perfused, cap refill < 2 sec. Samantha Shea M.D. was available for immediate consultation as the supervising physician. An electronic signature was used to authenticate this note.   -- Alvy Schirmer, PA-C

## 2022-05-24 NOTE — PROGRESS NOTES
Carlos Figueroa (: 1950) is a 66 y.o. female, patient, here for evaluation of the following chief complaint(s):  LOW BACK PAIN       ASSESSMENT/PLAN:    Below is the assessment and plan developed based on review of pertinent history, physical exam, labs, studies, and medications. She does have some moderate degenerative changes mainly at L3-4 with some foraminal stenosis on the MRI from last August.  She also has some point tenderness of over the left lateral hip. I am going to send her to one of the joint surgeons for a greater trochanter injection. In terms of her lower back we will get a lumbar epidural at L3-4. She will see me back after those are completed    1. Chronic left-sided low back pain with left-sided sciatica  -     XR SPINE LUMB MIN 4 V; Future      No follow-ups on file. SUBJECTIVE/OBJECTIVE:  Carlos Figueroa (: 1950) is a 66 y.o. female. No flowsheet data found. She comes in today on referral for left posterior buttock left lateral hip pain and left knee pain. She said this started about a year ago with no acute trauma or injury noted. She has left posterior buttock and left hip pain. She been in physical therapy without relief. She did have an MRI back in last 2021. She not had any injection therapy. The pain is mainly on her left side and hurts when she rolls on that side. She denies any bowel bladder difficulties    Imaging:    XR Results (most recent):  Results from Appointment encounter on 22    XR SPINE LUMB MIN 4 V    Narrative  AP and lateral and flexion-extension lumbar spine demonstrates mild degenerative narrowing of the disc base. No gross instability with flexion-extension. No fractures or lytic lesions. I independently reviewed the MRI today. She has moderate spondylosis in the lower lumbar spine mainly at L3-4 with a mild retrolisthesis.   Moderate facet hypertrophy causing foraminal stenosis left greater than right          MRI Results (most recent): MRI Results (most recent):  Results from [place] encounter on 08/11/21    MRI LUMB SPINE W WO CONT    Narrative  EXAM: MRI LUMB SPINE W WO CONT  Clinical history: breast cancer, evaluate for spine lesions  INDICATION: . Rest cancer, evaluate for metastases    COMPARISON: None    TECHNIQUE: MR imaging of the lumbar spine was performed using the following  sequences: sagittal T1, T2, STIR;  axial T1, T2 prior to and following contrast  administration. CONTRAST: 15 mL of ProHance. FINDINGS:    The abdominal aorta is normal in caliber. The proximal common iliac vessels are  normal in caliber. There are tiny right and left renal cysts present. There is  minimal if any anterolisthesis of L3 on L4. Vertebral body heights are  maintained. Marrow signal is normal. Multilevel disc desiccation. There is no  distal cord signal abnormality. There is no abnormal intrathecal postcontrast  enhancement. There is no evidence of osseous metastatic disease. The conus medullaris terminates at L1-L2. Signal and caliber of the distal  spinal cord are within normal limits. There is no pathologic intrathecal  enhancement. The paraspinal soft tissues are within normal limits. Lower thoracic spine: No herniation or stenosis. L1-L2: Minimal facet arthropathy. Dumbbell-shaped disc protrusion. Canal is  patent. Foramina are patent. Effacement of nerve roots extending to the right  L2-3 foramen. L2-L3: Minimal facet arthropathy. Disc bulge. Canal is patent. Foramina are  patent. L3-L4: Minimal facet arthropathy. Ligamentum flavum hypertrophy is greater on  the left. Left foraminal protrusion/osteophyte. Mild canal and left foraminal  stenosis. L4-L5: Moderate facet arthropathy is greater on the right. . Disc desiccation and  disc bulge. Minimal canal stenosis. Foramina are patent    L5-S1: Minimal facet arthropathy. The canal is patent.  The foramina are patent. Impression  Multilevel disc and facet degenerative change is present. There is mild canal  and left foraminal stenosis at L3-L4. There is no evidence of osseous metastatic disease. Allergies   Allergen Reactions    Keflex [Cephalexin] Rash    Sulfa (Sulfonamide Antibiotics) Rash    Monistat 1 [Tioconazole] Swelling       Current Outpatient Medications   Medication Sig    LORazepam (ATIVAN) 1 mg tablet TAKE 1 & 1/2 (ONE & ONE-HALF) TABLETS BY MOUTH NIGHTLY .  letrozole (FEMARA) 2.5 mg tablet Take 1 Tablet by mouth daily.  methylPREDNISolone (Medrol, Osmani,) 4 mg tablet Take as directed    methocarbamoL (ROBAXIN) 500 mg tablet Take 1 Tablet by mouth nightly as needed for Muscle Spasm(s).  methotrexate 25 mg/mL chemo injection INJECT 1 ML SUBCUTANEOUSLY EVERY FRIDAY    upadacitinib (Rinvoq) 15 mg Tb24 Take 15 mg by mouth daily. Indications: rheumatoid arthritis    levothyroxine (SYNTHROID) 200 mcg tablet TAKE ONE TABLET BY MOUTH ONCE DAILY BEFORE BREAKFAST    ergocalciferol (ERGOCALCIFEROL) 1,250 mcg (50,000 unit) capsule Take 1 Capsule by mouth every seven (7) days. Indications: vitamin D deficiency (high dose therapy)    citalopram (CELEXA) 40 mg tablet TAKE 1 TABLET EVERY DAY    lisinopril-hydroCHLOROthiazide (PRINZIDE, ZESTORETIC) 10-12.5 mg per tablet TAKE 1 TABLET EVERY DAY    ibuprofen (MOTRIN) 800 mg tablet Take 800 mg by mouth daily.  Insulin Syringe-Needle U-100 1 mL 30 gauge x 5/16 syrg 1 Each by Does Not Apply route Every Saturday. To be used for methotrexate solution    albuterol (PROVENTIL HFA, VENTOLIN HFA, PROAIR HFA) 90 mcg/actuation inhaler Take 2 Puffs by inhalation every four (4) hours as needed for Wheezing.  folic acid (FOLVITE) 1 mg tablet TAKE 1 TABLET BY MOUTH ONCE DAILY    diclofenac (VOLTAREN) 1 % gel Apply  to affected area four (4) times daily. No current facility-administered medications for this visit.        Past Medical History: Diagnosis Date    [facility] cancer, [facility] ([facility] [address]     Depression     Goiter     Hearing loss     bilateral    Hypertension     Hypothyroid     IBS (irritable bowel syndrome)     Menopause     Rheumatoid arthritis ([facility] [address]     S/P radiation therapy         Past Surgical History:   Procedure Laterality Date    HX BREAST LUMPECTOMY Left 1/29/2019    LEFT BREAST LUMPECTOMY WITH ULTRASOUND, LEFT BREAST SENTINEL NODE BIOPSY (11a) performed by Lucia De La Cruz MD at [address] HX CATARACT REMOVAL Bilateral     HX [address]  7/97    brain lesion removed ? infectious       Family History   Problem Relation Age of Onset    Cancer Mother         Lung    COPD Mother     Cancer Father         Lung    Heart Disease Brother         CABGx3    Diabetes Brother     Heart Disease Brother     Diabetes Brother     COPD Brother     Liver Disease Brother         cirrhosis    Alcohol abuse Brother     Heart Disease Paternal Uncle         CAD    Heart Disease Paternal Grandmother         CAD    Breast Cancer Paternal Grandmother     Anesth Problems Neg Hx         Social History     Tobacco Use    Smoking status: Never Smoker    Smokeless tobacco: Never Used   Vaping Use    Vaping Use: Not on file   Substance Use Topics    Alcohol use: Yes     Alcohol/week: 5.0 standard drinks     Types: 5 Glasses of wine per week    Drug use: No        Review of Systems       Vitals:  Ht 5' 4\" (1.626 m)   Wt 180 lb (81.6 kg)   BMI 30.90 kg/m²    Body mass index is 30.9 kg/m². Ortho Exam       Alert and Kim  x 3    Normal gait and station; normal posture    No assistive devices today. Cervical spine:  Examination of the cervical spine demonstrates no tenderness on palpation, no pain, no swelling or edema, with normal lumbar range of motion. Thoracic spine: Examination of the thoracic spine demonstrates no tenderness on palpation, no pain, no swelling or edema. Normal sensation and range of motion. Lumbar spine:     Examination of the lumbar spine demonstrates on inspection: No abnormal cutaneous markings. No previous surgical incisions. On palpation: There are some palpation of left posterior buttock. The pain radiates to the left lateral hip region. It radiates down to the medial aspect of the knee    Range of motion: Normal range of motion of the lumbar spine is noted. Hip range of motion is stable. Motor examination:  Right iliopsoas 5/5,  left iliopsoas 5/5, right quad 5/5, left quad 5/5, right anterior tibialis 5/5, left anterior tibialis 5/5, right EHL 5/5, left EHL 5/5, right gastrocnemius 5/5 , left gastrocnemius 5/5    Sensory examination: Reveals perhaps some mild decrease sensation along the L3 dermatome    Reflexes: Left knee 2/2, right knee 2/2, right ankle 2/2, left ankle 2/2    Functional testing: Straight leg test negative bilaterally; bowstring sign negative bilaterally    Babinsksi and Clonus negative bilaterally. An electronic signature was used to authenticate this note.   -- Amilcar Fernandez MD

## 2022-06-07 ENCOUNTER — HOSPITAL ENCOUNTER (OUTPATIENT)
Dept: INTERVENTIONAL RADIOLOGY/VASCULAR | Age: 72
Discharge: HOME | End: 2022-06-07
Attending: ORTHOPAEDIC SURGERY | Admitting: ORTHOPAEDIC SURGERY
Payer: MEDICARE

## 2022-06-07 VITALS
BODY MASS INDEX: 30.73 KG/M2 | RESPIRATION RATE: 16 BRPM | TEMPERATURE: 98.3 F | WEIGHT: 180 LBS | OXYGEN SATURATION: 99 % | HEIGHT: 64 IN | SYSTOLIC BLOOD PRESSURE: 129 MMHG | DIASTOLIC BLOOD PRESSURE: 63 MMHG | HEART RATE: 56 BPM

## 2022-06-07 DIAGNOSIS — G89.29 CHRONIC LEFT-SIDED LOW BACK PAIN WITH LEFT-SIDED SCIATICA: ICD-10-CM

## 2022-06-07 DIAGNOSIS — M06.09 SERONEGATIVE RHEUMATOID ARTHRITIS OF MULTIPLE SITES (HCC): Primary | ICD-10-CM

## 2022-06-07 DIAGNOSIS — M54.42 CHRONIC LEFT-SIDED LOW BACK PAIN WITH LEFT-SIDED SCIATICA: ICD-10-CM

## 2022-06-07 PROCEDURE — 74011000636 HC RX REV CODE- 636: Performed by: STUDENT IN AN ORGANIZED HEALTH CARE EDUCATION/TRAINING PROGRAM

## 2022-06-07 PROCEDURE — 74011000250 HC RX REV CODE- 250: Performed by: STUDENT IN AN ORGANIZED HEALTH CARE EDUCATION/TRAINING PROGRAM

## 2022-06-07 PROCEDURE — 74011250636 HC RX REV CODE- 250/636: Performed by: STUDENT IN AN ORGANIZED HEALTH CARE EDUCATION/TRAINING PROGRAM

## 2022-06-07 PROCEDURE — 64483 NJX AA&/STRD TFRM EPI L/S 1: CPT

## 2022-06-07 RX ORDER — LIDOCAINE HYDROCHLORIDE 10 MG/ML
15 INJECTION, SOLUTION EPIDURAL; INFILTRATION; INTRACAUDAL; PERINEURAL
Status: COMPLETED | OUTPATIENT
Start: 2022-06-07 | End: 2022-06-07

## 2022-06-07 RX ORDER — UPADACITINIB 15 MG/1
15 TABLET, EXTENDED RELEASE ORAL DAILY
Qty: 28 EACH | Refills: 0 | Status: SHIPPED | COMMUNITY
Start: 2022-06-07 | End: 2022-07-03

## 2022-06-07 RX ORDER — DEXAMETHASONE SODIUM PHOSPHATE 10 MG/ML
10 INJECTION INTRAMUSCULAR; INTRAVENOUS
Status: COMPLETED | OUTPATIENT
Start: 2022-06-07 | End: 2022-06-07

## 2022-06-07 RX ADMIN — IOHEXOL 5 ML: 180 INJECTION INTRAVENOUS at 16:09

## 2022-06-07 RX ADMIN — DEXAMETHASONE SODIUM PHOSPHATE 10 MG: 10 INJECTION, SOLUTION INTRAMUSCULAR; INTRAVENOUS at 16:09

## 2022-06-07 RX ADMIN — LIDOCAINE HYDROCHLORIDE 15 ML: 10 INJECTION, SOLUTION EPIDURAL; INFILTRATION; INTRACAUDAL; PERINEURAL at 16:09

## 2022-06-07 NOTE — DISCHARGE INSTRUCTIONS
You have received sedation medications today. YOU SHOULD NOT DRIVE FOR 24 HOURS, DO NOT OPERATE HEAVY MACHINERY, DO NOT MAKE ANY LEGAL DECISIONS OR SIGN LEGAL DOCUMENTS FOR 24 HOURS. DO NOT DRINK ALCOHOL, TAKE ANY MEDICATIONS UNLESS PRESCRIBED BY YOUR DOCTOR. IF YOU ARE A CAREGIVER, SOMEONE SHOULD TAKE THAT ROLE FOR 24 HOURS. Side effects of sedation medications and other medications used today have been reviewed  Those side effects can include but are not limited to: dizziness, drowsiness, poor balance, fatigue, sleepiness. Take precautions at home to prevent falls, such as assistance with walking or stairs if allowed and /or when needed or position changes. Allergic or adverse reactions could include nausea, itching, hives, dizziness, or anything else out of the ordinary. Should you experience any of these significant changes, please call [phone] between the hours of 7:30 am and 3:30 pm or [phone] after hours. After hours, ask the  to page the X-ray Technologist, and describe the problem to the technologist. If you are experiencing chest pain, shortness of breath, altered mental state, unusual bleeding or any other emergent symptom you should call 911 immediately.

## 2022-06-07 NOTE — PROGRESS NOTES
Name of procedure: JENNIFER     Any complications related to procedure: see orders     Post Procedure Care Needed/order sets placed in connect care: see orders     Pt tolerated procedure well. VSS. No C/O pain. Dressing to site D&I. No bleeding or hematoma noted to site. Discharge instructions given. Copy on chart and copy given to pt. Pt verbalized understanding. Pt taken to car by wheelchair and taken home by family. NAD noted at time of discharge.

## 2022-06-07 NOTE — ROUTINE PROCESS
Pt arrives via wheelchair to angio department accompanied by olga for [facility] procedure. All assessments completed and consent was reviewed. Education given was regarding procedure, no sedation, post-procedure care and  management/follow-up. Opportunity for questions was provided and all questions and concerns were addressed.

## 2022-06-15 ENCOUNTER — OFFICE VISIT (OUTPATIENT)
Dept: RHEUMATOLOGY | Age: 72
End: 2022-06-15
Payer: MEDICARE

## 2022-06-15 VITALS
TEMPERATURE: 98 F | HEART RATE: 87 BPM | WEIGHT: 183 LBS | DIASTOLIC BLOOD PRESSURE: 79 MMHG | BODY MASS INDEX: 31.24 KG/M2 | SYSTOLIC BLOOD PRESSURE: 127 MMHG | HEIGHT: 64 IN | RESPIRATION RATE: 18 BRPM

## 2022-06-15 DIAGNOSIS — M06.09 SERONEGATIVE RHEUMATOID ARTHRITIS OF MULTIPLE SITES (HCC): Primary | ICD-10-CM

## 2022-06-15 DIAGNOSIS — R74.8 ELEVATED ALKALINE PHOSPHATASE LEVEL: ICD-10-CM

## 2022-06-15 PROCEDURE — 1123F ACP DISCUSS/DSCN MKR DOCD: CPT | Performed by: INTERNAL MEDICINE

## 2022-06-15 PROCEDURE — G0463 HOSPITAL OUTPT CLINIC VISIT: HCPCS | Performed by: INTERNAL MEDICINE

## 2022-06-15 PROCEDURE — G8754 DIAS BP LESS 90: HCPCS | Performed by: INTERNAL MEDICINE

## 2022-06-15 PROCEDURE — 1101F PT FALLS ASSESS-DOCD LE1/YR: CPT | Performed by: INTERNAL MEDICINE

## 2022-06-15 PROCEDURE — 3017F COLORECTAL CA SCREEN DOC REV: CPT | Performed by: INTERNAL MEDICINE

## 2022-06-15 PROCEDURE — G8399 PT W/DXA RESULTS DOCUMENT: HCPCS | Performed by: INTERNAL MEDICINE

## 2022-06-15 PROCEDURE — 1090F PRES/ABSN URINE INCON ASSESS: CPT | Performed by: INTERNAL MEDICINE

## 2022-06-15 PROCEDURE — G8752 SYS BP LESS 140: HCPCS | Performed by: INTERNAL MEDICINE

## 2022-06-15 PROCEDURE — G8427 DOCREV CUR MEDS BY ELIG CLIN: HCPCS | Performed by: INTERNAL MEDICINE

## 2022-06-15 PROCEDURE — G8536 NO DOC ELDER MAL SCRN: HCPCS | Performed by: INTERNAL MEDICINE

## 2022-06-15 PROCEDURE — G9899 SCRN MAM PERF RSLTS DOC: HCPCS | Performed by: INTERNAL MEDICINE

## 2022-06-15 PROCEDURE — G8417 CALC BMI ABV UP PARAM F/U: HCPCS | Performed by: INTERNAL MEDICINE

## 2022-06-15 PROCEDURE — G9717 DOC PT DX DEP/BP F/U NT REQ: HCPCS | Performed by: INTERNAL MEDICINE

## 2022-06-15 PROCEDURE — 99215 OFFICE O/P EST HI 40 MIN: CPT | Performed by: INTERNAL MEDICINE

## 2022-06-15 RX ORDER — SYRINGE-NEEDLE,INSULIN,0.5 ML 30 GX5/16"
1 SYRINGE, EMPTY DISPOSABLE MISCELLANEOUS
Qty: 10 EACH | Refills: 5 | Status: SHIPPED | OUTPATIENT
Start: 2022-06-18 | End: 2022-09-19 | Stop reason: SDUPTHER

## 2022-06-15 NOTE — PATIENT INSTRUCTIONS
1) Take Rinvoq every other day for the next month. If you feel worse on days when you do not take Rinvoq then you can go back to one pill a day. If you do not feel any difference you can either space it out even more of you can try to stop it all together. 2) Continue to take 1 mL of methotrexate per week. Make sure you are taking your daily folic acid pills. 3) You can try hot wax treatments on your hands to try and relieve some joint pain. 4) You can continue to take Ibuprofen as needed for joint pain. 5) You can try to wear a knee brace with a patellar window to try and relieve some knee pain. 6) You can try to use over the counter numbing balms such as salonpas or tiger balm on your knees for temporary knee pain relief. 7) Check labs in the next month. 8) Follow up in 3 months. Let me know if you have nay questions or concerns in the meantime.

## 2022-06-15 NOTE — PROGRESS NOTES
REASON FOR VISIT    This is a follow-up visit for Ms. Yinka Lay for     ICD-10-CM   1. Seronegative rheumatoid arthritis of multiple sites ([facility] M06.09   2. PMR (polymyalgia rheumatica) ([facility]) M35.3     Inflammatory arthritis phenotype includes:  Anti-CCP positive: no  Rheumatoid factor positive: no  Erosive disease: no  Extra-articular manifestations include: Polymyalgia Rheumatica, smoldering myeloma, interstitial lung disease     Immunosuppression Screening (1/12/2021): Quantiferon TB: negative  PPD:  Not performed  Hepatitis B: negative  Hepatitis C: negative    Therapy History includes:  Current DMARD therapy include: methotrexate 25 mg SubQ every Friday (4/29/2019 to 2/27/2021; stopped by mistake; 4/23/2021 to present), Humira 40 mg every days (2/27/2021 to 8/10/2021) - Rinvoq 15 mg daily (SAMPLE: 8/10/2021 to present)  Prior DMARD therapy include: methotrexate 20 mg every Wednesday (10/15/2018 to 4/29/2019)  Discontinued DMARDs because of inefficacy: None  Discontinued DMARDs because of side effects: None    HISTORY OF PRESENT ILLNESS    Ms. Yinka Lay returns for a follow-up. Pt is currently taking rinvoq once a day. She has been taking it consistently since her last visit with Dr. Lis Abad. She feels like the rinvoq is working better than the humira was. She is afraid to stop taking rinvoq because she does not want to feel any worse than she does now. Pt takes Advil and tylenol almost everyday at 2 pm to relieve her joint pain. Pt notes that her hands are still providing her with pain. She uses scissors all day which exacerbates her pain. She has tried arthritis compresssion gloves for her pain, but they are too tight for her to work with. She notes that before she got on the methotrexate her hands were much more swollen and stiff in the mornings. She currently takes 1 mL of subQ methotrexate per week. Pt denies pain in her shoulders and elbows.  Her joint pain is mostly in her wrists and top of her hands. Pt reports that it is hard for her to walk up and down steps because of her joint pain. Her neighborhood has sloping streets, and it is hard for her to walk her dogs outside. Pt had a miniscus tear in her R knee, and she got a steroid injection in it. She also received an injection in her L hip and her back. She uses Voltaren gel on her knees at night. She has never tried to wear a knee brace with a hole in the middle. Pt denies excessive edema of her ankles at night. Pt is in her third year of antiestrogen treatment after dealing with breast cancer. REVIEW OF SYSTEMS    A comprehensive review of systems was performed and pertinent results are documented in the HPI, review of systems is otherwise non-contributory. PAST MEDICAL HISTORY    She has a past medical history of Breast cancer, left ([address] Depression, Goiter, Hearing loss, Hypertension, Hypothyroid, IBS (irritable bowel syndrome), Menopause, Rheumatoid arthritis ([address] and S/P radiation therapy. FAMILY HISTORY    Her family history includes Alcohol abuse in her brother; Breast Cancer in her paternal grandmother; COPD in her brother and mother; Cancer in her father and mother; Diabetes in her brother and brother; Heart Disease in her brother, brother, paternal grandmother, and paternal uncle; Liver Disease in her brother. SOCIAL HISTORY    She reports that she has never smoked. She has never used smokeless tobacco. She reports current alcohol use of about 5.0 standard drinks of alcohol per week. She reports that she does not use drugs. MEDICATIONS    Current Outpatient Medications   Medication Sig    upadacitinib (Rinvoq) 15 mg Tb24 Take 15 mg by mouth daily. Indications: rheumatoid arthritis    LORazepam (ATIVAN) 1 mg tablet TAKE 1 & 1/2 (ONE & ONE-HALF) TABLETS BY MOUTH NIGHTLY .  letrozole (FEMARA) 2.5 mg tablet Take 1 Tablet by mouth daily.     methylPREDNISolone (Medrol, Osmani,) 4 mg tablet Take as directed    methocarbamoL (ROBAXIN) 500 mg tablet Take 1 Tablet by mouth nightly as needed for Muscle Spasm(s).  methotrexate 25 mg/mL chemo injection INJECT 1 ML SUBCUTANEOUSLY EVERY FRIDAY    upadacitinib (Rinvoq) 15 mg Tb24 Take 15 mg by mouth daily. Indications: rheumatoid arthritis    levothyroxine (SYNTHROID) 200 mcg tablet TAKE ONE TABLET BY MOUTH ONCE DAILY BEFORE BREAKFAST    ergocalciferol (ERGOCALCIFEROL) 1,250 mcg (50,000 unit) capsule Take 1 Capsule by mouth every seven (7) days. Indications: vitamin D deficiency (high dose therapy)    citalopram (CELEXA) 40 mg tablet TAKE 1 TABLET EVERY DAY    lisinopril-hydroCHLOROthiazide (PRINZIDE, ZESTORETIC) 10-12.5 mg per tablet TAKE 1 TABLET EVERY DAY    ibuprofen (MOTRIN) 800 mg tablet Take 800 mg by mouth daily.  Insulin Syringe-Needle U-100 1 mL 30 gauge x 5/16 syrg 1 Each by Does Not Apply route Every Saturday. To be used for methotrexate solution    albuterol (PROVENTIL HFA, VENTOLIN HFA, PROAIR HFA) 90 mcg/actuation inhaler Take 2 Puffs by inhalation every four (4) hours as needed for Wheezing.  folic acid (FOLVITE) 1 mg tablet TAKE 1 TABLET BY MOUTH ONCE DAILY    diclofenac (VOLTAREN) 1 % gel Apply  to affected area four (4) times daily. No current facility-administered medications for this visit. ALLERGIES    Allergies   Allergen Reactions    Keflex [Cephalexin] Rash    Sulfa (Sulfonamide Antibiotics) Rash    Monistat 1 [Tioconazole] Swelling       PHYSICAL EXAMINATION    Visit Vitals  /79   Pulse 87   Temp 98 °F (36.7 °C)   Resp 18   Ht 5' 4\" (1.626 m)   Wt 183 lb (83 kg)   BMI 31.41 kg/m²     Body mass index is 31.41 kg/m². General:  The patient is well developed, well nourished, alert, and in no apparent distress. Eyes: Sclera are anicteric. No conjunctival injection. HEENT:  Oropharynx is clear. No oral ulcers. Adequate salivary pooling. No cervical or supraclavicular lymphadenopathy.    Lungs:  Clear to auscultation bilaterally, without wheeze or stridor. Normal respiratory effort. Cor:  Regular rate and rhythm. No murmur rub or gallop. Abdomen: Soft, non-tender, without hepatomegaly or masses. Extremities: No calf tenderness or edema. Warm and well perfused. Skin:  No significant abnormalities. No petechial, purpuric, or psoriaform rashes   Neuro: Nonfocal  Musculoskeletal:   A comprehensive musculoskeletal exam was performed for all joints of each upper and lower extremity and assessed for swelling, tenderness and range of motion. Results are documented as below:   Tenderness across MCPs. Trace synovitis of R PIP 2. Tenderness of wrists. Pan MCP and pan PIP tenderness. Bilateral knee crepitus. Medial joint line tenderness on R and lateral JLT on left. DATA REVIEW    Laboratory   4/12/22: Immunoglobulin A 514, M-Jere 0.3, Free kappa Lt Chains 19.5, Kappa/Lamda ratio serum 2.6, Cr 0.76, Alk phos 164, AST 14, ALT 25, Tbilli 0.5, WBC 6.2, HGB 13, Plt 320, Tandem-R Ostase 52.3, GGT 27, ESR 2, CRP <1 mg/L    Recent laboratory results were reviewed, summarized, and discussed with the patient. Imaging    Musculoskeletal Ultrasound    None    Radiographs  XR knee LT MIN 4 V 5/24/22:  Patient with mild medial joint space narrowing of the left knee. Remainder of joint space appears well-preserved. Patient noted to have severe medial joint space narrowing on the right knee. Remainder of joint space appears well-preserved. XR SPINE LUMB MIN 4 V 5/24/22: AP and lateral and flexion-extension lumbar spine demonstrates mild degenerative narrowing of the disc base. No gross instability with flexion-extension. No fractures or lytic lesions. Skeletal Survey 11/28/2018: Few small calvarial lucencies. No fractures or lytic lesions at risk for fracture. Bilateral Shoulder 10/04/2018: RIGHT: There is prominent AC joint degeneration with spurring and loss of joint space.  There is some mild deformity, chronic in nature of the tuberosity. No fracture or dislocation, no bony erosion, the bone is normal in mineral contents. No soft tissue calcifications. LEFT: The bone is normal in mineral contents. There is some mild chronic deformity of the tuberosity and AC joint degeneration. There are no soft tissue calcification bony erosion fracture or dislocation. Bilateral Hand 10/04/2018: RIGHT: no acute fracture or dislocation. Alignment is anatomic. Mild degenerative changes are seen in the interphalangeal joint of the thumb. No erosions are seen. No abnormality seen in the soft tissues. LEFT: no acute fracture or dislocation. Alignment is anatomic. Moderate to severe degenerative changes are present in the first [facility] joint. A cystic lucency in the ace of the first metacarpal likely represents a subchondral cyst. No clear erosions are seen. No abnormality seen in the soft tissues. Bilateral Foot 10/04/2018: RIGHT:  no fracture or other acute osseous or articular abnormality. A small plantar calcaneal heel spur is noted. No erosions or joint space narrowing are present. The soft tissues are within normal limits. LEFT: no acute fracture or dislocation. Alignment is anatomic. A small plantar calcaneal heel spur is noted. No erosions or joint space narrowing are present. . No abnormality is seen in the soft tissues.     CT Imaging    CT Chest without contrast 2/04/2021: CHEST WALL: Left breast lesion is stable in appearance. THYROID: No nodule. MEDIASTINUM: No mass or lymphadenopathy. RIKY: No mass or lymphadenopathy. THORACIC AORTA: Atherosclerotic change. MAIN PULMONARY ARTERY: Normal in caliber. TRACHEA/BRONCHI: Patent. ESOPHAGUS: No wall thickening or dilatation. HEART: Trace atherosclerotic change in the LAD. Normal in size. PLEURA: No effusion or pneumothorax. LUNGS: No suspicious pulmonary mass or nodule. Trace scarring in the lingula. INCIDENTALLY IMAGED UPPER ABDOMEN: Hepatic steatosis. Postcholecystectomy. BONES: Chronic mild loss of vertebral body height. Anterior disc osteophytes. PET/CT Scan 1/24/2019: HEAD/NECK: No apparent foci of abnormal hypermetabolism. Cerebral evaluation is limited by normal intense activity. CHEST: No foci of abnormal hypermetabolism. The known small breast cancer at 12:00 in the left breast demonstrates no increased metabolic activity. ABDOMEN/PELVIS: No foci of abnormal hypermetabolism. SKELETON: No foci of abnormal hypermetabolism in the axial and visualized appendicular skeleton. Lower extremities:. Imaging of the lower extremities is unremarkable. There is muscular activity noted. CT Head without contrast 9/13/2017: Prior right occipital craniectomy. Encephalomalacia in the right cerebellar region. . There is no significant white matter disease. There is no intracranial hemorrhage, extra-axial collection, mass, mass effect or midline shift. The basilar cisterns are open. No acute infarct is identified. The visualized portions of the paranasal sinuses and mastoid air cells are clear    MR Imaging    MRI Breasts with and without contrast 1/07/2019: There is minimal background parenchymal enhancement and the breasts are almost entirely fat. A few sub-5 mm foci of enhancement are scattered bilaterally. Right breast: No suspicious enhancing foci. No axillary or internal mammary chain lymphadenopathy. Left breast: A vague area of enhancement around the biopsy clip in the anterior third at 12:00 does not reach threshold enhancement. This measures approximately 9 x 9 mm, and correlates well with the small area of architectural distortion on mammography and subtle architectural distortion and shadowing on ultrasound. No axillary or internal mammary chain lymphadenopathy.      DXA     DXA 6/10/2021: (excluded None) lumbar spine L1-L4 T score -1.2 (BMD 1.050 g/cm2), left femoral neck T score: -0.9 (0.906 g/cm2), left total hip T score: -0.6 (0.933 g/cm2), right femoral neck T score: -0.9 (0.908 g/cm2), right total hip T score: -0.3 (0.966 g/cm2), and distal one third left radius T score -1.0 (BMD 0.789 g/cm2). FRAX score 11.1 % probability in 10 years for major osteoporotic fracture and 1.3 % 10 year probability of hip fracture. DXA 5/12/2015: (excluded distal radius) lumbar spine L1-L4 T score 0.4 (BMD 1.241 g/cm2), left femoral neck T score: 0.5 (1.109 g/cm2), left total hip T score: 1.0 (1.139 g/cm2), right femoral neck T score: 0.2 (1.062 g/cm2), right total hip T score: 1.1 (1.145 g/cm2). FRAX score N/A % probability in 10 years for major osteoporotic fracture and N/A % 10 year probability of hip fracture. PATHOLOGY    Lymph node, left axilla, #1, sentinel node excision 1/29/2019: No evidence for malignancy in one node (0/1). Breast, left, lumpectomy 1/29/2019:  Invasive lobular carcinoma. Lobular carcinoma in situ     Bone Marrow Biopsy 12/19/2018: Bone marrow: Variably cellular marrow with mild monoclonal plasmacytosis. Trilineage hematopoiesis with maturation. Breast, left, needle core biopsy 12/03/2018: Invasive mammary carcinoma with ductal and lobular features Favor Underwood histologic grade 1. Largest glass slide measurement of invasive carcinoma: 8.5 mm. ER pos IL pos (weak) HER 2 neg. PROCEDURE     Kenalog 80 mg IM. (01/21/20)    ASSESSMENT AND PLAN    This is a follow-up visit for Ms. Edward Carnes for seronegative rheumatoid arthritis, with arthralgia currently driven by secondary osteoarthritis, but no active synovitis. We reviewed a trial of spacing Rinvoq given at least a theoretical increased risk of breast cancer recurrence and MGUS progression while on this. 1. Seronegative rheumatoid arthritis of multiple sites ([facility]  - Pt to trial Rinvoq every other day and then stop if no discernible difference  - Cont 25mg methotrexate weekly subQ  - C REACTIVE PROTEIN, QT; Future  - CBC WITH AUTOMATED DIFF; Future  - METABOLIC PANEL, COMPREHENSIVE;  Future  - SED RATE (ESR); Future  - C REACTIVE PROTEIN, QT  - CBC WITH AUTOMATED DIFF  - METABOLIC PANEL, COMPREHENSIVE  - SED RATE (ESR)  - Insulin Syringe-Needle U-100 1 mL 30 gauge x 5/16 syrg; 1 Each by Does Not Apply route Every Saturday. To be used for methotrexate solution  Dispense: 10 Each; Refill: 5    2. Elevated alkaline phosphatase level  - C REACTIVE PROTEIN, QT; Future  - CBC WITH AUTOMATED DIFF; Future  - METABOLIC PANEL, COMPREHENSIVE; Future  - SED RATE (ESR); Future     Patient Instructions   1) Take Rinvoq every other day for the next month. If you feel worse on days when you do not take Rinvoq then you can go back to one pill a day. If you do not feel any difference you can either space it out even more of you can try to stop it all together. 2) Continue to take 1 mL of methotrexate per week. Make sure you are taking your daily folic acid pills. 3) You can try hot wax treatments on your hands to try and relieve some joint pain. 4) You can continue to take Ibuprofen as needed for joint pain. 5) You can try to wear a knee brace with a patellar window to try and relieve some knee pain. 6) You can try to use over the counter numbing balms such as salonpas or tiger balm on your knees for temporary knee pain relief. 7) Check labs in the next month. 8) Follow up in 3 months. Let me know if you have nay questions or concerns in the meantime. The patient voiced understanding of the aforementioned assessment and plan.    TODAY'S ORDERS    Orders Placed This Encounter    C REACTIVE PROTEIN, QT    CBC WITH AUTOMATED DIFF    METABOLIC PANEL, COMPREHENSIVE    SED RATE (ESR)    Insulin Syringe-Needle U-100 1 mL 30 gauge x 5/16 syrg     Future Appointments   Date Time Provider Geoff Olga Lidia   6/15/2022  9:00 AM Shirlene Bose MD [facility] BS AMB   11/11/2022  9:30 AM Teri Contreras  [facility] BS AMB     Face to face time: 27 minutes  Note preparation and records review day of service: 20 minutes  Total provider time day of service: 47 minutes    This was scribed by Amber Anders in the presence of Dr. Juana Hawley MD    Adult Rheumatology   Rheumatology Ultrasound Certified  [id number]  [facility], [address] [place] [address]   Phone [phone]  Fax [phone]

## 2022-07-08 DIAGNOSIS — C50.912 MALIGNANT NEOPLASM OF LEFT FEMALE BREAST, UNSPECIFIED ESTROGEN RECEPTOR STATUS, UNSPECIFIED SITE OF BREAST (HCC): ICD-10-CM

## 2022-07-10 RX ORDER — LETROZOLE 2.5 MG/1
TABLET, FILM COATED ORAL
Qty: 90 TABLET | Refills: 0 | Status: SHIPPED | OUTPATIENT
Start: 2022-07-10

## 2022-07-19 ENCOUNTER — PATIENT MESSAGE (OUTPATIENT)
Dept: INTERNAL MEDICINE CLINIC | Age: 72
End: 2022-07-19

## 2022-07-19 DIAGNOSIS — R39.9 UTI SYMPTOMS: Primary | ICD-10-CM

## 2022-07-20 NOTE — TELEPHONE ENCOUNTER
Lab orders have been placed of for urine analysis and culture due to patient stating that she is having a UTI symptoms

## 2022-07-22 LAB
ALBUMIN SERPL-MCNC: 4.1 G/DL (ref 3.7–4.7)
ALBUMIN/GLOB SERPL: 1.6 {RATIO} (ref 1.2–2.2)
ALP SERPL-CCNC: 136 IU/L (ref 44–121)
ALT SERPL-CCNC: 19 IU/L (ref 0–32)
AST SERPL-CCNC: 20 IU/L (ref 0–40)
BASOPHILS # BLD AUTO: 0.1 X10E3/UL (ref 0–0.2)
BASOPHILS NFR BLD AUTO: 1 %
BILIRUB SERPL-MCNC: 0.6 MG/DL (ref 0–1.2)
BUN SERPL-MCNC: 23 MG/DL (ref 8–27)
BUN/CREAT SERPL: 27 (ref 12–28)
CALCIUM SERPL-MCNC: 9.2 MG/DL (ref 8.7–10.3)
CHLORIDE SERPL-SCNC: 104 MMOL/L (ref 96–106)
CO2 SERPL-SCNC: 23 MMOL/L (ref 20–29)
CREAT SERPL-MCNC: 0.85 MG/DL (ref 0.57–1)
CRP SERPL-MCNC: 1 MG/L (ref 0–10)
EGFR: 73 ML/MIN/1.73
EOSINOPHIL # BLD AUTO: 0.1 X10E3/UL (ref 0–0.4)
EOSINOPHIL NFR BLD AUTO: 1 %
ERYTHROCYTE [DISTWIDTH] IN BLOOD BY AUTOMATED COUNT: 13.7 % (ref 11.7–15.4)
ERYTHROCYTE [SEDIMENTATION RATE] IN BLOOD BY WESTERGREN METHOD: 3 MM/HR (ref 0–40)
GLOBULIN SER CALC-MCNC: 2.6 G/DL (ref 1.5–4.5)
GLUCOSE SERPL-MCNC: 96 MG/DL (ref 65–99)
HCT VFR BLD AUTO: 35.8 % (ref 34–46.6)
HGB BLD-MCNC: 12 G/DL (ref 11.1–15.9)
IMM GRANULOCYTES # BLD AUTO: 0 X10E3/UL (ref 0–0.1)
IMM GRANULOCYTES NFR BLD AUTO: 0 %
LYMPHOCYTES # BLD AUTO: 0.7 X10E3/UL (ref 0.7–3.1)
LYMPHOCYTES NFR BLD AUTO: 12 %
MCH RBC QN AUTO: 32.3 PG (ref 26.6–33)
MCHC RBC AUTO-ENTMCNC: 33.5 G/DL (ref 31.5–35.7)
MCV RBC AUTO: 97 FL (ref 79–97)
MONOCYTES # BLD AUTO: 0.6 X10E3/UL (ref 0.1–0.9)
MONOCYTES NFR BLD AUTO: 10 %
NEUTROPHILS # BLD AUTO: 4.3 X10E3/UL (ref 1.4–7)
NEUTROPHILS NFR BLD AUTO: 76 %
PLATELET # BLD AUTO: 358 X10E3/UL (ref 150–450)
POTASSIUM SERPL-SCNC: 4.5 MMOL/L (ref 3.5–5.2)
PROT SERPL-MCNC: 6.7 G/DL (ref 6–8.5)
RBC # BLD AUTO: 3.71 X10E6/UL (ref 3.77–5.28)
SODIUM SERPL-SCNC: 142 MMOL/L (ref 134–144)
WBC # BLD AUTO: 5.6 X10E3/UL (ref 3.4–10.8)

## 2022-07-24 LAB
APPEARANCE UR: ABNORMAL
BACTERIA #/AREA URNS HPF: ABNORMAL /[HPF]
BACTERIA UR CULT: ABNORMAL
BILIRUB UR QL STRIP: NEGATIVE
CASTS URNS QL MICRO: ABNORMAL /LPF
COLOR UR: ABNORMAL
EPI CELLS #/AREA URNS HPF: ABNORMAL /HPF (ref 0–10)
GLUCOSE UR QL STRIP: NEGATIVE
HGB UR QL STRIP: ABNORMAL
KETONES UR QL STRIP: NEGATIVE
LEUKOCYTE ESTERASE UR QL STRIP: ABNORMAL
MICRO URNS: ABNORMAL
NITRITE UR QL STRIP: POSITIVE
PH UR STRIP: 5.5 [PH] (ref 5–7.5)
PROT UR QL STRIP: ABNORMAL
RBC #/AREA URNS HPF: >30 /HPF (ref 0–2)
SP GR UR STRIP: 1.02 (ref 1–1.03)
URINALYSIS REFLEX, 377202: ABNORMAL
UROBILINOGEN UR STRIP-MCNC: 1 MG/DL (ref 0.2–1)
WBC #/AREA URNS HPF: ABNORMAL /HPF (ref 0–5)

## 2022-07-26 ENCOUNTER — TELEPHONE (OUTPATIENT)
Dept: INTERNAL MEDICINE CLINIC | Age: 72
End: 2022-07-26

## 2022-07-26 RX ORDER — CIPROFLOXACIN 250 MG/1
250 TABLET, FILM COATED ORAL 2 TIMES DAILY
Qty: 6 TABLET | Refills: 0 | Status: SHIPPED | OUTPATIENT
Start: 2022-07-26 | End: 2022-07-29

## 2022-07-28 ENCOUNTER — TELEPHONE (OUTPATIENT)
Dept: INTERNAL MEDICINE CLINIC | Age: 72
End: 2022-07-28

## 2022-07-28 DIAGNOSIS — N39.0 RECURRENT UTI: Primary | ICD-10-CM

## 2022-07-28 NOTE — TELEPHONE ENCOUNTER
Patient was returning my call regarding her uti medication. I had called to make sure she received it. She also stated she wanted a referral to [facility] , I let her know per Dr. Del Ledezma she could go see Dr. Jose De Jesus Arteaga.

## 2022-09-13 RX ORDER — LISINOPRIL AND HYDROCHLOROTHIAZIDE 10; 12.5 MG/1; MG/1
TABLET ORAL
Qty: 90 TABLET | Refills: 3 | Status: SHIPPED | OUTPATIENT
Start: 2022-09-13

## 2022-09-19 ENCOUNTER — OFFICE VISIT (OUTPATIENT)
Dept: RHEUMATOLOGY | Age: 72
End: 2022-09-19
Payer: MEDICARE

## 2022-09-19 VITALS
HEART RATE: 63 BPM | BODY MASS INDEX: 31.07 KG/M2 | RESPIRATION RATE: 16 BRPM | OXYGEN SATURATION: 99 % | DIASTOLIC BLOOD PRESSURE: 68 MMHG | WEIGHT: 181 LBS | SYSTOLIC BLOOD PRESSURE: 140 MMHG | TEMPERATURE: 98.1 F

## 2022-09-19 DIAGNOSIS — E78.49 OTHER HYPERLIPIDEMIA: ICD-10-CM

## 2022-09-19 DIAGNOSIS — Z79.60 LONG-TERM USE OF IMMUNOSUPPRESSANT MEDICATION: ICD-10-CM

## 2022-09-19 DIAGNOSIS — F51.01 PRIMARY INSOMNIA: ICD-10-CM

## 2022-09-19 DIAGNOSIS — M06.09 SERONEGATIVE RHEUMATOID ARTHRITIS OF MULTIPLE SITES (HCC): Primary | ICD-10-CM

## 2022-09-19 PROCEDURE — 1123F ACP DISCUSS/DSCN MKR DOCD: CPT | Performed by: INTERNAL MEDICINE

## 2022-09-19 PROCEDURE — 99214 OFFICE O/P EST MOD 30 MIN: CPT | Performed by: INTERNAL MEDICINE

## 2022-09-19 PROCEDURE — G8754 DIAS BP LESS 90: HCPCS | Performed by: INTERNAL MEDICINE

## 2022-09-19 PROCEDURE — G9899 SCRN MAM PERF RSLTS DOC: HCPCS | Performed by: INTERNAL MEDICINE

## 2022-09-19 PROCEDURE — 1101F PT FALLS ASSESS-DOCD LE1/YR: CPT | Performed by: INTERNAL MEDICINE

## 2022-09-19 PROCEDURE — G9717 DOC PT DX DEP/BP F/U NT REQ: HCPCS | Performed by: INTERNAL MEDICINE

## 2022-09-19 PROCEDURE — G8417 CALC BMI ABV UP PARAM F/U: HCPCS | Performed by: INTERNAL MEDICINE

## 2022-09-19 PROCEDURE — 3017F COLORECTAL CA SCREEN DOC REV: CPT | Performed by: INTERNAL MEDICINE

## 2022-09-19 PROCEDURE — G8427 DOCREV CUR MEDS BY ELIG CLIN: HCPCS | Performed by: INTERNAL MEDICINE

## 2022-09-19 PROCEDURE — G8399 PT W/DXA RESULTS DOCUMENT: HCPCS | Performed by: INTERNAL MEDICINE

## 2022-09-19 PROCEDURE — 1090F PRES/ABSN URINE INCON ASSESS: CPT | Performed by: INTERNAL MEDICINE

## 2022-09-19 PROCEDURE — G8753 SYS BP > OR = 140: HCPCS | Performed by: INTERNAL MEDICINE

## 2022-09-19 PROCEDURE — G8536 NO DOC ELDER MAL SCRN: HCPCS | Performed by: INTERNAL MEDICINE

## 2022-09-19 PROCEDURE — G0463 HOSPITAL OUTPT CLINIC VISIT: HCPCS | Performed by: INTERNAL MEDICINE

## 2022-09-19 RX ORDER — SYRINGE-NEEDLE,INSULIN,0.5 ML 30 GX5/16"
1 SYRINGE, EMPTY DISPOSABLE MISCELLANEOUS
Qty: 10 EACH | Refills: 5 | Status: SHIPPED | OUTPATIENT
Start: 2022-09-24

## 2022-09-19 RX ORDER — LORAZEPAM 1 MG/1
TABLET ORAL
Qty: 45 TABLET | Refills: 3 | Status: SHIPPED | OUTPATIENT
Start: 2022-09-19

## 2022-09-19 NOTE — PROGRESS NOTES
REASON FOR VISIT    This is a follow-up visit for Ms. Lenore Jensen for     ICD-10-CM   1. Seronegative rheumatoid arthritis of multiple sites ([facility] M06.09   2. PMR (polymyalgia rheumatica) ([facility]) M35.3     Inflammatory arthritis phenotype includes:  Anti-CCP positive: no  Rheumatoid factor positive: no  Erosive disease: no  Extra-articular manifestations include: Polymyalgia Rheumatica, smoldering myeloma, interstitial lung disease     Immunosuppression Screening (1/12/2021): Quantiferon TB: negative  PPD:  Not performed  Hepatitis B: negative  Hepatitis C: negative    Therapy History includes:  Current DMARD therapy include: methotrexate 20->25 mg SubQ every Friday (10/15/18 to 2/27/2021; stopped by mistake; 4/23/2021 to present), Rinvoq 15 mg daily (SAMPLE: 8/10/2021 to present)  Prior DMARD therapy include: Humira 40 mg every 14 days (2/27/2021 to 8/10/2021)   Discontinued DMARDs because of inefficacy: None  Discontinued DMARDs because of side effects: None    HISTORY OF PRESENT ILLNESS    Ms. Lenore Jensen returns for a follow-up. She says that she is doing \"as well as she can\" under the circumstances. Pt says that she spaced out the Rinvoq to every other day for 6 weeks and she had increased pain so she started to take it every day again. She says that the difference between taking it every day and taking it every other day is big. Pt reports that she has been treated for UTIs twice since her last visit. She just moved from a 3 bedroom house to a 1 bedroom house, lot of associated stress with leaving her old house. She thinks that her stress has caused her UTI issus. She plans on scheduling another appointment with her urologist.     Pt reports that her hardest daily tasks are walking up and down stairs and doing her buttons. Pt says that the pain in her L hand and wrist is \"excruciating. \"     Pt reports a \"normal cough\" without any sinus problems.  Pt says that she does not check in with her pulmonologist.     Pt says that her spine injection made her back and hip pain go away. Pt notes that she still works 40 hours a week. REVIEW OF SYSTEMS    A comprehensive review of systems was performed and pertinent results are documented in the HPI, review of systems is otherwise non-contributory. PAST MEDICAL HISTORY    She has a past medical history of Breast cancer, left ([address] Depression, Goiter, Hearing loss, Hypertension, Hypothyroid, IBS (irritable bowel syndrome), Menopause, Rheumatoid arthritis ([address] and S/P radiation therapy. FAMILY HISTORY    Her family history includes Alcohol abuse in her brother; Breast Cancer in her paternal grandmother; COPD in her brother and mother; Cancer in her father and mother; Diabetes in her brother and brother; Heart Disease in her brother, brother, paternal grandmother, and paternal uncle; Liver Disease in her brother. SOCIAL HISTORY    She reports that she has never smoked. She has never used smokeless tobacco. She reports current alcohol use of about 5.0 standard drinks per week. She reports that she does not use drugs. MEDICATIONS    Current Outpatient Medications   Medication Sig    lisinopril-hydroCHLOROthiazide (PRINZIDE, ZESTORETIC) 10-12.5 mg per tablet TAKE 1 TABLET EVERY DAY    letrozole (FEMARA) 2.5 mg tablet TAKE 1 TABLET EVERY DAY    Insulin Syringe-Needle U-100 1 mL 30 gauge x 5/16 syrg 1 Each by Does Not Apply route Every Saturday. To be used for methotrexate solution    LORazepam (ATIVAN) 1 mg tablet TAKE 1 & 1/2 (ONE & ONE-HALF) TABLETS BY MOUTH NIGHTLY . methotrexate 25 mg/mL chemo injection INJECT 1 ML SUBCUTANEOUSLY EVERY FRIDAY    upadacitinib (Rinvoq) 15 mg Tb24 Take 15 mg by mouth daily.  Indications: rheumatoid arthritis    levothyroxine (SYNTHROID) 200 mcg tablet TAKE ONE TABLET BY MOUTH ONCE DAILY BEFORE BREAKFAST    ergocalciferol (ERGOCALCIFEROL) 1,250 mcg (50,000 unit) capsule Take 1 Capsule by mouth every seven (7) days. Indications: vitamin D deficiency (high dose therapy)    citalopram (CELEXA) 40 mg tablet TAKE 1 TABLET EVERY DAY    ibuprofen (MOTRIN) 800 mg tablet Take 800 mg by mouth daily. folic acid (FOLVITE) 1 mg tablet TAKE 1 TABLET BY MOUTH ONCE DAILY    diclofenac (VOLTAREN) 1 % gel Apply  to affected area four (4) times daily. No current facility-administered medications for this visit. ALLERGIES    Allergies   Allergen Reactions    Keflex [Cephalexin] Rash    Sulfa (Sulfonamide Antibiotics) Rash    Monistat 1 [Tioconazole] Swelling       PHYSICAL EXAMINATION    Visit Vitals  BP (!) 140/68 (BP 1 Location: Left upper arm, BP Patient Position: Sitting, BP Cuff Size: Adult)   Pulse 63   Temp 98.1 °F (36.7 °C) (Oral)   Resp 16   Wt 181 lb (82.1 kg)   SpO2 99%   BMI 31.07 kg/m²       General:  The patient is well developed, well nourished, alert, and in no apparent distress. Eyes: Sclera are anicteric. No conjunctival injection. HEENT:  Oropharynx is clear. No oral ulcers. Adequate salivary pooling. No cervical or supraclavicular lymphadenopathy. Lungs:  Clear to auscultation bilaterally today, without wheeze or stridor. Normal respiratory effort. Cor:  Regular rate and rhythm. No murmur rub or gallop. Abdomen: Soft, non-tender, without hepatomegaly or masses. Extremities: No calf tenderness or edema. Warm and well perfused. Skin:  No significant abnormalities. No petechial, purpuric, or psoriaform rashes   Neuro: Nonfocal  Musculoskeletal:   A comprehensive musculoskeletal exam was performed for all joints of each upper and lower extremity and assessed for swelling, tenderness and range of motion. Results are documented as below:   Left > right wrist tenderness, pan-MCP tenderness and PIP tenderness without suad synovitis  Bilateral knee crepitus and joint line tenderness.     DATA REVIEW    Laboratory   7/21/22: UA RBC >30, Leukocyte esterase 3+, Protein 1+, Blood 1+, positive nitrites, Many bacteria; CRP 1 mg/L, ESR 3, Cr 0.85, Alk phos 136, Tbili 0.6, AST 20, ALT 19, CBC WNL,   4/12/22: Immunoglobulin A 514, M-Jere 0.3, Free kappa Lt Chains 19.5, Kappa/Lamda ratio serum 2.6, Cr 0.76, Alk phos 164, AST 14, ALT 25, Tbilli 0.5, WBC 6.2, HGB 13, Plt 320, Tandem-R Ostase 52.3, GGT 27, ESR 2, CRP <1 mg/L    Recent laboratory results were reviewed, summarized, and discussed with the patient. Imaging    Musculoskeletal Ultrasound    None    Radiographs  XR knee LT MIN 4 V 5/24/22:  Patient with mild medial joint space narrowing of the left knee. Remainder of joint space appears well-preserved. Patient noted to have severe medial joint space narrowing on the right knee. Remainder of joint space appears well-preserved. XR SPINE LUMB MIN 4 V 5/24/22: AP and lateral and flexion-extension lumbar spine demonstrates mild degenerative narrowing of the disc base. No gross instability with flexion-extension. No fractures or lytic lesions. Skeletal Survey 11/28/2018: Few small calvarial lucencies. No fractures or lytic lesions at risk for fracture. Bilateral Shoulder 10/04/2018: RIGHT: There is prominent AC joint degeneration with spurring and loss of joint space. There is some mild deformity, chronic in nature of the tuberosity. No fracture or dislocation, no bony erosion, the bone is normal in mineral contents. No soft tissue calcifications. LEFT: The bone is normal in mineral contents. There is some mild chronic deformity of the tuberosity and AC joint degeneration. There are no soft tissue calcification bony erosion fracture or dislocation. Bilateral Hand 10/04/2018: RIGHT: no acute fracture or dislocation. Alignment is anatomic. Mild degenerative changes are seen in the interphalangeal joint of the thumb. No erosions are seen. No abnormality seen in the soft tissues. LEFT: no acute fracture or dislocation. Alignment is anatomic.  Moderate to severe degenerative changes are present in the first CMC joint. A cystic lucency in the ace of the first metacarpal likely represents a subchondral cyst. No clear erosions are seen. No abnormality seen in the soft tissues. Bilateral Foot 10/04/2018: RIGHT:  no fracture or other acute osseous or articular abnormality. A small plantar calcaneal heel spur is noted. No erosions or joint space narrowing are present. The soft tissues are within normal limits. LEFT: no acute fracture or dislocation. Alignment is anatomic. A small plantar calcaneal heel spur is noted. No erosions or joint space narrowing are present. . No abnormality is seen in the soft tissues. CT Imaging    CT Chest without contrast 2/04/2021: CHEST WALL: Left breast lesion is stable in appearance. THYROID: No nodule. MEDIASTINUM: No mass or lymphadenopathy. RIKY: No mass or lymphadenopathy. THORACIC AORTA: Atherosclerotic change. MAIN PULMONARY ARTERY: Normal in caliber. TRACHEA/BRONCHI: Patent. ESOPHAGUS: No wall thickening or dilatation. HEART: Trace atherosclerotic change in the LAD. Normal in size. PLEURA: No effusion or pneumothorax. LUNGS: No suspicious pulmonary mass or nodule. Trace scarring in the lingula. INCIDENTALLY IMAGED UPPER ABDOMEN: Hepatic steatosis. Postcholecystectomy. BONES: Chronic mild loss of vertebral body height. Anterior disc osteophytes. PET/CT Scan 1/24/2019: HEAD/NECK: No apparent foci of abnormal hypermetabolism. Cerebral evaluation is limited by normal intense activity. CHEST: No foci of abnormal hypermetabolism. The known small breast cancer at 12:00 in the left breast demonstrates no increased metabolic activity. ABDOMEN/PELVIS: No foci of abnormal hypermetabolism. SKELETON: No foci of abnormal hypermetabolism in the axial and visualized appendicular skeleton. Lower extremities:. Imaging of the lower extremities is unremarkable. There is muscular activity noted. CT Head without contrast 9/13/2017: Prior right occipital craniectomy.  Encephalomalacia in the right cerebellar region. . There is no significant white matter disease. There is no intracranial hemorrhage, extra-axial collection, mass, mass effect or midline shift. The basilar cisterns are open. No acute infarct is identified. The visualized portions of the paranasal sinuses and mastoid air cells are clear    MR Imaging    MRI Breasts with and without contrast 1/07/2019: There is minimal background parenchymal enhancement and the breasts are almost entirely fat. A few sub-5 mm foci of enhancement are scattered bilaterally. Right breast: No suspicious enhancing foci. No axillary or internal mammary chain lymphadenopathy. Left breast: A vague area of enhancement around the biopsy clip in the anterior third at 12:00 does not reach threshold enhancement. This measures approximately 9 x 9 mm, and correlates well with the small area of architectural distortion on mammography and subtle architectural distortion and shadowing on ultrasound. No axillary or internal mammary chain lymphadenopathy. DXA     DXA 6/10/2021: (excluded None) lumbar spine L1-L4 T score -1.2 (BMD 1.050 g/cm2), left femoral neck T score: -0.9 (0.906 g/cm2), left total hip T score: -0.6 (0.933 g/cm2), right femoral neck T score: -0.9 (0.908 g/cm2), right total hip T score: -0.3 (0.966 g/cm2), and distal one third left radius T score -1.0 (BMD 0.789 g/cm2). FRAX score 11.1 % probability in 10 years for major osteoporotic fracture and 1.3 % 10 year probability of hip fracture. DXA 5/12/2015: (excluded distal radius) lumbar spine L1-L4 T score 0.4 (BMD 1.241 g/cm2), left femoral neck T score: 0.5 (1.109 g/cm2), left total hip T score: 1.0 (1.139 g/cm2), right femoral neck T score: 0.2 (1.062 g/cm2), right total hip T score: 1.1 (1.145 g/cm2). FRAX score N/A % probability in 10 years for major osteoporotic fracture and N/A % 10 year probability of hip fracture.     PATHOLOGY    Lymph node, left axilla, #1, sentinel node excision 1/29/2019: No evidence for malignancy in one node (0/1). Breast, left, lumpectomy 1/29/2019:  Invasive lobular carcinoma. Lobular carcinoma in situ     Bone Marrow Biopsy 12/19/2018: Bone marrow: Variably cellular marrow with mild monoclonal plasmacytosis. Trilineage hematopoiesis with maturation. Breast, left, needle core biopsy 12/03/2018: Invasive mammary carcinoma with ductal and lobular features Favor Deedee histologic grade 1. Largest glass slide measurement of invasive carcinoma: 8.5 mm. ER pos MO pos (weak) HER 2 neg. PROCEDURE     Kenalog 80 mg IM. (01/21/20)    ASSESSMENT AND PLAN    This is a follow-up visit for Ms. Maria M Deshpande for seronegative rheumatoid arthritis, with arthralgia currently driven by secondary osteoarthritis and low disease activity though still relatively frequent UTI's. Continuing 25mg subQ weekly methotrexate with Rinvoq daily, reviewed nneed to hold immunosuppression with infections. 1. Seronegative rheumatoid arthritis of multiple sites (HCC)  - Cont methotrexate 25mg subQ weekly, Rinvoq daily  - Insulin Syringe-Needle U-100 1 mL 30 gauge x 5/16 syrg; 1 Each by Does Not Apply route Every Saturday. To be used for methotrexate solution, 1mL (25mg) subQ weekly. Dispense: 10 Each; Refill: 5  - C REACTIVE PROTEIN, QT; Future  - CBC WITH AUTOMATED DIFF; Future  - METABOLIC PANEL, COMPREHENSIVE; Future  - SED RATE (ESR); Future  - LIPID PANEL; Future    2. Long-term use of immunosuppressant medication  - CBC WITH AUTOMATED DIFF; Future  - METABOLIC PANEL, COMPREHENSIVE; Future  - LIPID PANEL; Future    3. Other hyperlipidemia  - LIPID PANEL; Future     Patient Instructions   1) Continue to take one pill of Rinvoq daily. Remember, if you are on antibiotics stop taking Rinvoq until you are done taking antibiotics. 2) Continue to take 1mL of subcutaneous Methotrexate one time per week with daily folic acid.      3) I recommend that you check in with a dermatologist annually to be checked for skin cancers. 4) I recommend that you check in with Dr. Aj Soto once yearly for lung monitoring. 5) Check labs in 2 months. These will be fasted labs. 6) Follow up in 4 months. Let me know if you have increased joint pain in the meantime. The patient voiced understanding of the aforementioned assessment and plan. TODAY'S ORDERS    Orders Placed This Encounter    C REACTIVE PROTEIN, QT    CBC WITH AUTOMATED DIFF    METABOLIC PANEL, COMPREHENSIVE    SED RATE (ESR)    LIPID PANEL    Insulin Syringe-Needle U-100 1 mL 30 gauge x 5/16 syrg       Future Appointments   Date Time Provider Geoff Duggan   9/19/2022  8:40 AM Joaquina Hobbs MD [facility] BS AMB   11/11/2022  9:30 AM Vero Mclean  [facility] BS AMB     Face to face time:15 minutes  Note preparation and records review day of service: 16 minutes  Total provider time day of service: 31 minutes    This was scribed by Rome Castaneda in the presence of Dr. Christa Be. The note was reviewed and amended personally, and I agree with the above information.     Yessenia Laguna MD    Adult Rheumatology   [facility]  A [facility] of [facility] [address] [place] [address] Road   Phone [phone]  Fax [phone]

## 2022-09-19 NOTE — PROGRESS NOTES
Chief Complaint   Patient presents with    Joint Pain     1. Have you been to the ER, urgent care clinic since your last visit? Hospitalized since your last visit? No    2. Have you seen or consulted any other health care providers outside of the [address] [place] [address] since your last visit? Include any pap smears or colon screening.  No

## 2022-09-19 NOTE — PATIENT INSTRUCTIONS
1) Continue to take one pill of Rinvoq daily. Remember, if you are on antibiotics stop taking Rinvoq until you are done taking antibiotics. 2) Continue to take 1mL of subcutaneous Methotrexate one time per week with daily folic acid. 3) I recommend that you check in with a dermatologist annually to be checked for skin cancers. 4) I recommend that you check in with Dr. Jenna Carbajal once yearly for lung monitoring. 5) Check labs in 2 months. These will be fasted labs. 6) Follow up in 4 months. Let me know if you have increased joint pain in the meantime.

## 2022-10-31 NOTE — PROGRESS NOTES
HPI: Fiorella Bueno (: 1950) is a 66 y.o. female, patient, here for evaluation of the following chief complaint(s):    Hand Pain (Left hand and thumb pain for years, gradually worsening. Tried steroid injections with little relief.)  Patient seen today to evaluate her left hand. The patient has complained of ongoing basal joint left thumb arthritic type pain. She denies any fall or specific injury. She has received 3 prior injections. She has tried ice, anti-inflammatories including Tylenol and ibuprofen. She denies any significant right hand involvement but does admit to some degree of overall digital numbness. She had a prior right ring trigger finger release and did receive one of the 3 prior injections under anesthesia. Overall her pain is worsened in the last 3 to 5 years especially in the last several months. Vitals: There were no vitals taken for this visit. There is no height or weight on file to calculate BMI. Allergies   Allergen Reactions    Keflex [Cephalexin] Rash    Sulfa (Sulfonamide Antibiotics) Rash    Monistat 1 [Tioconazole] Swelling       Current Outpatient Medications   Medication Sig    methylPREDNISolone (MEDROL DOSEPACK) 4 mg tablet Per dose pack instructions    Insulin Syringe-Needle U-100 1 mL 30 gauge x 5/16 syrg 1 Each by Does Not Apply route Every Saturday. To be used for methotrexate solution, 1mL (25mg) subQ weekly. LORazepam (ATIVAN) 1 mg tablet TAKE 1 & 1/2 (ONE & ONE-HALF) TABLETS BY MOUTH NIGHTLY .    lisinopril-hydroCHLOROthiazide (PRINZIDE, ZESTORETIC) 10-12.5 mg per tablet TAKE 1 TABLET EVERY DAY    letrozole (FEMARA) 2.5 mg tablet TAKE 1 TABLET EVERY DAY    methotrexate 25 mg/mL chemo injection INJECT 1 ML SUBCUTANEOUSLY EVERY FRIDAY    upadacitinib (Rinvoq) 15 mg Tb24 Take 15 mg by mouth daily.  Indications: rheumatoid arthritis    levothyroxine (SYNTHROID) 200 mcg tablet TAKE ONE TABLET BY MOUTH ONCE DAILY BEFORE BREAKFAST    ergocalciferol (ERGOCALCIFEROL) 1,250 mcg (50,000 unit) capsule Take 1 Capsule by mouth every seven (7) days. Indications: vitamin D deficiency (high dose therapy)    citalopram (CELEXA) 40 mg tablet TAKE 1 TABLET EVERY DAY    ibuprofen (MOTRIN) 800 mg tablet Take 800 mg by mouth daily. folic acid (FOLVITE) 1 mg tablet TAKE 1 TABLET BY MOUTH ONCE DAILY    diclofenac (VOLTAREN) 1 % gel Apply  to affected area four (4) times daily. No current facility-administered medications for this visit. Past Medical History:   Diagnosis Date    Bre[facility] cancer, left ([facility]     Depression     Goiter     Hearing loss     bilateral    Hypertension     Hypothyroid     IBS (irritable bowel syndrome)     Menopause     Rheumatoid arthritis ([facility]     S/P radiation therapy         Past Surgical History:   Procedure Laterality Date    HX BREAST LUMPECTOMY Left 1/29/2019    LEFT BREAST LUMPECTOMY WITH ULTRASOUND, LEFT BREAST SENTINEL NODE BIOPSY (11a) performed by Guicho aCrlin MD at [facility]    HX CATARACT REMOVAL Bilateral     HX CHOLECYSTECTOMY  1997    IR INJ Euna Kurk EPID LUMB ANES/STER SNGL  6/7/2022    NEUROLOGICAL PROCEDURE UNLISTED  7/97    brain lesion removed ? infectious       Family History   Problem Relation Age of Onset    Cancer Mother         Lung    COPD Mother     Cancer Father         Lung    Heart Disease Brother         CABGx3    Diabetes Brother     Heart Disease Brother     Diabetes Brother     COPD Brother     Liver Disease Brother         cirrhosis    Alcohol abuse Brother     Heart Disease Paternal Uncle         CAD    Heart Disease Paternal Grandmother         CAD    Breast Cancer Paternal Grandmother     Anesth Problems Neg Hx         Social History     Tobacco Use    Smoking status: Never    Smokeless tobacco: Never   Substance Use Topics    Alcohol use:  Yes     Alcohol/week: 5.0 standard drinks     Types: 5 Glasses of wine per week    Drug use: No        Review of Systems   All other systems reviewed and are negative. Physical Exam    Patient has pain and crepitation with grind test in the left thumb carpometacarpal joint. There is significant dorsal prominence of the left thumb CMC joint but no hyperextension of the MP joint of any significant degree. There is pain and crepitation produced with grind testing with equivocal to negative Suha Eng testing and more of a positive Tinel's Phalen's and nerve compression test on the left than the right. Imaging:    XR Results (most recent):  Results from Appointment encounter on 11/01/22    XR HAND LT MIN 3 V    Narrative  AP, lateral and oblique x-ray of the left hand shows advanced left thumb carpometacarpal joint osteoarthritis with joint space collapse, sclerosis, osteophyte formation even cystic changes but no fracture. Minimal osteopenia otherwise throughout the hand        ASSESSMENT/PLAN:  Below is the assessment and plan developed based on review of pertinent history, physical exam, labs, studies, and medications. Patient's examination was consistent with left thumb CMC basal joint osteoarthritis and carpal tunnel syndrome. She has had 3 prior corticosteroid injections and did not want to have this repeated. Her radiographs when compared to films from 10/4/2018 shows fairly dramatic progression of the thumb CMC osteoarthritis. She has had a prior successful right ring trigger finger release but does not experience any triggering in the left hand. I reviewed treatment options and answered her questions in detail. She was offered but deferred further conservative treatment and again prefers to proceed with operative management. The current plan is to perform a left thumb [facility] arthroplasty with a left endoscopic carpal tunnel release. I reviewed risks that include but not limited to stiffness, pain, nerve or tendon damage and overall incomplete relief of pain.   Arrangements can be made for this to be performed on an outpatient basis at her convenience. 1. Carpal tunnel syndrome of left wrist  -     methylPREDNISolone (MEDROL DOSEPACK) 4 mg tablet; Per dose pack instructions, Normal, Disp-1 Dose Pack, R-0  2. Left wrist pain  3. Primary osteoarthritis of both hands  -     XR HAND LT MIN 3 V; Future  4. Primary osteoarthritis of first carpometacarpal joint of left hand      Return for Follow-up 7 to 10 days after surgery. .    An electronic signature was used to authenticate this note.   -- Jadiel Willoughby MD

## 2022-11-01 ENCOUNTER — OFFICE VISIT (OUTPATIENT)
Dept: ORTHOPEDIC SURGERY | Age: 72
End: 2022-11-01
Payer: MEDICARE

## 2022-11-01 DIAGNOSIS — M25.532 LEFT WRIST PAIN: ICD-10-CM

## 2022-11-01 DIAGNOSIS — M18.12 PRIMARY OSTEOARTHRITIS OF FIRST CARPOMETACARPAL JOINT OF LEFT HAND: ICD-10-CM

## 2022-11-01 DIAGNOSIS — M19.041 PRIMARY OSTEOARTHRITIS OF BOTH HANDS: ICD-10-CM

## 2022-11-01 DIAGNOSIS — G56.02 CARPAL TUNNEL SYNDROME OF LEFT WRIST: Primary | ICD-10-CM

## 2022-11-01 DIAGNOSIS — M19.042 PRIMARY OSTEOARTHRITIS OF BOTH HANDS: ICD-10-CM

## 2022-11-01 PROCEDURE — G8399 PT W/DXA RESULTS DOCUMENT: HCPCS | Performed by: ORTHOPAEDIC SURGERY

## 2022-11-01 PROCEDURE — G8756 NO BP MEASURE DOC: HCPCS | Performed by: ORTHOPAEDIC SURGERY

## 2022-11-01 PROCEDURE — 3017F COLORECTAL CA SCREEN DOC REV: CPT | Performed by: ORTHOPAEDIC SURGERY

## 2022-11-01 PROCEDURE — G8417 CALC BMI ABV UP PARAM F/U: HCPCS | Performed by: ORTHOPAEDIC SURGERY

## 2022-11-01 PROCEDURE — G8427 DOCREV CUR MEDS BY ELIG CLIN: HCPCS | Performed by: ORTHOPAEDIC SURGERY

## 2022-11-01 PROCEDURE — 99203 OFFICE O/P NEW LOW 30 MIN: CPT | Performed by: ORTHOPAEDIC SURGERY

## 2022-11-01 PROCEDURE — 1101F PT FALLS ASSESS-DOCD LE1/YR: CPT | Performed by: ORTHOPAEDIC SURGERY

## 2022-11-01 PROCEDURE — 1090F PRES/ABSN URINE INCON ASSESS: CPT | Performed by: ORTHOPAEDIC SURGERY

## 2022-11-01 PROCEDURE — 1123F ACP DISCUSS/DSCN MKR DOCD: CPT | Performed by: ORTHOPAEDIC SURGERY

## 2022-11-01 PROCEDURE — G9717 DOC PT DX DEP/BP F/U NT REQ: HCPCS | Performed by: ORTHOPAEDIC SURGERY

## 2022-11-01 PROCEDURE — G9899 SCRN MAM PERF RSLTS DOC: HCPCS | Performed by: ORTHOPAEDIC SURGERY

## 2022-11-01 PROCEDURE — G8536 NO DOC ELDER MAL SCRN: HCPCS | Performed by: ORTHOPAEDIC SURGERY

## 2022-11-01 RX ORDER — METHYLPREDNISOLONE 4 MG/1
TABLET ORAL
Qty: 1 DOSE PACK | Refills: 0 | Status: SHIPPED | OUTPATIENT
Start: 2022-11-01

## 2022-11-01 NOTE — LETTER
11/1/2022    Patient: Baldemar Zepeda   YOB: 1950   Date of Visit: 11/1/2022     Noé Schafer MD  [address]  [facility]    Dear Noé Schafer MD,      Thank you for referring Ms. Meri Cortez to [facility] for evaluation. My notes for this consultation are attached. If you have questions, please do not hesitate to call me. I look forward to following your patient along with you.       Sincerely,    Brittni Crawford MD

## 2022-11-01 NOTE — PATIENT INSTRUCTIONS
Learning About Arthritis at the [facility]umb  What is it? Arthritis at the base of the thumb joint is wear and tear on the cartilage. Cartilage is a firm, thick, slippery tissue. It covers and protects the ends of bones where they meet to form a joint. When you have arthritis, there are changes in the cartilage that cause it to break down. The bones rub together and cause joint damage and pain. What causes it? Experts don't know what causes arthritis at the base of the thumb. But aging, a lot of use, an injury, or family history may play a part. What are the symptoms? Symptoms of arthritis at the base of the thumb include aching in your joint. Or the pain may feel burning or sharp. You may feel clicking, creaking, or catching in the joint. It may get stiff. You may have more pain and less strength when you pinch or  things. Symptoms may come and go, stay the same, or get worse over time. How is it diagnosed? Your doctor can often diagnose arthritis by asking you questions about your joint pain and other symptoms and examining you. You may also have X-rays and blood tests. Blood tests can help make sure another disease isn't causing your symptoms. How is it treated? Arthritis at the base of your thumb may be treated with rest, pain relievers, steroid medicines, using a brace or splint, and--in some cases--surgery. To help relieve pain in the joint, rest your sore hand. Switch hands for some activities. You can try heat and cold therapy, such as hot compresses, paraffin wax, cold packs, or ice massage. Your doctor may give you a splint to wear during some activities or when pain flares up. You can often manage mild or moderate arthritis pain with over-the-counter pain relievers. These include medicines that reduce swelling, such as ibuprofen or naproxen. You can also use acetaminophen. Sometimes these medicines are in creams that you can rub on your thumb and hand.  Your doctor may also prescribe other medicine for your pain. For some people, steroid shots may be an option. If none of the treatments work, your doctor may discuss surgery with you. Follow-up care is a key part of your treatment and safety. Be sure to make and go to all appointments, and call your doctor if you are having problems. It's also a good idea to know your test results and keep a list of the medicines you take. Where can you learn more? Go to http://www.gray.com/  Enter T110 in the search box to learn more about \"Learning About Arthritis at the [facility]. \"  Current as of: March 9, 2022               Content Version: 13.4  © 2006-2022 [employer]. Care instructions adapted under license by [employer] (which disclaims liability or warranty for this information). If you have questions about a medical condition or this instruction, always ask your healthcare professional. [place] [address] any warranty or liability for your use of this information. Learning About Arthritis at the [facility]  What is it? Arthritis at the base of the thumb joint is wear and tear on the cartilage. Cartilage is a firm, thick, slippery tissue. It covers and protects the ends of bones where they meet to form a joint. When you have arthritis, there are changes in the cartilage that cause it to break down. The bones rub together and cause joint damage and pain. What causes it? Experts don't know what causes arthritis at the base of the thumb. But aging, a lot of use, an injury, or family history may play a part. What are the symptoms? Symptoms of arthritis at the base of the thumb include aching in your joint. Or the pain may feel burning or sharp. You may feel clicking, creaking, or catching in the joint. It may get stiff. You may have more pain and less strength when you pinch or  things.   Symptoms may come and go, stay the same, or get worse over time. How is it diagnosed? Your doctor can often diagnose arthritis by asking you questions about your joint pain and other symptoms and examining you. You may also have X-rays and blood tests. Blood tests can help make sure another disease isn't causing your symptoms. How is it treated? Arthritis at the base of your thumb may be treated with rest, pain relievers, steroid medicines, using a brace or splint, and--in some cases--surgery. To help relieve pain in the joint, rest your sore hand. Switch hands for some activities. You can try heat and cold therapy, such as hot compresses, paraffin wax, cold packs, or ice massage. Your doctor may give you a splint to wear during some activities or when pain flares up. You can often manage mild or moderate arthritis pain with over-the-counter pain relievers. These include medicines that reduce swelling, such as ibuprofen or naproxen. You can also use acetaminophen. Sometimes these medicines are in creams that you can rub on your thumb and hand. Your doctor may also prescribe other medicine for your pain. For some people, steroid shots may be an option. If none of the treatments work, your doctor may discuss surgery with you. Follow-up care is a key part of your treatment and safety. Be sure to make and go to all appointments, and call your doctor if you are having problems. It's also a good idea to know your test results and keep a list of the medicines you take. Where can you learn more? Go to http://www.gray.com/  Enter T110 in the search box to learn more about \"Learning About Arthritis at the [facility]. \"  Current as of: March 9, 2022               Content Version: 13.4  © 7466-8052 [employer]. Care instructions adapted under license by [employer] (which disclaims liability or warranty for this information).  If you have questions about a medical condition or this instruction, always ask your healthcare professional. [address] any warranty or liability for your use of this information.

## 2022-11-03 DIAGNOSIS — G56.02 CARPAL TUNNEL SYNDROME OF LEFT WRIST: ICD-10-CM

## 2022-11-03 DIAGNOSIS — M18.12 PRIMARY OSTEOARTHRITIS OF FIRST CARPOMETACARPAL JOINT OF LEFT HAND: Primary | ICD-10-CM

## 2022-11-09 ENCOUNTER — TRANSCRIBE ORDER (OUTPATIENT)
Dept: REGISTRATION | Age: 72
End: 2022-11-09

## 2022-11-09 ENCOUNTER — HOSPITAL ENCOUNTER (OUTPATIENT)
Dept: NON INVASIVE DIAGNOSTICS | Age: 72
Discharge: HOME | End: 2022-11-09
Payer: MEDICARE

## 2022-11-09 DIAGNOSIS — Z41.9 ELECTIVE SURGERY: Primary | ICD-10-CM

## 2022-11-09 DIAGNOSIS — Z41.9 ELECTIVE SURGERY: ICD-10-CM

## 2022-11-09 LAB
ATRIAL RATE: 60 BPM
CALCULATED P AXIS, ECG09: 39 DEGREES
CALCULATED R AXIS, ECG10: 71 DEGREES
CALCULATED T AXIS, ECG11: 63 DEGREES
DIAGNOSIS, 93000: NORMAL
P-R INTERVAL, ECG05: 144 MS
Q-T INTERVAL, ECG07: 416 MS
QRS DURATION, ECG06: 90 MS
QTC CALCULATION (BEZET), ECG08: 416 MS
VENTRICULAR RATE, ECG03: 60 BPM

## 2022-11-09 PROCEDURE — 93005 ELECTROCARDIOGRAM TRACING: CPT

## 2022-11-11 ENCOUNTER — OFFICE VISIT (OUTPATIENT)
Dept: ONCOLOGY | Age: 72
End: 2022-11-11
Payer: MEDICARE

## 2022-11-11 ENCOUNTER — TELEPHONE (OUTPATIENT)
Dept: ONCOLOGY | Age: 72
End: 2022-11-11

## 2022-11-11 VITALS
RESPIRATION RATE: 18 BRPM | HEART RATE: 67 BPM | HEIGHT: 64 IN | SYSTOLIC BLOOD PRESSURE: 159 MMHG | OXYGEN SATURATION: 95 % | WEIGHT: 190 LBS | BODY MASS INDEX: 32.44 KG/M2 | DIASTOLIC BLOOD PRESSURE: 77 MMHG

## 2022-11-11 DIAGNOSIS — D47.2 SMOLDERING MYELOMA: ICD-10-CM

## 2022-11-11 DIAGNOSIS — D47.2 MGUS (MONOCLONAL GAMMOPATHY OF UNKNOWN SIGNIFICANCE): Primary | ICD-10-CM

## 2022-11-11 DIAGNOSIS — E66.01 SEVERE OBESITY (HCC): ICD-10-CM

## 2022-11-11 DIAGNOSIS — D47.2 MONOCLONAL GAMMOPATHY OF UNKNOWN SIGNIFICANCE (MGUS): ICD-10-CM

## 2022-11-11 PROCEDURE — G8754 DIAS BP LESS 90: HCPCS | Performed by: INTERNAL MEDICINE

## 2022-11-11 PROCEDURE — G8417 CALC BMI ABV UP PARAM F/U: HCPCS | Performed by: INTERNAL MEDICINE

## 2022-11-11 PROCEDURE — 3078F DIAST BP <80 MM HG: CPT | Performed by: INTERNAL MEDICINE

## 2022-11-11 PROCEDURE — G8753 SYS BP > OR = 140: HCPCS | Performed by: INTERNAL MEDICINE

## 2022-11-11 PROCEDURE — 99214 OFFICE O/P EST MOD 30 MIN: CPT | Performed by: INTERNAL MEDICINE

## 2022-11-11 PROCEDURE — G9717 DOC PT DX DEP/BP F/U NT REQ: HCPCS | Performed by: INTERNAL MEDICINE

## 2022-11-11 PROCEDURE — G0463 HOSPITAL OUTPT CLINIC VISIT: HCPCS | Performed by: INTERNAL MEDICINE

## 2022-11-11 PROCEDURE — G8399 PT W/DXA RESULTS DOCUMENT: HCPCS | Performed by: INTERNAL MEDICINE

## 2022-11-11 PROCEDURE — 1123F ACP DISCUSS/DSCN MKR DOCD: CPT | Performed by: INTERNAL MEDICINE

## 2022-11-11 PROCEDURE — 1101F PT FALLS ASSESS-DOCD LE1/YR: CPT | Performed by: INTERNAL MEDICINE

## 2022-11-11 PROCEDURE — G8536 NO DOC ELDER MAL SCRN: HCPCS | Performed by: INTERNAL MEDICINE

## 2022-11-11 PROCEDURE — G9899 SCRN MAM PERF RSLTS DOC: HCPCS | Performed by: INTERNAL MEDICINE

## 2022-11-11 PROCEDURE — 3017F COLORECTAL CA SCREEN DOC REV: CPT | Performed by: INTERNAL MEDICINE

## 2022-11-11 PROCEDURE — G8428 CUR MEDS NOT DOCUMENT: HCPCS | Performed by: INTERNAL MEDICINE

## 2022-11-11 PROCEDURE — 1090F PRES/ABSN URINE INCON ASSESS: CPT | Performed by: INTERNAL MEDICINE

## 2022-11-11 PROCEDURE — 3074F SYST BP LT 130 MM HG: CPT | Performed by: INTERNAL MEDICINE

## 2022-11-11 NOTE — PROGRESS NOTES
Wayne Boothe is a 66 y.o. female  Chief Complaint   Patient presents with    Follow-up     1)  Paraproteinemia- Smoldering Myeloma  2)  Left breast ER pos GA pos (weak) HER 2 neg stage I Breast cancer- 12/2018     1. Have you been to the ER, urgent care clinic since your last visit? Hospitalized since your last visit? No    2. Have you seen or consulted any other health care providers outside of the [address] [place] [address] since your last visit? Include any pap smears or colon screening.  No

## 2022-11-11 NOTE — PROGRESS NOTES
[facility] [place] [facility] [place] [address] [address], [place]: [phone]  F: [phone]    Reason for Visit:   Camron Garcia is a 66 y.o. female who is seen for follow up of    1)  Paraproteinemia- Smoldering Myeloma  2)  Left breast ER pos [place] pos (weak) HER 2 neg stage I Breast cancer- 12/2018    TREATMENT HISTORY  1/29/19-left lumpectomy with sentinel lymph node biopsy. 1.8 cm invasive lobular carcinoma, grade 1, 1 of one negative lymph nodes. Oncotype low risk. Completed RT  4/19 started Letrozole    History of Present Illness:   Patient is a 66 y.o. with seronegative rheumatoid arthritis and secondary polymyalgia rheumatica who is seen for follow-up of smoldering myeloma and lobular breast cancer. She presents for follow-up on adjuvant Letrozole. Has some hot flashes. She has no new joint aches. She has had no new lumps, sob. She sees Dr. Norma Merino for RA    Past Medical History:   Diagnosis Date    Breast cancer, left ([address]     Depression     Goiter     Hearing loss     bilateral    Hypertension     Hypothyroid     IBS (irritable bowel syndrome)     Menopause     Rheumatoid arthritis ([address]     S/P radiation therapy       Past Surgical History:   Procedure Laterality Date    HX BREAST LUMPECTOMY Left 1/29/2019    LEFT BREAST LUMPECTOMY WITH ULTRASOUND, LEFT BREAST SENTINEL NODE BIOPSY (11a) performed by Forde Osgood, MD at [facility]    [address] [facility] Bilateral     [address]    IR INJ Jose Rough EPID LUMB ANES/STER SNGL  6/7/2022    NEUROLOGICAL PROCEDURE UNLISTED  7/97    brain lesion removed ? infectious      Social History     Tobacco Use    Smoking status: Never    Smokeless tobacco: Never   Substance Use Topics    Alcohol use:  Yes     Alcohol/week: 5.0 standard drinks     Types: 5 Glasses of wine per week      Family History   Problem Relation Age of Onset    Cancer Mother         Lung    COPD Mother     Cancer Father Lung    Heart Disease Brother         CABGx3    Diabetes Brother     Heart Disease Brother     Diabetes Brother     COPD Brother     Liver Disease Brother         cirrhosis    Alcohol abuse Brother     Heart Disease Paternal Uncle         CAD    Heart Disease Paternal Grandmother         CAD    Breast Cancer Paternal Grandmother     Anesth Problems Neg Hx      Current Outpatient Medications   Medication Sig    methylPREDNISolone (MEDROL DOSEPACK) 4 mg tablet Per dose pack instructions    Insulin Syringe-Needle U-100 1 mL 30 gauge x 5/16 syrg 1 Each by Does Not Apply route Every Saturday. To be used for methotrexate solution, 1mL (25mg) subQ weekly. LORazepam (ATIVAN) 1 mg tablet TAKE 1 & 1/2 (ONE & ONE-HALF) TABLETS BY MOUTH NIGHTLY .    lisinopril-hydroCHLOROthiazide (PRINZIDE, ZESTORETIC) 10-12.5 mg per tablet TAKE 1 TABLET EVERY DAY    letrozole (FEMARA) 2.5 mg tablet TAKE 1 TABLET EVERY DAY    methotrexate 25 mg/mL chemo injection INJECT 1 ML SUBCUTANEOUSLY EVERY FRIDAY    upadacitinib (Rinvoq) 15 mg Tb24 Take 15 mg by mouth daily. Indications: rheumatoid arthritis    levothyroxine (SYNTHROID) 200 mcg tablet TAKE ONE TABLET BY MOUTH ONCE DAILY BEFORE BREAKFAST    ergocalciferol (ERGOCALCIFEROL) 1,250 mcg (50,000 unit) capsule Take 1 Capsule by mouth every seven (7) days. Indications: vitamin D deficiency (high dose therapy)    citalopram (CELEXA) 40 mg tablet TAKE 1 TABLET EVERY DAY    ibuprofen (MOTRIN) 800 mg tablet Take 800 mg by mouth daily. folic acid (FOLVITE) 1 mg tablet TAKE 1 TABLET BY MOUTH ONCE DAILY    diclofenac (VOLTAREN) 1 % gel Apply  to affected area four (4) times daily. No current facility-administered medications for this visit. Allergies   Allergen Reactions    Keflex [Cephalexin] Rash    Sulfa (Sulfonamide Antibiotics) Rash    Monistat 1 [Tioconazole] Swelling        Review of Systems: A complete review of systems was obtained, negative except as described above.     Physical Exam:     Visit Vitals  BP (!) 159/77   Pulse 67   Resp 18   Ht 5' 4\" (1.626 m)   Wt 190 lb (86.2 kg)   SpO2 95%   BMI 32.61 kg/m²       ECOG PS: 0  General: No distress  Eyes: PERRL, anicteric sclerae  HENT: Atraumatic  Neck: Supple  Breast: No palpable lumps or adenopathy, no skin changes. MS: Normal gait and station. Digits without clubbing or cyanosis. Skin: No rashes, ecchymoses, or petechiae. Normal temperature, turgor, and texture. Psych: Alert, oriented, appropriate affect, normal judgment/insight    Results:     Lab Results   Component Value Date/Time    WBC 7.0 11/09/2022 09:05 AM    HGB 12.7 11/09/2022 09:05 AM    HCT 36.3 11/09/2022 09:05 AM    PLATELET 513 93/50/1451 09:05 AM    MCV 92 11/09/2022 09:05 AM    ABS. NEUTROPHILS 5.7 11/09/2022 09:05 AM     Lab Results   Component Value Date/Time    Sodium 139 11/09/2022 09:05 AM    Potassium 4.3 11/09/2022 09:05 AM    Chloride 105 11/09/2022 09:05 AM    CO2 22 11/09/2022 09:05 AM    Glucose 103 (H) 11/09/2022 09:05 AM    BUN 16 11/09/2022 09:05 AM    Creatinine 0.61 11/09/2022 09:05 AM    GFR est AA >60 04/12/2022 09:04 AM    GFR est non-AA >60 04/12/2022 09:04 AM    Calcium 9.3 11/09/2022 09:05 AM     Lab Results   Component Value Date/Time    Bilirubin, total 0.5 11/09/2022 09:05 AM    ALT (SGPT) 34 (H) 11/09/2022 09:05 AM    Alk.  phosphatase 130 (H) 11/09/2022 09:05 AM    Protein, total 7.5 11/09/2022 09:05 AM    Albumin 4.3 11/09/2022 09:05 AM    Globulin 3.5 04/12/2022 09:04 AM     Lab Results   Component Value Date/Time    Ferritin 753 (H) 09/16/2020 06:14 AM     09/14/2020 11:18 PM    Beta-2 Microglobulin, serum 1.8 11/21/2018 02:47 PM    Sed rate (ESR) 5 11/09/2022 09:05 AM    C-Reactive Protein, Qt <1 11/09/2022 09:05 AM    TSH 0.024 (L) 07/07/2020 10:54 AM    M-Jere 0.3 (H) 04/12/2022 09:33 AM    M-Jere 0.2 (H) 01/21/2020 11:45 AM    Lipase 204 12/23/2016 01:31 PM    Hep C Virus Ab 0.1 12/13/2021 12:17 PM     Lab Results   Component Value Date/Time    INR 1.1 09/18/2020 12:07 AM    aPTT 33.9 (H) 09/18/2020 12:07 AM    D-dimer 1.01 (H) 09/21/2020 02:41 AM    Fibrinogen 552 (H) 09/18/2020 12:07 AM     Records reviewed and summarized above. Pathology report(s) reviewed    Bone marrow: 12/19/18  Variably cellular marrow with mild monoclonal plasmacytosis. Trilineage hematopoiesis with maturation. See comment. Peripheral blood:   Unremarkable peripheral blood. See CBC data. Comment   The bone marrow is variably cellular ranging <10% to 60% to reveal mild monoclonal, kappa restricted plasmacytosis (10%). Trilineage hematopoiesis with adequate maturation is identified      Interpretation:   Del(1p): Not Detected   Dup(1q): Not Detected   Gains(5, 9, 15): DETECTED   Del(13q): Not Detected   Del(17p)(TP53): Not Detected (See Below)   IGH(Rearrangement): Not Detected   Testing performed by [facility] and interpreted by Shauna Kyle M.D. Please see scanned outside report in [facility] for complete details. Breast biopsy 12/3/18  Breast, left, needle core biopsy:   Invasive mammary carcinoma with ductal and lobular features   Favor [place] histologic grade 1   Largest glass slide measurement of invasive carcinoma: 8.5 mm     1/29/19  Lumpectomy and SLN     TUMOR   Tumor Size: 1.8   Histologic Type: Invasive lobular carcinoma   Histologic Grade (1-3)   Glandular (Acinar)/Tubular Differentiation: 3   Nuclear Pleomorphism: 1   Mitotic Rate: 1   Overall ndGndrndanddndend:nd nd2nd Lymph Nodes with Macrometastases (>2 mm): 0   Lymph Nodes with Micrometastases (>0.2 mm to 2 mm and/or >200 cells): 0 Number of Lymph Nodes with Isolated Tumor Cells (?0.2 mm and ?200 cells)#: 0   Total Number of Lymph Nodes Examined: 1   Number of [place] Nodes Examined: 1   3.  Breast, left, anterior margin, excision:   Atypical lobular hyperplasia     Radiology report(s) reviewed       DEXA 6/2021    Osteopenia    Mammogram 12/2021:  Normal    PET CT  Negative for any lytic lesions or uptake. Skeletal survey 11/2021  Negative     Assessment:   1) Paraproteinemia- Smoldering Myeloma    Small IgA M spike  No end organ damage- Bone survey suggests few calvarial lucencies-but there are no corresponding lesions on the PET/CT. BM bx 2018 showed 10% plasma cells with trisomies  Together she likely has smoldering myeloma     Reviewed the spectrum of plasma cell disorders and implications of diagnosis    She understands that smoldering myeloma has about a > 40% risk of transforming into myeloma in the ensuing years. At this time we will continue with observation. Last labs  11/2022 stable-gammopathy was not done    2) Left breast cancer  9 mm lesion on Mammo/USG  %, [place] 3% HER2 mignon negative  Status post lumpectomy and sentinel lymph node biopsy on 1/29/19  T1cN0 invasive lobular carcinoma-stage I  Tumor is 1.8 cm  Grade 1  Margins with atypical lobular hyper  Oncotype DX low risk    Completed adjuvant RT  Tolerating adjuvant Letrozole since 4/19  Planning 5 years    Discussed lifestyle, she is practicing weight bearing  DEXA 6/21 reviewed and shows osteopenia, she is taking VD and exercising     Exam unremarkable  Mammogram reviewed 12/2021 and reassuring    3)  Rheumatoid arthritis      4) Depression  Per Dr. Sole Miles    5) Left hip pain  Sees Ortho  I have asked her to let me know if they do any imaging    Plan:     Continue letrozole   Continue VD  Counseled in Life style changes   DEXA  - due 6/2023   Mammogram due aug 2023  Cbc, cmp, gammopathy  in 6 months  Follow with Ortho  Skeletal survey - consider 11/2023    RTC in 6 months with cbc, cmp, gammopathy  Did not have gammopathy labs this time - will order and call if major changes     I appreciate the opportunity to participate in Ms. Mariam Cortez's care.       Signed By: Philly Wright MD

## 2022-11-15 ENCOUNTER — TELEPHONE (OUTPATIENT)
Dept: ONCOLOGY | Age: 72
End: 2022-11-15

## 2022-11-15 LAB
ALBUMIN SERPL ELPH-MCNC: 3.7 G/DL (ref 2.9–4.4)
ALBUMIN/GLOB SERPL: 1.2 {RATIO} (ref 0.7–1.7)
ALPHA1 GLOB SERPL ELPH-MCNC: 0.2 G/DL (ref 0–0.4)
ALPHA2 GLOB SERPL ELPH-MCNC: 0.6 G/DL (ref 0.4–1)
B-GLOBULIN SERPL ELPH-MCNC: 1.3 G/DL (ref 0.7–1.3)
GAMMA GLOB SERPL ELPH-MCNC: 1 G/DL (ref 0.4–1.8)
GLOBULIN SER-MCNC: 3.1 G/DL (ref 2.2–3.9)
IGA SERPL-MCNC: 543 MG/DL (ref 64–422)
IGG SERPL-MCNC: 1199 MG/DL (ref 586–1602)
IGM SERPL-MCNC: 54 MG/DL (ref 26–217)
INTERPRETATION SERPL IEP-IMP: ABNORMAL
KAPPA LC FREE SER-MCNC: 22.9 MG/L (ref 3.3–19.4)
KAPPA LC FREE/LAMBDA FREE SER: 4.02 {RATIO} (ref 0.26–1.65)
LAMBDA LC FREE SERPL-MCNC: 5.7 MG/L (ref 5.7–26.3)
M PROTEIN SERPL ELPH-MCNC: 0.3 G/DL
PLEASE NOTE:, 149534: ABNORMAL
PROT SERPL-MCNC: 6.8 G/DL (ref 6–8.5)

## 2022-11-15 NOTE — TELEPHONE ENCOUNTER
[id number] [facility] pt with lab updates, HIPAA verified x2. Dr. Bk Harvey has reviewed her labs and her protein is stable. Pt voiced understanding and has no questions or concerns at this time.

## 2022-11-16 DIAGNOSIS — M18.12 PRIMARY OSTEOARTHRITIS OF FIRST CARPOMETACARPAL JOINT OF LEFT HAND: Primary | ICD-10-CM

## 2022-11-16 RX ORDER — TRAMADOL HYDROCHLORIDE 50 MG/1
50 TABLET ORAL
Qty: 15 TABLET | Refills: 0 | Status: SHIPPED | OUTPATIENT
Start: 2022-11-16 | End: 2022-11-19

## 2022-11-28 NOTE — PROGRESS NOTES
HPI: Bandar Rueda (: 1950) is a 66 y.o. female, patient, here for evaluation of the following chief complaint(s):    Surgical Follow-up (Follow up on surgery on her left hand. ...done 22.)  Patient seen today to evaluate her left hand. The patient has complained of ongoing basal joint left thumb arthritic type pain. She denies any fall or specific injury. She has received 3 prior injections. She has tried ice, anti-inflammatories including Tylenol and ibuprofen. She denies any significant right hand involvement but does admit to some degree of overall digital numbness. She had a prior right ring trigger finger release and did receive one of the 3 prior injections under anesthesia. Overall her pain is worsened in the last 3 to 5 years especially in the last several months. She underwent a left thumb CMC arthroplasty with FCR interpositional tendon transfer including suspension plasty and left endoscopic carpal tunnel release on 2022. Vitals:  Ht 5' 4\" (1.626 m)   Wt 190 lb (86.2 kg)   BMI 32.61 kg/m²    Body mass index is 32.61 kg/m². Allergies   Allergen Reactions    Keflex [Cephalexin] Rash    Sulfa (Sulfonamide Antibiotics) Rash    Monistat 1 [Tioconazole] Swelling       Current Outpatient Medications   Medication Sig    methylPREDNISolone (MEDROL DOSEPACK) 4 mg tablet Per dose pack instructions    Insulin Syringe-Needle U-100 1 mL 30 gauge x 5/16 syrg 1 Each by Does Not Apply route Every Saturday. To be used for methotrexate solution, 1mL (25mg) subQ weekly. LORazepam (ATIVAN) 1 mg tablet TAKE 1 & 1/2 (ONE & ONE-HALF) TABLETS BY MOUTH NIGHTLY .    lisinopril-hydroCHLOROthiazide (PRINZIDE, ZESTORETIC) 10-12.5 mg per tablet TAKE 1 TABLET EVERY DAY    letrozole (FEMARA) 2.5 mg tablet TAKE 1 TABLET EVERY DAY    methotrexate 25 mg/mL chemo injection INJECT 1 ML SUBCUTANEOUSLY EVERY FRIDAY    upadacitinib (Rinvoq) 15 mg Tb24 Take 15 mg by mouth daily.  Indications: rheumatoid arthritis    levothyroxine (SYNTHROID) 200 mcg tablet TAKE ONE TABLET BY MOUTH ONCE DAILY BEFORE BREAKFAST    ergocalciferol (ERGOCALCIFEROL) 1,250 mcg (50,000 unit) capsule Take 1 Capsule by mouth every seven (7) days. Indications: vitamin D deficiency (high dose therapy)    citalopram (CELEXA) 40 mg tablet TAKE 1 TABLET EVERY DAY    ibuprofen (MOTRIN) 800 mg tablet Take 800 mg by mouth daily. folic acid (FOLVITE) 1 mg tablet TAKE 1 TABLET BY MOUTH ONCE DAILY    diclofenac (VOLTAREN) 1 % gel Apply  to affected area four (4) times daily. No current facility-administered medications for this visit. Past Medical History:   Diagnosis Date    Breast cancer, left ([facility]     Depression     Goiter     Hearing loss     bilateral    Hypertension     Hypothyroid     IBS (irritable bowel syndrome)     Menopause     Rheumatoid arthritis ([facility]     S/P radiation therapy         Past Surgical History:   Procedure Laterality Date    HX BREAST LUMPECTOMY Left 1/29/2019    LEFT BREAST LUMPECTOMY WITH ULTRASOUND, LEFT BREAST SENTINEL NODE BIOPSY (11a) performed by Junior Prasad MD at [place] [address]    HX CATARACT REMOVAL Bilateral     HX CHOLECYSTECTOMY  1997    IR INJ Will Balling EPID LUMB ANES/STER SNGL  6/7/2022    NEUROLOGICAL PROCEDURE UNLISTED  7/97    brain lesion removed ? infectious       Family History   Problem Relation Age of Onset    Cancer Mother         Lung    COPD Mother     Cancer Father         Lung    Heart Disease Brother         CABGx3    Diabetes Brother     Heart Disease Brother     Diabetes Brother     COPD Brother     Liver Disease Brother         cirrhosis    Alcohol abuse Brother     Heart Disease Paternal Uncle         CAD    Heart Disease Paternal Grandmother         CAD    Breast Cancer Paternal Grandmother     Anesth Problems Neg Hx         Social History     Tobacco Use    Smoking status: Never    Smokeless tobacco: Never   Substance Use Topics    Alcohol use:  Yes Alcohol/week: 5.0 standard drinks     Types: 5 Glasses of wine per week    Drug use: No        Review of Systems   All other systems reviewed and are negative. Physical Exam    Overall the wounds are healing well and there is really no redness drainage or sign infection. Good thumb alignment. Imaging:    XR Results (most recent):  Results from Appointment encounter on 11/29/22    XR HAND LT MIN 3 V    Narrative  AP, lateral and oblique x-ray of the left hand including the thumb shows good post trapeziectomy space in height. The buttons are radiolucent. No fracture. ASSESSMENT/PLAN:  Below is the assessment and plan developed based on review of pertinent history, physical exam, labs, studies, and medications. Patient's examination was consistent with left thumb CMC basal joint osteoarthritis and carpal tunnel syndrome. She has had 3 prior corticosteroid injections and did not want to have this repeated. Her radiographs when compared to films from 10/4/2018 shows fairly dramatic progression of the thumb CMC osteoarthritis. She has had a prior successful right ring trigger finger release but does not experience any triggering in the left hand. I reviewed treatment options and answered her questions in detail. She was offered but deferred further conservative treatment and again prefers to proceed with operative management. The current plan is to perform a left thumb [facility] arthroplasty with a left endoscopic carpal tunnel release. She underwent a left thumb CMC arthroplasty with FCR interpositional tendon transfer including suspension plasty and left endoscopic carpal tunnel release on 11/17/2022. Recommended a thumb spica splint for comfort and support and then gradual motion and strength recovery. Follow-up in 3 to 4 weeks. 1. Primary osteoarthritis of both hands  2.  Primary osteoarthritis of first carpometacarpal joint of left hand  -     WI [facility] W/O JOINTS PRE OTS  - REFERRAL TO DME  -     XR HAND LT MIN 3 V; Future  3. Carpal tunnel syndrome of left wrist  -     WI WHFO W/O JOINTS PRE OTS  -     REFERRAL TO DME  -     XR HAND LT MIN 3 V; Future  4. Left wrist pain      Return in about 4 weeks (around 12/27/2022). An electronic signature was used to authenticate this note.   -- Oseas Cabello MD

## 2022-11-29 ENCOUNTER — OFFICE VISIT (OUTPATIENT)
Dept: ORTHOPEDIC SURGERY | Age: 72
End: 2022-11-29
Payer: MEDICARE

## 2022-11-29 VITALS — HEIGHT: 64 IN | BODY MASS INDEX: 32.44 KG/M2 | WEIGHT: 190 LBS

## 2022-11-29 DIAGNOSIS — M18.12 PRIMARY OSTEOARTHRITIS OF FIRST CARPOMETACARPAL JOINT OF LEFT HAND: ICD-10-CM

## 2022-11-29 DIAGNOSIS — G56.02 CARPAL TUNNEL SYNDROME OF LEFT WRIST: ICD-10-CM

## 2022-11-29 DIAGNOSIS — M19.042 PRIMARY OSTEOARTHRITIS OF BOTH HANDS: Primary | ICD-10-CM

## 2022-11-29 DIAGNOSIS — M25.532 LEFT WRIST PAIN: ICD-10-CM

## 2022-11-29 DIAGNOSIS — M19.041 PRIMARY OSTEOARTHRITIS OF BOTH HANDS: Primary | ICD-10-CM

## 2022-11-29 NOTE — LETTER
11/29/2022    Patient: Lyly Rangel   YOB: 1950   Date of Visit: 11/29/2022     Tyler Deutsch MD  [id number]  [facility]    Dear Tyler Deutsch MD,      Thank you for referring Ms. Yuliet Cortez to [facility] for evaluation. My notes for this consultation are attached. If you have questions, please do not hesitate to call me. I look forward to following your patient along with you.       Sincerely,    Sidra Recinos MD

## 2022-11-29 NOTE — PATIENT INSTRUCTIONS
Thumb Arthritis: Exercises  Introduction  Here are some examples of exercises for you to try. The exercises may be suggested for a condition or for rehabilitation. Start each exercise slowly. Ease off the exercises if you start to have pain. You will be told when to start these exercises and which ones will work best for you. How to do the exercises  Thumb IP flexion  Place your forearm and hand on a table with your affected thumb pointing up. With your other hand, hold your thumb steady just below the joint nearest your thumbnail. Bend the tip of your thumb downward, then straighten it. Repeat 8 to 12 times. Switch hands and repeat steps 1 through 4, even if only one thumb is sore. Thumb MP flexion  Place your forearm and hand on a table with your affected thumb pointing up. With your other hand, hold the base of your thumb and palm steady. Bend your thumb downward where it meets your palm, then straighten it. Repeat 8 to 12 times. Switch hands and repeat steps 1 through 4, even if only one thumb is sore. Thumb opposition  With your affected hand, point your fingers and thumb straight up. Your wrist should be relaxed, following the line of your fingers and thumb. Touch your affected thumb to each finger, one finger at a time. This will look like an \"okay\" sign, but try to keep your other fingers straight and pointing upward as much as you can. Repeat 8 to 12 times. Switch hands and repeat steps 1 through 3, even if only one thumb is sore. Follow-up care is a key part of your treatment and safety. Be sure to make and go to all appointments, and call your doctor if you are having problems. It's also a good idea to know your test results and keep a list of the medicines you take. Current as of: March 9, 2022               Content Version: 13.4  © 1130-1181 [employer].    Care instructions adapted under license by [employer] (which disclaims liability or warranty for this information). If you have questions about a medical condition or this instruction, always ask your healthcare professional. [place] [address] any warranty or liability for your use of this information.

## 2022-11-30 DIAGNOSIS — E55.9 VITAMIN D DEFICIENCY: ICD-10-CM

## 2022-12-01 DIAGNOSIS — M06.09 SERONEGATIVE RHEUMATOID ARTHRITIS OF MULTIPLE SITES (HCC): ICD-10-CM

## 2022-12-01 RX ORDER — CITALOPRAM 40 MG/1
TABLET, FILM COATED ORAL
Qty: 90 TABLET | Refills: 3 | Status: SHIPPED | OUTPATIENT
Start: 2022-12-01

## 2022-12-01 RX ORDER — ERGOCALCIFEROL 1.25 MG/1
CAPSULE ORAL
Qty: 12 CAPSULE | Refills: 4 | Status: SHIPPED | OUTPATIENT
Start: 2022-12-01

## 2022-12-01 NOTE — TELEPHONE ENCOUNTER
Last visit was 9/19/22  Follow up scheduled for 1/19/23  Lab Results   Component Value Date/Time    Sodium 139 11/09/2022 09:05 AM    Potassium 4.3 11/09/2022 09:05 AM    Chloride 105 11/09/2022 09:05 AM    CO2 22 11/09/2022 09:05 AM    Anion gap 4 (L) 04/12/2022 09:04 AM    Glucose 103 (H) 11/09/2022 09:05 AM    BUN 16 11/09/2022 09:05 AM    Creatinine 0.61 11/09/2022 09:05 AM    BUN/Creatinine ratio 26 11/09/2022 09:05 AM    GFR est AA >60 04/12/2022 09:04 AM    GFR est non-AA >60 04/12/2022 09:04 AM    Calcium 9.3 11/09/2022 09:05 AM    Bilirubin, total 0.5 11/09/2022 09:05 AM    Alk.  phosphatase 130 (H) 11/09/2022 09:05 AM    Protein, total 6.8 11/11/2022 10:29 AM    Albumin 4.3 11/09/2022 09:05 AM    Globulin 3.5 04/12/2022 09:04 AM    A-G Ratio 1.3 11/09/2022 09:05 AM    ALT (SGPT) 34 (H) 11/09/2022 09:05 AM    AST (SGOT) 26 11/09/2022 09:05 AM     Lab Results   Component Value Date/Time    WBC 7.0 11/09/2022 09:05 AM    HGB 12.7 11/09/2022 09:05 AM    HCT 36.3 11/09/2022 09:05 AM    PLATELET 106 01/20/5165 09:05 AM    MCV 92 11/09/2022 09:05 AM

## 2022-12-05 RX ORDER — SYRINGE AND NEEDLE,INSULIN,1ML 30 GX5/16"
SYRINGE, EMPTY DISPOSABLE MISCELLANEOUS
Qty: 100 EACH | Refills: 0 | Status: SHIPPED | OUTPATIENT
Start: 2022-12-05

## 2022-12-15 ENCOUNTER — TELEPHONE (OUTPATIENT)
Dept: INTERNAL MEDICINE CLINIC | Age: 72
End: 2022-12-15

## 2022-12-15 ENCOUNTER — TRANSCRIBE ORDER (OUTPATIENT)
Dept: SCHEDULING | Age: 72
End: 2022-12-15

## 2022-12-15 DIAGNOSIS — Z12.31 ENCOUNTER FOR SCREENING MAMMOGRAM FOR MALIGNANT NEOPLASM OF BREAST: Primary | ICD-10-CM

## 2022-12-15 NOTE — TELEPHONE ENCOUNTER
Patient states that she is due for a mammogram. She is requesting that one of the doctors write an order for her to have her mammogram done so that she can get that appointment scheduled.

## 2022-12-20 ENCOUNTER — OFFICE VISIT (OUTPATIENT)
Dept: ORTHOPEDIC SURGERY | Age: 72
End: 2022-12-20
Payer: MEDICARE

## 2022-12-20 DIAGNOSIS — M18.12 PRIMARY OSTEOARTHRITIS OF FIRST CARPOMETACARPAL JOINT OF LEFT HAND: ICD-10-CM

## 2022-12-20 DIAGNOSIS — G56.02 CARPAL TUNNEL SYNDROME OF LEFT WRIST: ICD-10-CM

## 2022-12-20 DIAGNOSIS — M19.041 PRIMARY OSTEOARTHRITIS OF BOTH HANDS: Primary | ICD-10-CM

## 2022-12-20 DIAGNOSIS — M25.532 LEFT WRIST PAIN: ICD-10-CM

## 2022-12-20 DIAGNOSIS — M19.042 PRIMARY OSTEOARTHRITIS OF BOTH HANDS: Primary | ICD-10-CM

## 2022-12-20 NOTE — LETTER
12/20/2022    Patient: Katina Dave   YOB: 1950   Date of Visit: 12/20/2022     Rickey Maitas MD  [address]  [facility] [place]    Dear Rickey Matias MD,      Thank you for referring Ms. Priscilla Cortez to [facility] for evaluation. My notes for this consultation are attached. If you have questions, please do not hesitate to call me. I look forward to following your patient along with you.       Sincerely,    Kayden Bartholomew MD

## 2022-12-20 NOTE — PATIENT INSTRUCTIONS
Thumb Arthritis: Exercises  Introduction  Here are some examples of exercises for you to try. The exercises may be suggested for a condition or for rehabilitation. Start each exercise slowly. Ease off the exercises if you start to have pain. You will be told when to start these exercises and which ones will work best for you. How to do the exercises  Thumb IP flexion  Place your forearm and hand on a table with your affected thumb pointing up. With your other hand, hold your thumb steady just below the joint nearest your thumbnail. Bend the tip of your thumb downward, then straighten it. Repeat 8 to 12 times. Switch hands and repeat steps 1 through 4, even if only one thumb is sore. Thumb MP flexion  Place your forearm and hand on a table with your affected thumb pointing up. With your other hand, hold the base of your thumb and palm steady. Bend your thumb downward where it meets your palm, then straighten it. Repeat 8 to 12 times. Switch hands and repeat steps 1 through 4, even if only one thumb is sore. Thumb opposition  With your affected hand, point your fingers and thumb straight up. Your wrist should be relaxed, following the line of your fingers and thumb. Touch your affected thumb to each finger, one finger at a time. This will look like an \"okay\" sign, but try to keep your other fingers straight and pointing upward as much as you can. Repeat 8 to 12 times. Switch hands and repeat steps 1 through 3, even if only one thumb is sore. Follow-up care is a key part of your treatment and safety. Be sure to make and go to all appointments, and call your doctor if you are having problems. It's also a good idea to know your test results and keep a list of the medicines you take. Current as of: March 9, 2022               Content Version: 13.4  © 8274-3239 [employer], [employer].    Care instructions adapted under license by [employer] (which disclaims liability or warranty for this information). If you have questions about a medical condition or this instruction, always ask your healthcare professional. [place] [address] any warranty or liability for your use of this information.

## 2022-12-20 NOTE — PROGRESS NOTES
HPI: Milton Hollins (: 1950) is a 67 y.o. female, patient, here for evaluation of the following chief complaint(s):    Surgical Follow-up (Left thumb [facility] Joint arthroplasty with endoscopic carpal tunnel release on 22. Splinted 22.)  Patient seen today to evaluate her left hand. The patient has complained of ongoing basal joint left thumb arthritic type pain. She denies any fall or specific injury. She has received 3 prior injections. She has tried ice, anti-inflammatories including Tylenol and ibuprofen. She denies any significant right hand involvement but does admit to some degree of overall digital numbness. She had a prior right ring trigger finger release and did receive one of the 3 prior injections under anesthesia. Overall her pain is worsened in the last 3 to 5 years especially in the last several months. She underwent a left thumb CMC arthroplasty with FCR interpositional tendon transfer including suspensionplasty and left endoscopic carpal tunnel release on 2022. Vitals: There were no vitals taken for this visit. There is no height or weight on file to calculate BMI.     Allergies   Allergen Reactions    Keflex [Cephalexin] Rash    Sulfa (Sulfonamide Antibiotics) Rash    Monistat 1 [Tioconazole] Swelling       Current Outpatient Medications   Medication Sig    Droplet Insulin Syringe 1 mL 30 gauge x 5/16 syrg USED WITH METHOTREXATE WEEKLY AS DIRECTED    ergocalciferol (ERGOCALCIFEROL) 1,250 mcg (50,000 unit) capsule TAKE 1 CAPSULE BY MOUTH EVERY SEVEN (7) DAYS FOR VITAMIN D DEFICIENCY (HIGH DOSE THERAPY)    citalopram (CELEXA) 40 mg tablet TAKE 1 TABLET EVERY DAY    methylPREDNISolone (MEDROL DOSEPACK) 4 mg tablet Per dose pack instructions    LORazepam (ATIVAN) 1 mg tablet TAKE 1 & 1/2 (ONE & ONE-HALF) TABLETS BY MOUTH NIGHTLY .    lisinopril-hydroCHLOROthiazide (PRINZIDE, ZESTORETIC) 10-12.5 mg per tablet TAKE 1 TABLET EVERY DAY    letrozole (FEMARA) 2.5 mg tablet TAKE 1 TABLET EVERY DAY    methotrexate 25 mg/mL chemo injection INJECT 1 ML SUBCUTANEOUSLY EVERY FRIDAY    upadacitinib (Rinvoq) 15 mg Tb24 Take 15 mg by mouth daily. Indications: rheumatoid arthritis    levothyroxine (SYNTHROID) 200 mcg tablet TAKE ONE TABLET BY MOUTH ONCE DAILY BEFORE BREAKFAST    ibuprofen (MOTRIN) 800 mg tablet Take 800 mg by mouth daily. folic acid (FOLVITE) 1 mg tablet TAKE 1 TABLET BY MOUTH ONCE DAILY    diclofenac (VOLTAREN) 1 % gel Apply  to affected area four (4) times daily. No current facility-administered medications for this visit. Past Medical History:   Diagnosis Date    Breast cancer, left ([facility] [address]     Depression     Goiter     Hearing loss     bilateral    Hypertension     Hypothyroid     IBS (irritable bowel syndrome)     Menopause     Rheumatoid arthritis ([facility] [address]     S/P radiation therapy         Past Surgical History:   Procedure Laterality Date    HX BREAST LUMPECTOMY Left 1/29/2019    LEFT BREAST LUMPECTOMY WITH ULTRASOUND, LEFT BREAST SENTINEL NODE BIOPSY (11a) performed by Herson Kelley MD at [facility]    HX CATARACT REMOVAL Bilateral     HX CHOLECYSTECTOMY  1997    IR INJ Marilynn Quince EPID LUMB ANES/STER SNGL  6/7/2022    NEUROLOGICAL PROCEDURE UNLISTED  7/97    brain lesion removed ? infectious       Family History   Problem Relation Age of Onset    Cancer Mother         Lung    COPD Mother     Cancer Father         Lung    Heart Disease Brother         CABGx3    Diabetes Brother     Heart Disease Brother     Diabetes Brother     COPD Brother     Liver Disease Brother         cirrhosis    Alcohol abuse Brother     Heart Disease Paternal Uncle         CAD    Heart Disease Paternal Grandmother         CAD    Breast Cancer Paternal Grandmother     Anesth Problems Neg Hx         Social History     Tobacco Use    Smoking status: Never    Smokeless tobacco: Never   Substance Use Topics    Alcohol use:  Yes     Alcohol/week: 5.0 standard drinks     Types: 5 Glasses of wine per week    Drug use: No        Review of Systems   All other systems reviewed and are negative. Physical Exam    Overall the wounds are well-healed and there is really no redness drainage or sign infection. Good thumb alignment. She is opposing her thumb well towards the base of the small finger. Imaging:    XR Results (most recent):  Results from Appointment encounter on 12/20/22    XR THUMB LT MIN 2 V    Narrative  AP, lateral and oblique x-ray of the left thumb shows good post trapeziectomy space in height with good thumb metacarpal alignment. No apparent change of the radiolucent suspension plastic buttons. ASSESSMENT/PLAN:  Below is the assessment and plan developed based on review of pertinent history, physical exam, labs, studies, and medications. Patient's examination was consistent with left thumb CMC basal joint osteoarthritis and carpal tunnel syndrome. She has had 3 prior corticosteroid injections and did not want to have this repeated. Her radiographs when compared to films from 10/4/2018 shows fairly dramatic progression of the thumb CMC osteoarthritis. She has had a prior successful right ring trigger finger release but does not experience any triggering in the left hand. I reviewed treatment options and answered her questions in detail. She was offered but deferred further conservative treatment and again prefers to proceed with operative management. The current plan is to perform a left thumb [facility] arthroplasty with a left endoscopic carpal tunnel release. She underwent a left thumb CMC arthroplasty with FCR interpositional tendon transfer including suspension plasty and left endoscopic carpal tunnel release on 11/17/2022. She has a thumb spica splint for comfort and support and may also consider obtaining a hand-based splint. She feels she may have been overdoing some of her activities not wearing the splint.   I certainly recommended that she use her judgment but to try to use the splint when needed. She is making good progress. She will continue with home exercises but deferred the need for outpatient hand therapy. I will see her back in 6 to 8 weeks for further assessment sooner if needed      1. Primary osteoarthritis of both hands  2. Primary osteoarthritis of first carpometacarpal joint of left hand  -     XR THUMB LT MIN 2 V; Future  3. Carpal tunnel syndrome of left wrist  4. Left wrist pain      Return in about 6 weeks (around 1/31/2023). An electronic signature was used to authenticate this note.   -- Shanique Tuttle MD

## 2022-12-23 ENCOUNTER — PATIENT MESSAGE (OUTPATIENT)
Dept: INTERNAL MEDICINE CLINIC | Age: 72
End: 2022-12-23

## 2022-12-23 DIAGNOSIS — F51.01 PRIMARY INSOMNIA: ICD-10-CM

## 2022-12-24 RX ORDER — LORAZEPAM 1 MG/1
TABLET ORAL
Qty: 60 TABLET | Refills: 2 | Status: SHIPPED | OUTPATIENT
Start: 2022-12-24

## 2022-12-24 NOTE — TELEPHONE ENCOUNTER
From: Lore Shaikh  To:  Tana Jacob MD  Sent: 12/23/2022 11:04 AM EST  Subject: Lorazepam    Dr Jacky Pelletier would you please increase my dose from 1 1/2 dose a day to 2 pills a day that helps me more but I keep running out when I do that so some days are better than others , Thank You , Kailey Toney

## 2023-01-16 ENCOUNTER — TELEPHONE (OUTPATIENT)
Dept: INTERNAL MEDICINE CLINIC | Age: 73
End: 2023-01-16

## 2023-01-16 DIAGNOSIS — F51.01 PRIMARY INSOMNIA: ICD-10-CM

## 2023-01-16 RX ORDER — LORAZEPAM 1 MG/1
TABLET ORAL
Qty: 60 TABLET | Refills: 2 | Status: SHIPPED | OUTPATIENT
Start: 2023-01-16

## 2023-01-16 NOTE — TELEPHONE ENCOUNTER
Pt is requesting per her previous message that her lorazepam change to 2 pills per day instead of 1 1/2. She states that she picked up her prescription from [address] and it was the old dosage of 1 1/2. Please advise.

## 2023-01-16 NOTE — TELEPHONE ENCOUNTER
Garland Vivar from [address] [place] [address] called and requested that we fax over the mammogram order for the pt to [phone]. Fax completed.

## 2023-01-16 NOTE — TELEPHONE ENCOUNTER
----- Message from Radha Parsons sent at 1/4/2023 12:00 PM EST -----  Subject: Referral Request    Reason for referral request? Patient asked if the order for Bi lateral   diagnostic screening faxed over to [facility] and it needs to NOT be just   the routine screening must be diagnostic. Provider patient wants to be referred to(if known):     Provider Phone Number(if known):     Additional Information for Provider?   ---------------------------------------------------------------------------  --------------  [employer] [employer]    [phone]; OK to leave message on voicemail  ---------------------------------------------------------------------------  --------------

## 2023-01-16 NOTE — TELEPHONE ENCOUNTER
LM on VM that mammogram order is diagnostic and can be retrieved by [facility] in their system.

## 2023-01-17 ENCOUNTER — TRANSCRIBE ORDER (OUTPATIENT)
Dept: SCHEDULING | Age: 73
End: 2023-01-17

## 2023-01-17 DIAGNOSIS — Z12.31 ENCOUNTER FOR SCREENING MAMMOGRAM FOR MALIGNANT NEOPLASM OF BREAST: Primary | ICD-10-CM

## 2023-01-18 DIAGNOSIS — C50.912 MALIGNANT NEOPLASM OF LEFT FEMALE BREAST, UNSPECIFIED ESTROGEN RECEPTOR STATUS, UNSPECIFIED SITE OF BREAST (HCC): Primary | ICD-10-CM

## 2023-01-19 ENCOUNTER — OFFICE VISIT (OUTPATIENT)
Dept: RHEUMATOLOGY | Age: 73
End: 2023-01-19
Payer: MEDICARE

## 2023-01-19 VITALS
OXYGEN SATURATION: 97 % | WEIGHT: 195 LBS | HEART RATE: 64 BPM | TEMPERATURE: 98 F | BODY MASS INDEX: 33.47 KG/M2 | SYSTOLIC BLOOD PRESSURE: 146 MMHG | RESPIRATION RATE: 16 BRPM | DIASTOLIC BLOOD PRESSURE: 75 MMHG

## 2023-01-19 DIAGNOSIS — M06.09 SERONEGATIVE RHEUMATOID ARTHRITIS OF MULTIPLE SITES (HCC): Primary | ICD-10-CM

## 2023-01-19 DIAGNOSIS — R74.8 ELEVATED ALKALINE PHOSPHATASE LEVEL: ICD-10-CM

## 2023-01-19 DIAGNOSIS — Z79.60 LONG-TERM USE OF IMMUNOSUPPRESSANT MEDICATION: ICD-10-CM

## 2023-01-19 PROCEDURE — G8399 PT W/DXA RESULTS DOCUMENT: HCPCS | Performed by: INTERNAL MEDICINE

## 2023-01-19 PROCEDURE — 3077F SYST BP >= 140 MM HG: CPT | Performed by: INTERNAL MEDICINE

## 2023-01-19 PROCEDURE — 1101F PT FALLS ASSESS-DOCD LE1/YR: CPT | Performed by: INTERNAL MEDICINE

## 2023-01-19 PROCEDURE — 3078F DIAST BP <80 MM HG: CPT | Performed by: INTERNAL MEDICINE

## 2023-01-19 PROCEDURE — G8417 CALC BMI ABV UP PARAM F/U: HCPCS | Performed by: INTERNAL MEDICINE

## 2023-01-19 PROCEDURE — G8536 NO DOC ELDER MAL SCRN: HCPCS | Performed by: INTERNAL MEDICINE

## 2023-01-19 PROCEDURE — G9717 DOC PT DX DEP/BP F/U NT REQ: HCPCS | Performed by: INTERNAL MEDICINE

## 2023-01-19 PROCEDURE — G9899 SCRN MAM PERF RSLTS DOC: HCPCS | Performed by: INTERNAL MEDICINE

## 2023-01-19 PROCEDURE — G0463 HOSPITAL OUTPT CLINIC VISIT: HCPCS | Performed by: INTERNAL MEDICINE

## 2023-01-19 PROCEDURE — G8427 DOCREV CUR MEDS BY ELIG CLIN: HCPCS | Performed by: INTERNAL MEDICINE

## 2023-01-19 PROCEDURE — 3017F COLORECTAL CA SCREEN DOC REV: CPT | Performed by: INTERNAL MEDICINE

## 2023-01-19 PROCEDURE — 1090F PRES/ABSN URINE INCON ASSESS: CPT | Performed by: INTERNAL MEDICINE

## 2023-01-19 PROCEDURE — 1123F ACP DISCUSS/DSCN MKR DOCD: CPT | Performed by: INTERNAL MEDICINE

## 2023-01-19 PROCEDURE — 99214 OFFICE O/P EST MOD 30 MIN: CPT | Performed by: INTERNAL MEDICINE

## 2023-01-19 RX ORDER — METHOTREXATE 25 MG/ML
20 INJECTION, SOLUTION INTRA-ARTERIAL; INTRAMUSCULAR; INTRAVENOUS
Qty: 12 ML | Refills: 0 | Status: SHIPPED | OUTPATIENT
Start: 2023-01-19

## 2023-01-19 NOTE — PROGRESS NOTES
REASON FOR VISIT    This is a follow-up visit for Ms. Roxi Epstein for     ICD-10-CM   1. Seronegative rheumatoid arthritis of multiple sites ([facility] M06.09   2. PMR (polymyalgia rheumatica) ([facility]) M35.3     Inflammatory arthritis phenotype includes:  Anti-CCP positive: no  Rheumatoid factor positive: no  Erosive disease: no  Extra-articular manifestations include: Polymyalgia Rheumatica, smoldering myeloma, interstitial lung disease     Immunosuppression Screening (1/12/2021): Quantiferon TB: negative  PPD:  Not performed  Hepatitis B: negative  Hepatitis C: negative    Therapy History includes:  Current DMARD therapy include: methotrexate 20->25 mg SubQ every Friday (10/15/18 to 2/27/2021; stopped by mistake; 4/23/2021 to present), Rinvoq 15 mg daily (SAMPLE: 8/10/2021 to present)  Prior DMARD therapy include: Humira 40 mg every 14 days (2/27/2021 to 8/10/2021)   Discontinued DMARDs because of inefficacy: None  Discontinued DMARDs because of side effects: None    HISTORY OF PRESENT ILLNESS    Ms. Roxi Epstein returns for a follow-up. Last visit 9/19/2022. Pt has not taken Methotrexate for the past 3 weeks because she ran out. She says that she can feel a negative difference in her joint pain since she stopped taking it. She says that she did not have the hand stiffness and pain in the morning when she was on the medication. She has been taking 1mL of subQ Methotrexate once per week with daily folic acid before she ran out. Pt takes 800mg of Ibuprofen twice daily everyday. She also takes Pepcid for acid reflux. Pt has most of her joint pain in her hands and in her L shoulder. She fell and hurt her shoulder feeding a feral cat. Her dog pulled on her when she was going to feed the cat and she fell down the steps. Pt had a carpal tunnel surgery and some bone removed in her L hand in. She is happy with the results of the story and feels a lot better.        REVIEW OF SYSTEMS    A comprehensive review of systems was performed and pertinent results are documented in the HPI, review of systems is otherwise non-contributory. PAST MEDICAL HISTORY    She has a past medical history of Breast cancer, left ([address] Depression, Goiter, Hearing loss, Hypertension, Hypothyroid, IBS (irritable bowel syndrome), Menopause, Rheumatoid arthritis ([address] and S/P radiation therapy. FAMILY HISTORY    Her family history includes Alcohol abuse in her brother; Breast Cancer in her paternal grandmother; COPD in her brother and mother; Cancer in her father and mother; Diabetes in her brother and brother; Heart Disease in her brother, brother, paternal grandmother, and paternal uncle; Liver Disease in her brother. SOCIAL HISTORY    She reports that she has never smoked. She has never used smokeless tobacco. She reports current alcohol use of about 5.0 standard drinks per week. She reports that she does not use drugs. MEDICATIONS    Current Outpatient Medications   Medication Sig    LORazepam (ATIVAN) 1 mg tablet TAKE  2 (two) TABLETS BY MOUTH NIGHTLY . Droplet Insulin Syringe 1 mL 30 gauge x 5/16 syrg USED WITH METHOTREXATE WEEKLY AS DIRECTED    ergocalciferol (ERGOCALCIFEROL) 1,250 mcg (50,000 unit) capsule TAKE 1 CAPSULE BY MOUTH EVERY SEVEN (7) DAYS FOR VITAMIN D DEFICIENCY (HIGH DOSE THERAPY)    citalopram (CELEXA) 40 mg tablet TAKE 1 TABLET EVERY DAY    methylPREDNISolone (MEDROL DOSEPACK) 4 mg tablet Per dose pack instructions    lisinopril-hydroCHLOROthiazide (PRINZIDE, ZESTORETIC) 10-12.5 mg per tablet TAKE 1 TABLET EVERY DAY    letrozole (FEMARA) 2.5 mg tablet TAKE 1 TABLET EVERY DAY    methotrexate 25 mg/mL chemo injection INJECT 1 ML SUBCUTANEOUSLY EVERY FRIDAY    upadacitinib (Rinvoq) 15 mg Tb24 Take 15 mg by mouth daily. Indications: rheumatoid arthritis    levothyroxine (SYNTHROID) 200 mcg tablet TAKE ONE TABLET BY MOUTH ONCE DAILY BEFORE BREAKFAST    ibuprofen (MOTRIN) 800 mg tablet Take 800 mg by mouth daily. folic acid (FOLVITE) 1 mg tablet TAKE 1 TABLET BY MOUTH ONCE DAILY    diclofenac (VOLTAREN) 1 % gel Apply  to affected area four (4) times daily. No current facility-administered medications for this visit. ALLERGIES    Allergies   Allergen Reactions    Keflex [Cephalexin] Rash    Sulfa (Sulfonamide Antibiotics) Rash    Monistat 1 [Tioconazole] Swelling       PHYSICAL EXAMINATION    Visit Vitals  BP (!) 146/75 (BP 1 Location: Left upper arm, BP Patient Position: Sitting, BP Cuff Size: Adult long)   Pulse 64   Temp 98 °F (36.7 °C) (Oral)   Resp 16   Wt 195 lb (88.5 kg)   SpO2 97%   BMI 33.47 kg/m²       General:  The patient is well developed, well nourished, alert, and in no apparent distress. Eyes: Sclera are anicteric. No conjunctival injection. HEENT:  Oropharynx is clear. No oral ulcers. Adequate salivary pooling. No cervical or supraclavicular lymphadenopathy. Lungs:  Clear to auscultation bilaterally today, without wheeze or stridor. Normal respiratory effort. Cor:  Regular rate and rhythm. No murmur rub or gallop. Abdomen: Soft, non-tender, without hepatomegaly or masses. Extremities: No calf tenderness, trace B LE edema distal to mid-shin. Warm and well perfused. Skin:  No significant abnormalities. No petechial, purpuric, or psoriaform rashes   Neuro: Nonfocal  Musculoskeletal:   A comprehensive musculoskeletal exam was performed for all joints of each upper and lower extremity and assessed for swelling, tenderness and range of motion.  Results are documented as below:   interval left > right wrist synovitis and tenderness, left MCP2-3 synovitis with bilateral pan-MCP and PIP tenderness, interval left CMC surgical scar  bilateral knee crepitus and cool soft tissue swelling    DATA REVIEW    Laboratory   11/11/22: Immunoglobulin A 543, Mspike 0.3  11/9/22: ESR 5, Cholesterol 224, , Cr 0.61, Tbili 0.5, Alk phos 130 ALT 34, AST 26, CBC WNL, CRP <1 mg/L  7/21/22: UA RBC >30, Leukocyte esterase 3+, Protein 1+, Blood 1+, positive nitrites, Many bacteria; CRP 1 mg/L, ESR 3, Cr 0.85, Alk phos 136, Tbili 0.6, AST 20, ALT 19, CBC WNL,   4/12/22: Immunoglobulin A 514, M-Jere 0.3, Free kappa Lt Chains 19.5, Kappa/Lamda ratio serum 2.6, Cr 0.76, Alk phos 164, AST 14, ALT 25, Tbilli 0.5, WBC 6.2, HGB 13, Plt 320, Tandem-R Ostase 52.3, GGT 27, ESR 2, CRP <1 mg/L    Recent laboratory results were reviewed, summarized, and discussed with the patient. Imaging    Musculoskeletal Ultrasound    None    Radiographs  XR THUMB 12/20/22:   AP, lateral and oblique x-ray of the left thumb shows good post trapeziectomy space in height with good thumb metacarpal alignment. No apparent change of the radiolucent suspension plastic buttons. XR HAND LT 11/29/22:   AP, lateral and oblique x-ray of the left hand including the thumb shows good post trapeziectomy space in height. The buttons are radiolucent. No fracture. XR knee LT MIN 4 V 5/24/22:  Patient with mild medial joint space narrowing of the left knee. Remainder of joint space appears well-preserved. Patient noted to have severe medial joint space narrowing on the right knee. Remainder of joint space appears well-preserved. XR SPINE LUMB MIN 4 V 5/24/22: AP and lateral and flexion-extension lumbar spine demonstrates mild degenerative narrowing of the disc base. No gross instability with flexion-extension. No fractures or lytic lesions. Skeletal Survey 11/28/2018: Few small calvarial lucencies. No fractures or lytic lesions at risk for fracture. Bilateral Shoulder 10/04/2018: RIGHT: There is prominent AC joint degeneration with spurring and loss of joint space. There is some mild deformity, chronic in nature of the tuberosity. No fracture or dislocation, no bony erosion, the bone is normal in mineral contents. No soft tissue calcifications. LEFT: The bone is normal in mineral contents.  There is some mild chronic deformity of the tuberosity and AC joint degeneration. There are no soft tissue calcification bony erosion fracture or dislocation. Bilateral Hand 10/04/2018: RIGHT: no acute fracture or dislocation. Alignment is anatomic. Mild degenerative changes are seen in the interphalangeal joint of the thumb. No erosions are seen. No abnormality seen in the soft tissues. LEFT: no acute fracture or dislocation. Alignment is anatomic. Moderate to severe degenerative changes are present in the first [facility] joint. A cystic lucency in the ace of the first metacarpal likely represents a subchondral cyst. No clear erosions are seen. No abnormality seen in the soft tissues. Bilateral Foot 10/04/2018: RIGHT:  no fracture or other acute osseous or articular abnormality. A small plantar calcaneal heel spur is noted. No erosions or joint space narrowing are present. The soft tissues are within normal limits. LEFT: no acute fracture or dislocation. Alignment is anatomic. A small plantar calcaneal heel spur is noted. No erosions or joint space narrowing are present. . No abnormality is seen in the soft tissues. CT Imaging    CT Chest without contrast 2/04/2021: CHEST WALL: Left breast lesion is stable in appearance. THYROID: No nodule. MEDIASTINUM: No mass or lymphadenopathy. RIKY: No mass or lymphadenopathy. THORACIC AORTA: Atherosclerotic change. MAIN PULMONARY ARTERY: Normal in caliber. TRACHEA/BRONCHI: Patent. ESOPHAGUS: No wall thickening or dilatation. HEART: Trace atherosclerotic change in the LAD. Normal in size. PLEURA: No effusion or pneumothorax. LUNGS: No suspicious pulmonary mass or nodule. Trace scarring in the lingula. INCIDENTALLY IMAGED UPPER ABDOMEN: Hepatic steatosis. Postcholecystectomy. BONES: Chronic mild loss of vertebral body height. Anterior disc osteophytes. PET/CT Scan 1/24/2019: HEAD/NECK: No apparent foci of abnormal hypermetabolism. Cerebral evaluation is limited by normal intense activity.  CHEST: No foci of abnormal hypermetabolism. The known small breast cancer at 12:00 in the left breast demonstrates no increased metabolic activity. ABDOMEN/PELVIS: No foci of abnormal hypermetabolism. SKELETON: No foci of abnormal hypermetabolism in the axial and visualized appendicular skeleton. Lower extremities:. Imaging of the lower extremities is unremarkable. There is muscular activity noted. CT Head without contrast 9/13/2017: Prior right occipital craniectomy. Encephalomalacia in the right cerebellar region. . There is no significant white matter disease. There is no intracranial hemorrhage, extra-axial collection, mass, mass effect or midline shift. The basilar cisterns are open. No acute infarct is identified. The visualized portions of the paranasal sinuses and mastoid air cells are clear    MR Imaging    MRI Breasts with and without contrast 1/07/2019: There is minimal background parenchymal enhancement and the breasts are almost entirely fat. A few sub-5 mm foci of enhancement are scattered bilaterally. Right breast: No suspicious enhancing foci. No axillary or internal mammary chain lymphadenopathy. Left breast: A vague area of enhancement around the biopsy clip in the anterior third at 12:00 does not reach threshold enhancement. This measures approximately 9 x 9 mm, and correlates well with the small area of architectural distortion on mammography and subtle architectural distortion and shadowing on ultrasound. No axillary or internal mammary chain lymphadenopathy. DXA     DXA 6/10/2021: (excluded None) lumbar spine L1-L4 T score -1.2 (BMD 1.050 g/cm2), left femoral neck T score: -0.9 (0.906 g/cm2), left total hip T score: -0.6 (0.933 g/cm2), right femoral neck T score: -0.9 (0.908 g/cm2), right total hip T score: -0.3 (0.966 g/cm2), and distal one third left radius T score -1.0 (BMD 0.789 g/cm2).  FRAX score 11.1 % probability in 10 years for major osteoporotic fracture and 1.3 % 10 year probability of hip fracture. DXA 5/12/2015: (excluded distal radius) lumbar spine L1-L4 T score 0.4 (BMD 1.241 g/cm2), left femoral neck T score: 0.5 (1.109 g/cm2), left total hip T score: 1.0 (1.139 g/cm2), right femoral neck T score: 0.2 (1.062 g/cm2), right total hip T score: 1.1 (1.145 g/cm2). FRAX score N/A % probability in 10 years for major osteoporotic fracture and N/A % 10 year probability of hip fracture. PATHOLOGY    Lymph node, left axilla, #1, sentinel node excision 1/29/2019: No evidence for malignancy in one node (0/1). Breast, left, lumpectomy 1/29/2019:  Invasive lobular carcinoma. Lobular carcinoma in situ     Bone Marrow Biopsy 12/19/2018: Bone marrow: Variably cellular marrow with mild monoclonal plasmacytosis. Trilineage hematopoiesis with maturation. Breast, left, needle core biopsy 12/03/2018: Invasive mammary carcinoma with ductal and lobular features Favor [place] histologic grade 1. Largest glass slide measurement of invasive carcinoma: 8.5 mm. ER pos AK pos (weak) HER 2 neg. PROCEDURE     Kenalog 80 mg IM. (01/21/20)    ASSESSMENT AND PLAN    This is a follow-up visit for Ms. Antonio Govea for seronegative rheumatoid arthritis, with flaring arthritis in the hands after missing 3 weeks of methotrexate. Earlier low-grade transaminitis on 25mg methotrexate, will try dose-reduction to 20mg upon resumption. Continuing daily Rinvoq. 1. Seronegative rheumatoid arthritis of multiple sites (HCC)  - Methotrexate 20mg subQ weekly, Rinvoq 15mg daily  - C REACTIVE PROTEIN, QT; Future  - CBC WITH AUTOMATED DIFF; Future  - METABOLIC PANEL, COMPREHENSIVE; Future  - SED RATE (ESR); Future  - QUANTIFERON-TB GOLD PLUS; Future  - C REACTIVE PROTEIN, QT  - CBC WITH AUTOMATED DIFF  - METABOLIC PANEL, COMPREHENSIVE  - SED RATE (ESR)  - QUANTIFERON-TB GOLD PLUS  - C REACTIVE PROTEIN, QT; Future  - CBC WITH AUTOMATED DIFF; Future  - METABOLIC PANEL, COMPREHENSIVE; Future  - SED RATE (ESR);  Future  - GGT; Future  - methotrexate 25 mg/mL chemo injection; 0.8 mL by SubCUTAneous route every seven (7) days. INJECT EVERY FRIDAY  Dispense: 12 mL; Refill: 0    2. Long-term use of immunosuppressant medication  - C REACTIVE PROTEIN, QT; Future  - CBC WITH AUTOMATED DIFF; Future  - METABOLIC PANEL, COMPREHENSIVE; Future  - SED RATE (ESR); Future  - QUANTIFERON-TB GOLD PLUS; Future    3. Elevated alkaline phosphatase level  - GGT; Future       Patient Instructions   1) Continue to take one pill of Rinvoq daily. 2) Decrease to 0.8 mL of sub cutaneous Methotrexate once per week with daily folic acid. 3) You can take 650mg of Tylenol up to 3 times a day for joint pain. Continue to take Ibuprofen as needed for joint pain. 4) Check labs ASAP and again in 2 months. 5) Follow up in 2-3 months. Let me know if you have any questions or concerns in the meantime. The patient voiced understanding of the aforementioned assessment and plan. TODAY'S ORDERS    Orders Placed This Encounter    QUANTIFERON-TB GOLD PLUS    C REACTIVE PROTEIN, QT    CBC WITH AUTOMATED DIFF    METABOLIC PANEL, COMPREHENSIVE    SED RATE (ESR)    C REACTIVE PROTEIN, QT    CBC WITH AUTOMATED DIFF    METABOLIC PANEL, COMPREHENSIVE    SED RATE (ESR)    GGT    methotrexate 25 mg/mL chemo injection         Future Appointments   Date Time Provider Geoff Olga Lidia   1/19/2023  9:45 AM LAB ONLY [facility] BS AMB   5/12/2023  9:30 AM Miguel Dixon  [address]  BS AMB     Face to face time: 21 minutes  Note preparation and records review day of service: 10 minutes  Total provider time day of service: 31 minutes    This was scribed by Jie Vazquez in the presence of Dr. Ralf Holloway. The note was reviewed and amended personally, and I agree with the above information.     Layla Larios MD    Adult Rheumatology   [facility]  A Part of [facility], [address] [place] [address] Road   Phone [phone]  Fax [phone]

## 2023-01-19 NOTE — PROGRESS NOTES
Chief Complaint   Patient presents with    Joint Pain     1. Have you been to the ER, urgent care clinic since your last visit? Hospitalized since your last visit? No    2. Have you seen or consulted any other health care providers outside of the [address] [place] [address] since your last visit? Include any pap smears or colon screening. Carpal tunnel surgery around November/December with Dr Laila Damico.

## 2023-01-19 NOTE — PATIENT INSTRUCTIONS
1) Continue to take one pill of Rinvoq daily. 2) Decrease to 0.8 mL of sub cutaneous Methotrexate once per week with daily folic acid. 3) You can take 650mg of Tylenol up to 3 times a day for joint pain. Continue to take Ibuprofen as needed for joint pain. 4) Check labs ASAP and again in 2 months. 5) Follow up in 2-3 months. Let me know if you have any questions or concerns in the meantime.

## 2023-01-20 LAB
ALBUMIN SERPL-MCNC: 3.9 G/DL (ref 3.5–5)
ALBUMIN/GLOB SERPL: 1.1 (ref 1.1–2.2)
ALP SERPL-CCNC: 148 U/L (ref 45–117)
ALT SERPL-CCNC: 31 U/L (ref 12–78)
ANION GAP SERPL CALC-SCNC: 3 MMOL/L (ref 5–15)
AST SERPL-CCNC: 18 U/L (ref 15–37)
BASOPHILS # BLD: 0.1 K/UL (ref 0–0.1)
BASOPHILS NFR BLD: 1 % (ref 0–1)
BILIRUB SERPL-MCNC: 0.5 MG/DL (ref 0.2–1)
BUN SERPL-MCNC: 16 MG/DL (ref 6–20)
BUN/CREAT SERPL: 20 (ref 12–20)
CALCIUM SERPL-MCNC: 9.6 MG/DL (ref 8.5–10.1)
CHLORIDE SERPL-SCNC: 108 MMOL/L (ref 97–108)
CO2 SERPL-SCNC: 30 MMOL/L (ref 21–32)
CREAT SERPL-MCNC: 0.79 MG/DL (ref 0.55–1.02)
CRP SERPL-MCNC: <0.29 MG/DL (ref 0–0.6)
DIFFERENTIAL METHOD BLD: ABNORMAL
EOSINOPHIL # BLD: 0.1 K/UL (ref 0–0.4)
EOSINOPHIL NFR BLD: 1 % (ref 0–7)
ERYTHROCYTE [DISTWIDTH] IN BLOOD BY AUTOMATED COUNT: 13.5 % (ref 11.5–14.5)
ERYTHROCYTE [SEDIMENTATION RATE] IN BLOOD: 7 MM/HR (ref 0–30)
GLOBULIN SER CALC-MCNC: 3.5 G/DL (ref 2–4)
GLUCOSE SERPL-MCNC: 102 MG/DL (ref 65–100)
HCT VFR BLD AUTO: 40 % (ref 35–47)
HGB BLD-MCNC: 12.9 G/DL (ref 11.5–16)
IMM GRANULOCYTES # BLD AUTO: 0 K/UL (ref 0–0.04)
IMM GRANULOCYTES NFR BLD AUTO: 1 % (ref 0–0.5)
LYMPHOCYTES # BLD: 1 K/UL (ref 0.8–3.5)
LYMPHOCYTES NFR BLD: 21 % (ref 12–49)
MCH RBC QN AUTO: 31.5 PG (ref 26–34)
MCHC RBC AUTO-ENTMCNC: 32.3 G/DL (ref 30–36.5)
MCV RBC AUTO: 97.6 FL (ref 80–99)
MONOCYTES # BLD: 0.4 K/UL (ref 0–1)
MONOCYTES NFR BLD: 9 % (ref 5–13)
NEUTS SEG # BLD: 3.1 K/UL (ref 1.8–8)
NEUTS SEG NFR BLD: 67 % (ref 32–75)
NRBC # BLD: 0 K/UL (ref 0–0.01)
NRBC BLD-RTO: 0 PER 100 WBC
PLATELET # BLD AUTO: 327 K/UL (ref 150–400)
PMV BLD AUTO: 9.3 FL (ref 8.9–12.9)
POTASSIUM SERPL-SCNC: 4.7 MMOL/L (ref 3.5–5.1)
PROT SERPL-MCNC: 7.4 G/DL (ref 6.4–8.2)
RBC # BLD AUTO: 4.1 M/UL (ref 3.8–5.2)
SODIUM SERPL-SCNC: 141 MMOL/L (ref 136–145)
WBC # BLD AUTO: 4.7 K/UL (ref 3.6–11)

## 2023-01-23 ENCOUNTER — TRANSCRIBE ORDER (OUTPATIENT)
Dept: SCHEDULING | Age: 73
End: 2023-01-23

## 2023-01-23 DIAGNOSIS — Z85.3 PERSONAL HISTORY OF BREAST CANCER: Primary | ICD-10-CM

## 2023-02-03 RX ORDER — LEVOTHYROXINE SODIUM 200 UG/1
TABLET ORAL
Qty: 90 TABLET | Refills: 3 | Status: SHIPPED | OUTPATIENT
Start: 2023-02-03

## 2023-02-09 DIAGNOSIS — C50.912 MALIGNANT NEOPLASM OF LEFT FEMALE BREAST, UNSPECIFIED ESTROGEN RECEPTOR STATUS, UNSPECIFIED SITE OF BREAST (HCC): ICD-10-CM

## 2023-02-09 DIAGNOSIS — M06.09 SERONEGATIVE RHEUMATOID ARTHRITIS OF MULTIPLE SITES (HCC): ICD-10-CM

## 2023-02-09 RX ORDER — LETROZOLE 2.5 MG/1
TABLET, FILM COATED ORAL
Qty: 90 TABLET | Refills: 0 | Status: SHIPPED | OUTPATIENT
Start: 2023-02-09

## 2023-02-09 NOTE — TELEPHONE ENCOUNTER
Received faxed refill request for Methotrexate 2.5 mg/ml injection. Last visit was 1/19/23  Follow up scheduled for 3/20/23  Lab Results   Component Value Date/Time    Sodium 141 01/19/2023 10:00 AM    Potassium 4.7 01/19/2023 10:00 AM    Chloride 108 01/19/2023 10:00 AM    CO2 30 01/19/2023 10:00 AM    Anion gap 3 (L) 01/19/2023 10:00 AM    Glucose 102 (H) 01/19/2023 10:00 AM    BUN 16 01/19/2023 10:00 AM    Creatinine 0.79 01/19/2023 10:00 AM    BUN/Creatinine ratio 20 01/19/2023 10:00 AM    GFR est AA >60 04/12/2022 09:04 AM    GFR est non-AA >60 04/12/2022 09:04 AM    Calcium 9.6 01/19/2023 10:00 AM    Bilirubin, total 0.5 01/19/2023 10:00 AM    Alk.  phosphatase 148 (H) 01/19/2023 10:00 AM    Protein, total 7.4 01/19/2023 10:00 AM    Albumin 3.9 01/19/2023 10:00 AM    Globulin 3.5 01/19/2023 10:00 AM    A-G Ratio 1.1 01/19/2023 10:00 AM    ALT (SGPT) 31 01/19/2023 10:00 AM    AST (SGOT) 18 01/19/2023 10:00 AM     Lab Results   Component Value Date/Time    WBC 4.7 01/19/2023 10:00 AM    HGB 12.9 01/19/2023 10:00 AM    HCT 40.0 01/19/2023 10:00 AM    PLATELET 612 33/09/5924 10:00 AM    MCV 97.6 01/19/2023 10:00 AM

## 2023-02-13 RX ORDER — METHOTREXATE 25 MG/ML
20 INJECTION, SOLUTION INTRA-ARTERIAL; INTRAMUSCULAR; INTRAVENOUS
Qty: 12 ML | Refills: 0 | Status: SHIPPED | OUTPATIENT
Start: 2023-02-13

## 2023-02-13 RX ORDER — SYRINGE-NEEDLE,INSULIN,0.5 ML 30 GX5/16"
SYRINGE, EMPTY DISPOSABLE MISCELLANEOUS
Qty: 100 EACH | Refills: 0 | Status: SHIPPED | OUTPATIENT
Start: 2023-02-13

## 2023-02-15 ENCOUNTER — HOSPITAL ENCOUNTER (OUTPATIENT)
Dept: MAMMOGRAPHY | Age: 73
Discharge: HOME | End: 2023-02-15
Attending: INTERNAL MEDICINE
Payer: MEDICARE

## 2023-02-15 DIAGNOSIS — Z85.3 PERSONAL HISTORY OF BREAST CANCER: ICD-10-CM

## 2023-02-15 PROCEDURE — 77062 BREAST TOMOSYNTHESIS BI: CPT

## 2023-02-27 ENCOUNTER — OFFICE VISIT (OUTPATIENT)
Dept: INTERNAL MEDICINE CLINIC | Age: 73
End: 2023-02-27
Payer: MEDICARE

## 2023-02-27 VITALS
OXYGEN SATURATION: 99 % | RESPIRATION RATE: 20 BRPM | WEIGHT: 198.8 LBS | DIASTOLIC BLOOD PRESSURE: 78 MMHG | BODY MASS INDEX: 33.94 KG/M2 | TEMPERATURE: 98.2 F | SYSTOLIC BLOOD PRESSURE: 150 MMHG | HEIGHT: 64 IN | HEART RATE: 81 BPM

## 2023-02-27 DIAGNOSIS — Z13.1 SPECIAL SCREENING EXAMINATION FOR DIABETES MELLITUS: ICD-10-CM

## 2023-02-27 DIAGNOSIS — Z79.899 OTHER LONG TERM (CURRENT) DRUG THERAPY: ICD-10-CM

## 2023-02-27 DIAGNOSIS — E55.9 VITAMIN D DEFICIENCY: ICD-10-CM

## 2023-02-27 DIAGNOSIS — I10 PRIMARY HYPERTENSION: ICD-10-CM

## 2023-02-27 DIAGNOSIS — Z00.00 PHYSICAL EXAM, ROUTINE: Primary | ICD-10-CM

## 2023-02-27 DIAGNOSIS — F32.1 MODERATE MAJOR DEPRESSION (HCC): ICD-10-CM

## 2023-02-27 DIAGNOSIS — E03.9 ACQUIRED HYPOTHYROIDISM: ICD-10-CM

## 2023-02-27 PROBLEM — F32.A DEPRESSION: Status: ACTIVE | Noted: 2023-02-27

## 2023-02-27 PROCEDURE — 1101F PT FALLS ASSESS-DOCD LE1/YR: CPT | Performed by: NURSE PRACTITIONER

## 2023-02-27 PROCEDURE — 93010 ELECTROCARDIOGRAM REPORT: CPT | Performed by: NURSE PRACTITIONER

## 2023-02-27 PROCEDURE — G0463 HOSPITAL OUTPT CLINIC VISIT: HCPCS | Performed by: NURSE PRACTITIONER

## 2023-02-27 PROCEDURE — G8417 CALC BMI ABV UP PARAM F/U: HCPCS | Performed by: NURSE PRACTITIONER

## 2023-02-27 PROCEDURE — G9899 SCRN MAM PERF RSLTS DOC: HCPCS | Performed by: NURSE PRACTITIONER

## 2023-02-27 PROCEDURE — 1090F PRES/ABSN URINE INCON ASSESS: CPT | Performed by: NURSE PRACTITIONER

## 2023-02-27 PROCEDURE — G9717 DOC PT DX DEP/BP F/U NT REQ: HCPCS | Performed by: NURSE PRACTITIONER

## 2023-02-27 PROCEDURE — 99213 OFFICE O/P EST LOW 20 MIN: CPT | Performed by: NURSE PRACTITIONER

## 2023-02-27 PROCEDURE — 93005 ELECTROCARDIOGRAM TRACING: CPT | Performed by: NURSE PRACTITIONER

## 2023-02-27 PROCEDURE — G8427 DOCREV CUR MEDS BY ELIG CLIN: HCPCS | Performed by: NURSE PRACTITIONER

## 2023-02-27 PROCEDURE — G8536 NO DOC ELDER MAL SCRN: HCPCS | Performed by: NURSE PRACTITIONER

## 2023-02-27 PROCEDURE — 3017F COLORECTAL CA SCREEN DOC REV: CPT | Performed by: NURSE PRACTITIONER

## 2023-02-27 PROCEDURE — G8399 PT W/DXA RESULTS DOCUMENT: HCPCS | Performed by: NURSE PRACTITIONER

## 2023-02-27 PROCEDURE — 99397 PER PM REEVAL EST PAT 65+ YR: CPT | Performed by: NURSE PRACTITIONER

## 2023-02-27 RX ORDER — AMOXICILLIN 500 MG/1
1 TABLET, FILM COATED ORAL EVERY 6 HOURS
COMMUNITY
Start: 2023-02-24

## 2023-02-27 RX ORDER — MIRABEGRON 25 MG/1
25 TABLET, FILM COATED, EXTENDED RELEASE ORAL
COMMUNITY
Start: 2023-01-31

## 2023-02-27 NOTE — PROGRESS NOTES
Subjective:   67 y.o. female for Well Woman Check. Patient is here for preop evaluation with EKG in preparation for dental implant surgery. She also reports someone else is going to provide clearance for surgery closer to date in [facility] March. Whole mouth dental plate. [facility] will do rest close to date. On amoxicillin pre surgery. Appt Friday. No paperwork with patient       Patient Active Problem List    Diagnosis Date Noted    Depression 02/27/2023    Pneumonia 09/15/2020    Suspected COVID-19 virus infection 09/15/2020    Viral pneumonia 09/14/2020    Smoldering myeloma 08/14/2019    Multiple myeloma ([address] 01/14/2019    Malignant neoplasm of left female breast ([address]     Long-term use of immunosuppressant medication 11/28/2018    Severe obesity ([address] 11/21/2018    Seronegative rheumatoid arthritis of multiple sites ([address] 10/15/2018    Monoclonal gammopathy of unknown significance (MGUS) 10/15/2018    Vitamin D deficiency 10/15/2018    PMR (polymyalgia rheumatica) ([address] 10/04/2018    Iliotibial band syndrome of both sides 10/04/2018    Long term current use of non-steroidal anti-inflammatories (NSAID) 10/04/2018    Moderate major depression ([address] 10/01/2018    Graves disease 07/16/2013    HTN (hypertension) 11/03/2011    Hypothyroidism 11/02/2011    IBS (irritable bowel syndrome) 11/02/2011     Current Outpatient Medications   Medication Sig Dispense Refill    amoxicillin (AMOXIL) 500 mg tablet Take 1 Tablet by mouth every six (6) hours. Myrbetriq 25 mg ER tablet Take 25 mg by mouth nightly. methotrexate 25 mg/mL chemo injection 0.8 mL by SubCUTAneous route every seven (7) days.  INJECT EVERY FRIDAY 12 mL 0    Insulin Syringe-Needle U-100 (Droplet Insulin Syringe) 1 mL 30 gauge x 5/16 syrg USED WITH METHOTREXATE WEEKLY AS DIRECTED 100 Each 0    letrozole (FEMARA) 2.5 mg tablet TAKE 1 TABLET EVERY DAY 90 Tablet 0    levothyroxine (SYNTHROID) 200 mcg tablet TAKE ONE TABLET ONCE DAILY BEFORE BREAKFAST 90 Tablet 3    LORazepam (ATIVAN) 1 mg tablet TAKE  2 (two) TABLETS BY MOUTH NIGHTLY . 60 Tablet 2    ergocalciferol (ERGOCALCIFEROL) 1,250 mcg (50,000 unit) capsule TAKE 1 CAPSULE BY MOUTH EVERY SEVEN (7) DAYS FOR VITAMIN D DEFICIENCY (HIGH DOSE THERAPY) 12 Capsule 4    citalopram (CELEXA) 40 mg tablet TAKE 1 TABLET EVERY DAY 90 Tablet 3    lisinopril-hydroCHLOROthiazide (PRINZIDE, ZESTORETIC) 10-12.5 mg per tablet TAKE 1 TABLET EVERY DAY 90 Tablet 3    upadacitinib (Rinvoq) 15 mg Tb24 Take 15 mg by mouth daily. Indications: rheumatoid arthritis 30 Each 11    ibuprofen (MOTRIN) 800 mg tablet Take 800 mg by mouth daily. folic acid (FOLVITE) 1 mg tablet TAKE 1 TABLET BY MOUTH ONCE DAILY 90 Tab 1    diclofenac (VOLTAREN) 1 % gel Apply  to affected area four (4) times daily. 100 g 2     Allergies   Allergen Reactions    Keflex [Cephalexin] Rash    Sulfa (Sulfonamide Antibiotics) Rash    Monistat 1 [Tioconazole] Swelling     Past Medical History:   Diagnosis Date    Breast cancer, left ([facility]     Depression     Goiter     Hearing loss     bilateral    Hypertension     Hypothyroid     IBS (irritable bowel syndrome)     Menopause     Rheumatoid arthritis ([address]     S/P radiation therapy      Past Surgical History:   Procedure Laterality Date    HX BREAST LUMPECTOMY Left 01/29/2019    LEFT BREAST LUMPECTOMY WITH ULTRASOUND, LEFT BREAST SENTINEL NODE BIOPSY (11a) performed by Chelsie Newman MD at [address] Left     HX CATARACT REMOVAL Bilateral     HX CHOLECYSTECTOMY  1997    IR INJ FORAMIN EPID LUMB ANES/STER SNGL  06/07/2022    [facility] UNLISTED NEUROLOGICAL/NEUROMUSCULAR DX PX  07/1997    brain lesion removed ? infectious         HTN- prinzide / zestoretic. Does not take BP at home. Hypothyroid/ synthroid 200mcg. Depression- celexa. Stable   Followed by Rheumatology  Dr. Nils Graham for  RA/ Polymyalgia/ labs ordered. Specific concerns today: no concerns today.     Review of Systems  A comprehensive review of systems was negative. Objective:   Blood pressure (!) 148/69, pulse 81, temperature 98.2 °F (36.8 °C), temperature source Temporal, resp. rate 20, height 5' 4\" (1.626 m), weight 198 lb 12.8 oz (90.2 kg), SpO2 99 %. Physical Examination:   General appearance - alert, well appearing, and in no distress and overweight  Mental status - alert, oriented to person, place, and time  Eyes - pupils equal and reactive, extraocular eye movements intact  Ears - bilateral TM's and external ear canals normal  Nose - normal and patent, no erythema, discharge or polyps  Mouth - mucous membranes moist, pharynx normal without lesions  Neck - supple, no significant adenopathy  Lymphatics - no palpable nodes  Heart - normal rate, regular rhythm, normal S1, S2, no murmurs, rubs, clicks or gallops  Abdomen - soft, nontender, non distended  Neurological - alert, oriented, normal speech, no focal findings or movement disorder noted  Musculoskeletal - no joint tenderness, deformity or swelling  Extremities - peripheral pulses normal, no pedal edema, no clubbing or cyanosis  Skin - normal coloration and turgor, no rashes, no suspicious skin lesions noted     EKG Results       Procedure 720 Value Units Date/Time    AMB POC EKG ROUTINE W/ 12 LEADS, INTER & REP [[id number]] Collected: 02/27/23 1103    Order Status: Completed Updated: 02/27/23 1104          NSR  Assessment/Plan:     lose weight, follow low salt diet, routine labs ordered, refrain from excess NSAID use as it raises BP. Stop  5 days prior to surgery    ICD-10-CM ICD-9-CM    1. Physical exam, routine  Z00.00 V70.0 AMB POC EKG ROUTINE W/ 12 LEADS, INTER & REP      2. Primary hypertension  I10 401.9 LIPID PANEL      3. Acquired hypothyroidism  E03.9 244.9 TSH 3RD GENERATION      4. Moderate major depression (HCC)  F32.1 296.22 Celexa / stable      5.  Vitamin D deficiency  E55.9 268.9 VITAMIN D, 25 HYDROXY         Encounter Diagnoses   Name Primary?     Physical exam, routine Yes    Primary hypertension     Acquired hypothyroidism     Moderate major depression ([facility]     Vitamin D deficiency    Return 4-6 months AMW visit  Signed By: VICTOR HUGO Aviles     February 27, 2023

## 2023-02-27 NOTE — PROGRESS NOTES
Patient here for EKG, getting dental implants, doing pre op visit done by [facility]. No chief complaint on file. Vitals:    02/27/23 1041   Temp: 98.2 °F (36.8 °C)   TempSrc: Temporal   Weight: 198 lb 12.8 oz (90.2 kg)       Current Outpatient Medications on File Prior to Visit   Medication Sig Dispense Refill    amoxicillin (AMOXIL) 500 mg tablet Take 1 Tablet by mouth every six (6) hours. Myrbetriq 25 mg ER tablet Take 25 mg by mouth nightly. methotrexate 25 mg/mL chemo injection 0.8 mL by SubCUTAneous route every seven (7) days. INJECT EVERY FRIDAY 12 mL 0    Insulin Syringe-Needle U-100 (Droplet Insulin Syringe) 1 mL 30 gauge x 5/16 syrg USED WITH METHOTREXATE WEEKLY AS DIRECTED 100 Each 0    letrozole (FEMARA) 2.5 mg tablet TAKE 1 TABLET EVERY DAY 90 Tablet 0    levothyroxine (SYNTHROID) 200 mcg tablet TAKE ONE TABLET ONCE DAILY BEFORE BREAKFAST 90 Tablet 3    LORazepam (ATIVAN) 1 mg tablet TAKE  2 (two) TABLETS BY MOUTH NIGHTLY . 60 Tablet 2    ergocalciferol (ERGOCALCIFEROL) 1,250 mcg (50,000 unit) capsule TAKE 1 CAPSULE BY MOUTH EVERY SEVEN (7) DAYS FOR VITAMIN D DEFICIENCY (HIGH DOSE THERAPY) 12 Capsule 4    citalopram (CELEXA) 40 mg tablet TAKE 1 TABLET EVERY DAY 90 Tablet 3    lisinopril-hydroCHLOROthiazide (PRINZIDE, ZESTORETIC) 10-12.5 mg per tablet TAKE 1 TABLET EVERY DAY 90 Tablet 3    upadacitinib (Rinvoq) 15 mg Tb24 Take 15 mg by mouth daily. Indications: rheumatoid arthritis 30 Each 11    ibuprofen (MOTRIN) 800 mg tablet Take 800 mg by mouth daily. folic acid (FOLVITE) 1 mg tablet TAKE 1 TABLET BY MOUTH ONCE DAILY 90 Tab 1    diclofenac (VOLTAREN) 1 % gel Apply  to affected area four (4) times daily. 100 g 2    [DISCONTINUED] methylPREDNISolone (MEDROL DOSEPACK) 4 mg tablet Per dose pack instructions 1 Dose Pack 0     No current facility-administered medications on file prior to visit.        Health Maintenance Due   Topic    DTaP/Tdap/Td series (1 - Tdap)    Shingles Vaccine (2 of 2)    COVID-19 Vaccine (4 - Booster for [employer] series)    Flu Vaccine (1)    Medicare Yearly Exam        1. \"Have you been to the ER, urgent care clinic since your last visit? Hospitalized since your last visit? \" No    2. \"Have you seen or consulted any other health care providers outside of the [address] [place] [address] since your last visit? \" Yes Dental Implants      3. For patients aged 39-70: Has the patient had a colonoscopy / FIT/ Cologuard? Yes - no Care Gap present      If the patient is female:    4. For patients aged 41-77: Has the patient had a mammogram within the past 2 years? Yes - no Care Gap present      5. For patients aged 21-65: Has the patient had a pap smear?  NA - based on age or sex

## 2023-02-28 ENCOUNTER — PATIENT MESSAGE (OUTPATIENT)
Dept: INTERNAL MEDICINE CLINIC | Age: 73
End: 2023-02-28

## 2023-02-28 DIAGNOSIS — E55.9 VITAMIN D DEFICIENCY: ICD-10-CM

## 2023-02-28 DIAGNOSIS — E03.9 ACQUIRED HYPOTHYROIDISM: ICD-10-CM

## 2023-02-28 DIAGNOSIS — I10 PRIMARY HYPERTENSION: ICD-10-CM

## 2023-02-28 DIAGNOSIS — Z79.899 OTHER LONG TERM (CURRENT) DRUG THERAPY: ICD-10-CM

## 2023-02-28 DIAGNOSIS — Z00.00 PHYSICAL EXAM, ROUTINE: ICD-10-CM

## 2023-02-28 DIAGNOSIS — Z13.1 SPECIAL SCREENING EXAMINATION FOR DIABETES MELLITUS: ICD-10-CM

## 2023-02-28 LAB
25(OH)D3 SERPL-MCNC: 48 NG/ML (ref 30–100)
ALBUMIN SERPL-MCNC: 3.8 G/DL (ref 3.5–5)
ALBUMIN/GLOB SERPL: 1.1 (ref 1.1–2.2)
ALP SERPL-CCNC: 157 U/L (ref 45–117)
ALT SERPL-CCNC: 57 U/L (ref 12–78)
ANION GAP SERPL CALC-SCNC: 4 MMOL/L (ref 5–15)
AST SERPL-CCNC: 30 U/L (ref 15–37)
BILIRUB SERPL-MCNC: 0.4 MG/DL (ref 0.2–1)
BUN SERPL-MCNC: 19 MG/DL (ref 6–20)
BUN/CREAT SERPL: 28 (ref 12–20)
CALCIUM SERPL-MCNC: 9.2 MG/DL (ref 8.5–10.1)
CHLORIDE SERPL-SCNC: 109 MMOL/L (ref 97–108)
CHOLEST SERPL-MCNC: 198 MG/DL
CO2 SERPL-SCNC: 28 MMOL/L (ref 21–32)
CREAT SERPL-MCNC: 0.67 MG/DL (ref 0.55–1.02)
ERYTHROCYTE [DISTWIDTH] IN BLOOD BY AUTOMATED COUNT: 13.7 % (ref 11.5–14.5)
EST. AVERAGE GLUCOSE BLD GHB EST-MCNC: 103 MG/DL
GLOBULIN SER CALC-MCNC: 3.5 G/DL (ref 2–4)
GLUCOSE SERPL-MCNC: 107 MG/DL (ref 65–100)
HBA1C MFR BLD: 5.2 % (ref 4–5.6)
HCT VFR BLD AUTO: 36.6 % (ref 35–47)
HDLC SERPL-MCNC: 59 MG/DL
HDLC SERPL: 3.4 (ref 0–5)
HGB BLD-MCNC: 12.4 G/DL (ref 11.5–16)
LDLC SERPL CALC-MCNC: 112.8 MG/DL (ref 0–100)
MCH RBC QN AUTO: 31.8 PG (ref 26–34)
MCHC RBC AUTO-ENTMCNC: 33.9 G/DL (ref 30–36.5)
MCV RBC AUTO: 93.8 FL (ref 80–99)
NRBC # BLD: 0 K/UL (ref 0–0.01)
NRBC BLD-RTO: 0 PER 100 WBC
PLATELET # BLD AUTO: 344 K/UL (ref 150–400)
PMV BLD AUTO: 9.3 FL (ref 8.9–12.9)
POTASSIUM SERPL-SCNC: 4.6 MMOL/L (ref 3.5–5.1)
PROT SERPL-MCNC: 7.3 G/DL (ref 6.4–8.2)
RBC # BLD AUTO: 3.9 M/UL (ref 3.8–5.2)
SODIUM SERPL-SCNC: 141 MMOL/L (ref 136–145)
TRIGL SERPL-MCNC: 131 MG/DL (ref ?–150)
TSH SERPL DL<=0.05 MIU/L-ACNC: <0.01 UIU/ML (ref 0.36–3.74)
VLDLC SERPL CALC-MCNC: 26.2 MG/DL
WBC # BLD AUTO: 5.6 K/UL (ref 3.6–11)

## 2023-03-03 ENCOUNTER — TELEPHONE (OUTPATIENT)
Dept: INTERNAL MEDICINE CLINIC | Age: 73
End: 2023-03-03

## 2023-03-03 NOTE — TELEPHONE ENCOUNTER
Called pt lvm about medical records for [facility]. I am unable to fax them keep getting a busy response. Advised pt can  medical records and hand deliver them. 3/6---Sebastian from [facility] called about medical records I let him know they were mailed bc the fax machine was not working. Advised they should receive records this week through mail the  comes on Tues.

## 2023-03-09 ENCOUNTER — TELEPHONE (OUTPATIENT)
Dept: INTERNAL MEDICINE CLINIC | Age: 73
End: 2023-03-09

## 2023-03-09 NOTE — TELEPHONE ENCOUNTER
[facility] calling to check on the status of medical records. They had sent over a records request today and are wondering how soon records will be sent. Spoke with Payton Car and she stated she will be searching through faxes to find request. Eliza Harris is [phone] ext [phone].

## 2023-03-13 ENCOUNTER — APPOINTMENT (OUTPATIENT)
Dept: FAMILY MEDICINE CLINIC | Age: 73
End: 2023-03-13

## 2023-03-14 LAB
ALBUMIN SERPL-MCNC: 3.8 G/DL (ref 3.5–5)
ALBUMIN/GLOB SERPL: 1.2 (ref 1.1–2.2)
ALP SERPL-CCNC: 141 U/L (ref 45–117)
ALT SERPL-CCNC: 36 U/L (ref 12–78)
ANION GAP SERPL CALC-SCNC: 6 MMOL/L (ref 5–15)
AST SERPL-CCNC: 20 U/L (ref 15–37)
BASOPHILS # BLD: 0 K/UL (ref 0–0.1)
BASOPHILS NFR BLD: 1 % (ref 0–1)
BILIRUB SERPL-MCNC: 0.4 MG/DL (ref 0.2–1)
BUN SERPL-MCNC: 18 MG/DL (ref 6–20)
BUN/CREAT SERPL: 25 (ref 12–20)
CALCIUM SERPL-MCNC: 9.4 MG/DL (ref 8.5–10.1)
CHLORIDE SERPL-SCNC: 108 MMOL/L (ref 97–108)
CO2 SERPL-SCNC: 30 MMOL/L (ref 21–32)
CREAT SERPL-MCNC: 0.73 MG/DL (ref 0.55–1.02)
CRP SERPL-MCNC: <0.29 MG/DL (ref 0–0.6)
DIFFERENTIAL METHOD BLD: ABNORMAL
EOSINOPHIL # BLD: 0.1 K/UL (ref 0–0.4)
EOSINOPHIL NFR BLD: 2 % (ref 0–7)
ERYTHROCYTE [DISTWIDTH] IN BLOOD BY AUTOMATED COUNT: 13.9 % (ref 11.5–14.5)
ERYTHROCYTE [SEDIMENTATION RATE] IN BLOOD: 3 MM/HR (ref 0–30)
GLOBULIN SER CALC-MCNC: 3.1 G/DL (ref 2–4)
GLUCOSE SERPL-MCNC: 105 MG/DL (ref 65–100)
HCT VFR BLD AUTO: 40.8 % (ref 35–47)
HGB BLD-MCNC: 12.4 G/DL (ref 11.5–16)
IMM GRANULOCYTES # BLD AUTO: 0 K/UL (ref 0–0.04)
IMM GRANULOCYTES NFR BLD AUTO: 1 % (ref 0–0.5)
LYMPHOCYTES # BLD: 0.8 K/UL (ref 0.8–3.5)
LYMPHOCYTES NFR BLD: 16 % (ref 12–49)
MCH RBC QN AUTO: 31.4 PG (ref 26–34)
MCHC RBC AUTO-ENTMCNC: 30.4 G/DL (ref 30–36.5)
MCV RBC AUTO: 103.3 FL (ref 80–99)
MONOCYTES # BLD: 0.6 K/UL (ref 0–1)
MONOCYTES NFR BLD: 12 % (ref 5–13)
NEUTS SEG # BLD: 3.2 K/UL (ref 1.8–8)
NEUTS SEG NFR BLD: 68 % (ref 32–75)
NRBC # BLD: 0 K/UL (ref 0–0.01)
NRBC BLD-RTO: 0 PER 100 WBC
PLATELET # BLD AUTO: 343 K/UL (ref 150–400)
PMV BLD AUTO: 9.5 FL (ref 8.9–12.9)
POTASSIUM SERPL-SCNC: 4.3 MMOL/L (ref 3.5–5.1)
PROT SERPL-MCNC: 6.9 G/DL (ref 6.4–8.2)
RBC # BLD AUTO: 3.95 M/UL (ref 3.8–5.2)
RBC MORPH BLD: ABNORMAL
SODIUM SERPL-SCNC: 144 MMOL/L (ref 136–145)
WBC # BLD AUTO: 4.7 K/UL (ref 3.6–11)

## 2023-03-20 ENCOUNTER — OFFICE VISIT (OUTPATIENT)
Dept: RHEUMATOLOGY | Age: 73
End: 2023-03-20

## 2023-03-20 VITALS
HEART RATE: 74 BPM | DIASTOLIC BLOOD PRESSURE: 79 MMHG | SYSTOLIC BLOOD PRESSURE: 134 MMHG | WEIGHT: 199.8 LBS | RESPIRATION RATE: 16 BRPM | OXYGEN SATURATION: 95 % | TEMPERATURE: 97.9 F | BODY MASS INDEX: 34.3 KG/M2

## 2023-03-20 DIAGNOSIS — M06.09 SERONEGATIVE RHEUMATOID ARTHRITIS OF MULTIPLE SITES (HCC): Primary | ICD-10-CM

## 2023-03-20 DIAGNOSIS — M70.62 TROCHANTERIC BURSITIS, LEFT HIP: ICD-10-CM

## 2023-03-20 DIAGNOSIS — Z79.60 LONG-TERM USE OF IMMUNOSUPPRESSANT MEDICATION: ICD-10-CM

## 2023-03-20 RX ORDER — TRIAMCINOLONE ACETONIDE 40 MG/ML
40 INJECTION, SUSPENSION INTRA-ARTICULAR; INTRAMUSCULAR ONCE
Status: COMPLETED | OUTPATIENT
Start: 2023-03-20 | End: 2023-03-20

## 2023-03-20 RX ORDER — LIDOCAINE HYDROCHLORIDE 10 MG/ML
1 INJECTION INFILTRATION; PERINEURAL ONCE
Status: COMPLETED | OUTPATIENT
Start: 2023-03-20 | End: 2023-03-20

## 2023-03-20 RX ORDER — METHOTREXATE 25 MG/ML
25 INJECTION, SOLUTION INTRA-ARTERIAL; INTRAMUSCULAR; INTRAVENOUS
Qty: 14 ML | Refills: 0 | Status: SHIPPED | OUTPATIENT
Start: 2023-03-20

## 2023-03-20 RX ADMIN — LIDOCAINE HYDROCHLORIDE 1 ML: 10 INJECTION INFILTRATION; PERINEURAL at 09:38

## 2023-03-20 RX ADMIN — TRIAMCINOLONE ACETONIDE 40 MG: 40 INJECTION, SUSPENSION INTRA-ARTICULAR; INTRAMUSCULAR at 09:41

## 2023-03-20 NOTE — PATIENT INSTRUCTIONS
Left trochanteric bursa injected today. Call if you experience redness, swelling, or fever. . you can apply ice as needed for soreness over the next couple of days. Continue methotrexate 25mg subQ weekly, and Rinvoq daily. Repeat labs in 3 months. For your upcoming surgery, I'd recommend holding Rinvoq for 48 hours before your surgery, then you can resume it within 7 days of surgery if your wounds are healing well without infectious complications. I'd recommend continuing methotrexate throughout the procedure without interruption  I am unfortunately leaving the practice and the [place] area after June; our practice will be sending out letters shortly with recommendations on area practices you can reach out to, as I'm afraid the chances they'll be able to get a provider in to take my place quickly are pretty low.

## 2023-03-20 NOTE — PROGRESS NOTES
REASON FOR VISIT    This is a follow-up visit for Ms. Holly Wu for     ICD-10-CM   1. Seronegative rheumatoid arthritis of multiple sites ([facility] M06.09   2. PMR (polymyalgia rheumatica) ([facility]) M35.3     Inflammatory arthritis phenotype includes:  Anti-CCP positive: no  Rheumatoid factor positive: no  Erosive disease: no  Extra-articular manifestations include: Polymyalgia Rheumatica, smoldering myeloma, interstitial lung disease     Immunosuppression Screening (1/12/2021): Quantiferon TB: negative  PPD:  Not performed  Hepatitis B: negative  Hepatitis C: negative    Therapy History includes:  Current DMARD therapy include: methotrexate 20->25 mg SubQ every Friday (10/15/18 to 2/27/2021; stopped by mistake; 4/23/2021 to present), Rinvoq 15 mg daily (SAMPLE: 8/10/2021 to present)  Prior DMARD therapy include: Humira 40 mg every 14 days (2/27/2021 to 8/10/2021)   Discontinued DMARDs because of inefficacy: None  Discontinued DMARDs because of side effects: None    HISTORY OF PRESENT ILLNESS    Ms. Holly Wu returns for a follow-up. Last visit 1/19/2023, at which time we had trialed reduction in methotrexate to 20mg subQ weekly due to transaminitis with the 25mg dose. Has been able to continue on methotrexate and Rinvoq without interruptions. Admits she never decreased methotrexate to 20mg as she was afraid    Stiffness is usually present, but primarily gelling. Upcoming dental work. Not on osteoporosis medication (osteopenic by 6/21 DXA). Follows her monoclonal gammopathy through her PCP now, Mspike and bone-driven alk phos elevation have been stable and she hasn't seen her Hematologist in years with this stability. No rapidly progressive numbness or tingling, does have some mild intermittent paresthesia in the fingers with use which usually \"shakes out. \" Still feels her carpal tunnel surgery was helpful.     Had a UTI for which she follows with a urologist, CT was done for longerstanding hematuria, she isn't sure of the result. April 17th having a dental plate placed. Last visit told she would be able to continue bother her methotrexate and Rinvoq through surgery. REVIEW OF SYSTEMS    A comprehensive review of systems was performed and pertinent results are documented in the HPI, review of systems is otherwise non-contributory. PAST MEDICAL HISTORY    She has a past medical history of Breast cancer, left ([address] Depression, Goiter, Hearing loss, Hypertension, Hypothyroid, IBS (irritable bowel syndrome), Menopause, Rheumatoid arthritis ([address] and S/P radiation therapy. FAMILY HISTORY    Her family history includes Alcohol abuse in her brother; Breast Cancer in her paternal grandmother; COPD in her brother and mother; Cancer in her father and mother; Diabetes in her brother and brother; Heart Disease in her brother, brother, paternal grandmother, and paternal uncle; Liver Disease in her brother. SOCIAL HISTORY    She reports that she has never smoked. She has never used smokeless tobacco. She reports current alcohol use of about 5.0 standard drinks per week. She reports that she does not use drugs. MEDICATIONS    Current Outpatient Medications   Medication Sig    amoxicillin (AMOXIL) 500 mg tablet Take 1 Tablet by mouth every six (6) hours. Myrbetriq 25 mg ER tablet Take 25 mg by mouth nightly. methotrexate 25 mg/mL chemo injection 0.8 mL by SubCUTAneous route every seven (7) days.  INJECT EVERY FRIDAY    Insulin Syringe-Needle U-100 (Droplet Insulin Syringe) 1 mL 30 gauge x 5/16 syrg USED WITH METHOTREXATE WEEKLY AS DIRECTED    letrozole (FEMARA) 2.5 mg tablet TAKE 1 TABLET EVERY DAY    levothyroxine (SYNTHROID) 200 mcg tablet TAKE ONE TABLET ONCE DAILY BEFORE BREAKFAST    LORazepam (ATIVAN) 1 mg tablet TAKE  2 (two) TABLETS BY MOUTH NIGHTLY .    ergocalciferol (ERGOCALCIFEROL) 1,250 mcg (50,000 unit) capsule TAKE 1 CAPSULE BY MOUTH EVERY SEVEN (7) DAYS FOR VITAMIN D DEFICIENCY (HIGH DOSE THERAPY)    citalopram (CELEXA) 40 mg tablet TAKE 1 TABLET EVERY DAY    lisinopril-hydroCHLOROthiazide (PRINZIDE, ZESTORETIC) 10-12.5 mg per tablet TAKE 1 TABLET EVERY DAY    upadacitinib (Rinvoq) 15 mg Tb24 Take 15 mg by mouth daily. Indications: rheumatoid arthritis    ibuprofen (MOTRIN) 800 mg tablet Take 800 mg by mouth daily. folic acid (FOLVITE) 1 mg tablet TAKE 1 TABLET BY MOUTH ONCE DAILY    diclofenac (VOLTAREN) 1 % gel Apply  to affected area four (4) times daily. No current facility-administered medications for this visit. ALLERGIES    Allergies   Allergen Reactions    Keflex [Cephalexin] Rash    Sulfa (Sulfonamide Antibiotics) Rash    Monistat 1 [Tioconazole] Swelling       PHYSICAL EXAMINATION    Visit Vitals  /79 (BP 1 Location: Right arm, BP Patient Position: Sitting, BP Cuff Size: Adult)   Pulse 74   Temp 97.9 °F (36.6 °C) (Temporal)   Resp 16   Wt 199 lb 12.8 oz (90.6 kg)   SpO2 95%   BMI 34.30 kg/m²         General:  The patient is well developed, well nourished, alert, and in no apparent distress. Eyes: Sclera are anicteric. No conjunctival injection. HEENT:  Oropharynx is clear. No oral ulcers. Adequate salivary pooling. No cervical or supraclavicular lymphadenopathy. Lungs:  Clear to auscultation bilaterally today, without wheeze or stridor. Normal respiratory effort. Cor:  Regular rate and rhythm. No murmur rub or gallop. Abdomen: Soft, non-tender, without hepatomegaly or masses. Extremities: No calf tenderness, stable trace B LE edema distal to mid-shin. Warm and well perfused. Skin:  No significant abnormalities. No petechial, purpuric, or psoriaform rashes   Neuro: Nonfocal  Musculoskeletal:   A comprehensive musculoskeletal exam was performed for all joints of each upper and lower extremity and assessed for swelling, tenderness and range of motion.  Results are documented as below:   Persistent trace left  wrist synovitis and tenderness, resolved MCP and PIP synovitis with mild PIP-predominant tenderness  bilateral knee crepitus and trace right suprapatellar effusion. DATA REVIEW    Laboratory   3/13/23: WBC 4.7, Hgb 12.4, Plt 343; ESR 3, Cr 0.73, Tbili 0.4, AST 20, ALT 36, AlkP 141; CRP <0.29mg/dL, GGT 44 (WNL)  2/28/23: HDL 59, .8, A1c 5.2, TSH <0.01, Vit D 48  11/11/22: Immunoglobulin A 543, Mspike 0.3  11/9/22: ESR 5, Cholesterol 224, , Cr 0.61, Tbili 0.5, Alk phos 130 ALT 34, AST 26, CBC WNL, CRP <1 mg/L  7/21/22: UA RBC >30, Leukocyte esterase 3+, Protein 1+, Blood 1+, positive nitrites, Many bacteria; CRP 1 mg/L, ESR 3, Cr 0.85, Alk phos 136, Tbili 0.6, AST 20, ALT 19, CBC WNL,   4/12/22: Immunoglobulin A 514, M-Jere 0.3, Free kappa Lt Chains 19.5, Kappa/Lamda ratio serum 2.6, Cr 0.76, Alk phos 164, AST 14, ALT 25, Tbilli 0.5, WBC 6.2, HGB 13, Plt 320, Tandem-R Ostase 52.3, GGT 27, ESR 2, CRP <1 mg/L    Recent laboratory results were reviewed, summarized, and discussed with the patient. Imaging    Musculoskeletal Ultrasound    None    Radiographs  XR THUMB 12/20/22:   AP, lateral and oblique x-ray of the left thumb shows good post trapeziectomy space in height with good thumb metacarpal alignment. No apparent change of the radiolucent suspension plastic buttons. XR HAND LT 11/29/22:   AP, lateral and oblique x-ray of the left hand including the thumb shows good post trapeziectomy space in height. The buttons are radiolucent. No fracture. XR knee LT MIN 4 V 5/24/22:  Patient with mild medial joint space narrowing of the left knee. Remainder of joint space appears well-preserved. Patient noted to have severe medial joint space narrowing on the right knee. Remainder of joint space appears well-preserved. XR SPINE LUMB MIN 4 V 5/24/22: AP and lateral and flexion-extension lumbar spine demonstrates mild degenerative narrowing of the disc base. No gross instability with flexion-extension.   No fractures or lytic lesions. Skeletal Survey 11/28/2018: Few small calvarial lucencies. No fractures or lytic lesions at risk for fracture. Bilateral Shoulder 10/04/2018: RIGHT: There is prominent AC joint degeneration with spurring and loss of joint space. There is some mild deformity, chronic in nature of the tuberosity. No fracture or dislocation, no bony erosion, the bone is normal in mineral contents. No soft tissue calcifications. LEFT: The bone is normal in mineral contents. There is some mild chronic deformity of the tuberosity and AC joint degeneration. There are no soft tissue calcification bony erosion fracture or dislocation. Bilateral Hand 10/04/2018: RIGHT: no acute fracture or dislocation. Alignment is anatomic. Mild degenerative changes are seen in the interphalangeal joint of the thumb. No erosions are seen. No abnormality seen in the soft tissues. LEFT: no acute fracture or dislocation. Alignment is anatomic. Moderate to severe degenerative changes are present in the first [facility] joint. A cystic lucency in the ace of the first metacarpal likely represents a subchondral cyst. No clear erosions are seen. No abnormality seen in the soft tissues. Bilateral Foot 10/04/2018: RIGHT:  no fracture or other acute osseous or articular abnormality. A small plantar calcaneal heel spur is noted. No erosions or joint space narrowing are present. The soft tissues are within normal limits. LEFT: no acute fracture or dislocation. Alignment is anatomic. A small plantar calcaneal heel spur is noted. No erosions or joint space narrowing are present. . No abnormality is seen in the soft tissues. CT Imaging    CT Chest without contrast 2/04/2021: CHEST WALL: Left breast lesion is stable in appearance. THYROID: No nodule. MEDIASTINUM: No mass or lymphadenopathy. RIKY: No mass or lymphadenopathy. THORACIC AORTA: Atherosclerotic change. MAIN PULMONARY ARTERY: Normal in caliber. TRACHEA/BRONCHI: Patent.  ESOPHAGUS: No wall thickening or dilatation. HEART: Trace atherosclerotic change in the LAD. Normal in size. PLEURA: No effusion or pneumothorax. LUNGS: No suspicious pulmonary mass or nodule. Trace scarring in the lingula. INCIDENTALLY IMAGED UPPER ABDOMEN: Hepatic steatosis. Postcholecystectomy. BONES: Chronic mild loss of vertebral body height. Anterior disc osteophytes. PET/CT Scan 1/24/2019: HEAD/NECK: No apparent foci of abnormal hypermetabolism. Cerebral evaluation is limited by normal intense activity. CHEST: No foci of abnormal hypermetabolism. The known small breast cancer at 12:00 in the left breast demonstrates no increased metabolic activity. ABDOMEN/PELVIS: No foci of abnormal hypermetabolism. SKELETON: No foci of abnormal hypermetabolism in the axial and visualized appendicular skeleton. Lower extremities:. Imaging of the lower extremities is unremarkable. There is muscular activity noted. CT Head without contrast 9/13/2017: Prior right occipital craniectomy. Encephalomalacia in the right cerebellar region. . There is no significant white matter disease. There is no intracranial hemorrhage, extra-axial collection, mass, mass effect or midline shift. The basilar cisterns are open. No acute infarct is identified. The visualized portions of the paranasal sinuses and mastoid air cells are clear    MR Imaging    MRI Breasts with and without contrast 1/07/2019: There is minimal background parenchymal enhancement and the breasts are almost entirely fat. A few sub-5 mm foci of enhancement are scattered bilaterally. Right breast: No suspicious enhancing foci. No axillary or internal mammary chain lymphadenopathy. Left breast: A vague area of enhancement around the biopsy clip in the anterior third at 12:00 does not reach threshold enhancement. This measures approximately 9 x 9 mm, and correlates well with the small area of architectural distortion on mammography and subtle architectural distortion and shadowing on ultrasound.  No axillary or internal mammary chain lymphadenopathy. DXA     DXA 6/10/2021: (excluded None) lumbar spine L1-L4 T score -1.2 (BMD 1.050 g/cm2), left femoral neck T score: -0.9 (0.906 g/cm2), left total hip T score: -0.6 (0.933 g/cm2), right femoral neck T score: -0.9 (0.908 g/cm2), right total hip T score: -0.3 (0.966 g/cm2), and distal one third left radius T score -1.0 (BMD 0.789 g/cm2). FRAX score 11.1 % probability in 10 years for major osteoporotic fracture and 1.3 % 10 year probability of hip fracture. DXA 5/12/2015: (excluded distal radius) lumbar spine L1-L4 T score 0.4 (BMD 1.241 g/cm2), left femoral neck T score: 0.5 (1.109 g/cm2), left total hip T score: 1.0 (1.139 g/cm2), right femoral neck T score: 0.2 (1.062 g/cm2), right total hip T score: 1.1 (1.145 g/cm2). FRAX score N/A % probability in 10 years for major osteoporotic fracture and N/A % 10 year probability of hip fracture. PATHOLOGY    Lymph node, left axilla, #1, sentinel node excision 1/29/2019: No evidence for malignancy in one node (0/1). Breast, left, lumpectomy 1/29/2019:  Invasive lobular carcinoma. Lobular carcinoma in situ     Bone Marrow Biopsy 12/19/2018: Bone marrow: Variably cellular marrow with mild monoclonal plasmacytosis. Trilineage hematopoiesis with maturation. Breast, left, needle core biopsy 12/03/2018: Invasive mammary carcinoma with ductal and lobular features Favor Deedee histologic grade 1. Largest glass slide measurement of invasive carcinoma: 8.5 mm. ER pos WI pos (weak) HER 2 neg. PROCEDURE     Kenalog 80 mg IM. (01/21/20)    ASSESSMENT AND PLAN  ***  This is a follow-up visit for Ms. Finnegan Done for seronegative rheumatoid arthritis, with flaring arthritis in the hands after missing 3 weeks of methotrexate. Earlier low-grade transaminitis on 25mg methotrexate, will try dose-reduction to 20mg upon resumption. Continuing daily Rinvoq.     1. Seronegative rheumatoid arthritis of multiple sites ([facility]  - Methotrexate 20mg subQ weekly, Rinvoq 15mg daily  - C REACTIVE PROTEIN, QT; Future  - CBC WITH AUTOMATED DIFF; Future  - METABOLIC PANEL, COMPREHENSIVE; Future  - SED RATE (ESR); Future  - QUANTIFERON-TB GOLD PLUS; Future  - C REACTIVE PROTEIN, QT  - CBC WITH AUTOMATED DIFF  - METABOLIC PANEL, COMPREHENSIVE  - SED RATE (ESR)  - QUANTIFERON-TB GOLD PLUS  - C REACTIVE PROTEIN, QT; Future  - CBC WITH AUTOMATED DIFF; Future  - METABOLIC PANEL, COMPREHENSIVE; Future  - SED RATE (ESR); Future  - GGT; Future  - methotrexate 25 mg/mL chemo injection; 0.8 mL by SubCUTAneous route every seven (7) days. INJECT EVERY FRIDAY  Dispense: 12 mL; Refill: 0    2. Long-term use of immunosuppressant medication  - C REACTIVE PROTEIN, QT; Future  - CBC WITH AUTOMATED DIFF; Future  - METABOLIC PANEL, COMPREHENSIVE; Future  - SED RATE (ESR); Future  - QUANTIFERON-TB GOLD PLUS; Future    3. Elevated alkaline phosphatase level  - GGT; Future       There are no Patient Instructions on file for this visit. The patient voiced understanding of the aforementioned assessment and plan. TODAY'S ORDERS    No orders of the defined types were placed in this encounter. Future Appointments   Date Time Provider Geoff Duggan   3/20/2023  8:40 AM Pk Haile MD [facility] BS AMB   5/12/2023  9:30 AM Cassie Lara  [facility] BS AMB   6/27/2023  9:40 AM Urszula Dacosta MD [facility] AMB     Face to face time: 21 minutes  Note preparation and records review day of service: 10 minutes  Total provider time day of service: 31 minutes    This was scribed by Lucrecia Barboza in the presence of Dr. Tiffanie Michaels. The note was reviewed and amended personally, and I agree with the above information.     Christine Hennessy MD    Adult Rheumatology   [facility]  A Part of [facility], [address] [facility], [address]   Phone [phone]  Fax [phone]

## 2023-03-20 NOTE — PROGRESS NOTES
Chief Complaint   Patient presents with    Joint Pain     1. Have you been to the ER, urgent care clinic since your last visit? Hospitalized since your last visit? No    2. Have you seen or consulted any other health care providers outside of the [address] [place] [address] since your last visit? Include any pap smears or colon screening.  No

## 2023-04-22 DIAGNOSIS — Z12.31 ENCOUNTER FOR SCREENING MAMMOGRAM FOR MALIGNANT NEOPLASM OF BREAST: Primary | ICD-10-CM

## 2023-04-22 DIAGNOSIS — D47.2 MGUS (MONOCLONAL GAMMOPATHY OF UNKNOWN SIGNIFICANCE): Primary | ICD-10-CM

## 2023-04-23 DIAGNOSIS — D47.2 MGUS (MONOCLONAL GAMMOPATHY OF UNKNOWN SIGNIFICANCE): Primary | ICD-10-CM

## 2023-04-23 DIAGNOSIS — M06.09 SERONEGATIVE RHEUMATOID ARTHRITIS OF MULTIPLE SITES (HCC): Primary | ICD-10-CM

## 2023-04-24 DIAGNOSIS — Z79.60 LONG-TERM USE OF IMMUNOSUPPRESSANT MEDICATION: ICD-10-CM

## 2023-04-24 DIAGNOSIS — D47.2 MGUS (MONOCLONAL GAMMOPATHY OF UNKNOWN SIGNIFICANCE): Primary | ICD-10-CM

## 2023-04-24 DIAGNOSIS — M06.09 SERONEGATIVE RHEUMATOID ARTHRITIS OF MULTIPLE SITES (HCC): Primary | ICD-10-CM

## 2023-05-07 ENCOUNTER — TELEPHONE (OUTPATIENT)
Age: 73
End: 2023-05-07

## 2023-05-11 DIAGNOSIS — D47.2 MGUS (MONOCLONAL GAMMOPATHY OF UNKNOWN SIGNIFICANCE): ICD-10-CM

## 2023-05-12 LAB
ALBUMIN SERPL-MCNC: 4.3 G/DL (ref 3.7–4.7)
ALBUMIN/GLOB SERPL: 1.6 {RATIO} (ref 1.2–2.2)
ALP SERPL-CCNC: 155 IU/L (ref 44–121)
ALT SERPL-CCNC: 31 IU/L (ref 0–32)
AST SERPL-CCNC: 24 IU/L (ref 0–40)
BASOPHILS # BLD AUTO: 0 X10E3/UL (ref 0–0.2)
BASOPHILS NFR BLD AUTO: 1 %
BILIRUB SERPL-MCNC: 0.5 MG/DL (ref 0–1.2)
BUN SERPL-MCNC: 19 MG/DL (ref 8–27)
BUN/CREAT SERPL: 25 (ref 12–28)
CALCIUM SERPL-MCNC: 9.5 MG/DL (ref 8.7–10.3)
CHLORIDE SERPL-SCNC: 103 MMOL/L (ref 96–106)
CO2 SERPL-SCNC: 25 MMOL/L (ref 20–29)
CREAT SERPL-MCNC: 0.75 MG/DL (ref 0.57–1)
EGFRCR SERPLBLD CKD-EPI 2021: 84 ML/MIN/1.73
EOSINOPHIL # BLD AUTO: 0.1 X10E3/UL (ref 0–0.4)
EOSINOPHIL NFR BLD AUTO: 1 %
ERYTHROCYTE [DISTWIDTH] IN BLOOD BY AUTOMATED COUNT: 13.7 % (ref 11.7–15.4)
GLOBULIN SER CALC-MCNC: 2.7 G/DL (ref 1.5–4.5)
GLUCOSE SERPL-MCNC: 103 MG/DL (ref 70–99)
HCT VFR BLD AUTO: 36.7 % (ref 34–46.6)
HGB BLD-MCNC: 12.7 G/DL (ref 11.1–15.9)
IMM GRANULOCYTES # BLD AUTO: 0 X10E3/UL (ref 0–0.1)
IMM GRANULOCYTES NFR BLD AUTO: 1 %
LYMPHOCYTES # BLD AUTO: 0.7 X10E3/UL (ref 0.7–3.1)
LYMPHOCYTES NFR BLD AUTO: 13 %
MCH RBC QN AUTO: 32.8 PG (ref 26.6–33)
MCHC RBC AUTO-ENTMCNC: 34.6 G/DL (ref 31.5–35.7)
MCV RBC AUTO: 95 FL (ref 79–97)
MONOCYTES # BLD AUTO: 0.5 X10E3/UL (ref 0.1–0.9)
MONOCYTES NFR BLD AUTO: 9 %
NEUTROPHILS # BLD AUTO: 4.3 X10E3/UL (ref 1.4–7)
NEUTROPHILS NFR BLD AUTO: 75 %
PLATELET # BLD AUTO: 399 X10E3/UL (ref 150–450)
POTASSIUM SERPL-SCNC: 4.9 MMOL/L (ref 3.5–5.2)
RBC # BLD AUTO: 3.87 X10E6/UL (ref 3.77–5.28)
SODIUM SERPL-SCNC: 139 MMOL/L (ref 134–144)
WBC # BLD AUTO: 5.7 X10E3/UL (ref 3.4–10.8)

## 2023-05-16 LAB
ALBUMIN SERPL ELPH-MCNC: 3.9 G/DL (ref 2.9–4.4)
ALBUMIN/GLOB SERPL: 1.3 {RATIO} (ref 0.7–1.7)
ALPHA1 GLOB SERPL ELPH-MCNC: 0.2 G/DL (ref 0–0.4)
ALPHA2 GLOB SERPL ELPH-MCNC: 0.5 G/DL (ref 0.4–1)
B-GLOBULIN SERPL ELPH-MCNC: 1.5 G/DL (ref 0.7–1.3)
GAMMA GLOB SERPL ELPH-MCNC: 1 G/DL (ref 0.4–1.8)
GLOBULIN SER-MCNC: 3.1 G/DL (ref 2.2–3.9)
IGA SERPL-MCNC: 621 MG/DL (ref 64–422)
IGG SERPL-MCNC: 1206 MG/DL (ref 586–1602)
IGM SERPL-MCNC: 43 MG/DL (ref 26–217)
INTERPRETATION SERPL IEP-IMP: ABNORMAL
KAPPA LC FREE SER-MCNC: 27.4 MG/L (ref 3.3–19.4)
KAPPA LC FREE/LAMBDA FREE SER: 4.03 {RATIO} (ref 0.26–1.65)
LABORATORY COMMENT REPORT: ABNORMAL
LAMBDA LC FREE SERPL-MCNC: 6.8 MG/L (ref 5.7–26.3)
M PROTEIN SERPL ELPH-MCNC: 0.3 G/DL
PROT SERPL-MCNC: 7 G/DL (ref 6–8.5)

## 2023-06-15 DIAGNOSIS — M06.09 RHEUMATOID ARTHRITIS WITHOUT RHEUMATOID FACTOR, MULTIPLE SITES (HCC): Primary | ICD-10-CM

## 2023-06-15 RX ORDER — METHOTREXATE 25 MG/ML
INJECTION, SOLUTION INTRA-ARTERIAL; INTRAMUSCULAR; INTRAVENOUS
Qty: 12 ML | Refills: 0 | Status: SHIPPED | OUTPATIENT
Start: 2023-06-15 | End: 2023-06-20 | Stop reason: SDUPTHER

## 2023-06-15 NOTE — TELEPHONE ENCOUNTER
Last visit 3/20/23  Follow up 6/20/23  Lab Results   Component Value Date     05/11/2023    K 4.9 05/11/2023     05/11/2023    CO2 25 05/11/2023    BUN 19 05/11/2023    CREATININE 0.75 05/11/2023    GLUCOSE 103 (H) 05/11/2023    CALCIUM 9.5 05/11/2023    PROT 7.0 05/11/2023    LABALBU 4.3 05/11/2023    LABALBU 3.9 05/11/2023    BILITOT 0.5 05/11/2023    ALKPHOS 155 (H) 05/11/2023    AST 24 05/11/2023    ALT 31 05/11/2023    LABGLOM 84 05/11/2023    GFRAA >60 04/12/2022    AGRATIO 1.6 05/11/2023    GLOB 3.1 03/13/2023     Lab Results   Component Value Date    WBC 5.7 05/11/2023    HGB 12.7 05/11/2023    HCT 36.7 05/11/2023    MCV 95 05/11/2023     05/11/2023

## 2023-06-16 DIAGNOSIS — M06.09 SERONEGATIVE RHEUMATOID ARTHRITIS OF MULTIPLE SITES (HCC): ICD-10-CM

## 2023-06-16 DIAGNOSIS — Z79.60 LONG-TERM USE OF IMMUNOSUPPRESSANT MEDICATION: ICD-10-CM

## 2023-06-16 LAB
ALBUMIN SERPL-MCNC: 4 G/DL (ref 3.5–5)
ALBUMIN/GLOB SERPL: 1.1 (ref 1.1–2.2)
ALP SERPL-CCNC: 120 U/L (ref 45–117)
ALT SERPL-CCNC: 36 U/L (ref 12–78)
ANION GAP SERPL CALC-SCNC: 3 MMOL/L (ref 5–15)
AST SERPL-CCNC: 19 U/L (ref 15–37)
BASOPHILS # BLD: 0.1 K/UL (ref 0–0.1)
BASOPHILS NFR BLD: 1 % (ref 0–1)
BILIRUB SERPL-MCNC: 0.5 MG/DL (ref 0.2–1)
BUN SERPL-MCNC: 20 MG/DL (ref 6–20)
BUN/CREAT SERPL: 25 (ref 12–20)
CALCIUM SERPL-MCNC: 9.5 MG/DL (ref 8.5–10.1)
CHLORIDE SERPL-SCNC: 108 MMOL/L (ref 97–108)
CO2 SERPL-SCNC: 30 MMOL/L (ref 21–32)
CREAT SERPL-MCNC: 0.79 MG/DL (ref 0.55–1.02)
CRP SERPL-MCNC: <0.29 MG/DL (ref 0–0.6)
DIFFERENTIAL METHOD BLD: NORMAL
EOSINOPHIL # BLD: 0.1 K/UL (ref 0–0.4)
EOSINOPHIL NFR BLD: 1 % (ref 0–7)
ERYTHROCYTE [DISTWIDTH] IN BLOOD BY AUTOMATED COUNT: 13.2 % (ref 11.5–14.5)
ERYTHROCYTE [SEDIMENTATION RATE] IN BLOOD: 15 MM/HR (ref 0–30)
GLOBULIN SER CALC-MCNC: 3.6 G/DL (ref 2–4)
GLUCOSE SERPL-MCNC: 80 MG/DL (ref 65–100)
HCT VFR BLD AUTO: 38.6 % (ref 35–47)
HGB BLD-MCNC: 12.6 G/DL (ref 11.5–16)
IMM GRANULOCYTES # BLD AUTO: 0 K/UL (ref 0–0.04)
IMM GRANULOCYTES NFR BLD AUTO: 0 % (ref 0–0.5)
LYMPHOCYTES # BLD: 0.9 K/UL (ref 0.8–3.5)
LYMPHOCYTES NFR BLD: 18 % (ref 12–49)
MCH RBC QN AUTO: 31.9 PG (ref 26–34)
MCHC RBC AUTO-ENTMCNC: 32.6 G/DL (ref 30–36.5)
MCV RBC AUTO: 97.7 FL (ref 80–99)
MONOCYTES # BLD: 0.4 K/UL (ref 0–1)
MONOCYTES NFR BLD: 9 % (ref 5–13)
NEUTS SEG # BLD: 3.4 K/UL (ref 1.8–8)
NEUTS SEG NFR BLD: 71 % (ref 32–75)
NRBC # BLD: 0 K/UL (ref 0–0.01)
NRBC BLD-RTO: 0 PER 100 WBC
PLATELET # BLD AUTO: 395 K/UL (ref 150–400)
PMV BLD AUTO: 9.1 FL (ref 8.9–12.9)
POTASSIUM SERPL-SCNC: 4.9 MMOL/L (ref 3.5–5.1)
PROT SERPL-MCNC: 7.6 G/DL (ref 6.4–8.2)
RBC # BLD AUTO: 3.95 M/UL (ref 3.8–5.2)
SODIUM SERPL-SCNC: 141 MMOL/L (ref 136–145)
WBC # BLD AUTO: 4.8 K/UL (ref 3.6–11)

## 2023-06-20 ENCOUNTER — OFFICE VISIT (OUTPATIENT)
Age: 73
End: 2023-06-20
Payer: MEDICARE

## 2023-06-20 VITALS
TEMPERATURE: 96.9 F | WEIGHT: 192 LBS | HEART RATE: 59 BPM | RESPIRATION RATE: 16 BRPM | SYSTOLIC BLOOD PRESSURE: 122 MMHG | BODY MASS INDEX: 32.96 KG/M2 | DIASTOLIC BLOOD PRESSURE: 71 MMHG

## 2023-06-20 DIAGNOSIS — Z79.60 LONG TERM (CURRENT) USE OF UNSPECIFIED IMMUNOMODULATORS AND IMMUNOSUPPRESSANTS: ICD-10-CM

## 2023-06-20 DIAGNOSIS — M06.09 RHEUMATOID ARTHRITIS WITHOUT RHEUMATOID FACTOR, MULTIPLE SITES (HCC): Primary | ICD-10-CM

## 2023-06-20 PROCEDURE — 99214 OFFICE O/P EST MOD 30 MIN: CPT | Performed by: INTERNAL MEDICINE

## 2023-06-20 PROCEDURE — G8417 CALC BMI ABV UP PARAM F/U: HCPCS | Performed by: INTERNAL MEDICINE

## 2023-06-20 PROCEDURE — 1090F PRES/ABSN URINE INCON ASSESS: CPT | Performed by: INTERNAL MEDICINE

## 2023-06-20 PROCEDURE — 1036F TOBACCO NON-USER: CPT | Performed by: INTERNAL MEDICINE

## 2023-06-20 PROCEDURE — G8427 DOCREV CUR MEDS BY ELIG CLIN: HCPCS | Performed by: INTERNAL MEDICINE

## 2023-06-20 PROCEDURE — 1123F ACP DISCUSS/DSCN MKR DOCD: CPT | Performed by: INTERNAL MEDICINE

## 2023-06-20 PROCEDURE — G8399 PT W/DXA RESULTS DOCUMENT: HCPCS | Performed by: INTERNAL MEDICINE

## 2023-06-20 PROCEDURE — 3017F COLORECTAL CA SCREEN DOC REV: CPT | Performed by: INTERNAL MEDICINE

## 2023-06-20 PROCEDURE — 3078F DIAST BP <80 MM HG: CPT | Performed by: INTERNAL MEDICINE

## 2023-06-20 PROCEDURE — 3074F SYST BP LT 130 MM HG: CPT | Performed by: INTERNAL MEDICINE

## 2023-06-20 RX ORDER — METHOTREXATE 25 MG/ML
INJECTION, SOLUTION INTRA-ARTERIAL; INTRAMUSCULAR; INTRAVENOUS
Qty: 24 ML | Refills: 0 | Status: SHIPPED | OUTPATIENT
Start: 2023-06-20

## 2023-06-20 RX ORDER — UPADACITINIB 15 MG/1
15 TABLET, EXTENDED RELEASE ORAL DAILY
Qty: 90 TABLET | Refills: 1 | Status: SHIPPED | OUTPATIENT
Start: 2023-06-20

## 2023-06-20 RX ORDER — FOLIC ACID 1 MG/1
1 TABLET ORAL DAILY
Qty: 90 TABLET | Refills: 1 | Status: SHIPPED | OUTPATIENT
Start: 2023-06-20

## 2023-06-20 ASSESSMENT — PATIENT HEALTH QUESTIONNAIRE - PHQ9
1. LITTLE INTEREST OR PLEASURE IN DOING THINGS: 0
SUM OF ALL RESPONSES TO PHQ9 QUESTIONS 1 & 2: 0
SUM OF ALL RESPONSES TO PHQ QUESTIONS 1-9: 0
2. FEELING DOWN, DEPRESSED OR HOPELESS: 0
SUM OF ALL RESPONSES TO PHQ QUESTIONS 1-9: 0

## 2023-06-20 NOTE — PROGRESS NOTES
Chief Complaint   Patient presents with    Joint Pain     1. Have you been to the ER, urgent care clinic since your last visit? Hospitalized since your last visit? No    2. Have you seen or consulted any other health care providers outside of the [address] [place] [address] since your last visit? Include any pap smears or colon screening.  No

## 2023-06-20 NOTE — PATIENT INSTRUCTIONS
Continue methotrexate 25mg subQ weekly with daily folic acid supplements and Rinvoq daily. Look for arthritis compression gloves to reduce hand irritation with activity. Apply topical diclofenac gel up to 4x/day as needed, and topical numbing medications such as Salonpas or Tigerbalm on your back. You can also place a heating pad on your back. You can take 650mg of Tylenol up to 3 times a day for joint pain. Continue to work on weight loss. Remember that every pound lost takes 4 pounds of pressure off of the knees and back. Weight loss mostly comes down to creating a caloric deficit through diet as opposed to exercise. Follow up with the NP in October as scheduled.

## 2023-06-20 NOTE — PROGRESS NOTES
REASON FOR VISIT    This is a follow-up visit for Ms. Nabil Atkinson for     ICD-10-CM   1. Seronegative rheumatoid [facility] multiple sites ([facility] M06.09   2. PMR (polymyalgia rheumatica) ([facility]) M35.3     Inflammatory arthritis phenotype includes:  Anti-CCP positive: no  Rheumatoid factor positive: no  Erosive disease: no  Extra-articular manifestations include: Polymyalgia Rheumatica, smoldering myeloma, interstitial lung disease     Immunosuppression Screening (1/12/2021): Quantiferon TB: negative  PPD:  Not performed  Hepatitis B: negative  Hepatitis C: negative    Therapy History includes:  Current DMARD therapy include: methotrexate 20->25 mg SubQ every Friday (10/15/18 to 2/27/2021; stopped by mistake; 4/23/2021 to present), Rinvoq 15 mg daily (SAMPLE: 8/10/2021 to present)  Prior DMARD therapy include: Humira 40 mg every 14 days (2/27/2021 to 8/10/2021)   Discontinued DMARDs because of inefficacy: None  Discontinued DMARDs because of side effects: None    HISTORY OF PRESENT ILLNESS  The patient returns for a follow up. Last visit 3/20/2023. Pt is taking methotrexate 25 mg SubQ weekly and Rinvoq daily. She thinks that these medications make her joints feel better and she does not want to change her medication. She states that the injection in her left hip has brought her relief. She takes Tylenol and Ibuprofen for joint relief. Pt takes 30 minutes to recover from her morning stiffness. She is still working in [employer]  to stay active. She has cramps in her left hand and her left leg, latter particularly at night. She has been using an OTC spray to relieve the cramps. Pt is seeing an NP in October for further Rheumatological follow up and she will be seeing a Rheumatologist in December. REVIEW OF SYSTEMS    A comprehensive review of systems was performed and pertinent results are documented in the HPI, review of systems is otherwise non-contributory.     PAST MEDICAL HISTORY    She has a past

## 2023-06-23 DIAGNOSIS — F51.01 PRIMARY INSOMNIA: Primary | ICD-10-CM

## 2023-06-23 RX ORDER — LORAZEPAM 1 MG/1
TABLET ORAL
Qty: 60 TABLET | Refills: 0 | Status: SHIPPED | OUTPATIENT
Start: 2023-06-23 | End: 2023-07-23

## 2023-06-27 ENCOUNTER — OFFICE VISIT (OUTPATIENT)
Age: 73
End: 2023-06-27
Payer: MEDICARE

## 2023-06-27 VITALS
RESPIRATION RATE: 20 BRPM | OXYGEN SATURATION: 99 % | HEART RATE: 61 BPM | BODY MASS INDEX: 32.61 KG/M2 | SYSTOLIC BLOOD PRESSURE: 147 MMHG | DIASTOLIC BLOOD PRESSURE: 57 MMHG | HEIGHT: 64 IN | WEIGHT: 191 LBS | TEMPERATURE: 98 F

## 2023-06-27 DIAGNOSIS — F32.1 MODERATE MAJOR DEPRESSION (HCC): ICD-10-CM

## 2023-06-27 DIAGNOSIS — Z12.11 SCREENING FOR MALIGNANT NEOPLASM OF COLON: ICD-10-CM

## 2023-06-27 DIAGNOSIS — M06.09 SERONEGATIVE RHEUMATOID ARTHRITIS OF MULTIPLE SITES (HCC): ICD-10-CM

## 2023-06-27 DIAGNOSIS — E03.9 ACQUIRED HYPOTHYROIDISM: ICD-10-CM

## 2023-06-27 DIAGNOSIS — E78.00 PURE HYPERCHOLESTEROLEMIA: ICD-10-CM

## 2023-06-27 DIAGNOSIS — C50.912 MALIGNANT NEOPLASM OF LEFT FEMALE BREAST, UNSPECIFIED ESTROGEN RECEPTOR STATUS, UNSPECIFIED SITE OF BREAST (HCC): ICD-10-CM

## 2023-06-27 DIAGNOSIS — E55.9 VITAMIN D DEFICIENCY, UNSPECIFIED: ICD-10-CM

## 2023-06-27 DIAGNOSIS — Z00.00 MEDICARE ANNUAL WELLNESS VISIT, SUBSEQUENT: ICD-10-CM

## 2023-06-27 DIAGNOSIS — R42 VERTIGO: ICD-10-CM

## 2023-06-27 DIAGNOSIS — F32.1 MAJOR DEPRESSIVE DISORDER, SINGLE EPISODE, MODERATE (HCC): ICD-10-CM

## 2023-06-27 DIAGNOSIS — R06.02 SOB (SHORTNESS OF BREATH): Primary | ICD-10-CM

## 2023-06-27 DIAGNOSIS — M35.3 PMR (POLYMYALGIA RHEUMATICA) (HCC): ICD-10-CM

## 2023-06-27 DIAGNOSIS — C90.01 MULTIPLE MYELOMA IN REMISSION (HCC): ICD-10-CM

## 2023-06-27 LAB
25(OH)D3 SERPL-MCNC: 70.4 NG/ML (ref 30–100)
ALBUMIN SERPL-MCNC: 4 G/DL (ref 3.5–5)
ALBUMIN/GLOB SERPL: 1.2 (ref 1.1–2.2)
ALP SERPL-CCNC: 118 U/L (ref 45–117)
ALT SERPL-CCNC: 41 U/L (ref 12–78)
ANION GAP SERPL CALC-SCNC: 4 MMOL/L (ref 5–15)
AST SERPL-CCNC: 29 U/L (ref 15–37)
BILIRUB SERPL-MCNC: 0.5 MG/DL (ref 0.2–1)
BUN SERPL-MCNC: 19 MG/DL (ref 6–20)
BUN/CREAT SERPL: 24 (ref 12–20)
CALCIUM SERPL-MCNC: 9.4 MG/DL (ref 8.5–10.1)
CHLORIDE SERPL-SCNC: 108 MMOL/L (ref 97–108)
CHOLEST SERPL-MCNC: 206 MG/DL
CO2 SERPL-SCNC: 28 MMOL/L (ref 21–32)
CREAT SERPL-MCNC: 0.8 MG/DL (ref 0.55–1.02)
GLOBULIN SER CALC-MCNC: 3.4 G/DL (ref 2–4)
GLUCOSE SERPL-MCNC: 100 MG/DL (ref 65–100)
HDLC SERPL-MCNC: 66 MG/DL
HDLC SERPL: 3.1 (ref 0–5)
LDLC SERPL CALC-MCNC: 111.4 MG/DL (ref 0–100)
POTASSIUM SERPL-SCNC: 4.3 MMOL/L (ref 3.5–5.1)
PROT SERPL-MCNC: 7.4 G/DL (ref 6.4–8.2)
SODIUM SERPL-SCNC: 140 MMOL/L (ref 136–145)
T4 FREE SERPL-MCNC: 2.1 NG/DL (ref 0.8–1.5)
TRIGL SERPL-MCNC: 143 MG/DL
TSH SERPL DL<=0.05 MIU/L-ACNC: <0.01 UIU/ML (ref 0.36–3.74)
VLDLC SERPL CALC-MCNC: 28.6 MG/DL

## 2023-06-27 PROCEDURE — G8427 DOCREV CUR MEDS BY ELIG CLIN: HCPCS | Performed by: INTERNAL MEDICINE

## 2023-06-27 PROCEDURE — 99214 OFFICE O/P EST MOD 30 MIN: CPT | Performed by: INTERNAL MEDICINE

## 2023-06-27 PROCEDURE — 3078F DIAST BP <80 MM HG: CPT | Performed by: INTERNAL MEDICINE

## 2023-06-27 PROCEDURE — 1123F ACP DISCUSS/DSCN MKR DOCD: CPT | Performed by: INTERNAL MEDICINE

## 2023-06-27 PROCEDURE — G8417 CALC BMI ABV UP PARAM F/U: HCPCS | Performed by: INTERNAL MEDICINE

## 2023-06-27 PROCEDURE — 3017F COLORECTAL CA SCREEN DOC REV: CPT | Performed by: INTERNAL MEDICINE

## 2023-06-27 PROCEDURE — 3074F SYST BP LT 130 MM HG: CPT | Performed by: INTERNAL MEDICINE

## 2023-06-27 PROCEDURE — 1036F TOBACCO NON-USER: CPT | Performed by: INTERNAL MEDICINE

## 2023-06-27 PROCEDURE — 1090F PRES/ABSN URINE INCON ASSESS: CPT | Performed by: INTERNAL MEDICINE

## 2023-06-27 PROCEDURE — G0439 PPPS, SUBSEQ VISIT: HCPCS | Performed by: INTERNAL MEDICINE

## 2023-06-27 PROCEDURE — G8399 PT W/DXA RESULTS DOCUMENT: HCPCS | Performed by: INTERNAL MEDICINE

## 2023-06-27 RX ORDER — SYRINGE AND NEEDLE,INSULIN,1ML 30 GX5/16"
SYRINGE, EMPTY DISPOSABLE MISCELLANEOUS
COMMUNITY
Start: 2023-04-22 | End: 2023-07-03

## 2023-06-27 RX ORDER — MIRABEGRON 50 MG/1
50 TABLET, FILM COATED, EXTENDED RELEASE ORAL NIGHTLY
COMMUNITY
Start: 2023-06-22

## 2023-06-27 RX ORDER — IBUPROFEN 600 MG/1
600 TABLET ORAL EVERY 6 HOURS PRN
COMMUNITY
Start: 2023-05-01

## 2023-06-27 SDOH — ECONOMIC STABILITY: INCOME INSECURITY: HOW HARD IS IT FOR YOU TO PAY FOR THE VERY BASICS LIKE FOOD, HOUSING, MEDICAL CARE, AND HEATING?: NOT HARD AT ALL

## 2023-06-27 SDOH — ECONOMIC STABILITY: HOUSING INSECURITY
IN THE LAST 12 MONTHS, WAS THERE A TIME WHEN YOU DID NOT HAVE A STEADY PLACE TO SLEEP OR SLEPT IN A SHELTER (INCLUDING NOW)?: NO

## 2023-06-27 SDOH — ECONOMIC STABILITY: FOOD INSECURITY: WITHIN THE PAST 12 MONTHS, YOU WORRIED THAT YOUR FOOD WOULD RUN OUT BEFORE YOU GOT MONEY TO BUY MORE.: NEVER TRUE

## 2023-06-27 SDOH — ECONOMIC STABILITY: FOOD INSECURITY: WITHIN THE PAST 12 MONTHS, THE FOOD YOU BOUGHT JUST DIDN'T LAST AND YOU DIDN'T HAVE MONEY TO GET MORE.: NEVER TRUE

## 2023-06-27 ASSESSMENT — PATIENT HEALTH QUESTIONNAIRE - PHQ9
9. THOUGHTS THAT YOU WOULD BE BETTER OFF DEAD, OR OF HURTING YOURSELF: 0
3. TROUBLE FALLING OR STAYING ASLEEP: 0
4. FEELING TIRED OR HAVING LITTLE ENERGY: 0
7. TROUBLE CONCENTRATING ON THINGS, SUCH AS READING THE NEWSPAPER OR WATCHING TELEVISION: 0
1. LITTLE INTEREST OR PLEASURE IN DOING THINGS: 0
SUM OF ALL RESPONSES TO PHQ QUESTIONS 1-9: 0
10. IF YOU CHECKED OFF ANY PROBLEMS, HOW DIFFICULT HAVE THESE PROBLEMS MADE IT FOR YOU TO DO YOUR WORK, TAKE CARE OF THINGS AT HOME, OR GET ALONG WITH OTHER PEOPLE: 0
SUM OF ALL RESPONSES TO PHQ QUESTIONS 1-9: 0
8. MOVING OR SPEAKING SO SLOWLY THAT OTHER PEOPLE COULD HAVE NOTICED. OR THE OPPOSITE, BEING SO FIGETY OR RESTLESS THAT YOU HAVE BEEN MOVING AROUND A LOT MORE THAN USUAL: 0
5. POOR APPETITE OR OVEREATING: 0
SUM OF ALL RESPONSES TO PHQ QUESTIONS 1-9: 0
2. FEELING DOWN, DEPRESSED OR HOPELESS: 0
SUM OF ALL RESPONSES TO PHQ QUESTIONS 1-9: 0
SUM OF ALL RESPONSES TO PHQ9 QUESTIONS 1 & 2: 0
6. FEELING BAD ABOUT YOURSELF - OR THAT YOU ARE A FAILURE OR HAVE LET YOURSELF OR YOUR FAMILY DOWN: 0

## 2023-06-27 ASSESSMENT — LIFESTYLE VARIABLES
HOW MANY STANDARD DRINKS CONTAINING ALCOHOL DO YOU HAVE ON A TYPICAL DAY: 1 OR 2
HOW OFTEN DO YOU HAVE A DRINK CONTAINING ALCOHOL: 2-4 TIMES A MONTH

## 2023-06-28 RX ORDER — LETROZOLE 2.5 MG/1
TABLET, FILM COATED ORAL
Qty: 90 TABLET | Refills: 2 | Status: SHIPPED | OUTPATIENT
Start: 2023-06-28

## 2023-06-29 ENCOUNTER — TELEPHONE (OUTPATIENT)
Age: 73
End: 2023-06-29

## 2023-06-29 DIAGNOSIS — E03.9 ACQUIRED HYPOTHYROIDISM: Primary | ICD-10-CM

## 2023-07-01 DIAGNOSIS — Z79.60 LONG TERM (CURRENT) USE OF UNSPECIFIED IMMUNOMODULATORS AND IMMUNOSUPPRESSANTS: ICD-10-CM

## 2023-07-01 DIAGNOSIS — M06.09 RHEUMATOID ARTHRITIS WITHOUT RHEUMATOID FACTOR, MULTIPLE SITES (HCC): Primary | ICD-10-CM

## 2023-07-03 RX ORDER — SYRINGE AND NEEDLE,INSULIN,1ML 30 GX5/16"
SYRINGE, EMPTY DISPOSABLE MISCELLANEOUS
Qty: 50 EACH | Refills: 0 | Status: SHIPPED | OUTPATIENT
Start: 2023-07-03

## 2023-07-03 NOTE — TELEPHONE ENCOUNTER
Seen 6/20/23  Lab Results   Component Value Date     06/27/2023    K 4.3 06/27/2023     06/27/2023    CO2 28 06/27/2023    BUN 19 06/27/2023    CREATININE 0.80 06/27/2023    GLUCOSE 100 06/27/2023    CALCIUM 9.4 06/27/2023    PROT 7.4 06/27/2023    LABALBU 4.0 06/27/2023    BILITOT 0.5 06/27/2023    ALKPHOS 118 (H) 06/27/2023    AST 29 06/27/2023    ALT 41 06/27/2023    LABGLOM >60 06/27/2023    GFRAA >60 04/12/2022    AGRATIO 1.6 05/11/2023    GLOB 3.4 06/27/2023     Lab Results   Component Value Date    WBC 4.8 06/16/2023    HGB 12.6 06/16/2023    HCT 38.6 06/16/2023    MCV 97.7 06/16/2023     06/16/2023

## 2023-07-09 LAB — NONINV COLON CA DNA+OCC BLD SCRN STL QL: NORMAL

## 2023-07-21 ENCOUNTER — OFFICE VISIT (OUTPATIENT)
Age: 73
End: 2023-07-21
Payer: MEDICARE

## 2023-07-21 VITALS
OXYGEN SATURATION: 93 % | WEIGHT: 192 LBS | RESPIRATION RATE: 18 BRPM | BODY MASS INDEX: 32.96 KG/M2 | TEMPERATURE: 98.2 F | DIASTOLIC BLOOD PRESSURE: 75 MMHG | HEART RATE: 68 BPM | SYSTOLIC BLOOD PRESSURE: 135 MMHG

## 2023-07-21 DIAGNOSIS — M85.88 OTHER SPECIFIED DISORDERS OF BONE DENSITY AND STRUCTURE, OTHER SITE: ICD-10-CM

## 2023-07-21 DIAGNOSIS — C50.012 MALIGNANT NEOPLASM OF NIPPLE OF LEFT BREAST IN FEMALE, ESTROGEN RECEPTOR POSITIVE (HCC): ICD-10-CM

## 2023-07-21 DIAGNOSIS — D47.2 MONOCLONAL GAMMOPATHY OF UNKNOWN SIGNIFICANCE (MGUS): Primary | ICD-10-CM

## 2023-07-21 DIAGNOSIS — Z17.0 MALIGNANT NEOPLASM OF NIPPLE OF LEFT BREAST IN FEMALE, ESTROGEN RECEPTOR POSITIVE (HCC): ICD-10-CM

## 2023-07-21 PROCEDURE — 1123F ACP DISCUSS/DSCN MKR DOCD: CPT | Performed by: INTERNAL MEDICINE

## 2023-07-21 PROCEDURE — 3017F COLORECTAL CA SCREEN DOC REV: CPT | Performed by: INTERNAL MEDICINE

## 2023-07-21 PROCEDURE — 1090F PRES/ABSN URINE INCON ASSESS: CPT | Performed by: INTERNAL MEDICINE

## 2023-07-21 PROCEDURE — 3078F DIAST BP <80 MM HG: CPT | Performed by: INTERNAL MEDICINE

## 2023-07-21 PROCEDURE — 3075F SYST BP GE 130 - 139MM HG: CPT | Performed by: INTERNAL MEDICINE

## 2023-07-21 PROCEDURE — 99214 OFFICE O/P EST MOD 30 MIN: CPT | Performed by: INTERNAL MEDICINE

## 2023-07-21 PROCEDURE — G8417 CALC BMI ABV UP PARAM F/U: HCPCS | Performed by: INTERNAL MEDICINE

## 2023-07-21 PROCEDURE — 1036F TOBACCO NON-USER: CPT | Performed by: INTERNAL MEDICINE

## 2023-07-21 PROCEDURE — G8428 CUR MEDS NOT DOCUMENT: HCPCS | Performed by: INTERNAL MEDICINE

## 2023-07-21 PROCEDURE — G8399 PT W/DXA RESULTS DOCUMENT: HCPCS | Performed by: INTERNAL MEDICINE

## 2023-07-21 NOTE — PROGRESS NOTES
Mary Bishop is a 67 y.o. female    Chief Complaint   Patient presents with    Follow-up      1)  Paraproteinemia- Smoldering Myeloma   2)  Left breast ER pos [place] pos (weak) HER 2 neg stage I Breast cancer- 12/2018       1. Have you been to the ER, urgent care clinic since your last visit? Hospitalized since your last visit? No    2. Have you seen or consulted any other health care providers outside of the [address] [place] [address] since your last visit? Include any pap smears or colon screening.    Yes, Dr. [facility]

## 2023-07-21 NOTE — PROGRESS NOTES
[facility] at [address], [address] [place] [address] , [address]: [phone]  F: [phone]          Reason for Visit:     Yanni Salinas is a 66 y.o.  female who is seen for follow up of      1)  Paraproteinemia- Smoldering Myeloma   2)  Left breast ER pos [place] pos (weak) HER 2 neg stage I Breast cancer- 12/2018      TREATMENT HISTORY   1/29/19-left lumpectomy with sentinel lymph node biopsy. 1.8 cm invasive lobular carcinoma, grade 1, 1 of one negative lymph nodes. Oncotype low risk. Completed RT   4/19 started Letrozole          History of Present Illness:     Patient is a 66 y.o. with seronegative rheumatoid arthritis and secondary  polymyalgia rheumatica who is seen for follow-up of smoldering myeloma and lobular breast cancer. She presents for follow-up on adjuvant Letrozole. She has no new joint aches but has had some knee pain. She has had no new lumps, sob. She sees Dr. Vibha Garcia for RA  She still has some L breast sensitivity  She has noted some forgetfulness and she gets scared when that happens       Review of Systems: A complete review of systems was obtained, negative except as described above. Physical Exam:        Visit Vitals        BP  (!) 159/77     Pulse  67     Resp  18     Ht  5' 4\" (1.626 m)     Wt  190 lb (86.2 kg)     SpO2  95%        BMI           ECOG PS: 0   General: No distress   Neck: Supple   Breast: No palpable lumps or adenopathy, no skin changes. MS: Normal gait and station. Digits without clubbing or cyanosis.    Psych: Alert, oriented, appropriate affect, normal judgment/insight          Results:          Lab Results   Component Value Date/Time    WBC 4.8 06/16/2023 08:11 AM    HGB 12.6 06/16/2023 08:11 AM    HCT 38.6 06/16/2023 08:11 AM     06/16/2023 08:11 AM    MCV 97.7 06/16/2023 08:11 AM     Lab Results   Component Value Date/Time     06/27/2023 10:37 AM    K 4.3 06/27/2023 10:37 AM

## 2023-07-25 DIAGNOSIS — F51.01 PRIMARY INSOMNIA: ICD-10-CM

## 2023-07-25 RX ORDER — LORAZEPAM 1 MG/1
TABLET ORAL
Qty: 60 TABLET | Refills: 0 | Status: SHIPPED | OUTPATIENT
Start: 2023-07-25 | End: 2023-08-24

## 2023-07-28 LAB — NONINV COLON CA DNA+OCC BLD SCRN STL QL: NEGATIVE

## 2023-08-15 ENCOUNTER — HOSPITAL ENCOUNTER (OUTPATIENT)
Facility: HOSPITAL | Age: 73
Discharge: HOME | End: 2023-08-18
Attending: INTERNAL MEDICINE
Payer: MEDICARE

## 2023-08-15 DIAGNOSIS — M85.88 OTHER SPECIFIED DISORDERS OF BONE DENSITY AND STRUCTURE, OTHER SITE: ICD-10-CM

## 2023-08-15 DIAGNOSIS — D47.2 MONOCLONAL GAMMOPATHY OF UNKNOWN SIGNIFICANCE (MGUS): ICD-10-CM

## 2023-08-15 DIAGNOSIS — C50.012 MALIGNANT NEOPLASM OF NIPPLE OF LEFT BREAST IN FEMALE, ESTROGEN RECEPTOR POSITIVE (HCC): ICD-10-CM

## 2023-08-15 DIAGNOSIS — Z17.0 MALIGNANT NEOPLASM OF NIPPLE OF LEFT BREAST IN FEMALE, ESTROGEN RECEPTOR POSITIVE (HCC): ICD-10-CM

## 2023-08-15 PROCEDURE — 77080 DXA BONE DENSITY AXIAL: CPT

## 2023-08-15 PROCEDURE — A9579 GAD-BASE MR CONTRAST NOS,1ML: HCPCS

## 2023-08-15 PROCEDURE — 70553 MRI BRAIN STEM W/O & W/DYE: CPT

## 2023-08-15 PROCEDURE — 6360000004 HC RX CONTRAST MEDICATION

## 2023-08-15 PROCEDURE — 77075 RADEX OSSEOUS SURVEY COMPL: CPT

## 2023-08-15 RX ADMIN — GADOTERIDOL 18 ML: 279.3 INJECTION, SOLUTION INTRAVENOUS at 16:41

## 2023-08-16 ENCOUNTER — TELEPHONE (OUTPATIENT)
Age: 73
End: 2023-08-16

## 2023-08-16 NOTE — TELEPHONE ENCOUNTER
1001:    Called patient to provide updates and recommendations per MD Tyler request   No answer   LVM to call office back

## 2023-08-17 ENCOUNTER — TELEPHONE (OUTPATIENT)
Age: 73
End: 2023-08-17

## 2023-08-17 NOTE — TELEPHONE ENCOUNTER
1235:    Called patient to provide the following updates per MD Scott request:   Based on the X rays, MD Chelsie Farley is concerned about there being progression to Myeloma   Suggesting a BM bx  Follow-up in   4 weeks to go over results   No answer   LVM to call office back     [phone]:    Called patient's son Belle Haynes) listed on PHI   No answer   LVM to call office back

## 2023-08-18 ENCOUNTER — TELEPHONE (OUTPATIENT)
Age: 73
End: 2023-08-18

## 2023-08-22 NOTE — TELEPHONE ENCOUNTER
1625:    Called patient and confirmed x2 identifiers   Informed of recent scan results and MD Scott's concern for progression of Myeloma   Recommendation for BM Bx   Patient verbalized understanding and agreed with plan     No new questions or concerns at this time

## 2023-08-28 DIAGNOSIS — F51.01 PRIMARY INSOMNIA: ICD-10-CM

## 2023-08-30 RX ORDER — LORAZEPAM 1 MG/1
TABLET ORAL
Qty: 60 TABLET | Refills: 0 | Status: SHIPPED | OUTPATIENT
Start: 2023-08-30 | End: 2023-09-29

## 2023-09-11 RX ORDER — LISINOPRIL AND HYDROCHLOROTHIAZIDE 12.5; 1 MG/1; MG/1
TABLET ORAL
Qty: 90 TABLET | Refills: 3 | Status: SHIPPED | OUTPATIENT
Start: 2023-09-11

## 2023-10-01 DIAGNOSIS — F51.01 PRIMARY INSOMNIA: ICD-10-CM

## 2023-10-03 RX ORDER — LORAZEPAM 1 MG/1
TABLET ORAL
Qty: 60 TABLET | Refills: 0 | Status: SHIPPED | OUTPATIENT
Start: 2023-10-03 | End: 2023-11-02

## 2023-10-05 ENCOUNTER — HOSPITAL ENCOUNTER (OUTPATIENT)
Facility: HOSPITAL | Age: 73
Discharge: HOME | End: 2023-10-07
Attending: INTERNAL MEDICINE
Payer: MEDICARE

## 2023-10-05 ENCOUNTER — HOSPITAL ENCOUNTER (OUTPATIENT)
Facility: HOSPITAL | Age: 73
Discharge: HOME | End: 2023-10-05
Attending: INTERNAL MEDICINE
Payer: MEDICARE

## 2023-10-05 VITALS
BODY MASS INDEX: 32.78 KG/M2 | HEIGHT: 64 IN | SYSTOLIC BLOOD PRESSURE: 135 MMHG | DIASTOLIC BLOOD PRESSURE: 75 MMHG | WEIGHT: 192.02 LBS

## 2023-10-05 DIAGNOSIS — R06.02 SHORTNESS OF BREATH: ICD-10-CM

## 2023-10-05 DIAGNOSIS — R06.02 SOB (SHORTNESS OF BREATH): ICD-10-CM

## 2023-10-05 PROCEDURE — 71250 CT THORAX DX C-: CPT

## 2023-10-05 PROCEDURE — 93306 TTE W/DOPPLER COMPLETE: CPT

## 2023-10-08 LAB
ECHO AO ROOT DIAM: 2.6 CM
ECHO AO ROOT INDEX: 1.35 CM/M2
ECHO AV AREA PEAK VELOCITY: 2.5 CM2
ECHO AV AREA/BSA PEAK VELOCITY: 1.3 CM2/M2
ECHO AV PEAK GRADIENT: 9 MMHG
ECHO AV PEAK VELOCITY: 1.5 M/S
ECHO AV VELOCITY RATIO: 0.8
ECHO BSA: 1.98 M2
ECHO EST RA PRESSURE: 3 MMHG
ECHO LA DIAMETER INDEX: 1.77 CM/M2
ECHO LA DIAMETER: 3.4 CM
ECHO LA TO AORTIC ROOT RATIO: 1.31
ECHO LA VOL 2C: 42 ML (ref 22–52)
ECHO LA VOL 2C: 43 ML (ref 22–52)
ECHO LA VOL 4C: 66 ML (ref 22–52)
ECHO LA VOL 4C: 68 ML (ref 22–52)
ECHO LA VOL BP: 53 ML (ref 22–52)
ECHO LA VOL/BSA BIPLANE: 28 ML/M2 (ref 16–34)
ECHO LA VOLUME AREA LENGTH: 55 ML
ECHO LA VOLUME INDEX AREA LENGTH: 29 ML/M2 (ref 16–34)
ECHO LV E' LATERAL VELOCITY: 11 CM/S
ECHO LV E' SEPTAL VELOCITY: 8 CM/S
ECHO LV FRACTIONAL SHORTENING: 38 % (ref 28–44)
ECHO LV INTERNAL DIMENSION DIASTOLE INDEX: 2.45 CM/M2
ECHO LV INTERNAL DIMENSION DIASTOLIC: 4.7 CM (ref 3.9–5.3)
ECHO LV INTERNAL DIMENSION SYSTOLIC INDEX: 1.51 CM/M2
ECHO LV INTERNAL DIMENSION SYSTOLIC: 2.9 CM
ECHO LV IVSD: 0.8 CM (ref 0.6–0.9)
ECHO LV MASS 2D: 103.1 G (ref 67–162)
ECHO LV MASS INDEX 2D: 53.7 G/M2 (ref 43–95)
ECHO LV POSTERIOR WALL DIASTOLIC: 0.6 CM (ref 0.6–0.9)
ECHO LV RELATIVE WALL THICKNESS RATIO: 0.26
ECHO LVOT AREA: 3.1 CM2
ECHO LVOT DIAM: 2 CM
ECHO LVOT PEAK GRADIENT: 6 MMHG
ECHO LVOT PEAK VELOCITY: 1.2 M/S
ECHO MV A VELOCITY: 0.84 M/S
ECHO MV E DECELERATION TIME (DT): 173.7 MS
ECHO MV E VELOCITY: 0.87 M/S
ECHO MV E/A RATIO: 1.04
ECHO MV E/E' LATERAL: 7.91
ECHO MV E/E' RATIO (AVERAGED): 9.39
ECHO MV E/E' SEPTAL: 10.88
ECHO MV MAX VELOCITY: 1 M/S
ECHO MV MEAN GRADIENT: 2 MMHG
ECHO MV MEAN VELOCITY: 0.6 M/S
ECHO MV PEAK GRADIENT: 4 MMHG
ECHO MV REGURGITANT PEAK GRADIENT: 6 MMHG
ECHO MV REGURGITANT PEAK VELOCITY: 1.2 M/S
ECHO MV VTI: 25.7 CM
ECHO PV MAX VELOCITY: 1.2 M/S
ECHO PV PEAK GRADIENT: 6 MMHG
ECHO RIGHT VENTRICULAR SYSTOLIC PRESSURE (RVSP): 27 MMHG
ECHO RV FREE WALL PEAK S': 20 CM/S
ECHO RV TAPSE: 3.4 CM (ref 1.7–?)
ECHO TV REGURGITANT MAX VELOCITY: 2.44 M/S
ECHO TV REGURGITANT PEAK GRADIENT: 24 MMHG

## 2023-10-25 DIAGNOSIS — F51.01 PRIMARY INSOMNIA: ICD-10-CM

## 2023-10-25 RX ORDER — LORAZEPAM 1 MG/1
2 TABLET ORAL NIGHTLY
Qty: 60 TABLET | Refills: 0 | Status: SHIPPED | OUTPATIENT
Start: 2023-10-25 | End: 2023-11-24

## 2023-10-25 NOTE — TELEPHONE ENCOUNTER
Requested Prescriptions     Pending Prescriptions Disp Refills    LORazepam (ATIVAN) 1 MG tablet 60 tablet 0     Sig: Take 2 tablets by mouth nightly for 30 days.

## 2023-11-01 DIAGNOSIS — F51.01 PRIMARY INSOMNIA: ICD-10-CM

## 2023-11-02 RX ORDER — LORAZEPAM 1 MG/1
2 TABLET ORAL
Qty: 60 TABLET | Refills: 0 | OUTPATIENT
Start: 2023-11-02

## 2023-11-30 RX ORDER — CITALOPRAM 40 MG/1
40 TABLET ORAL DAILY
Qty: 90 TABLET | Refills: 0 | Status: SHIPPED | OUTPATIENT
Start: 2023-11-30

## 2023-12-12 NOTE — TELEPHONE ENCOUNTER
Pt is requesting a refill for the following medication      LORazepam (ATIVAN) 1 MG tablet      Pt states that she has been out of her meds for a few days now. The wrong script was call in.   She would like for the medication to be sent to the following pharmacy  [facility]  [address]  [phone]

## 2023-12-15 DIAGNOSIS — G47.00 INSOMNIA, UNSPECIFIED TYPE: Primary | ICD-10-CM

## 2023-12-15 RX ORDER — LORAZEPAM 1 MG/1
TABLET ORAL
Qty: 14 TABLET | Refills: 0 | Status: SHIPPED | OUTPATIENT
Start: 2023-12-15 | End: 2023-12-21

## 2023-12-15 RX ORDER — LORAZEPAM 1 MG/1
1 TABLET ORAL EVERY 8 HOURS PRN
OUTPATIENT
Start: 2023-12-15 | End: 2024-01-14

## 2023-12-27 ENCOUNTER — OFFICE VISIT (OUTPATIENT)
Age: 73
End: 2023-12-27
Payer: MEDICARE

## 2023-12-27 VITALS
SYSTOLIC BLOOD PRESSURE: 125 MMHG | TEMPERATURE: 97 F | BODY MASS INDEX: 34.07 KG/M2 | WEIGHT: 198.6 LBS | DIASTOLIC BLOOD PRESSURE: 70 MMHG | OXYGEN SATURATION: 98 % | RESPIRATION RATE: 18 BRPM | HEART RATE: 80 BPM

## 2023-12-27 DIAGNOSIS — E66.01 SEVERE OBESITY (HCC): ICD-10-CM

## 2023-12-27 DIAGNOSIS — F43.10 PTSD (POST-TRAUMATIC STRESS DISORDER): ICD-10-CM

## 2023-12-27 DIAGNOSIS — F41.9 ANXIETY: ICD-10-CM

## 2023-12-27 DIAGNOSIS — I10 ESSENTIAL (PRIMARY) HYPERTENSION: ICD-10-CM

## 2023-12-27 DIAGNOSIS — J01.00 ACUTE NON-RECURRENT MAXILLARY SINUSITIS: ICD-10-CM

## 2023-12-27 DIAGNOSIS — E66.9 OBESITY (BMI 30-39.9): ICD-10-CM

## 2023-12-27 DIAGNOSIS — F51.01 PRIMARY INSOMNIA: ICD-10-CM

## 2023-12-27 DIAGNOSIS — E03.9 HYPOTHYROIDISM, UNSPECIFIED TYPE: ICD-10-CM

## 2023-12-27 DIAGNOSIS — E78.00 PURE HYPERCHOLESTEROLEMIA: Primary | ICD-10-CM

## 2023-12-27 DIAGNOSIS — F51.8 ABNORMAL DREAMS: ICD-10-CM

## 2023-12-27 DIAGNOSIS — Z79.899 HIGH RISK MEDICATION USE: ICD-10-CM

## 2023-12-27 PROCEDURE — G8427 DOCREV CUR MEDS BY ELIG CLIN: HCPCS | Performed by: INTERNAL MEDICINE

## 2023-12-27 PROCEDURE — 99215 OFFICE O/P EST HI 40 MIN: CPT | Performed by: INTERNAL MEDICINE

## 2023-12-27 PROCEDURE — 3074F SYST BP LT 130 MM HG: CPT | Performed by: INTERNAL MEDICINE

## 2023-12-27 PROCEDURE — G8399 PT W/DXA RESULTS DOCUMENT: HCPCS | Performed by: INTERNAL MEDICINE

## 2023-12-27 PROCEDURE — 1090F PRES/ABSN URINE INCON ASSESS: CPT | Performed by: INTERNAL MEDICINE

## 2023-12-27 PROCEDURE — 1036F TOBACCO NON-USER: CPT | Performed by: INTERNAL MEDICINE

## 2023-12-27 PROCEDURE — 3078F DIAST BP <80 MM HG: CPT | Performed by: INTERNAL MEDICINE

## 2023-12-27 PROCEDURE — 1123F ACP DISCUSS/DSCN MKR DOCD: CPT | Performed by: INTERNAL MEDICINE

## 2023-12-27 PROCEDURE — 3017F COLORECTAL CA SCREEN DOC REV: CPT | Performed by: INTERNAL MEDICINE

## 2023-12-27 PROCEDURE — G8417 CALC BMI ABV UP PARAM F/U: HCPCS | Performed by: INTERNAL MEDICINE

## 2023-12-27 PROCEDURE — G8484 FLU IMMUNIZE NO ADMIN: HCPCS | Performed by: INTERNAL MEDICINE

## 2023-12-27 RX ORDER — AZITHROMYCIN 250 MG/1
250 TABLET, FILM COATED ORAL SEE ADMIN INSTRUCTIONS
Qty: 6 TABLET | Refills: 0 | Status: SHIPPED | OUTPATIENT
Start: 2023-12-27 | End: 2024-01-01

## 2023-12-27 RX ORDER — AMOXICILLIN AND CLAVULANATE POTASSIUM 875; 125 MG/1; MG/1
1 TABLET, FILM COATED ORAL 2 TIMES DAILY
Qty: 14 TABLET | Refills: 0 | Status: CANCELLED | OUTPATIENT
Start: 2023-12-27 | End: 2024-01-03

## 2023-12-27 RX ORDER — LORAZEPAM 1 MG/1
1.5 TABLET ORAL NIGHTLY PRN
Qty: 45 TABLET | Refills: 0 | Status: SHIPPED | OUTPATIENT
Start: 2023-12-27 | End: 2024-01-26

## 2023-12-27 NOTE — PROGRESS NOTES
I have reviewed all needed documentation in preparation for visit. Verified patient by name and date of birth  Chief Complaint   Patient presents with    [facility] and 6 mo f/u. Patient is in her third week of a sinus infection, has been taking dayquil, nightquil, mucinex with no relief. Patient is also experiencing cough due to postnasal drip. Needs a medication refill for Lorazepam and is out currently, wants a 90 day RX with two refills. There were no vitals filed for this visit. Health Maintenance Due   Topic Date Due    DTaP/Tdap/Td vaccine (1 - Tdap) Never done    Respiratory Syncytial Virus (RSV) Pregnant or age 61 yrs+ (1 - 1-dose 60+ series) Never done    Pneumococcal 65+ years Vaccine (2 - PCV) 05/05/2016    Shingles vaccine (2 of 2) 11/05/2020    Flu vaccine (1) 08/01/2023    COVID-19 Vaccine (4 - 2023-24 season) 09/01/2023       1. \"Have you been to the ER, urgent care clinic since your last visit? Hospitalized since your last visit? \" Yes, Kehinde Bull. 2. \"Have you seen or consulted any other health care providers outside of the [address] [place] [address] since your last visit? \" Yes, Kehinde Bull. 3. For patients aged 43-73: Has the patient had a colonoscopy / FIT/ Cologuard? Yes, up to date. If the patient is female:    4. For patients aged 43-66: Has the patient had a mammogram within the past 2 years? Yes, up to date. 5. For patients aged 21-65: Has the patient had a pap smear?  CLEMENTINA Hardwick

## 2024-01-17 ENCOUNTER — TELEPHONE (OUTPATIENT)
Age: 74
End: 2024-01-17

## 2024-01-17 DIAGNOSIS — Z12.31 ENCOUNTER FOR SCREENING MAMMOGRAM FOR MALIGNANT NEOPLASM OF BREAST: Primary | ICD-10-CM

## 2024-01-17 NOTE — TELEPHONE ENCOUNTER
Patient came into the office stating that she went to [facility] for her mammogram. She stated that Dr. BOYER need to sent over an order for her mammogram that she completes it every year and this will be the fifth year.     She needs a bilateral mammogram order to be sent in to complete her yearly mammogram.    Patient lso want to speak to a nurse or provider about her medication   LORazepam (ATIVAN) 1 MG tablet And seeing what Dr. BOYER can do to continue with medication.     You can give her a call at [phone].

## 2024-01-24 ENCOUNTER — HOSPITAL ENCOUNTER (OUTPATIENT)
Facility: HOSPITAL | Age: 74
Discharge: HOME | End: 2024-01-27
Attending: INTERNAL MEDICINE
Payer: MEDICARE

## 2024-01-24 VITALS — HEIGHT: 64 IN | BODY MASS INDEX: 33.8 KG/M2 | WEIGHT: 198 LBS

## 2024-01-24 DIAGNOSIS — Z85.3 PERSONAL HISTORY OF BREAST CANCER: ICD-10-CM

## 2024-01-24 DIAGNOSIS — Z12.31 ENCOUNTER FOR SCREENING MAMMOGRAM FOR MALIGNANT NEOPLASM OF BREAST: ICD-10-CM

## 2024-01-24 PROCEDURE — G0279 TOMOSYNTHESIS, MAMMO: HCPCS

## 2024-02-05 ENCOUNTER — OFFICE VISIT (OUTPATIENT)
Age: 74
End: 2024-02-05
Payer: MEDICARE

## 2024-02-05 VITALS
DIASTOLIC BLOOD PRESSURE: 82 MMHG | HEIGHT: 64 IN | OXYGEN SATURATION: 99 % | WEIGHT: 198 LBS | BODY MASS INDEX: 33.8 KG/M2 | SYSTOLIC BLOOD PRESSURE: 124 MMHG | HEART RATE: 78 BPM

## 2024-02-05 DIAGNOSIS — R26.89 BALANCE PROBLEM: ICD-10-CM

## 2024-02-05 DIAGNOSIS — R29.2 HYPERREFLEXIA: ICD-10-CM

## 2024-02-05 DIAGNOSIS — R41.3 MEMORY LOSS: Primary | ICD-10-CM

## 2024-02-05 DIAGNOSIS — E03.9 HYPOTHYROIDISM, UNSPECIFIED TYPE: ICD-10-CM

## 2024-02-05 DIAGNOSIS — F41.9 ANXIETY: ICD-10-CM

## 2024-02-05 DIAGNOSIS — M17.10 ARTHRITIS OF KNEE: ICD-10-CM

## 2024-02-05 PROCEDURE — 1090F PRES/ABSN URINE INCON ASSESS: CPT | Performed by: PSYCHIATRY & NEUROLOGY

## 2024-02-05 PROCEDURE — 1123F ACP DISCUSS/DSCN MKR DOCD: CPT | Performed by: PSYCHIATRY & NEUROLOGY

## 2024-02-05 PROCEDURE — G8484 FLU IMMUNIZE NO ADMIN: HCPCS | Performed by: PSYCHIATRY & NEUROLOGY

## 2024-02-05 PROCEDURE — 1036F TOBACCO NON-USER: CPT | Performed by: PSYCHIATRY & NEUROLOGY

## 2024-02-05 PROCEDURE — 3017F COLORECTAL CA SCREEN DOC REV: CPT | Performed by: PSYCHIATRY & NEUROLOGY

## 2024-02-05 PROCEDURE — 3079F DIAST BP 80-89 MM HG: CPT | Performed by: PSYCHIATRY & NEUROLOGY

## 2024-02-05 PROCEDURE — G8427 DOCREV CUR MEDS BY ELIG CLIN: HCPCS | Performed by: PSYCHIATRY & NEUROLOGY

## 2024-02-05 PROCEDURE — G8417 CALC BMI ABV UP PARAM F/U: HCPCS | Performed by: PSYCHIATRY & NEUROLOGY

## 2024-02-05 PROCEDURE — G8399 PT W/DXA RESULTS DOCUMENT: HCPCS | Performed by: PSYCHIATRY & NEUROLOGY

## 2024-02-05 PROCEDURE — 99205 OFFICE O/P NEW HI 60 MIN: CPT | Performed by: PSYCHIATRY & NEUROLOGY

## 2024-02-05 PROCEDURE — 3074F SYST BP LT 130 MM HG: CPT | Performed by: PSYCHIATRY & NEUROLOGY

## 2024-02-05 RX ORDER — OMEPRAZOLE 40 MG/1
CAPSULE, DELAYED RELEASE ORAL
COMMUNITY
Start: 2024-01-10

## 2024-02-05 NOTE — PROGRESS NOTES
Chief Complaint   Patient presents with    Neurologic Problem     Memory loss, balance issues     Vitals:    02/05/24 0849   BP: 124/82   Pulse: 78   SpO2: 99%

## 2024-02-05 NOTE — PROGRESS NOTES
Chief Complaint   Patient presents with    Neurologic Problem     Memory loss, balance issues     HISTORY OF PRESENT ILLNESS  Lauren Sanchez is a 73 y.o. female who came in for neurologic consultation requested by her PCP.  Over the past year or so she has been noticing memory problems.  She gives some examples where she once wore her shirt inside out to work, she had male on her lap to be put into the mailbox but kept driving and forgot to do it, she has gotten lost many times while driving.  She forgets conversations, things she needs to do etc.  Currently living by herself in an in-law suite.  She has a son but he is not very close.   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  She denies any problems at work.  No problems with activities of daily living.  She is able to cook for herself, pays her own bills, handles money etc.  Underlying medical issues include anxiety and she has been on a benzodiazepine at bedtime for over 16 years.  She also has hypothyroidism and is on thyroid replacement.  Her balance has been poor and she has had an occasional fall.  She does have degenerative arthritis of the knees and needs a knee replacement  She is a breast cancer survivor and has been in remission for over 5 years.  She has had some signal abnormalities (?  Smoldering)  in the bones which is being monitored.     Past Medical History:   Diagnosis Date    Breast cancer, left (HCC)     Depression     Goiter     Hearing loss     bilateral    Hypertension     Hypothyroid     IBS (irritable bowel syndrome)     Menopause     Rheumatoid arthritis (HCC)     S/P radiation therapy      Current Outpatient Medications   Medication Sig    mupirocin (BACTROBAN) 2 % ointment     omeprazole (PRILOSEC) 40 MG delayed release capsule     Semaglutide-Weight Management (WEGOVY) 0.25 MG/0.5ML SOAJ SC injection Inject 0.25 mg into the skin every 7 days for 30

## 2024-02-06 LAB — VIT B12 SERPL-MCNC: 261 PG/ML (ref 232–1245)

## 2024-02-08 ENCOUNTER — TELEPHONE (OUTPATIENT)
Age: 74
End: 2024-02-08

## 2024-02-08 NOTE — TELEPHONE ENCOUNTER
Called patient twice. No answer. Left a VM stating the doctor reviewed her labs stating, \"Please inform that her vitamin B12 level is low.  She should start OTC 1000 mcg sublingual tablet supplement daily.\" Will also give a mychart message to patient.

## 2024-02-12 RX ORDER — CITALOPRAM 40 MG/1
40 TABLET ORAL DAILY
Qty: 90 TABLET | Refills: 3 | Status: SHIPPED | OUTPATIENT
Start: 2024-02-12

## 2024-02-12 NOTE — TELEPHONE ENCOUNTER
Chief Complaint   Patient presents with    Medication Refill       Requested Prescriptions     Pending Prescriptions Disp Refills    citalopram (CELEXA) 40 MG tablet [Pharmacy Med Name: CITALOPRAM HYDROBROMIDE 40 MG Tablet] 90 tablet 3     Sig: TAKE 1 TABLET EVERY DAY       Allergies:  Allergies   Allergen Reactions    Cephalexin Rash    Sulfa Antibiotics Rash    Tioconazole Swelling       Last visit with clinic:  12/27/2023   Next visit with clinic: 3/27/2024     Signed by Christiano KOO  02/12/24  8:20 AM

## 2024-02-16 RX ORDER — LEVOTHYROXINE SODIUM 0.2 MG/1
TABLET ORAL
Qty: 90 TABLET | Refills: 1 | Status: SHIPPED | OUTPATIENT
Start: 2024-02-16

## 2024-02-16 NOTE — TELEPHONE ENCOUNTER
Refill request received from [facility] for   Requested Prescriptions     Pending Prescriptions Disp Refills    levothyroxine (SYNTHROID) 200 MCG tablet 30 tablet      Last appointment: 12/27/2023   Next appointment: 3/27/2024     Routed to Dr Vito Tran for review.

## 2024-03-27 ENCOUNTER — OFFICE VISIT (OUTPATIENT)
Age: 74
End: 2024-03-27
Payer: MEDICARE

## 2024-03-27 VITALS
WEIGHT: 205 LBS | RESPIRATION RATE: 18 BRPM | BODY MASS INDEX: 35.19 KG/M2 | DIASTOLIC BLOOD PRESSURE: 80 MMHG | TEMPERATURE: 97.1 F | HEART RATE: 66 BPM | SYSTOLIC BLOOD PRESSURE: 173 MMHG | OXYGEN SATURATION: 98 %

## 2024-03-27 DIAGNOSIS — J84.9 INTERSTITIAL PULMONARY DISEASE, UNSPECIFIED (HCC): ICD-10-CM

## 2024-03-27 DIAGNOSIS — I10 PRIMARY HYPERTENSION: ICD-10-CM

## 2024-03-27 DIAGNOSIS — M06.09 SERONEGATIVE RHEUMATOID ARTHRITIS OF MULTIPLE SITES (HCC): ICD-10-CM

## 2024-03-27 DIAGNOSIS — I10 ESSENTIAL (PRIMARY) HYPERTENSION: Primary | ICD-10-CM

## 2024-03-27 DIAGNOSIS — M35.3 PMR (POLYMYALGIA RHEUMATICA) (HCC): ICD-10-CM

## 2024-03-27 DIAGNOSIS — F32.1 MODERATE MAJOR DEPRESSION (HCC): ICD-10-CM

## 2024-03-27 DIAGNOSIS — E03.9 HYPOTHYROIDISM, UNSPECIFIED TYPE: ICD-10-CM

## 2024-03-27 DIAGNOSIS — C90.01 MULTIPLE MYELOMA IN REMISSION (HCC): ICD-10-CM

## 2024-03-27 DIAGNOSIS — E66.01 SEVERE OBESITY (BMI 35.0-39.9) WITH COMORBIDITY (HCC): ICD-10-CM

## 2024-03-27 PROCEDURE — 99214 OFFICE O/P EST MOD 30 MIN: CPT | Performed by: INTERNAL MEDICINE

## 2024-03-27 RX ORDER — LISINOPRIL AND HYDROCHLOROTHIAZIDE 12.5; 1 MG/1; MG/1
2 TABLET ORAL DAILY
Qty: 180 TABLET | Refills: 3 | Status: SHIPPED
Start: 2024-03-27

## 2024-03-27 ASSESSMENT — PATIENT HEALTH QUESTIONNAIRE - PHQ9
SUM OF ALL RESPONSES TO PHQ QUESTIONS 1-9: 0
SUM OF ALL RESPONSES TO PHQ QUESTIONS 1-9: 0
2. FEELING DOWN, DEPRESSED OR HOPELESS: NOT AT ALL
10. IF YOU CHECKED OFF ANY PROBLEMS, HOW DIFFICULT HAVE THESE PROBLEMS MADE IT FOR YOU TO DO YOUR WORK, TAKE CARE OF THINGS AT HOME, OR GET ALONG WITH OTHER PEOPLE: NOT DIFFICULT AT ALL
9. THOUGHTS THAT YOU WOULD BE BETTER OFF DEAD, OR OF HURTING YOURSELF: NOT AT ALL
3. TROUBLE FALLING OR STAYING ASLEEP: NOT AT ALL
8. MOVING OR SPEAKING SO SLOWLY THAT OTHER PEOPLE COULD HAVE NOTICED. OR THE OPPOSITE, BEING SO FIGETY OR RESTLESS THAT YOU HAVE BEEN MOVING AROUND A LOT MORE THAN USUAL: NOT AT ALL
4. FEELING TIRED OR HAVING LITTLE ENERGY: NOT AT ALL
6. FEELING BAD ABOUT YOURSELF - OR THAT YOU ARE A FAILURE OR HAVE LET YOURSELF OR YOUR FAMILY DOWN: NOT AT ALL
1. LITTLE INTEREST OR PLEASURE IN DOING THINGS: NOT AT ALL
5. POOR APPETITE OR OVEREATING: NOT AT ALL
7. TROUBLE CONCENTRATING ON THINGS, SUCH AS READING THE NEWSPAPER OR WATCHING TELEVISION: NOT AT ALL
SUM OF ALL RESPONSES TO PHQ QUESTIONS 1-9: 0
SUM OF ALL RESPONSES TO PHQ9 QUESTIONS 1 & 2: 0
SUM OF ALL RESPONSES TO PHQ QUESTIONS 1-9: 0

## 2024-03-27 NOTE — PROGRESS NOTES
Lauren Sanchez is a 73 y.o. female  Chief Complaint   Patient presents with    Follow-up     Follow up. Patient never got the Wegovy, it was sent into [facility] but she needs it sent to [facility].        Vitals:    03/27/24 1558   BP: (!) 173/80   Pulse: 66   Resp: 18   Temp: 97.1 °F (36.2 °C)   SpO2: 98%          HPI  Ms.Joan Arleen Sanchez is a 73 y.o. with HTN, hepatic steatosis, seronegative rheumatoid arthritis, PMR, Graves' disease, hypothyroidism, history of breast cancer on letrozole 2.5 mg, and depression     HTN: home blood pressure is around 130's to low 140's systolic, she takes lisinopril-hctz, increase to 2 tablets and return in 3 weeks for recheck.     RA/PMR: per Rheum, on rinvoq tablet , mtx     Osteopenia: last Dexa on in August 15, 2023 showed osteopenia -2.3 Tscore on lumbar spine    Hypothyroid: levothyroxine, didn't check TSH since June, will get TSH in 3 months     History of breast cancer: last mammogram in 1/24 was benign, on letrozole 2.5 mg    Past Medical History:   Diagnosis Date    Breast cancer, left (HCC)     Depression     Goiter     Hearing loss     bilateral    Hypertension     Hypothyroid     IBS (irritable bowel syndrome)     Menopause     Rheumatoid arthritis (HCC)     S/P radiation therapy             ROS  Review of Systems   Constitutional:  Negative for fever.   Respiratory:  Negative for cough and shortness of breath.    Cardiovascular:  Negative for chest pain and palpitations.   Neurological:  Negative for headaches.   Psychiatric/Behavioral:  Negative for dysphoric mood.            EXAM  Physical Exam  Vitals reviewed.   Constitutional:       Appearance: Normal appearance.   HENT:      Head: Normocephalic and atraumatic.   Cardiovascular:      Rate and Rhythm: Normal rate and regular rhythm.      Pulses: Normal pulses.      Heart sounds: Normal heart sounds. No murmur heard.  Pulmonary:      Effort: Pulmonary effort is normal. No respiratory

## 2024-03-29 ASSESSMENT — ENCOUNTER SYMPTOMS
COUGH: 0
SHORTNESS OF BREATH: 0

## 2024-04-05 ENCOUNTER — TELEPHONE (OUTPATIENT)
Age: 74
End: 2024-04-05

## 2024-04-05 NOTE — TELEPHONE ENCOUNTER
Patient requesting NP evaluation. States she would like to be scheduled on her day off, which is on Wednesdays. Also, to leave a detail message, if possible.    Please contact

## 2024-04-10 ENCOUNTER — NURSE ONLY (OUTPATIENT)
Age: 74
End: 2024-04-10

## 2024-04-10 VITALS
HEIGHT: 64 IN | TEMPERATURE: 97.1 F | RESPIRATION RATE: 18 BRPM | OXYGEN SATURATION: 98 % | SYSTOLIC BLOOD PRESSURE: 124 MMHG | HEART RATE: 73 BPM | DIASTOLIC BLOOD PRESSURE: 72 MMHG | WEIGHT: 205 LBS | BODY MASS INDEX: 35 KG/M2

## 2024-04-10 DIAGNOSIS — R41.3 MEMORY LOSS: ICD-10-CM

## 2024-04-10 DIAGNOSIS — R29.2 HYPERREFLEXIA: ICD-10-CM

## 2024-04-10 DIAGNOSIS — R26.89 BALANCE PROBLEM: ICD-10-CM

## 2024-04-10 DIAGNOSIS — I10 ESSENTIAL (PRIMARY) HYPERTENSION: ICD-10-CM

## 2024-04-10 LAB
ANION GAP SERPL CALC-SCNC: 8 MMOL/L (ref 5–15)
BUN SERPL-MCNC: 21 MG/DL (ref 6–20)
BUN/CREAT SERPL: 21 (ref 12–20)
CALCIUM SERPL-MCNC: 9.5 MG/DL (ref 8.5–10.1)
CHLORIDE SERPL-SCNC: 103 MMOL/L (ref 97–108)
CO2 SERPL-SCNC: 26 MMOL/L (ref 21–32)
CREAT SERPL-MCNC: 1 MG/DL (ref 0.55–1.02)
GLUCOSE SERPL-MCNC: 123 MG/DL (ref 65–100)
POTASSIUM SERPL-SCNC: 4.3 MMOL/L (ref 3.5–5.1)
SODIUM SERPL-SCNC: 137 MMOL/L (ref 136–145)

## 2024-04-10 NOTE — PROGRESS NOTES
I have reviewed all needed documentation in preparation for visit. Verified patient by name and date of birth  No chief complaint on file.      Vitals:    04/10/24 1521   BP: 124/72   Site: Right Upper Arm   Position: Sitting   Cuff Size: Medium Adult   Pulse: 73   Resp: 18   Temp: 97.1 °F (36.2 °C)   TempSrc: Temporal   SpO2: 98%   Weight: 93 kg (205 lb)   Height: 1.626 m (5' 4\")       Health Maintenance Due   Topic Date Due    DTaP/Tdap/Td vaccine (1 - Tdap) Never done    Respiratory Syncytial Virus (RSV) Pregnant or age 60 yrs+ (1 - 1-dose 60+ series) Never done    Pneumococcal 65+ years Vaccine (2 of 2 - PCV) 05/05/2016    Shingles vaccine (2 of 2) 11/05/2020    COVID-19 Vaccine (4 - 2023-24 season) 09/01/2023       Patient walk in to check blood pressure     Sosa castillo, CMA

## 2024-04-11 LAB — VIT B12 SERPL-MCNC: 654 PG/ML (ref 193–986)

## 2024-04-26 ENCOUNTER — OFFICE VISIT (OUTPATIENT)
Age: 74
End: 2024-04-26
Payer: MEDICARE

## 2024-04-26 VITALS
HEIGHT: 64 IN | HEART RATE: 73 BPM | DIASTOLIC BLOOD PRESSURE: 74 MMHG | RESPIRATION RATE: 14 BRPM | SYSTOLIC BLOOD PRESSURE: 125 MMHG | BODY MASS INDEX: 35.27 KG/M2 | OXYGEN SATURATION: 97 % | TEMPERATURE: 98 F | WEIGHT: 206.6 LBS

## 2024-04-26 DIAGNOSIS — E66.01 SEVERE OBESITY (BMI 35.0-39.9) WITH COMORBIDITY (HCC): Primary | ICD-10-CM

## 2024-04-26 DIAGNOSIS — I10 PRIMARY HYPERTENSION: ICD-10-CM

## 2024-04-26 PROCEDURE — G8427 DOCREV CUR MEDS BY ELIG CLIN: HCPCS | Performed by: INTERNAL MEDICINE

## 2024-04-26 PROCEDURE — G8417 CALC BMI ABV UP PARAM F/U: HCPCS | Performed by: INTERNAL MEDICINE

## 2024-04-26 PROCEDURE — 3078F DIAST BP <80 MM HG: CPT | Performed by: INTERNAL MEDICINE

## 2024-04-26 PROCEDURE — 1123F ACP DISCUSS/DSCN MKR DOCD: CPT | Performed by: INTERNAL MEDICINE

## 2024-04-26 PROCEDURE — 99213 OFFICE O/P EST LOW 20 MIN: CPT | Performed by: INTERNAL MEDICINE

## 2024-04-26 PROCEDURE — 3017F COLORECTAL CA SCREEN DOC REV: CPT | Performed by: INTERNAL MEDICINE

## 2024-04-26 PROCEDURE — 3074F SYST BP LT 130 MM HG: CPT | Performed by: INTERNAL MEDICINE

## 2024-04-26 PROCEDURE — G8399 PT W/DXA RESULTS DOCUMENT: HCPCS | Performed by: INTERNAL MEDICINE

## 2024-04-26 PROCEDURE — 1090F PRES/ABSN URINE INCON ASSESS: CPT | Performed by: INTERNAL MEDICINE

## 2024-04-26 PROCEDURE — 1036F TOBACCO NON-USER: CPT | Performed by: INTERNAL MEDICINE

## 2024-04-26 RX ORDER — ORLISTAT 120 MG/1
120 CAPSULE ORAL
Qty: 90 CAPSULE | Refills: 2 | Status: SHIPPED | OUTPATIENT
Start: 2024-04-26 | End: 2024-07-25

## 2024-04-26 ASSESSMENT — ENCOUNTER SYMPTOMS
SHORTNESS OF BREATH: 0
COUGH: 0

## 2024-04-26 NOTE — PROGRESS NOTES
Lauren Sanchez is a 73 y.o. female  Chief Complaint   Patient presents with    Follow-up     No concerns - would like paper work filled out for medication help        Vitals:    04/26/24 0910   BP: 125/74   Pulse: 73   Resp: 14   Temp: 98 °F (36.7 °C)   SpO2: 97%          HPI  Ms.Joan Arleen Sanchez presents to clinic to follow up on HTN. She is taking lisinopril hctz 2 tablets daily, her blood pressure is better. She likes to loose weight to help with her arthritis pain, she is not able to move as much due to pain. She wants to curb her appetite with help of medication, she doesn't want to be on habit forming medications. Wegovy or zepbound are not covered by her insurance. She needs some medication assistance to help with weight loss.       .  Past Medical History:   Diagnosis Date    Breast cancer, left (HCC)     Depression     Goiter     Hearing loss     bilateral    Hypertension     Hypothyroid     IBS (irritable bowel syndrome)     Menopause     Rheumatoid arthritis (HCC)     S/P radiation therapy             ROS  Review of Systems   Constitutional:  Negative for fever.   Respiratory:  Negative for cough and shortness of breath.    Cardiovascular:  Negative for chest pain and palpitations.   Neurological:  Negative for headaches.   Psychiatric/Behavioral:  Negative for dysphoric mood.            EXAM  Physical Exam  Vitals and nursing note reviewed.   HENT:      Head: Normocephalic and atraumatic.   Cardiovascular:      Rate and Rhythm: Normal rate and regular rhythm.      Pulses: Normal pulses.      Heart sounds: Normal heart sounds. No murmur heard.  Pulmonary:      Effort: Pulmonary effort is normal. No respiratory distress.      Breath sounds: Normal breath sounds.   Abdominal:      Palpations: There is no mass.      Tenderness: There is no right CVA tenderness, left CVA tenderness or rebound.   Musculoskeletal:      Right lower leg: No edema.      Left lower leg: No edema.   Neurological:

## 2024-05-02 DIAGNOSIS — Z17.0 MALIGNANT NEOPLASM OF NIPPLE OF LEFT BREAST IN FEMALE, ESTROGEN RECEPTOR POSITIVE (HCC): Primary | ICD-10-CM

## 2024-05-02 DIAGNOSIS — C50.012 MALIGNANT NEOPLASM OF NIPPLE OF LEFT BREAST IN FEMALE, ESTROGEN RECEPTOR POSITIVE (HCC): Primary | ICD-10-CM

## 2024-05-02 RX ORDER — LETROZOLE 2.5 MG/1
2.5 TABLET, FILM COATED ORAL DAILY
Qty: 30 TABLET | Refills: 0 | Status: SHIPPED | OUTPATIENT
Start: 2024-05-02

## 2024-05-02 NOTE — PROGRESS NOTES
Oral Hormone therapy     Lauren Sanchez is a  73 y.o.female  diagnosed with breast cancer . Ms. Arleen Sanchez is being treated with Letrozole.     Medication name: Letrozole    Dose:  2.5 mg   Frequency: daily    Ordering provider: Johanna Scott MD  Start date: 4/2019        Last OV 7/21/23  Next OV 5/29/24    Refill for Letrozole sent to pharmacy on file. Refilled for 30 days pending office visit - should be completing treatment this year.        Francisca Preston, EBONID, BCOP, BCPS    For Pharmacy Admin Tracking Only    Program: Medical Group  CPA in place:  Yes  Recommendation Provided To: Patient/Caregiver: 1 via Telephone  Intervention Detail: Refill(s) Provided  Intervention Accepted By: Patient/Caregiver: 1    Time Spent (min): 10

## 2024-05-07 ENCOUNTER — PATIENT MESSAGE (OUTPATIENT)
Age: 74
End: 2024-05-07

## 2024-05-07 DIAGNOSIS — E66.01 SEVERE OBESITY (BMI 35.0-39.9) WITH COMORBIDITY (HCC): Primary | ICD-10-CM

## 2024-05-07 DIAGNOSIS — M06.09 SERONEGATIVE RHEUMATOID ARTHRITIS OF MULTIPLE SITES (HCC): ICD-10-CM

## 2024-05-07 DIAGNOSIS — I10 ESSENTIAL (PRIMARY) HYPERTENSION: ICD-10-CM

## 2024-05-08 RX ORDER — ORLISTAT 120 MG/1
120 CAPSULE ORAL
Qty: 90 CAPSULE | Refills: 2 | Status: SHIPPED | OUTPATIENT
Start: 2024-05-08 | End: 2024-08-06

## 2024-05-08 NOTE — TELEPHONE ENCOUNTER
Patient's mail order pharmacy didn't have orlistat and patient asks for prescription to be tried at local pharmacy[facility]

## 2024-05-17 ENCOUNTER — TELEPHONE (OUTPATIENT)
Age: 74
End: 2024-05-17

## 2024-05-17 DIAGNOSIS — I10 ESSENTIAL (PRIMARY) HYPERTENSION: ICD-10-CM

## 2024-05-17 DIAGNOSIS — E66.01 SEVERE OBESITY (BMI 35.0-39.9) WITH COMORBIDITY (HCC): ICD-10-CM

## 2024-05-17 DIAGNOSIS — M06.09 SERONEGATIVE RHEUMATOID ARTHRITIS OF MULTIPLE SITES (HCC): ICD-10-CM

## 2024-05-17 RX ORDER — ORLISTAT 120 MG/1
120 CAPSULE ORAL
Qty: 90 CAPSULE | Refills: 2 | Status: CANCELLED | OUTPATIENT
Start: 2024-05-17 | End: 2024-08-15

## 2024-05-17 NOTE — TELEPHONE ENCOUNTER
----- Message from Lauren Sanchez sent at 5/16/2024  4:04 PM EDT -----  Regarding: Orlistat  Contact: [phone]   when I went to [facility] to  my 30 day prescription for Orlistat it was $ 654 , I can’t this , but I have found out that I can get a 90 day Prescription from [facility] for $207 with my GoodRx card can you please call in to [facility] so I can get this prescription there number is [phone]. Their address is [address] [place] [address]. will be for the generic and it will be for a 90 day prescription, please call us in for me so I can get started on helping myself. Thank you for helping me Lauren Sanchez.

## 2024-05-21 DIAGNOSIS — E66.01 SEVERE OBESITY (BMI 35.0-39.9) WITH COMORBIDITY (HCC): ICD-10-CM

## 2024-05-21 DIAGNOSIS — I10 PRIMARY HYPERTENSION: ICD-10-CM

## 2024-05-21 RX ORDER — ORLISTAT 120 MG/1
120 CAPSULE ORAL
Qty: 90 CAPSULE | Refills: 2 | Status: SHIPPED | OUTPATIENT
Start: 2024-05-21 | End: 2024-08-19

## 2024-05-21 NOTE — TELEPHONE ENCOUNTER
Pt requests Rx from [facility]    Refill request received from [facility] for   Requested Prescriptions     Pending Prescriptions Disp Refills    orlistat (XENICAL) 120 MG capsule 90 capsule 2     Sig: Take 1 capsule by mouth 3 times daily (with meals)     Last office visit: 4/26/2024   Next office visit: 7/31/2024     Routed to Dr Vito Tran for review.     Chasity Knight LPN

## 2024-05-21 NOTE — TELEPHONE ENCOUNTER
----- Message from Lauren Sanchez sent at 5/20/2024 12:29 PM EDT -----  Regarding: Orlistat  Contact: [phone]  Jhoan Gaspar , I found a [facility] in [place] and I live in [place] so maybe they can help us , the cost at [facility] is $207 and the cost at [facility] is $283 they are the cheapest places to get Orlista everywhere else cost $5-6 hundred dollars which I can’t afford , both stores have it for 90 tables the numbers are [facility] -[phone] and  [facility] -[phone] thank you for all of your help I feel like I keep hitting a brick wall getting this med  TY Lauren

## 2024-05-21 NOTE — TELEPHONE ENCOUNTER
Pt provided information for  [facility] pharmacy on 5/20.  Refill request routed to provider on 5/21    Chasity Knight LPN

## 2024-05-22 DIAGNOSIS — D47.2 MONOCLONAL GAMMOPATHY OF UNKNOWN SIGNIFICANCE (MGUS): Primary | ICD-10-CM

## 2024-05-25 DIAGNOSIS — Z17.0 MALIGNANT NEOPLASM OF NIPPLE OF LEFT BREAST IN FEMALE, ESTROGEN RECEPTOR POSITIVE (HCC): ICD-10-CM

## 2024-05-25 DIAGNOSIS — C50.012 MALIGNANT NEOPLASM OF NIPPLE OF LEFT BREAST IN FEMALE, ESTROGEN RECEPTOR POSITIVE (HCC): ICD-10-CM

## 2024-05-28 ENCOUNTER — TELEPHONE (OUTPATIENT)
Age: 74
End: 2024-05-28

## 2024-05-28 RX ORDER — LETROZOLE 2.5 MG/1
2.5 TABLET, FILM COATED ORAL DAILY
Qty: 30 TABLET | Refills: 11 | OUTPATIENT
Start: 2024-05-28

## 2024-05-28 NOTE — TELEPHONE ENCOUNTER
Patient LVM. Stated they have an appt with Shekhar on 05/29 @10:15a. Not going to be able to make appt due to conflict with another doc appt.    #[phone]

## 2024-05-30 ENCOUNTER — TELEPHONE (OUTPATIENT)
Age: 74
End: 2024-05-30

## 2024-05-30 NOTE — TELEPHONE ENCOUNTER
Patient LVM. Stated needing to schedule an appt with Shekhar on very first opening.    #[phone]

## 2024-06-05 ENCOUNTER — TELEPHONE (OUTPATIENT)
Age: 74
End: 2024-06-05

## 2024-06-05 NOTE — TELEPHONE ENCOUNTER
Called spoke with patient regarding scheduling a follow up. Confirmed time and date. Also advised to patient the importance of having labs down at least 7 days before appt.

## 2024-06-06 DIAGNOSIS — D47.2 MONOCLONAL GAMMOPATHY OF UNKNOWN SIGNIFICANCE (MGUS): ICD-10-CM

## 2024-06-07 LAB
ALBUMIN SERPL-MCNC: 4 G/DL (ref 3.5–5)
ALBUMIN/GLOB SERPL: 1.1 (ref 1.1–2.2)
ALP SERPL-CCNC: 148 U/L (ref 45–117)
ALT SERPL-CCNC: 87 U/L (ref 12–78)
ANION GAP SERPL CALC-SCNC: 4 MMOL/L (ref 5–15)
AST SERPL-CCNC: 42 U/L (ref 15–37)
BASOPHILS # BLD: 0.1 K/UL (ref 0–0.1)
BASOPHILS NFR BLD: 1 % (ref 0–1)
BILIRUB SERPL-MCNC: 0.7 MG/DL (ref 0.2–1)
BUN SERPL-MCNC: 15 MG/DL (ref 6–20)
BUN/CREAT SERPL: 16 (ref 12–20)
CALCIUM SERPL-MCNC: 9.5 MG/DL (ref 8.5–10.1)
CHLORIDE SERPL-SCNC: 104 MMOL/L (ref 97–108)
CO2 SERPL-SCNC: 29 MMOL/L (ref 21–32)
CREAT SERPL-MCNC: 0.92 MG/DL (ref 0.55–1.02)
DIFFERENTIAL METHOD BLD: ABNORMAL
EOSINOPHIL # BLD: 0 K/UL (ref 0–0.4)
EOSINOPHIL NFR BLD: 1 % (ref 0–7)
ERYTHROCYTE [DISTWIDTH] IN BLOOD BY AUTOMATED COUNT: 13.2 % (ref 11.5–14.5)
GLOBULIN SER CALC-MCNC: 3.7 G/DL (ref 2–4)
GLUCOSE SERPL-MCNC: 98 MG/DL (ref 65–100)
HCT VFR BLD AUTO: 38.1 % (ref 35–47)
HGB BLD-MCNC: 13.2 G/DL (ref 11.5–16)
IMM GRANULOCYTES # BLD AUTO: 0.1 K/UL (ref 0–0.04)
IMM GRANULOCYTES NFR BLD AUTO: 1 % (ref 0–0.5)
LYMPHOCYTES # BLD: 1 K/UL (ref 0.8–3.5)
LYMPHOCYTES NFR BLD: 15 % (ref 12–49)
MCH RBC QN AUTO: 33.4 PG (ref 26–34)
MCHC RBC AUTO-ENTMCNC: 34.6 G/DL (ref 30–36.5)
MCV RBC AUTO: 96.5 FL (ref 80–99)
MONOCYTES # BLD: 0.7 K/UL (ref 0–1)
MONOCYTES NFR BLD: 10 % (ref 5–13)
NEUTS SEG # BLD: 4.7 K/UL (ref 1.8–8)
NEUTS SEG NFR BLD: 72 % (ref 32–75)
NRBC # BLD: 0 K/UL (ref 0–0.01)
NRBC BLD-RTO: 0 PER 100 WBC
PLATELET # BLD AUTO: 358 K/UL (ref 150–400)
PMV BLD AUTO: 9.6 FL (ref 8.9–12.9)
POTASSIUM SERPL-SCNC: 4.1 MMOL/L (ref 3.5–5.1)
PROT SERPL-MCNC: 7.7 G/DL (ref 6.4–8.2)
RBC # BLD AUTO: 3.95 M/UL (ref 3.8–5.2)
SODIUM SERPL-SCNC: 137 MMOL/L (ref 136–145)
WBC # BLD AUTO: 6.5 K/UL (ref 3.6–11)

## 2024-06-17 LAB
ALBUMIN SERPL ELPH-MCNC: 4.1 G/DL (ref 2.9–4.4)
ALBUMIN/GLOB SERPL: 1.3 (ref 0.7–1.7)
ALPHA1 GLOB SERPL ELPH-MCNC: 0.2 G/DL (ref 0–0.4)
ALPHA2 GLOB SERPL ELPH-MCNC: 0.6 G/DL (ref 0.4–1)
B-GLOBULIN SERPL ELPH-MCNC: 1.6 G/DL (ref 0.7–1.3)
GAMMA GLOB SERPL ELPH-MCNC: 1.1 G/DL (ref 0.4–1.8)
GLOBULIN SER-MCNC: 3.4 G/DL (ref 2.2–3.9)
IGA SERPL-MCNC: 696 MG/DL (ref 64–422)
IGG SERPL-MCNC: 1263 MG/DL (ref 586–1602)
IGM SERPL-MCNC: 42 MG/DL (ref 26–217)
INTERPRETATION SERPL IEP-IMP: ABNORMAL
KAPPA LC FREE SER-MCNC: 25 MG/L (ref 3.3–19.4)
KAPPA LC FREE/LAMBDA FREE SER: 3.73 (ref 0.26–1.65)
LAMBDA LC FREE SERPL-MCNC: 6.7 MG/L (ref 5.7–26.3)
M PROTEIN SERPL ELPH-MCNC: 0.5 G/DL
PROT SERPL-MCNC: 7.5 G/DL (ref 6–8.5)

## 2024-06-19 ENCOUNTER — OFFICE VISIT (OUTPATIENT)
Age: 74
End: 2024-06-19
Payer: MEDICARE

## 2024-06-19 VITALS
SYSTOLIC BLOOD PRESSURE: 146 MMHG | BODY MASS INDEX: 35.53 KG/M2 | RESPIRATION RATE: 18 BRPM | HEART RATE: 68 BPM | DIASTOLIC BLOOD PRESSURE: 75 MMHG | WEIGHT: 207 LBS | TEMPERATURE: 98 F | OXYGEN SATURATION: 94 %

## 2024-06-19 DIAGNOSIS — Z17.0 MALIGNANT NEOPLASM OF NIPPLE OF LEFT BREAST IN FEMALE, ESTROGEN RECEPTOR POSITIVE (HCC): ICD-10-CM

## 2024-06-19 DIAGNOSIS — C90.00 MULTIPLE MYELOMA, REMISSION STATUS UNSPECIFIED (HCC): ICD-10-CM

## 2024-06-19 DIAGNOSIS — C50.012 MALIGNANT NEOPLASM OF NIPPLE OF LEFT BREAST IN FEMALE, ESTROGEN RECEPTOR POSITIVE (HCC): ICD-10-CM

## 2024-06-19 DIAGNOSIS — D47.2 MONOCLONAL GAMMOPATHY: Primary | ICD-10-CM

## 2024-06-19 PROCEDURE — G8399 PT W/DXA RESULTS DOCUMENT: HCPCS | Performed by: INTERNAL MEDICINE

## 2024-06-19 PROCEDURE — 3077F SYST BP >= 140 MM HG: CPT | Performed by: INTERNAL MEDICINE

## 2024-06-19 PROCEDURE — 3017F COLORECTAL CA SCREEN DOC REV: CPT | Performed by: INTERNAL MEDICINE

## 2024-06-19 PROCEDURE — 99214 OFFICE O/P EST MOD 30 MIN: CPT | Performed by: INTERNAL MEDICINE

## 2024-06-19 PROCEDURE — 1123F ACP DISCUSS/DSCN MKR DOCD: CPT | Performed by: INTERNAL MEDICINE

## 2024-06-19 PROCEDURE — 3078F DIAST BP <80 MM HG: CPT | Performed by: INTERNAL MEDICINE

## 2024-06-19 PROCEDURE — G8417 CALC BMI ABV UP PARAM F/U: HCPCS | Performed by: INTERNAL MEDICINE

## 2024-06-19 PROCEDURE — 1090F PRES/ABSN URINE INCON ASSESS: CPT | Performed by: INTERNAL MEDICINE

## 2024-06-19 PROCEDURE — G8428 CUR MEDS NOT DOCUMENT: HCPCS | Performed by: INTERNAL MEDICINE

## 2024-06-19 PROCEDURE — 1036F TOBACCO NON-USER: CPT | Performed by: INTERNAL MEDICINE

## 2024-06-19 NOTE — PROGRESS NOTES
Lauren Sanchez is a 73 y.o. female    Chief Complaint   Patient presents with    Follow-up     1)  Paraproteinemia- Smoldering Myeloma   2)  Left breast ER pos [place] pos (weak) HER 2 neg stage I Breast cancer- 12/2018         1. Have you been to the ER, urgent care clinic since your last visit?  Hospitalized since your last visit?No    2. Have you seen or consulted any other health care providers outside of the [facility] since your last visit?  Include any pap smears or colon screening. No    
PM    GFRAA >60 04/12/2022 09:04 AM     Lab Results   Component Value Date/Time    ALT 87 06/06/2024 02:07 PM    AST 42 06/06/2024 02:07 PM    GLOB 3.4 06/06/2024 02:07 PM    GLOB 3.7 06/06/2024 02:07 PM      Records reviewed and summarized above.     M SPIKE 0.3      Pathology report(s) reviewed      Bone marrow: 12/19/18   Variably cellular marrow with mild monoclonal plasmacytosis.    Trilineage hematopoiesis with maturation.    See comment.    Peripheral blood:    Unremarkable peripheral blood.    See CBC data.    Comment    The bone marrow is variably cellular ranging <10% to 60% to reveal mild monoclonal, kappa restricted plasmacytosis (10%). Trilineage hematopoiesis with adequate maturation is identified        Interpretation:    Del(1p): Not Detected    Dup(1q): Not Detected    Gains(5, 9, 15): DETECTED    Del(13q): Not Detected    Del(17p)(TP53): Not Detected (See Below)    IGH(Rearrangement): Not Detected    Testing performed by lettrs and interpreted by Ken Cantu M.D. Please see scanned outside report in [facility] for complete details.       Breast biopsy 12/3/18   Breast, left, needle core biopsy:    Invasive mammary carcinoma with ductal and lobular features    Favor Myron histologic grade 1    Largest glass slide measurement of invasive carcinoma: 8.5 mm       1/29/19   Lumpectomy and SLN       TUMOR    Tumor Size: 1.8    Histologic Type: Invasive lobular carcinoma    Histologic Grade (1-3)    Glandular (Acinar)/Tubular Differentiation: 3    Nuclear Pleomorphism: 1    Mitotic Rate: 1    Overall ndGndrndanddndend:nd nd2nd Lymph Nodes with Macrometastases (>2 mm): 0    Lymph Nodes with Micrometastases (>0.2 mm to 2 mm and/or >200 cells): 0 Number of Lymph Nodes with Isolated Tumor Cells (?0.2 mm and ?200 cells)#: 0    Total Number of Lymph Nodes Examined: 1    Number of Goddard Nodes Examined: 1    3. Breast, left, anterior margin, excision:    Atypical lobular hyperplasia

## 2024-06-20 ENCOUNTER — TELEPHONE (OUTPATIENT)
Age: 74
End: 2024-06-20

## 2024-06-20 NOTE — TELEPHONE ENCOUNTER
9:07am: pt verified x2, informed pt of PET scan date/time, prep. Also informed her I sent a [facility] message with all of the information. Informed the pt scheduling for the bx should be reaching out to her. No further questions.

## 2024-06-27 ENCOUNTER — TELEPHONE (OUTPATIENT)
Age: 74
End: 2024-06-27

## 2024-07-03 ENCOUNTER — HOSPITAL ENCOUNTER (OUTPATIENT)
Facility: HOSPITAL | Age: 74
Discharge: HOME | End: 2024-07-06
Payer: MEDICARE

## 2024-07-03 VITALS
DIASTOLIC BLOOD PRESSURE: 71 MMHG | SYSTOLIC BLOOD PRESSURE: 156 MMHG | RESPIRATION RATE: 14 BRPM | OXYGEN SATURATION: 96 % | HEART RATE: 60 BPM | TEMPERATURE: 98.1 F

## 2024-07-03 DIAGNOSIS — D47.2 MONOCLONAL GAMMOPATHY: ICD-10-CM

## 2024-07-03 LAB
BASOPHILS # BLD: 0.1 K/UL (ref 0–0.1)
BASOPHILS NFR BLD: 1 % (ref 0–1)
DIFFERENTIAL METHOD BLD: ABNORMAL
EOSINOPHIL # BLD: 0.1 K/UL (ref 0–0.4)
EOSINOPHIL NFR BLD: 1 % (ref 0–7)
ERYTHROCYTE [DISTWIDTH] IN BLOOD BY AUTOMATED COUNT: 12.5 % (ref 11.5–14.5)
HCT VFR BLD AUTO: 37.6 % (ref 35–47)
HGB BLD-MCNC: 12.9 G/DL (ref 11.5–16)
IMM GRANULOCYTES # BLD AUTO: 0.1 K/UL (ref 0–0.04)
IMM GRANULOCYTES NFR BLD AUTO: 1 % (ref 0–0.5)
LYMPHOCYTES # BLD: 0.9 K/UL (ref 0.8–3.5)
LYMPHOCYTES NFR BLD: 16 % (ref 12–49)
MCH RBC QN AUTO: 32.8 PG (ref 26–34)
MCHC RBC AUTO-ENTMCNC: 34.3 G/DL (ref 30–36.5)
MCV RBC AUTO: 95.7 FL (ref 80–99)
MONOCYTES # BLD: 0.6 K/UL (ref 0–1)
MONOCYTES NFR BLD: 11 % (ref 5–13)
NEUTS SEG # BLD: 3.8 K/UL (ref 1.8–8)
NEUTS SEG NFR BLD: 70 % (ref 32–75)
NRBC # BLD: 0 K/UL (ref 0–0.01)
NRBC BLD-RTO: 0 PER 100 WBC
PLATELET # BLD AUTO: 315 K/UL (ref 150–400)
PMV BLD AUTO: 9.2 FL (ref 8.9–12.9)
RBC # BLD AUTO: 3.93 M/UL (ref 3.8–5.2)
WBC # BLD AUTO: 5.4 K/UL (ref 3.6–11)

## 2024-07-03 PROCEDURE — 88342 IMHCHEM/IMCYTCHM 1ST ANTB: CPT

## 2024-07-03 PROCEDURE — 36415 COLL VENOUS BLD VENIPUNCTURE: CPT

## 2024-07-03 PROCEDURE — 88313 SPECIAL STAINS GROUP 2: CPT

## 2024-07-03 PROCEDURE — 2500000003 HC RX 250 WO HCPCS

## 2024-07-03 PROCEDURE — 88305 TISSUE EXAM BY PATHOLOGIST: CPT

## 2024-07-03 PROCEDURE — 85025 COMPLETE CBC W/AUTO DIFF WBC: CPT

## 2024-07-03 PROCEDURE — 6360000002 HC RX W HCPCS

## 2024-07-03 PROCEDURE — 99152 MOD SED SAME PHYS/QHP 5/>YRS: CPT

## 2024-07-03 PROCEDURE — 88311 DECALCIFY TISSUE: CPT

## 2024-07-03 RX ORDER — FENTANYL CITRATE 50 UG/ML
INJECTION, SOLUTION INTRAMUSCULAR; INTRAVENOUS PRN
Status: DISCONTINUED | OUTPATIENT
Start: 2024-07-03 | End: 2024-07-07 | Stop reason: HOSPADM

## 2024-07-03 RX ORDER — MIDAZOLAM HYDROCHLORIDE 5 MG/5ML
INJECTION, SOLUTION INTRAMUSCULAR; INTRAVENOUS PRN
Status: DISCONTINUED | OUTPATIENT
Start: 2024-07-03 | End: 2024-07-07 | Stop reason: HOSPADM

## 2024-07-03 RX ORDER — LIDOCAINE HYDROCHLORIDE 10 MG/ML
INJECTION, SOLUTION EPIDURAL; INFILTRATION; INTRACAUDAL; PERINEURAL PRN
Status: DISCONTINUED | OUTPATIENT
Start: 2024-07-03 | End: 2024-07-07 | Stop reason: HOSPADM

## 2024-07-03 RX ADMIN — FENTANYL CITRATE 50 MCG: 50 INJECTION, SOLUTION INTRAMUSCULAR; INTRAVENOUS at 10:40

## 2024-07-03 RX ADMIN — MIDAZOLAM HYDROCHLORIDE 1 MG: 1 INJECTION, SOLUTION INTRAMUSCULAR; INTRAVENOUS at 10:35

## 2024-07-03 RX ADMIN — LIDOCAINE HYDROCHLORIDE 10 ML: 10 INJECTION, SOLUTION EPIDURAL; INFILTRATION; INTRACAUDAL; PERINEURAL at 10:37

## 2024-07-03 RX ADMIN — FENTANYL CITRATE 50 MCG: 50 INJECTION, SOLUTION INTRAMUSCULAR; INTRAVENOUS at 10:35

## 2024-07-03 RX ADMIN — MIDAZOLAM HYDROCHLORIDE 1 MG: 1 INJECTION, SOLUTION INTRAMUSCULAR; INTRAVENOUS at 10:42

## 2024-07-03 RX ADMIN — FENTANYL CITRATE 50 MCG: 50 INJECTION, SOLUTION INTRAMUSCULAR; INTRAVENOUS at 10:44

## 2024-07-03 ASSESSMENT — PAIN SCALES - GENERAL
PAINLEVEL_OUTOF10: 0

## 2024-07-03 NOTE — DISCHARGE INSTRUCTIONS
You have received sedation medications today. YOU SHOULD NOT DRIVE FOR 24 HOURS, DO NOT OPERATE HEAVY MACHINERY, DO NOT MAKE ANY LEGAL DECISIONS OR SIGN LEGAL DOCUMENTS FOR 24 HOURS. DO NOT DRINK ALCOHOL, TAKE ANY MEDICATIONS UNLESS PRESCRIBED BY YOUR DOCTOR. IF YOU ARE A CAREGIVER, SOMEONE SHOULD TAKE THAT ROLE FOR 24 HOURS.       Side effects of sedation medications and other medications used today have been reviewed  Those side effects can include but are not limited to: dizziness, drowsiness, poor balance, fatigue, sleepiness. Take precautions at home to prevent falls, such as assistance with walking or stairs if allowed and /or when needed or position changes. Allergic or adverse reactions could include nausea, itching, hives, dizziness, or anything else out of the ordinary.       Should you experience any of these significant changes, please call [phone] between the hours of 7:30 am and 3:30 pm or [phone] after hours. After hours, ask the  to page the X-ray Technologist, and describe the problem to the technologist. If you are experiencing chest pain, shortness of breath, altered mental state, unusual bleeding or any other emergent symptom you should call [phone] immediately.

## 2024-07-03 NOTE — PROGRESS NOTES
Pt arrives ambulatory to angio department accompanied by family member for bone marrow biopsy procedure. All assessments completed and consent was reviewed.  Education given was regarding procedure, moderate sedation, post-procedure care and  management/follow-up. Opportunity for questions was provided and all questions and concerns were addressed.

## 2024-07-03 NOTE — H&P
°C) (Oral)   Resp 18   LMP  (LMP Unknown)   SpO2 99%    General:  NAD  Heart:  RRR  Lungs:  NWOB  Neurological:  AAOX3      ASSESSMENT / PLAN   Procedure to be performed:  CT guided bone marrow biopsy  Plan for sedation:  moderate  Mallampati Airway Assessment:  II (soft palate, uvula, fauces visible)  ASA Classification:  ASA 2 - Patient with mild systemic disease with no functional limitations  Post procedure plan:  observation per protocol  Informed consent:  indication, risks and alternatives of the planned procedure and sedation methods reviewed with the patient / family who agree to proceed  Code status:  Full      Case reviewed with Dr. Mccrary who is in agreement with the assessment and plan.      ODIN Thompson - NP  On license of [facility]

## 2024-07-15 RX ORDER — LEVOTHYROXINE SODIUM 0.2 MG/1
TABLET ORAL
Qty: 90 TABLET | Refills: 3 | Status: SHIPPED | OUTPATIENT
Start: 2024-07-15

## 2024-07-30 ENCOUNTER — HOSPITAL ENCOUNTER (OUTPATIENT)
Facility: HOSPITAL | Age: 74
Discharge: HOME | End: 2024-08-02
Payer: MEDICARE

## 2024-07-30 DIAGNOSIS — D47.2 MONOCLONAL GAMMOPATHY: ICD-10-CM

## 2024-07-30 DIAGNOSIS — C90.00 MULTIPLE MYELOMA, REMISSION STATUS UNSPECIFIED (HCC): ICD-10-CM

## 2024-07-30 LAB
GLUCOSE BLD STRIP.AUTO-MCNC: 111 MG/DL (ref 65–117)
SERVICE CMNT-IMP: NORMAL

## 2024-07-30 PROCEDURE — 82962 GLUCOSE BLOOD TEST: CPT

## 2024-07-30 PROCEDURE — 3430000000 HC RX DIAGNOSTIC RADIOPHARMACEUTICAL: Performed by: INTERNAL MEDICINE

## 2024-07-30 PROCEDURE — 78816 PET IMAGE W/CT FULL BODY: CPT

## 2024-07-30 PROCEDURE — A9609 HC RX DIAGNOSTIC RADIOPHARMACEUTICAL: HCPCS | Performed by: INTERNAL MEDICINE

## 2024-07-30 RX ORDER — FLUDEOXYGLUCOSE F-18 500 MCI/ML
10 INJECTION INTRAVENOUS ONCE
Status: COMPLETED | OUTPATIENT
Start: 2024-07-30 | End: 2024-07-30

## 2024-07-30 RX ADMIN — FLUDEOXYGLUCOSE F-18 10 MILLICURIE: 500 INJECTION INTRAVENOUS at 09:00

## 2024-07-30 SDOH — ECONOMIC STABILITY: FOOD INSECURITY: WITHIN THE PAST 12 MONTHS, THE FOOD YOU BOUGHT JUST DIDN'T LAST AND YOU DIDN'T HAVE MONEY TO GET MORE.: NEVER TRUE

## 2024-07-30 SDOH — ECONOMIC STABILITY: TRANSPORTATION INSECURITY
IN THE PAST 12 MONTHS, HAS LACK OF TRANSPORTATION KEPT YOU FROM MEETINGS, WORK, OR FROM GETTING THINGS NEEDED FOR DAILY LIVING?: NO

## 2024-07-30 SDOH — ECONOMIC STABILITY: INCOME INSECURITY: HOW HARD IS IT FOR YOU TO PAY FOR THE VERY BASICS LIKE FOOD, HOUSING, MEDICAL CARE, AND HEATING?: NOT VERY HARD

## 2024-07-30 SDOH — ECONOMIC STABILITY: FOOD INSECURITY: WITHIN THE PAST 12 MONTHS, YOU WORRIED THAT YOUR FOOD WOULD RUN OUT BEFORE YOU GOT MONEY TO BUY MORE.: NEVER TRUE

## 2024-07-31 ENCOUNTER — CLINICAL DOCUMENTATION (OUTPATIENT)
Age: 74
End: 2024-07-31

## 2024-07-31 ENCOUNTER — OFFICE VISIT (OUTPATIENT)
Age: 74
End: 2024-07-31
Payer: MEDICARE

## 2024-07-31 VITALS
RESPIRATION RATE: 16 BRPM | SYSTOLIC BLOOD PRESSURE: 114 MMHG | TEMPERATURE: 96.9 F | HEART RATE: 70 BPM | DIASTOLIC BLOOD PRESSURE: 72 MMHG | WEIGHT: 205 LBS | BODY MASS INDEX: 35 KG/M2 | OXYGEN SATURATION: 98 % | HEIGHT: 64 IN

## 2024-07-31 DIAGNOSIS — E78.00 PURE HYPERCHOLESTEROLEMIA: ICD-10-CM

## 2024-07-31 DIAGNOSIS — E03.9 ACQUIRED HYPOTHYROIDISM: ICD-10-CM

## 2024-07-31 DIAGNOSIS — Z00.00 MEDICARE ANNUAL WELLNESS VISIT, SUBSEQUENT: Primary | ICD-10-CM

## 2024-07-31 DIAGNOSIS — E66.9 OBESITY (BMI 30-39.9): ICD-10-CM

## 2024-07-31 LAB
CHOLEST SERPL-MCNC: 228 MG/DL
HDLC SERPL-MCNC: 65 MG/DL
HDLC SERPL: 3.5 (ref 0–5)
LDLC SERPL CALC-MCNC: 137.8 MG/DL (ref 0–100)
T4 FREE SERPL-MCNC: 1.5 NG/DL (ref 0.8–1.5)
TRIGL SERPL-MCNC: 126 MG/DL
TSH SERPL DL<=0.05 MIU/L-ACNC: 0.01 UIU/ML (ref 0.36–3.74)
VLDLC SERPL CALC-MCNC: 25.2 MG/DL

## 2024-07-31 PROCEDURE — G8427 DOCREV CUR MEDS BY ELIG CLIN: HCPCS | Performed by: INTERNAL MEDICINE

## 2024-07-31 PROCEDURE — G8399 PT W/DXA RESULTS DOCUMENT: HCPCS | Performed by: INTERNAL MEDICINE

## 2024-07-31 PROCEDURE — 99213 OFFICE O/P EST LOW 20 MIN: CPT | Performed by: INTERNAL MEDICINE

## 2024-07-31 PROCEDURE — 3078F DIAST BP <80 MM HG: CPT | Performed by: INTERNAL MEDICINE

## 2024-07-31 PROCEDURE — 1036F TOBACCO NON-USER: CPT | Performed by: INTERNAL MEDICINE

## 2024-07-31 PROCEDURE — 1090F PRES/ABSN URINE INCON ASSESS: CPT | Performed by: INTERNAL MEDICINE

## 2024-07-31 PROCEDURE — 1123F ACP DISCUSS/DSCN MKR DOCD: CPT | Performed by: INTERNAL MEDICINE

## 2024-07-31 PROCEDURE — 3017F COLORECTAL CA SCREEN DOC REV: CPT | Performed by: INTERNAL MEDICINE

## 2024-07-31 PROCEDURE — G0439 PPPS, SUBSEQ VISIT: HCPCS | Performed by: INTERNAL MEDICINE

## 2024-07-31 PROCEDURE — G8417 CALC BMI ABV UP PARAM F/U: HCPCS | Performed by: INTERNAL MEDICINE

## 2024-07-31 PROCEDURE — 3074F SYST BP LT 130 MM HG: CPT | Performed by: INTERNAL MEDICINE

## 2024-07-31 RX ORDER — LORAZEPAM 1 MG/1
1 TABLET ORAL EVERY 6 HOURS PRN
COMMUNITY
Start: 2024-06-10

## 2024-07-31 RX ORDER — NALTREXONE HYDROCHLORIDE 50 MG/1
12.5 TABLET, FILM COATED ORAL 2 TIMES DAILY
Qty: 45 TABLET | Refills: 0 | Status: SHIPPED | OUTPATIENT
Start: 2024-07-31 | End: 2024-10-29

## 2024-07-31 RX ORDER — BUPROPION HYDROCHLORIDE 150 MG/1
TABLET ORAL
Qty: 180 TABLET | Refills: 0 | Status: SHIPPED | OUTPATIENT
Start: 2024-07-31

## 2024-07-31 SDOH — ECONOMIC STABILITY: INCOME INSECURITY: IN THE LAST 12 MONTHS, WAS THERE A TIME WHEN YOU WERE NOT ABLE TO PAY THE MORTGAGE OR RENT ON TIME?: NO

## 2024-07-31 ASSESSMENT — PATIENT HEALTH QUESTIONNAIRE - PHQ9
SUM OF ALL RESPONSES TO PHQ QUESTIONS 1-9: 0
9. THOUGHTS THAT YOU WOULD BE BETTER OFF DEAD, OR OF HURTING YOURSELF: NOT AT ALL
10. IF YOU CHECKED OFF ANY PROBLEMS, HOW DIFFICULT HAVE THESE PROBLEMS MADE IT FOR YOU TO DO YOUR WORK, TAKE CARE OF THINGS AT HOME, OR GET ALONG WITH OTHER PEOPLE: NOT DIFFICULT AT ALL
SUM OF ALL RESPONSES TO PHQ QUESTIONS 1-9: 0
2. FEELING DOWN, DEPRESSED OR HOPELESS: NOT AT ALL
5. POOR APPETITE OR OVEREATING: NOT AT ALL
SUM OF ALL RESPONSES TO PHQ9 QUESTIONS 1 & 2: 0
7. TROUBLE CONCENTRATING ON THINGS, SUCH AS READING THE NEWSPAPER OR WATCHING TELEVISION: NOT AT ALL
4. FEELING TIRED OR HAVING LITTLE ENERGY: NOT AT ALL
SUM OF ALL RESPONSES TO PHQ QUESTIONS 1-9: 0
3. TROUBLE FALLING OR STAYING ASLEEP: NOT AT ALL
SUM OF ALL RESPONSES TO PHQ QUESTIONS 1-9: 0
8. MOVING OR SPEAKING SO SLOWLY THAT OTHER PEOPLE COULD HAVE NOTICED. OR THE OPPOSITE, BEING SO FIGETY OR RESTLESS THAT YOU HAVE BEEN MOVING AROUND A LOT MORE THAN USUAL: NOT AT ALL
6. FEELING BAD ABOUT YOURSELF - OR THAT YOU ARE A FAILURE OR HAVE LET YOURSELF OR YOUR FAMILY DOWN: NOT AT ALL
1. LITTLE INTEREST OR PLEASURE IN DOING THINGS: NOT AT ALL

## 2024-07-31 NOTE — PROGRESS NOTES
Cancer [place] at [facility]   [address], [address] [place] [address]   W: [phone]  F: [phone]          Reason for Visit:     Lauren Sanchez is a 71 y.o.  female who is seen for follow up of      1)  Paraproteinemia- Smoldering Myeloma   2)  Left breast ER pos [place] pos (weak) HER 2 neg stage I Breast cancer- 12/2018      TREATMENT HISTORY   1/29/19-left lumpectomy with sentinel lymph node biopsy.  1.8 cm invasive lobular carcinoma, grade 1, 1 of one negative lymph nodes.Oncotype low risk. Completed RT   4/19 started Letrozole          History of Present Illness:     Patient is a 71 y.o. with seronegative rheumatoid arthritis and secondary  polymyalgia rheumatica who is seen for follow-up of smoldering myeloma and lobular breast cancer.      She presents for follow-up on adjuvant Letrozole. Also to review results of recent testing to r/o active myeloma.   No new pain       Review of Systems: A complete review of systems was obtained, negative except as described above.          Physical Exam:        Visit Vitals        BP  (!) 159/77     Pulse  67     Resp  18     Ht  5' 4\" (1.626 m)     Wt  190 lb (86.2 kg)     SpO2  95%        BMI           ECOG PS: 0   General: No distress   Neck: Supple   Breast: No palpable lumps or adenopathy, no skin changes.   MS: Normal gait and station. Digits without clubbing or cyanosis.   Psych: Alert, oriented, appropriate affect, normal judgment/insight          Results:          Lab Results   Component Value Date/Time    WBC 5.4 07/03/2024 10:16 AM    HGB 12.9 07/03/2024 10:16 AM    HCT 37.6 07/03/2024 10:16 AM     07/03/2024 10:16 AM    MCV 95.7 07/03/2024 10:16 AM     Lab Results   Component Value Date/Time     06/06/2024 02:07 PM    K 4.1 06/06/2024 02:07 PM     06/06/2024 02:07 PM    CO2 29 06/06/2024 02:07 PM    BUN 15 06/06/2024 02:07 PM    GFRAA >60 04/12/2022 09:04 AM     Lab Results   Component Value

## 2024-07-31 NOTE — PATIENT INSTRUCTIONS
you may want to swim, bike, or do other activities. Bit by bit, increase the time you're active every day. Try for at least 30 minutes on most days of the week.     Try to quit or cut back on using tobacco and other nicotine products. This includes smoking and vaping. If you need help quitting, talk to your doctor about stop-smoking programs and medicines. These can increase your chances of quitting for good. Quitting is one of the most important things you can do to protect your heart. It is never too late to quit. Try to avoid secondhand smoke too.     Stay at a weight that's healthy for you. Talk to your doctor if you need help losing weight.     Try to get 7 to 9 hours of sleep each night.     Limit alcohol to 2 drinks a day for men and 1 drink a day for women. Too much alcohol can cause health problems.     Manage other health problems such as diabetes, high blood pressure, and high cholesterol. If you think you may have a problem with alcohol or drug use, talk to your doctor.   Medicines    Take your medicines exactly as prescribed. Call your doctor if you think you are having a problem with your medicine.     If your doctor recommends aspirin, take the amount directed each day. Make sure you take aspirin and not another kind of pain reliever, such as acetaminophen (Tylenol).   When should you call for help?   Call 911 if you have symptoms of a heart attack. These may include:    Chest pain or pressure, or a strange feeling in the chest.     Sweating.     Shortness of breath.     Pain, pressure, or a strange feeling in the back, neck, jaw, or upper belly or in one or both shoulders or arms.     Lightheadedness or sudden weakness.     A fast or irregular heartbeat.   After you call 911, the  may tell you to chew 1 adult-strength or 2 to 4 low-dose aspirin. Wait for an ambulance. Do not try to drive yourself.  Watch closely for changes in your health, and be sure to contact your doctor if you have any

## 2024-07-31 NOTE — PROGRESS NOTES
I have reviewed all needed documentation in preparation for visit. Verified patient by name and date of birth  Chief Complaint   Patient presents with    Annual Exam     No Concerns        There were no vitals filed for this visit.    Health Maintenance Due   Topic Date Due    DTaP/Tdap/Td vaccine (1 - Tdap) Never done    Respiratory Syncytial Virus (RSV) Pregnant or age 60 yrs+ (1 - 1-dose 60+ series) Never done    Pneumococcal 65+ years Vaccine (2 of 2 - PCV) 05/05/2016    Shingles vaccine (2 of 2) 11/05/2020    COVID-19 Vaccine (4 - 2023-24 season) 09/01/2023    Annual Wellness Visit (Medicare)  06/27/2024     \"Have you been to the ER, urgent care clinic since your last visit?  Hospitalized since your last visit?\"    NO    “Have you seen or consulted any other health care providers outside of [facility] since your last visit?”    NO            Click Here for Release of Records Request         Trever Rodriguez [facility]

## 2024-07-31 NOTE — PROGRESS NOTES
Convert this visit to AWV if patient is ok   Medicare Annual Wellness Visit    Lauren Sanchez is here for Medicare AWV (Would like a do not resituate form )    Assessment & Plan   Medicare annual wellness visit, subsequent  Obesity (BMI 30-39.9)  -     buPROPion (WELLBUTRIN XL) 150 MG extended release tablet; Take half tablet in the morning for one week, then 1 tablet in the morning for one week , and then 1 tablet twice daily, Disp-180 tablet, R-0Normal  -     naltrexone (DEPADE) 50 MG tablet; Take 0.25 tablets by mouth in the morning and at bedtime Take a quarter tablet, Disp-45 tablet, R-0Normal  Acquired hypothyroidism  -     TSH + Free T4 Panel; Future  Pure hypercholesterolemia  -     Lipid Panel; Future      Recommendations for Preventive Services Due: see orders and patient instructions/AVS.  Recommended screening schedule for the next 5-10 years is provided to the patient in written form: see Patient Instructions/AVS.     Return in 1 year (on 7/31/2025) for Medicare AWV.     Subjective   Ms.Lauren Sanchez is a 73-year-old with a history of MM, obesity, seronegative rheumatoid arthritis, PMR, Graves' disease, hypothyroidism, hypertension and depression presents for wellness evaluation. She wants to loose weight and none of approved medication are affordable, she has recurrent falls, she continues to work and doesn't want referral to PT as of now.     Discussed about health maintenance vaccinations and she is up to date. We discussed on life style modification.     Patient's complete Health Risk Assessment and screening values have been reviewed and are found in Flowsheets. The following problems were reviewed today and where indicated follow up appointments were made and/or referrals ordered.    Positive Risk Factor Screenings with Interventions:    Fall Risk:  Do you feel unsteady or are you worried about falling? : (!) yes  2 or more falls in past year?: (!) yes  Fall with injury in past year?:

## 2024-08-01 ENCOUNTER — OFFICE VISIT (OUTPATIENT)
Age: 74
End: 2024-08-01
Payer: MEDICARE

## 2024-08-01 DIAGNOSIS — D47.2 SMOLDERING MULTIPLE MYELOMA (SMM): Primary | ICD-10-CM

## 2024-08-01 PROCEDURE — G8417 CALC BMI ABV UP PARAM F/U: HCPCS | Performed by: INTERNAL MEDICINE

## 2024-08-01 PROCEDURE — 99215 OFFICE O/P EST HI 40 MIN: CPT | Performed by: INTERNAL MEDICINE

## 2024-08-01 PROCEDURE — 1090F PRES/ABSN URINE INCON ASSESS: CPT | Performed by: INTERNAL MEDICINE

## 2024-08-01 PROCEDURE — 3017F COLORECTAL CA SCREEN DOC REV: CPT | Performed by: INTERNAL MEDICINE

## 2024-08-01 PROCEDURE — G8399 PT W/DXA RESULTS DOCUMENT: HCPCS | Performed by: INTERNAL MEDICINE

## 2024-08-01 PROCEDURE — 1036F TOBACCO NON-USER: CPT | Performed by: INTERNAL MEDICINE

## 2024-08-01 PROCEDURE — G8428 CUR MEDS NOT DOCUMENT: HCPCS | Performed by: INTERNAL MEDICINE

## 2024-08-01 PROCEDURE — 1123F ACP DISCUSS/DSCN MKR DOCD: CPT | Performed by: INTERNAL MEDICINE

## 2024-08-12 ENCOUNTER — TELEPHONE (OUTPATIENT)
Age: 74
End: 2024-08-12

## 2024-08-12 NOTE — TELEPHONE ENCOUNTER
Patient is requesting medication be sent into her mail order pharmacy instead of [facility].      Bupropion (Wellbutrin XL)  Naltrxone

## 2024-09-30 DIAGNOSIS — I10 PRIMARY HYPERTENSION: ICD-10-CM

## 2024-09-30 RX ORDER — LISINOPRIL/HYDROCHLOROTHIAZIDE 10-12.5 MG
2 TABLET ORAL DAILY
Qty: 180 TABLET | Refills: 3 | Status: SHIPPED | OUTPATIENT
Start: 2024-09-30

## 2024-09-30 NOTE — TELEPHONE ENCOUNTER
Refill request received from [facility] for   Requested Prescriptions     Pending Prescriptions Disp Refills    lisinopril-hydroCHLOROthiazide (PRINZIDE;ZESTORETIC) 10-12.5 MG per tablet 180 tablet 3     Sig: Take 2 tablets by mouth daily     Last office visit: 7/31/2024   Next office visit: 10/30/2024     Routed to Dr Vito Tran for review.         Trever Rodriguez [facility]

## 2024-10-15 DIAGNOSIS — I10 PRIMARY HYPERTENSION: ICD-10-CM

## 2024-10-15 RX ORDER — LISINOPRIL/HYDROCHLOROTHIAZIDE 10-12.5 MG
2 TABLET ORAL DAILY
Qty: 180 TABLET | Refills: 1 | Status: SHIPPED | OUTPATIENT
Start: 2024-10-15 | End: 2024-10-17 | Stop reason: SDUPTHER

## 2024-10-15 NOTE — TELEPHONE ENCOUNTER
Left vm for patient regarding her appointment request. Left number for patient to return call to schedule.

## 2024-10-17 ENCOUNTER — OFFICE VISIT (OUTPATIENT)
Age: 74
End: 2024-10-17
Payer: MEDICARE

## 2024-10-17 VITALS
HEIGHT: 64 IN | RESPIRATION RATE: 20 BRPM | BODY MASS INDEX: 34.31 KG/M2 | HEART RATE: 78 BPM | TEMPERATURE: 97.6 F | WEIGHT: 201 LBS | OXYGEN SATURATION: 97 % | DIASTOLIC BLOOD PRESSURE: 72 MMHG | SYSTOLIC BLOOD PRESSURE: 119 MMHG

## 2024-10-17 DIAGNOSIS — F32.1 MODERATE MAJOR DEPRESSION (HCC): ICD-10-CM

## 2024-10-17 DIAGNOSIS — N76.0 ACUTE VAGINITIS: ICD-10-CM

## 2024-10-17 DIAGNOSIS — I10 PRIMARY HYPERTENSION: ICD-10-CM

## 2024-10-17 DIAGNOSIS — E66.9 OBESITY (BMI 30-39.9): ICD-10-CM

## 2024-10-17 DIAGNOSIS — G47.34 OXYGEN DESATURATION DURING SLEEP: ICD-10-CM

## 2024-10-17 DIAGNOSIS — Z09 HOSPITAL DISCHARGE FOLLOW-UP: Primary | ICD-10-CM

## 2024-10-17 DIAGNOSIS — R30.0 DYSURIA: ICD-10-CM

## 2024-10-17 LAB
BILIRUBIN, URINE, POC: NEGATIVE
BLOOD URINE, POC: ABNORMAL
GLUCOSE URINE, POC: NEGATIVE
KETONES, URINE, POC: NEGATIVE
LEUKOCYTE ESTERASE, URINE, POC: NEGATIVE
NITRITE, URINE, POC: NEGATIVE
PH, URINE, POC: 8.5 (ref 4.6–8)
PROTEIN,URINE, POC: NEGATIVE
SPECIFIC GRAVITY, URINE, POC: 1.01 (ref 1–1.03)
URINALYSIS CLARITY, POC: CLEAR
URINALYSIS COLOR, POC: YELLOW
UROBILINOGEN, POC: ABNORMAL MG/DL

## 2024-10-17 PROCEDURE — 90653 IIV ADJUVANT VACCINE IM: CPT | Performed by: INTERNAL MEDICINE

## 2024-10-17 PROCEDURE — 81001 URINALYSIS AUTO W/SCOPE: CPT | Performed by: INTERNAL MEDICINE

## 2024-10-17 PROCEDURE — 99214 OFFICE O/P EST MOD 30 MIN: CPT | Performed by: INTERNAL MEDICINE

## 2024-10-17 RX ORDER — LISINOPRIL/HYDROCHLOROTHIAZIDE 10-12.5 MG
2 TABLET ORAL DAILY
Qty: 180 TABLET | Refills: 3 | Status: SHIPPED | OUTPATIENT
Start: 2024-10-17 | End: 2025-10-12

## 2024-10-17 RX ORDER — ONDANSETRON 4 MG/1
4 TABLET, ORALLY DISINTEGRATING ORAL EVERY 8 HOURS PRN
COMMUNITY
Start: 2024-10-12

## 2024-10-17 RX ORDER — LISINOPRIL/HYDROCHLOROTHIAZIDE 10-12.5 MG
2 TABLET ORAL DAILY
Qty: 60 TABLET | Refills: 0 | Status: SHIPPED | OUTPATIENT
Start: 2024-10-17 | End: 2024-10-17 | Stop reason: SDUPTHER

## 2024-10-17 RX ORDER — PHENTERMINE HYDROCHLORIDE 37.5 MG/1
37.5 TABLET ORAL
COMMUNITY
Start: 2024-08-30

## 2024-10-17 RX ORDER — CEPHALEXIN 500 MG/1
500 CAPSULE ORAL 3 TIMES DAILY
COMMUNITY
Start: 2024-10-12 | End: 2024-10-18 | Stop reason: ALTCHOICE

## 2024-10-17 RX ORDER — CIPROFLOXACIN 250 MG/1
250 TABLET, FILM COATED ORAL 2 TIMES DAILY
Qty: 6 TABLET | Refills: 0 | Status: CANCELLED | OUTPATIENT
Start: 2024-10-17 | End: 2024-10-20

## 2024-10-17 NOTE — PROGRESS NOTES
Lauren Sanchez is a 73 y.o. female  Chief Complaint   Patient presents with    Follow-up     Sxs Vomiting since 10/6/24  Pt has been  trying to eat has been better in last 3 days , Oxygen level has been dropping to 79 When in Hospital     Discuss Medications     Wellbutrin may be a high dose . Was unable to take at night , Pt states she has weaned herself off Lexapro but has been struggling with Wellbutrin the patient is Allergic to medication Prescribed Cephalexin per Chart     Vaginal Discharge     Pt states it's white in Vaginal area like yeast build up .       Vitals:    10/17/24 1426   BP: 119/72   Pulse: 78   Resp: 20   Temp: 97.6 °F (36.4 °C)   SpO2: 97%          HPI  Ms.Joan Arleen Sanchez is a 73 y.o. female presents following a recent ER visit for abdominal pain, nausea, and vomiting. Multiple workups, including a CT scan, were unrevealing. Urinalysis indicated the presence of cephalexin, which she has taken for the past five days, despite being allergic to it. Although her urinary symptoms have improved, she reports ongoing fatigue and feelings of depression.    Medical History:  The patient has a complex medical history, including rheumatoid arthritis, polymyalgia rheumatica, smoldering myeloma, lobular breast cancer, hypothyroidism, hypertension, and a history of depression and Graves' disease.    During a Medicare wellness visit two months ago, she expressed a desire to lose weight and inquired about weight-loss medications, many of which are not covered by insurance. At that time, we decided to add Wellbutrin to help with her mood and potentially aid in weight loss. We tapered her Celexa (citalopram), which she had been on for a long time. We reduced her Celexa to half (20 mg daily) for two weeks while introducing a quarter dose of Wellbutrin, then continued to decrease Celexa to 10 mg with a half dose of Wellbutrin over the next two weeks.  However, her symptoms that led to hospitalization

## 2024-10-17 NOTE — PROGRESS NOTES
Pt gives verbal consent for  Flu  vaccine      1) Are you allergic to eggs, baker's yeast, gelatin, streptomycin or neomycin ?  2) Are you allergic to any vaccine Component that you know of ?  3) Have you had a serious reaction to a previous Vaccine ?  4) Do you have a fever or other acute infection at the present time ?  5) Do you have a history or Guillain-[place] syndrome ?  6) Are you currently pregnant ?   7) Are you, or anyone in your home, being treated with chemotherapy, radiation for cancer, have HIV/AIDS, or any immune deficiency disease ?  8) Are you currently, or have you recently, taken Prednisone ?    Pt answers \"no\" to all questions   Vaccine administered, VIS statement given to pt.    Chasity Knight LPN

## 2024-10-17 NOTE — PROGRESS NOTES
I have reviewed all needed documentation in preparation for visit. Verified patient by name and date of birth  No chief complaint on file.      There were no vitals filed for this visit.    Health Maintenance Due   Topic Date Due    DTaP/Tdap/Td vaccine (1 - Tdap) Never done    Respiratory Syncytial Virus (RSV) Pregnant or age 60 yrs+ (1 - 1-dose 60+ series) Never done    Pneumococcal 65+ years Vaccine (2 of 2 - PCV) 05/05/2016    Shingles vaccine (2 of 2) 11/05/2020    Flu vaccine (1) 08/01/2024    COVID-19 Vaccine (4 - 2023-24 season) 09/01/2024     \"Have you been to the ER, urgent care clinic since your last visit?  Hospitalized since your last visit?\"    YES - When: approximately 4 days ago.  Where and Why: [facility] - Bladder Infection .    “Have you seen or consulted any other health care providers outside of [facility] since your last visit?”    NO            Click Here for Release of Records Request         Trever Rodriguez [facility]

## 2024-10-18 RX ORDER — BUPROPION HYDROCHLORIDE 150 MG/1
TABLET ORAL
Qty: 180 TABLET | Refills: 0 | Status: SHIPPED
Start: 2024-10-18

## 2024-10-18 ASSESSMENT — ENCOUNTER SYMPTOMS
DIARRHEA: 0
CONSTIPATION: 0
CHEST TIGHTNESS: 0
ABDOMINAL DISTENTION: 0
ABDOMINAL PAIN: 0

## 2024-10-30 ENCOUNTER — OFFICE VISIT (OUTPATIENT)
Age: 74
End: 2024-10-30
Payer: MEDICARE

## 2024-10-30 VITALS
TEMPERATURE: 97.6 F | WEIGHT: 188 LBS | RESPIRATION RATE: 16 BRPM | DIASTOLIC BLOOD PRESSURE: 55 MMHG | HEART RATE: 76 BPM | OXYGEN SATURATION: 95 % | SYSTOLIC BLOOD PRESSURE: 119 MMHG | HEIGHT: 64 IN | BODY MASS INDEX: 32.1 KG/M2

## 2024-10-30 DIAGNOSIS — E03.9 HYPOTHYROIDISM, UNSPECIFIED TYPE: ICD-10-CM

## 2024-10-30 DIAGNOSIS — E66.9 OBESITY (BMI 30-39.9): ICD-10-CM

## 2024-10-30 DIAGNOSIS — F32.1 MODERATE MAJOR DEPRESSION (HCC): Primary | ICD-10-CM

## 2024-10-30 PROCEDURE — 99214 OFFICE O/P EST MOD 30 MIN: CPT | Performed by: INTERNAL MEDICINE

## 2024-10-30 RX ORDER — BUPROPION HYDROCHLORIDE 150 MG/1
150 TABLET ORAL EVERY MORNING
Qty: 90 TABLET | Refills: 0 | Status: SHIPPED | OUTPATIENT
Start: 2024-10-30 | End: 2025-01-28

## 2024-10-30 RX ORDER — BUPROPION HYDROCHLORIDE 150 MG/1
150 TABLET ORAL EVERY MORNING
Qty: 90 TABLET | Refills: 0 | Status: SHIPPED
Start: 2024-10-30 | End: 2024-10-30

## 2024-10-30 ASSESSMENT — ENCOUNTER SYMPTOMS
SHORTNESS OF BREATH: 0
COUGH: 0

## 2024-10-30 NOTE — PROGRESS NOTES
I have reviewed all needed documentation in preparation for visit. Verified patient by name and date of birth  Chief Complaint   Patient presents with    3 Month Follow-Up       There were no vitals filed for this visit.    Health Maintenance Due   Topic Date Due    DTaP/Tdap/Td vaccine (1 - Tdap) Never done    Respiratory Syncytial Virus (RSV) Pregnant or age 60 yrs+ (1 - 1-dose 60+ series) Never done    Pneumococcal 65+ years Vaccine (2 of 2 - PCV) 05/05/2016    Shingles vaccine (2 of 2) 11/05/2020    COVID-19 Vaccine (4 - 2023-24 season) 09/01/2024     \"Have you been to the ER, urgent care clinic since your last visit?  Hospitalized since your last visit?\"    NO    “Have you seen or consulted any other health care providers outside of [facility] since your last visit?”    NO            Click Here for Release of Records Request         Trever Rodriguez [facility]

## 2024-10-30 NOTE — PROGRESS NOTES
Lauren Sanchez is a 73 y.o. female  Chief Complaint   Patient presents with    3 Month Follow-Up     Pt states medication Adjustment has been good . Pt B/p is low today has taken B/p Medication this morning.       Vitals:    10/30/24 1002   BP: (!) 119/55   Pulse: 76   Resp: 16   Temp: 97.6 °F (36.4 °C)   SpO2: 95%          HPI  Ms.Joan Arleen Sanchez presents for follow-up on MDD and obesity. At the previous visit, she reported not feeling well after we switched her from Celexa to Wellbutrin with naltrexone, aiming to improve both mood and support weight loss. This change led her to completely stop all medications, resulting in a worsening of her symptoms. At that visit, we reintroduced Celexa at a lower dose of 10 mg and started Wellbutrin at 75 mg. Today, she reports a much-improved mood and greater motivation. She inquires about the possibility of titrating her medications further to continue improving her mood and aid with weight loss.  .  Past Medical History:   Diagnosis Date    Breast cancer, left (HCC)     Depression     Goiter     Hearing loss     bilateral    Hypertension     Hypothyroid     IBS (irritable bowel syndrome)     Menopause     Rheumatoid arthritis (HCC)     S/P radiation therapy             ROS  Review of Systems   Constitutional:  Negative for fever.   Respiratory:  Negative for cough and shortness of breath.    Cardiovascular:  Negative for chest pain and palpitations.   Neurological:  Negative for headaches.   Psychiatric/Behavioral:  Negative for dysphoric mood.            EXAM  Physical Exam  Vitals reviewed.   Constitutional:       Appearance: Normal appearance.   HENT:      Head: Normocephalic and atraumatic.   Cardiovascular:      Rate and Rhythm: Normal rate and regular rhythm.      Pulses: Normal pulses.      Heart sounds: Normal heart sounds. No murmur heard.  Pulmonary:      Effort: Pulmonary effort is normal. No respiratory distress.      Breath sounds: Normal breath

## 2024-11-13 RX ORDER — NALTREXONE HYDROCHLORIDE 50 MG/1
12.5 TABLET, FILM COATED ORAL DAILY
Qty: 45 TABLET | Refills: 0 | Status: SHIPPED | OUTPATIENT
Start: 2024-11-13

## 2025-01-20 ENCOUNTER — PATIENT MESSAGE (OUTPATIENT)
Age: 75
End: 2025-01-20

## 2025-02-01 DIAGNOSIS — D47.2 SMOLDERING MULTIPLE MYELOMA (SMM): ICD-10-CM

## 2025-02-03 LAB
ALBUMIN SERPL-MCNC: 3.8 G/DL (ref 3.5–5)
ALBUMIN/GLOB SERPL: 1 (ref 1.1–2.2)
ALP SERPL-CCNC: 155 U/L (ref 45–117)
ALT SERPL-CCNC: 28 U/L (ref 12–78)
ANION GAP SERPL CALC-SCNC: 7 MMOL/L (ref 2–12)
AST SERPL-CCNC: 23 U/L (ref 15–37)
BASOPHILS # BLD: 0.05 K/UL (ref 0–0.1)
BASOPHILS NFR BLD: 1 % (ref 0–1)
BILIRUB SERPL-MCNC: 0.5 MG/DL (ref 0.2–1)
BUN SERPL-MCNC: 22 MG/DL (ref 6–20)
BUN/CREAT SERPL: 21 (ref 12–20)
CALCIUM SERPL-MCNC: 9.8 MG/DL (ref 8.5–10.1)
CHLORIDE SERPL-SCNC: 105 MMOL/L (ref 97–108)
CO2 SERPL-SCNC: 26 MMOL/L (ref 21–32)
CREAT SERPL-MCNC: 1.05 MG/DL (ref 0.55–1.02)
DIFFERENTIAL METHOD BLD: ABNORMAL
EOSINOPHIL # BLD: 0.06 K/UL (ref 0–0.4)
EOSINOPHIL NFR BLD: 1.2 % (ref 0–7)
ERYTHROCYTE [DISTWIDTH] IN BLOOD BY AUTOMATED COUNT: 12.5 % (ref 11.5–14.5)
GLOBULIN SER CALC-MCNC: 4 G/DL (ref 2–4)
GLUCOSE SERPL-MCNC: 105 MG/DL (ref 65–100)
HCT VFR BLD AUTO: 39 % (ref 35–47)
HGB BLD-MCNC: 12.6 G/DL (ref 11.5–16)
IMM GRANULOCYTES # BLD AUTO: 0.04 K/UL (ref 0–0.04)
IMM GRANULOCYTES NFR BLD AUTO: 0.8 % (ref 0–0.5)
LYMPHOCYTES # BLD: 0.8 K/UL (ref 0.8–3.5)
LYMPHOCYTES NFR BLD: 15.6 % (ref 12–49)
MCH RBC QN AUTO: 30.8 PG (ref 26–34)
MCHC RBC AUTO-ENTMCNC: 32.3 G/DL (ref 30–36.5)
MCV RBC AUTO: 95.4 FL (ref 80–99)
MONOCYTES # BLD: 0.47 K/UL (ref 0–1)
MONOCYTES NFR BLD: 9.3 % (ref 5–13)
NEUTS SEG # BLD: 3.68 K/UL (ref 1.8–8)
NEUTS SEG NFR BLD: 72.1 % (ref 32–75)
NRBC # BLD: 0 K/UL (ref 0–0.01)
NRBC BLD-RTO: 0 PER 100 WBC
PLATELET # BLD AUTO: 343 K/UL (ref 150–400)
PMV BLD AUTO: 9.3 FL (ref 8.9–12.9)
POTASSIUM SERPL-SCNC: 4.5 MMOL/L (ref 3.5–5.1)
PROT SERPL-MCNC: 7.8 G/DL (ref 6.4–8.2)
RBC # BLD AUTO: 4.09 M/UL (ref 3.8–5.2)
RBC MORPH BLD: ABNORMAL
SODIUM SERPL-SCNC: 138 MMOL/L (ref 136–145)
WBC # BLD AUTO: 5.1 K/UL (ref 3.6–11)
WBC MORPH BLD: ABNORMAL

## 2025-02-05 ENCOUNTER — OFFICE VISIT (OUTPATIENT)
Age: 75
End: 2025-02-05
Payer: MEDICARE

## 2025-02-05 VITALS
RESPIRATION RATE: 16 BRPM | SYSTOLIC BLOOD PRESSURE: 123 MMHG | DIASTOLIC BLOOD PRESSURE: 75 MMHG | BODY MASS INDEX: 31.93 KG/M2 | TEMPERATURE: 98.1 F | OXYGEN SATURATION: 95 % | HEART RATE: 78 BPM | WEIGHT: 186 LBS

## 2025-02-05 DIAGNOSIS — Z12.31 ENCOUNTER FOR SCREENING MAMMOGRAM FOR MALIGNANT NEOPLASM OF BREAST: ICD-10-CM

## 2025-02-05 DIAGNOSIS — Z00.00 ROUTINE HEALTH MAINTENANCE: ICD-10-CM

## 2025-02-05 DIAGNOSIS — D47.2 MONOCLONAL GAMMOPATHY: Primary | ICD-10-CM

## 2025-02-05 DIAGNOSIS — Z85.3 HISTORY OF BREAST CANCER: ICD-10-CM

## 2025-02-05 PROCEDURE — 99214 OFFICE O/P EST MOD 30 MIN: CPT

## 2025-02-05 NOTE — PROGRESS NOTES
Cancer [place] at [facility]   [address], [address] [place] [id number]   W: [phone]  F: [phone]          Reason for Visit:     Lauren Sanchez is a 71 y.o.  female who is seen for follow up of      1)  Paraproteinemia- Smoldering Myeloma   2)  Left breast ER pos [place] pos (weak) HER 2 neg stage I Breast cancer- 12/2018      TREATMENT HISTORY   1/29/19-left lumpectomy with sentinel lymph node biopsy.  1.8 cm invasive lobular carcinoma, grade 1, 1 of one negative lymph nodes.Oncotype low risk. Completed RT   4/19 started Letrozole          History of Present Illness:     Patient is a 71 y.o. with seronegative rheumatoid arthritis and secondary  polymyalgia rheumatica who is seen for follow-up of smoldering myeloma and lobular breast cancer.      She presents for follow-up on adjuvant Letrozole. Also to review results of recent testing to r/o active myeloma.   No new pain       Review of Systems: A complete review of systems was obtained, negative except as described above.          Physical Exam:        Visit Vitals        BP  (!) 159/77     Pulse  67     Resp  18     Ht  5' 4\" (1.626 m)     Wt  190 lb (86.2 kg)     SpO2  95%        BMI           ECOG PS: 0   General: No distress   Neck: Supple   Breast: No palpable lumps or adenopathy, no skin changes.   MS: Normal gait and station. Digits without clubbing or cyanosis.   Psych: Alert, oriented, appropriate affect, normal judgment/insight          Results:          Lab Results   Component Value Date/Time    WBC 5.4 07/03/2024 10:16 AM    HGB 12.9 07/03/2024 10:16 AM    HCT 37.6 07/03/2024 10:16 AM     07/03/2024 10:16 AM    MCV 95.7 07/03/2024 10:16 AM     Lab Results   Component Value Date/Time     06/06/2024 02:07 PM    K 4.1 06/06/2024 02:07 PM     06/06/2024 02:07 PM    CO2 29 06/06/2024 02:07 PM    BUN 15 06/06/2024 02:07 PM    GFRAA >60 04/12/2022 09:04 AM     Lab Results   Component Value 
Lauren Sanchez is a 74 y.o. female    Chief Complaint   Patient presents with    Follow-up     1)  Paraproteinemia- Smoldering Myeloma   2)  Left breast ER pos [place] pos (weak) HER 2 neg stage I Breast cancer- 12/2018         1. Have you been to the ER, urgent care clinic since your last visit?  Hospitalized since your last visit? Yes, [place]    2. Have you seen or consulted any other health care providers outside of the [facility] since your last visit?  Include any pap smears or colon screening. No        
Lymph Nodes with Isolated Tumor Cells (<=0.2 mm and <=200 cells)#: 0    Total Number of Lymph Nodes Examined: 1    Number of [place] Nodes Examined: 1    3. Breast, left, anterior margin, excision:    Atypical lobular hyperplasia       Radiology report(s) reviewed          DEXA 6/2021      Osteopenia      Mammogram 12/2021:   Normal      PET CT   Negative for any lytic lesions or uptake.      Skeletal survey 11/2021   Negative     Skeletal survey 8/2023   IMPRESSION:     Multiple lucencies within the calvarium, concerning for myeloma.  Possible lucency in the distal sacrum. Pelvic CT could better evaluate.  Lucency in the proximal right femur is not well defined and would not be typical  for myeloma. However, attention on follow-up.    PET/CT 7/2024  IMPRESSION:  No Foci of Abnormal Hypermetabolism.          Assessment:     1) Paraproteinemia- Smoldering Myeloma      Small IgA M spike   No end organ damage- Bone survey suggests few calvarial lucencies-but there are no corresponding lesions on the PET/CT.   BM bx 2018 showed 10% plasma cells with trisomies  Diagnosed with smoldering myeloma          She understands that smoldering myeloma has about a > 40% risk of transforming into myeloma in the ensuing years.      However 8/2023 Skeletal survey showed several lucencies  I had recommenced a BM bx which she was eventually able to do 7/2024, this looks overall stable, 10% plasma cells, there is however evidence of some clonal evolution with now FISH studies showing duplication of 1 q.  There are no clear endorgan damage.  PET CT scan is reassuring.      She has no B symptoms- no new pain.   Labs from 1/2025 show no anemia, no hypercalcemia, borderline elevated Cr. Gammopathy panel still pending.  Last PET CT 2024- normal  She would like to follow up in 1 year       2) Left breast cancer   9 mm lesion on Mammo/USG   %, UT 3% HER2 dorothy negative   Status post lumpectomy and sentinel lymph node biopsy on 1/29/19

## 2025-02-06 LAB
ALBUMIN SERPL ELPH-MCNC: 3.8 G/DL (ref 2.9–4.4)
ALBUMIN/GLOB SERPL: 1.1 (ref 0.7–1.7)
ALPHA1 GLOB SERPL ELPH-MCNC: 0.2 G/DL (ref 0–0.4)
ALPHA2 GLOB SERPL ELPH-MCNC: 0.6 G/DL (ref 0.4–1)
B-GLOBULIN SERPL ELPH-MCNC: 1.6 G/DL (ref 0.7–1.3)
GAMMA GLOB SERPL ELPH-MCNC: 1.1 G/DL (ref 0.4–1.8)
GLOBULIN SER-MCNC: 3.5 G/DL (ref 2.2–3.9)
IGA SERPL-MCNC: 687 MG/DL (ref 64–422)
IGG SERPL-MCNC: 1326 MG/DL (ref 586–1602)
IGM SERPL-MCNC: 46 MG/DL (ref 26–217)
INTERPRETATION SERPL IEP-IMP: ABNORMAL
KAPPA LC FREE SER-MCNC: 32.9 MG/L (ref 3.3–19.4)
KAPPA LC FREE/LAMBDA FREE SER: 3.02 (ref 0.26–1.65)
LAMBDA LC FREE SERPL-MCNC: 10.9 MG/L (ref 5.7–26.3)
M PROTEIN SERPL ELPH-MCNC: 0.5 G/DL
PROT SERPL-MCNC: 7.3 G/DL (ref 6–8.5)

## 2025-02-13 RX ORDER — NALTREXONE HYDROCHLORIDE 50 MG/1
TABLET, FILM COATED ORAL
Qty: 23 TABLET | Refills: 3 | Status: SHIPPED | OUTPATIENT
Start: 2025-02-13

## 2025-02-20 DIAGNOSIS — E66.9 OBESITY (BMI 30-39.9): ICD-10-CM

## 2025-02-20 DIAGNOSIS — F32.1 MODERATE MAJOR DEPRESSION (HCC): ICD-10-CM

## 2025-02-21 RX ORDER — BUPROPION HYDROCHLORIDE 150 MG/1
150 TABLET ORAL EVERY MORNING
Qty: 90 TABLET | Refills: 3 | Status: SHIPPED | OUTPATIENT
Start: 2025-02-21

## 2025-02-26 ENCOUNTER — OFFICE VISIT (OUTPATIENT)
Age: 75
End: 2025-02-26
Payer: MEDICARE

## 2025-02-26 DIAGNOSIS — R41.3 MEMORY LOSS: Primary | ICD-10-CM

## 2025-02-26 DIAGNOSIS — F41.9 ANXIETY: ICD-10-CM

## 2025-02-26 PROCEDURE — 90791 PSYCH DIAGNOSTIC EVALUATION: CPT | Performed by: STUDENT IN AN ORGANIZED HEALTH CARE EDUCATION/TRAINING PROGRAM

## 2025-02-26 NOTE — PROGRESS NOTES
INTAKE NOTE     Name: Lauren Sanchez MRN: [id number]   Age: 74 y.o.  YOB: 1950   Gender: female Date of Intake: 2/26/2025   Race/Ethnicity: White (non-)     Education: 10th grade       Referral Source: Kayden Miranda MD (Neurology)     EVALUATION METHODS: The following information was gathered from a clinical interview with Ms. Arleen Sanchez and a review of available medical records. This evaluation was conducted for clinical treatment planning and may not be valid for other purposes. Potential risks and benefits, limits of confidentiality, and evaluation procedures were discussed. Ms. Arleen Sanchez expressed an understanding of the purpose of the visit and consented to the procedures.     REASON FOR REFERRAL: Ms. Arleen Sanchez was referred by her neurologist to evaluate cognitive functioning and rule out impairment in the context of memory loss. She presented with concerns about short-term memory over the past year which was evident on cognitive screening during her initial visit with Dr. Miranda on 2/5/2024. MoCA = 21/30. It was noted during this testing that she appeared anxious and was hard of hearing, which may have played a role in her overall performance. Underlying risk factors for cognitive impairment include hypothyroidism, anxiety, and longstanding benzodiazepine use. Goal of evaluation is to rule out neurodegenerative vs mood factors. Information gathered today will assist in differential diagnosis and treatment planning/recommendations.     PRESENTING CONCERNS     Cognitive Functioning: Ms. Arleen Sanchez reported that she has been noticing cognitive changes for a while now, but was prompted to seek further evaluation after three \"really bad incidences\" all occurring within a 2-week period where she went to work with her shirt inside out. She had not noticed this until she got home and it was never pointed out by her co-workers. Overall, Ms. Arleen Sanchez is most

## 2025-02-26 NOTE — PATIENT INSTRUCTIONS
Thank you for choosing us for your neuropsychological evaluation. We will examine your overall cognitive functioning, including areas such as memory, attention, concentration, language, and problem solving.   You were seen for an initial visit by Dr. Nohemi Montes (Neuropsychologist) today. Please refer to the dates listed under \"What's Next\" for your scheduled Procedure (testing) and Follow-Up appointments.    Reminders for your testing appointment:   If you wear glasses/contacts and/or hearing assistive devices, please be sure to bring them with you to your testing appointment.   You will be working with my Psychometrist, Laurel, on the day of testing. She is specially trained to administer these cognitive tests to you.   Please prepare accordingly, as the duration of testing may take a few hours to complete.     To reschedule or cancel your appointment, please call [phone].   In case of an emergency, call [phone] or report to the nearest emergency department.  It has been our pleasure to work with you, and we look forward to speaking with you soon.

## 2025-03-03 ENCOUNTER — OFFICE VISIT (OUTPATIENT)
Age: 75
End: 2025-03-03
Payer: MEDICARE

## 2025-03-03 VITALS
WEIGHT: 182 LBS | DIASTOLIC BLOOD PRESSURE: 73 MMHG | BODY MASS INDEX: 31.07 KG/M2 | OXYGEN SATURATION: 97 % | SYSTOLIC BLOOD PRESSURE: 116 MMHG | TEMPERATURE: 97.6 F | HEART RATE: 72 BPM | RESPIRATION RATE: 16 BRPM | HEIGHT: 64 IN

## 2025-03-03 DIAGNOSIS — M06.09 SERONEGATIVE RHEUMATOID ARTHRITIS OF MULTIPLE SITES (HCC): ICD-10-CM

## 2025-03-03 DIAGNOSIS — F41.9 ANXIETY AND DEPRESSION: ICD-10-CM

## 2025-03-03 DIAGNOSIS — E53.8 VITAMIN B 12 DEFICIENCY: ICD-10-CM

## 2025-03-03 DIAGNOSIS — E55.9 VITAMIN D DEFICIENCY: ICD-10-CM

## 2025-03-03 DIAGNOSIS — C90.00 MULTIPLE MYELOMA, REMISSION STATUS UNSPECIFIED (HCC): ICD-10-CM

## 2025-03-03 DIAGNOSIS — F32.A ANXIETY AND DEPRESSION: ICD-10-CM

## 2025-03-03 DIAGNOSIS — E78.00 PURE HYPERCHOLESTEROLEMIA: ICD-10-CM

## 2025-03-03 DIAGNOSIS — F32.1 MODERATE MAJOR DEPRESSION (HCC): ICD-10-CM

## 2025-03-03 DIAGNOSIS — E66.01 SEVERE OBESITY (BMI 35.0-39.9) WITH COMORBIDITY: ICD-10-CM

## 2025-03-03 DIAGNOSIS — J84.9 INTERSTITIAL PULMONARY DISEASE, UNSPECIFIED (HCC): ICD-10-CM

## 2025-03-03 DIAGNOSIS — E03.9 HYPOTHYROIDISM, UNSPECIFIED TYPE: ICD-10-CM

## 2025-03-03 DIAGNOSIS — I10 PRIMARY HYPERTENSION: Primary | ICD-10-CM

## 2025-03-03 LAB
T4 FREE SERPL-MCNC: 1.6 NG/DL (ref 0.8–1.5)
TSH SERPL DL<=0.05 MIU/L-ACNC: 0.47 UIU/ML (ref 0.36–3.74)

## 2025-03-03 PROCEDURE — 99214 OFFICE O/P EST MOD 30 MIN: CPT | Performed by: INTERNAL MEDICINE

## 2025-03-03 PROCEDURE — 1159F MED LIST DOCD IN RCRD: CPT | Performed by: INTERNAL MEDICINE

## 2025-03-03 PROCEDURE — G8399 PT W/DXA RESULTS DOCUMENT: HCPCS | Performed by: INTERNAL MEDICINE

## 2025-03-03 PROCEDURE — 3074F SYST BP LT 130 MM HG: CPT | Performed by: INTERNAL MEDICINE

## 2025-03-03 PROCEDURE — 1123F ACP DISCUSS/DSCN MKR DOCD: CPT | Performed by: INTERNAL MEDICINE

## 2025-03-03 PROCEDURE — G8417 CALC BMI ABV UP PARAM F/U: HCPCS | Performed by: INTERNAL MEDICINE

## 2025-03-03 PROCEDURE — 1160F RVW MEDS BY RX/DR IN RCRD: CPT | Performed by: INTERNAL MEDICINE

## 2025-03-03 PROCEDURE — 3017F COLORECTAL CA SCREEN DOC REV: CPT | Performed by: INTERNAL MEDICINE

## 2025-03-03 PROCEDURE — 1090F PRES/ABSN URINE INCON ASSESS: CPT | Performed by: INTERNAL MEDICINE

## 2025-03-03 PROCEDURE — 3078F DIAST BP <80 MM HG: CPT | Performed by: INTERNAL MEDICINE

## 2025-03-03 PROCEDURE — G8427 DOCREV CUR MEDS BY ELIG CLIN: HCPCS | Performed by: INTERNAL MEDICINE

## 2025-03-03 PROCEDURE — 1126F AMNT PAIN NOTED NONE PRSNT: CPT | Performed by: INTERNAL MEDICINE

## 2025-03-03 PROCEDURE — 1036F TOBACCO NON-USER: CPT | Performed by: INTERNAL MEDICINE

## 2025-03-03 RX ORDER — CITALOPRAM HYDROBROMIDE 20 MG/1
20 TABLET ORAL DAILY
Qty: 90 TABLET | Refills: 0 | Status: SHIPPED | OUTPATIENT
Start: 2025-03-03 | End: 2025-03-03

## 2025-03-03 RX ORDER — ESTRADIOL 0.1 MG/G
2 CREAM VAGINAL DAILY
COMMUNITY
Start: 2025-02-22

## 2025-03-03 RX ORDER — CITALOPRAM HYDROBROMIDE 20 MG/1
20 TABLET ORAL DAILY
Qty: 90 TABLET | Refills: 0 | Status: SHIPPED | OUTPATIENT
Start: 2025-03-03 | End: 2025-06-01

## 2025-03-03 SDOH — ECONOMIC STABILITY: FOOD INSECURITY: WITHIN THE PAST 12 MONTHS, YOU WORRIED THAT YOUR FOOD WOULD RUN OUT BEFORE YOU GOT MONEY TO BUY MORE.: NEVER TRUE

## 2025-03-03 SDOH — ECONOMIC STABILITY: FOOD INSECURITY: WITHIN THE PAST 12 MONTHS, THE FOOD YOU BOUGHT JUST DIDN'T LAST AND YOU DIDN'T HAVE MONEY TO GET MORE.: NEVER TRUE

## 2025-03-03 ASSESSMENT — PATIENT HEALTH QUESTIONNAIRE - PHQ9
6. FEELING BAD ABOUT YOURSELF - OR THAT YOU ARE A FAILURE OR HAVE LET YOURSELF OR YOUR FAMILY DOWN: SEVERAL DAYS
SUM OF ALL RESPONSES TO PHQ QUESTIONS 1-9: 8
SUM OF ALL RESPONSES TO PHQ QUESTIONS 1-9: 8
4. FEELING TIRED OR HAVING LITTLE ENERGY: MORE THAN HALF THE DAYS
9. THOUGHTS THAT YOU WOULD BE BETTER OFF DEAD, OR OF HURTING YOURSELF: NOT AT ALL
10. IF YOU CHECKED OFF ANY PROBLEMS, HOW DIFFICULT HAVE THESE PROBLEMS MADE IT FOR YOU TO DO YOUR WORK, TAKE CARE OF THINGS AT HOME, OR GET ALONG WITH OTHER PEOPLE: SOMEWHAT DIFFICULT
2. FEELING DOWN, DEPRESSED OR HOPELESS: NEARLY EVERY DAY
5. POOR APPETITE OR OVEREATING: SEVERAL DAYS
8. MOVING OR SPEAKING SO SLOWLY THAT OTHER PEOPLE COULD HAVE NOTICED. OR THE OPPOSITE, BEING SO FIGETY OR RESTLESS THAT YOU HAVE BEEN MOVING AROUND A LOT MORE THAN USUAL: SEVERAL DAYS
SUM OF ALL RESPONSES TO PHQ QUESTIONS 1-9: 8
3. TROUBLE FALLING OR STAYING ASLEEP: NOT AT ALL
1. LITTLE INTEREST OR PLEASURE IN DOING THINGS: NOT AT ALL
7. TROUBLE CONCENTRATING ON THINGS, SUCH AS READING THE NEWSPAPER OR WATCHING TELEVISION: NOT AT ALL
SUM OF ALL RESPONSES TO PHQ QUESTIONS 1-9: 8

## 2025-03-03 ASSESSMENT — ENCOUNTER SYMPTOMS
SHORTNESS OF BREATH: 0
COUGH: 0

## 2025-03-03 NOTE — PROGRESS NOTES
I have reviewed all needed documentation in preparation for visit. Verified patient by name and date of birth  Chief Complaint   Patient presents with    3 Month Follow-Up       There were no vitals filed for this visit.    Health Maintenance Due   Topic Date Due    DTaP/Tdap/Td vaccine (1 - Tdap) Never done    Pneumococcal 50+ years Vaccine (2 of 2 - PCV) 05/05/2016    Shingles vaccine (2 of 2) 11/05/2020    COVID-19 Vaccine (4 - 2024-25 season) 09/01/2024    Respiratory Syncytial Virus (RSV) Pregnant or age 60 yrs+ (1 - 1-dose 75+ series) Never done     \"Have you been to the ER, urgent care clinic since your last visit?  Hospitalized since your last visit?\"    NO    “Have you seen or consulted any other health care providers outside of [facility] since your last visit?”    NO            Click Here for Release of Records Request         Trever Rodriguez [facility]  
deficiency  -     Vitamin B12 & Folate; Future    Anxiety and depression  -     citalopram (CELEXA) 20 MG tablet; Take 1 tablet by mouth daily    Other orders  -     Discontinue: citalopram (CELEXA) 20 MG tablet; Take 1 tablet by mouth daily          Vito Tran MD

## 2025-03-04 ENCOUNTER — TRANSCRIBE ORDERS (OUTPATIENT)
Facility: HOSPITAL | Age: 75
End: 2025-03-04

## 2025-03-04 DIAGNOSIS — Z12.31 VISIT FOR SCREENING MAMMOGRAM: Primary | ICD-10-CM

## 2025-03-04 LAB
25(OH)D3 SERPL-MCNC: 44.8 NG/ML (ref 30–100)
CHOLEST SERPL-MCNC: 238 MG/DL
FOLATE SERPL-MCNC: 27.6 NG/ML (ref 5–21)
HDLC SERPL-MCNC: 69 MG/DL
HDLC SERPL: 3.4 (ref 0–5)
LDLC SERPL CALC-MCNC: 145.2 MG/DL (ref 0–100)
TRIGL SERPL-MCNC: 119 MG/DL
VIT B12 SERPL-MCNC: 840 PG/ML (ref 193–986)
VLDLC SERPL CALC-MCNC: 23.8 MG/DL

## 2025-03-05 ENCOUNTER — PROCEDURE VISIT (OUTPATIENT)
Age: 75
End: 2025-03-05
Payer: MEDICARE

## 2025-03-05 DIAGNOSIS — F99 COGNITIVE DYSFUNCTION IN MENTAL ILLNESS: Primary | ICD-10-CM

## 2025-03-05 DIAGNOSIS — F41.9 ANXIETY AND DEPRESSION: ICD-10-CM

## 2025-03-05 DIAGNOSIS — F32.A ANXIETY AND DEPRESSION: ICD-10-CM

## 2025-03-05 DIAGNOSIS — R41.89 COGNITIVE DYSFUNCTION IN MENTAL ILLNESS: Primary | ICD-10-CM

## 2025-03-05 PROCEDURE — 96138 PSYCL/NRPSYC TECH 1ST: CPT | Performed by: STUDENT IN AN ORGANIZED HEALTH CARE EDUCATION/TRAINING PROGRAM

## 2025-03-05 PROCEDURE — 96139 PSYCL/NRPSYC TST TECH EA: CPT | Performed by: STUDENT IN AN ORGANIZED HEALTH CARE EDUCATION/TRAINING PROGRAM

## 2025-03-05 PROCEDURE — 96136 PSYCL/NRPSYC TST PHY/QHP 1ST: CPT | Performed by: STUDENT IN AN ORGANIZED HEALTH CARE EDUCATION/TRAINING PROGRAM

## 2025-03-05 PROCEDURE — 96133 NRPSYC TST EVAL PHYS/QHP EA: CPT | Performed by: STUDENT IN AN ORGANIZED HEALTH CARE EDUCATION/TRAINING PROGRAM

## 2025-03-10 ENCOUNTER — RESULTS FOLLOW-UP (OUTPATIENT)
Age: 75
End: 2025-03-10

## 2025-03-10 DIAGNOSIS — E78.00 PURE HYPERCHOLESTEROLEMIA: Primary | ICD-10-CM

## 2025-03-10 RX ORDER — ROSUVASTATIN CALCIUM 10 MG/1
10 TABLET, COATED ORAL DAILY
Qty: 90 TABLET | Refills: 1 | Status: SHIPPED | OUTPATIENT
Start: 2025-03-10

## 2025-03-12 ENCOUNTER — HOSPITAL ENCOUNTER (OUTPATIENT)
Facility: HOSPITAL | Age: 75
Discharge: HOME | End: 2025-03-15
Payer: MEDICARE

## 2025-03-12 VITALS — BODY MASS INDEX: 31.07 KG/M2 | HEIGHT: 64 IN | WEIGHT: 182 LBS

## 2025-03-12 DIAGNOSIS — Z12.31 VISIT FOR SCREENING MAMMOGRAM: ICD-10-CM

## 2025-03-12 PROCEDURE — 77067 SCR MAMMO BI INCL CAD: CPT

## 2025-03-13 ENCOUNTER — RESULTS FOLLOW-UP (OUTPATIENT)
Facility: HOSPITAL | Age: 75
End: 2025-03-13

## 2025-03-25 NOTE — PROGRESS NOTES
NEUROPSYCHOLOGICAL EVALUATION REPORT  Confidential     Name:  Lauren Sanchez MRN:  [id number]   Age:  74 y.o. YOB: 1950   Gender:  female Date of Intake:  2/26/2025   Race/Ethnicity:  White (non-) Date of Testing:  3/5/2025    Education:  10th grade     Handedness:  Right Referral Source:  Kayden Miranda MD (Neurology)     EVALUATION METHODS: The following information was gathered from a clinical interview with Ms. Arleen Sanchez and a review of available medical records. Information was also gathered from the administered neuropsychological tests that were completed in-person. This evaluation was conducted for clinical treatment planning and may not be valid for other purposes. Potential risks and benefits, limits of confidentiality, and evaluation procedures were discussed. Ms. Arleen Sanchez consented to have this report made available to her treating providers through her electronic medical records. She expressed an understanding of the purpose of the visit and consented to the procedures.     REASON FOR REFERRAL: Ms. Arleen Sanchez was referred for neuropsychological evaluation by her neurologist to evaluate cognitive functioning and rule out impairment in the context of memory loss. She presented with concerns about short-term memory over the past year which was evident on cognitive screening during her initial visit with Dr. Miranda on 2/5/2024. MoCA = 21/30. It was noted during this testing that she appeared anxious and was hard of hearing, which may have played a role in her overall performance. Underlying risk factors for cognitive impairment include hypothyroidism, anxiety, and longstanding benzodiazepine use. Goal of evaluation is to rule out neurodegenerative vs mood factors. This is her first neuropsychological evaluation.    PRESENTING CONCERNS     Cognitive Functioning: Ms. Arleen Sanchez reported that she has been noticing cognitive changes for a while now, but was

## 2025-03-26 ENCOUNTER — OFFICE VISIT (OUTPATIENT)
Age: 75
End: 2025-03-26
Payer: MEDICARE

## 2025-03-26 DIAGNOSIS — R41.89 COGNITIVE DYSFUNCTION IN MENTAL ILLNESS: Primary | ICD-10-CM

## 2025-03-26 DIAGNOSIS — F41.9 ANXIETY AND DEPRESSION: ICD-10-CM

## 2025-03-26 DIAGNOSIS — F99 COGNITIVE DYSFUNCTION IN MENTAL ILLNESS: Primary | ICD-10-CM

## 2025-03-26 DIAGNOSIS — F32.A ANXIETY AND DEPRESSION: ICD-10-CM

## 2025-03-26 PROCEDURE — 96132 NRPSYC TST EVAL PHYS/QHP 1ST: CPT | Performed by: STUDENT IN AN ORGANIZED HEALTH CARE EDUCATION/TRAINING PROGRAM

## 2025-03-26 NOTE — PROGRESS NOTES
NEUROPSYCHOLOGICAL EVALUATION   Feedback     Prior to seeing the patient for today's visit, I reviewed pertinent records, including the previously completed report, the records in Epic, and any updated visits from other providers since the patient's last visit. Ms. Lauren Sanchez was seen for a feedback appointment via in-person to discuss the results of her neuropsychological evaluation.     DISCUSSION OF RESULTS AND RECOMMENDATIONS:   I provided feedback about Ms. Arleen Sanchez's neuropsychological testing results in detail:   Cognitive diagnosis: Cognitive difficulties accompanying mood dysfunction (anxiety and depression)  Discussed evidence of isolated executive inefficiencies and explained how this can manifest as memory-related dysfunction.   Ms. Arleen Sanchez expressed agreement and relief with the results and that the findings were consistent with what is noticed in daily life.     Recommendations from the report were discussed in detail. She expressed understanding. The patient and/or their care partner(s) will:   Continue to work on mood management for further stabilization with her health providers. She was recently taken off the Wellbutrin and started on Celexa which she feels is helping significantly. It is hoped that improved mood function will also stabilize and possibly improved cognition.   See neurologist (referring provider) as needed should cognitive functions worsen despite optimal stabilization of mood.   Re-evaluation in the future if clinically necessary for update to diagnostic and treatment recommendations.     I answered any questions in detail, provided her with a printed copy of her report, and encouraged her and her family to contact us with any further questions or concerns moving forward.     MENTAL STATUS:  Arrival: On time and unaccompanied.  General appearance: Appropriately dressed and well-groomed.   Orientation: Oriented to time, place, and situation.   Level of

## 2025-05-08 RX ORDER — LEVOTHYROXINE SODIUM 200 UG/1
TABLET ORAL
Qty: 90 TABLET | Refills: 3 | Status: SHIPPED | OUTPATIENT
Start: 2025-05-08

## 2025-05-12 ENCOUNTER — OFFICE VISIT (OUTPATIENT)
Age: 75
End: 2025-05-12
Payer: MEDICARE

## 2025-05-12 VITALS
WEIGHT: 197 LBS | OXYGEN SATURATION: 97 % | TEMPERATURE: 97.6 F | BODY MASS INDEX: 33.63 KG/M2 | SYSTOLIC BLOOD PRESSURE: 98 MMHG | HEIGHT: 64 IN | DIASTOLIC BLOOD PRESSURE: 59 MMHG | RESPIRATION RATE: 16 BRPM | HEART RATE: 82 BPM

## 2025-05-12 DIAGNOSIS — F32.A ANXIETY AND DEPRESSION: ICD-10-CM

## 2025-05-12 DIAGNOSIS — G89.29 CHRONIC PAIN OF BOTH KNEES: Primary | ICD-10-CM

## 2025-05-12 DIAGNOSIS — M25.562 CHRONIC PAIN OF BOTH KNEES: Primary | ICD-10-CM

## 2025-05-12 DIAGNOSIS — F41.9 ANXIETY AND DEPRESSION: ICD-10-CM

## 2025-05-12 DIAGNOSIS — M25.561 CHRONIC PAIN OF BOTH KNEES: Primary | ICD-10-CM

## 2025-05-12 PROCEDURE — 1123F ACP DISCUSS/DSCN MKR DOCD: CPT | Performed by: INTERNAL MEDICINE

## 2025-05-12 PROCEDURE — 3078F DIAST BP <80 MM HG: CPT | Performed by: INTERNAL MEDICINE

## 2025-05-12 PROCEDURE — G8399 PT W/DXA RESULTS DOCUMENT: HCPCS | Performed by: INTERNAL MEDICINE

## 2025-05-12 PROCEDURE — 1160F RVW MEDS BY RX/DR IN RCRD: CPT | Performed by: INTERNAL MEDICINE

## 2025-05-12 PROCEDURE — 1036F TOBACCO NON-USER: CPT | Performed by: INTERNAL MEDICINE

## 2025-05-12 PROCEDURE — 99213 OFFICE O/P EST LOW 20 MIN: CPT | Performed by: INTERNAL MEDICINE

## 2025-05-12 PROCEDURE — G8427 DOCREV CUR MEDS BY ELIG CLIN: HCPCS | Performed by: INTERNAL MEDICINE

## 2025-05-12 PROCEDURE — 3017F COLORECTAL CA SCREEN DOC REV: CPT | Performed by: INTERNAL MEDICINE

## 2025-05-12 PROCEDURE — 3074F SYST BP LT 130 MM HG: CPT | Performed by: INTERNAL MEDICINE

## 2025-05-12 PROCEDURE — 1125F AMNT PAIN NOTED PAIN PRSNT: CPT | Performed by: INTERNAL MEDICINE

## 2025-05-12 PROCEDURE — 1090F PRES/ABSN URINE INCON ASSESS: CPT | Performed by: INTERNAL MEDICINE

## 2025-05-12 PROCEDURE — 1159F MED LIST DOCD IN RCRD: CPT | Performed by: INTERNAL MEDICINE

## 2025-05-12 PROCEDURE — G8417 CALC BMI ABV UP PARAM F/U: HCPCS | Performed by: INTERNAL MEDICINE

## 2025-05-12 RX ORDER — CITALOPRAM HYDROBROMIDE 20 MG/1
20 TABLET ORAL DAILY
Qty: 90 TABLET | Refills: 1 | Status: SHIPPED | OUTPATIENT
Start: 2025-05-12 | End: 2025-11-08

## 2025-05-12 ASSESSMENT — ENCOUNTER SYMPTOMS
SHORTNESS OF BREATH: 0
COUGH: 0

## 2025-05-12 NOTE — PROGRESS NOTES
I have reviewed all needed documentation in preparation for visit. Verified patient by name and date of birth  Chief Complaint   Patient presents with    Referral - General     Physical Therapy referral          There were no vitals filed for this visit.    Health Maintenance Due   Topic Date Due    DTaP/Tdap/Td vaccine (1 - Tdap) Never done    Pneumococcal 50+ years Vaccine (2 of 2 - PCV) 05/05/2016    Shingles vaccine (2 of 2) 11/05/2020    COVID-19 Vaccine (4 - 2024-25 season) 09/01/2024    Respiratory Syncytial Virus (RSV) Pregnant or age 60 yrs+ (1 - 1-dose 75+ series) Never done     \"Have you been to the ER, urgent care clinic since your last visit?  Hospitalized since your last visit?\"    NO    “Have you seen or consulted any other health care providers outside of [facility] since your last visit?”    NO            Click Here for Release of Records Request         Trever Rodriguez [facility]

## 2025-05-12 NOTE — PROGRESS NOTES
Lauren Sanchez is a 74 y.o. female  Chief Complaint   Patient presents with    Referral - General     Physical Therapy referral , Rt Knee pt is having a nerve block on this coming Thursday , Pt has no Stima unable to walk a mile , It band and Bilateral legs ,      Forms     [facility] place card.       Vitals:    05/12/25 1429   BP: (!) 98/59   Pulse: 82   Resp: 16   Temp: 97.6 °F (36.4 °C)   SpO2: 97%          HPI  Ms. Lexi Sanchez presents to the clinic for follow-up on knee pain. She is currently under the care of orthopedics and has been advised that a knee replacement may be necessary due to the severity of her symptoms. However, she is postponing the surgery because it would require approximately six months of recovery time, during which she would need full assistance, and she currently does not have support available. She plans to try joint injections first, and if there is no improvement, she is considering scheduling surgery around June. A referral for physical therapy was provided, as she would like to begin that in the meantime. She also can't walk far and likes [facility] handicapped placard given to her.     Regarding her depression, she is currently taking Celexa (citalopram) 20 mg daily, which she reports has been helpful. She inquired about increasing the dose to 40 mg, but given that 20 mg is the recommended maximum dose for her (likely due to age or other risk factors), we will continue with the current dose.      .  Past Medical History:   Diagnosis Date    Breast cancer, left (HCC)     Depression     Goiter     Hearing loss     bilateral    Hypertension     Hypothyroid     IBS (irritable bowel syndrome)     Menopause     Rheumatoid arthritis (HCC)     S/P radiation therapy             ROS  Review of Systems   Constitutional:  Negative for fever.   Respiratory:  Negative for cough and shortness of breath.    Cardiovascular:  Negative for chest pain and palpitations.   Neurological:  Negative

## 2025-06-24 ENCOUNTER — TELEPHONE (OUTPATIENT)
Age: 75
End: 2025-06-24

## 2025-06-24 NOTE — TELEPHONE ENCOUNTER
Pt called stating records from [facility] were sent over back in May regarding pt's liver enzymes. States she's scheduled for surgery next month. Advised no records received. Pt confirmed office fax number and stated she will reach out to [facility] to have records sent over. Call back number is [phone].

## 2025-07-16 ENCOUNTER — OFFICE VISIT (OUTPATIENT)
Age: 75
End: 2025-07-16
Payer: MEDICARE

## 2025-07-16 VITALS
TEMPERATURE: 97.6 F | HEIGHT: 64 IN | WEIGHT: 202 LBS | SYSTOLIC BLOOD PRESSURE: 124 MMHG | DIASTOLIC BLOOD PRESSURE: 76 MMHG | HEART RATE: 75 BPM | BODY MASS INDEX: 34.49 KG/M2 | RESPIRATION RATE: 20 BRPM | OXYGEN SATURATION: 97 %

## 2025-07-16 DIAGNOSIS — K76.0 HEPATIC STEATOSIS: ICD-10-CM

## 2025-07-16 DIAGNOSIS — K76.0 HEPATIC STEATOSIS: Primary | ICD-10-CM

## 2025-07-16 DIAGNOSIS — I10 PRIMARY HYPERTENSION: ICD-10-CM

## 2025-07-16 DIAGNOSIS — R07.9 EXERTIONAL CHEST PAIN: ICD-10-CM

## 2025-07-16 DIAGNOSIS — R53.83 OTHER FATIGUE: ICD-10-CM

## 2025-07-16 DIAGNOSIS — F32.A ANXIETY AND DEPRESSION: ICD-10-CM

## 2025-07-16 DIAGNOSIS — F41.9 ANXIETY AND DEPRESSION: ICD-10-CM

## 2025-07-16 DIAGNOSIS — E03.9 HYPOTHYROIDISM, UNSPECIFIED TYPE: ICD-10-CM

## 2025-07-16 DIAGNOSIS — E78.00 PURE HYPERCHOLESTEROLEMIA: ICD-10-CM

## 2025-07-16 DIAGNOSIS — G47.33 OSA ON CPAP: ICD-10-CM

## 2025-07-16 DIAGNOSIS — R07.9 CHEST PAIN, UNSPECIFIED TYPE: ICD-10-CM

## 2025-07-16 PROCEDURE — G8427 DOCREV CUR MEDS BY ELIG CLIN: HCPCS | Performed by: INTERNAL MEDICINE

## 2025-07-16 PROCEDURE — 1159F MED LIST DOCD IN RCRD: CPT | Performed by: INTERNAL MEDICINE

## 2025-07-16 PROCEDURE — 1160F RVW MEDS BY RX/DR IN RCRD: CPT | Performed by: INTERNAL MEDICINE

## 2025-07-16 PROCEDURE — 99214 OFFICE O/P EST MOD 30 MIN: CPT | Performed by: INTERNAL MEDICINE

## 2025-07-16 PROCEDURE — G8399 PT W/DXA RESULTS DOCUMENT: HCPCS | Performed by: INTERNAL MEDICINE

## 2025-07-16 PROCEDURE — 3017F COLORECTAL CA SCREEN DOC REV: CPT | Performed by: INTERNAL MEDICINE

## 2025-07-16 PROCEDURE — 1036F TOBACCO NON-USER: CPT | Performed by: INTERNAL MEDICINE

## 2025-07-16 PROCEDURE — 3074F SYST BP LT 130 MM HG: CPT | Performed by: INTERNAL MEDICINE

## 2025-07-16 PROCEDURE — 1123F ACP DISCUSS/DSCN MKR DOCD: CPT | Performed by: INTERNAL MEDICINE

## 2025-07-16 PROCEDURE — G2211 COMPLEX E/M VISIT ADD ON: HCPCS | Performed by: INTERNAL MEDICINE

## 2025-07-16 PROCEDURE — G8417 CALC BMI ABV UP PARAM F/U: HCPCS | Performed by: INTERNAL MEDICINE

## 2025-07-16 PROCEDURE — 1090F PRES/ABSN URINE INCON ASSESS: CPT | Performed by: INTERNAL MEDICINE

## 2025-07-16 PROCEDURE — 3078F DIAST BP <80 MM HG: CPT | Performed by: INTERNAL MEDICINE

## 2025-07-16 PROCEDURE — 1126F AMNT PAIN NOTED NONE PRSNT: CPT | Performed by: INTERNAL MEDICINE

## 2025-07-16 NOTE — PROGRESS NOTES
I have reviewed all needed documentation in preparation for visit. Verified patient by name and date of birth  Chief Complaint   Patient presents with    3 Month Follow-Up       There were no vitals filed for this visit.    Health Maintenance Due   Topic Date Due    DTaP/Tdap/Td vaccine (1 - Tdap) Never done    Pneumococcal 50+ years Vaccine (2 of 2 - PCV) 05/05/2016    Shingles vaccine (2 of 2) 11/05/2020    COVID-19 Vaccine (4 - 2024-25 season) 09/01/2024    Respiratory Syncytial Virus (RSV) Pregnant or age 60 yrs+ (1 - 1-dose 75+ series) Never done     \"Have you been to the ER, urgent care clinic since your last visit?  Hospitalized since your last visit?\"    NO    “Have you seen or consulted any other health care providers outside of [facility] since your last visit?”    NO        [facility] -Dr Kirk   Click Here for Release of Records Request         Trever Rodriguez [facility]

## 2025-07-16 NOTE — PROGRESS NOTES
Lauren Sanchez is a 74 y.o. female  Chief Complaint   Patient presents with    3 Month Follow-Up     Sxs of SOB , when walking or active  is still the same and with FxhX Of Fatty Liver wants to have it checked sxs of itching arms hands and face .    Pre-op Exam     Dr Puga Rt Knee Surgery in August 29,25025       Vitals:    07/16/25 1038   BP: 124/76   Pulse: 75   Resp: 20   Temp: 97.6 °F (36.4 °C)   SpO2: 97%          HPI  Ms.Joan Arleen Sanchez is a 74 y.o. F with history of MM, breast cancer, RA/PMR history, Graves' disease, hypothyroid, HTN, MDD who presents to the clinic for follow-up. She has a family history of fatty liver disease and was recently found to have fatty liver on a renal ultrasound performed at [facility]. She is concerned about whether she needs specific screening for fatty liver disease. We discussed that she does have fatty liver disease, and a dedicated liver ultrasound can be arranged for further evaluation. Disease management primarily involves weight loss and exercise, along with continued monitoring.  The patient recently returned from an out-of-state vacation where she walked extensively. She reports experiencing shortness of breath and chest pain during exertion, which improves with rest. Currently, she does not have chest pain, likely because she is less active. She has also decided to undergo knee replacement surgery and is seeking preoperative evaluation. I recommended a stress test prior to surgery and will refer her to cardiology for evaluation of her exertional chest pain and shortness of breath.  She is currently followed by [facility] for urinary leakage and recently underwent Botox injections. The patient reports feeling exhausted and sleeping most of the time. Her mood is stable and improved; she was previously on Celexa, which will be continued. Additional labs will be ordered, including thyroid function tests and ferritin level, to evaluate possible causes

## 2025-07-17 LAB
ALBUMIN SERPL-MCNC: 3.9 G/DL (ref 3.5–5)
ALBUMIN/GLOB SERPL: 1 (ref 1.1–2.2)
ALP SERPL-CCNC: 214 U/L (ref 45–117)
ALT SERPL-CCNC: 66 U/L (ref 12–78)
ANION GAP SERPL CALC-SCNC: 7 MMOL/L (ref 2–12)
AST SERPL-CCNC: 34 U/L (ref 15–37)
BILIRUB SERPL-MCNC: 0.3 MG/DL (ref 0.2–1)
BUN SERPL-MCNC: 21 MG/DL (ref 6–20)
BUN/CREAT SERPL: 21 (ref 12–20)
CALCIUM SERPL-MCNC: 9.4 MG/DL (ref 8.5–10.1)
CHLORIDE SERPL-SCNC: 104 MMOL/L (ref 97–108)
CHOLEST SERPL-MCNC: 160 MG/DL
CO2 SERPL-SCNC: 27 MMOL/L (ref 21–32)
CREAT SERPL-MCNC: 1.01 MG/DL (ref 0.55–1.02)
ERYTHROCYTE [DISTWIDTH] IN BLOOD BY AUTOMATED COUNT: 12.3 % (ref 11.5–14.5)
FERRITIN SERPL-MCNC: 423 NG/ML (ref 26–388)
GLOBULIN SER CALC-MCNC: 3.9 G/DL (ref 2–4)
GLUCOSE SERPL-MCNC: 110 MG/DL (ref 65–100)
HCT VFR BLD AUTO: 42.1 % (ref 35–47)
HDLC SERPL-MCNC: 54 MG/DL
HDLC SERPL: 3 (ref 0–5)
HGB BLD-MCNC: 13.5 G/DL (ref 11.5–16)
INR PPP: 1 (ref 0.9–1.1)
LDLC SERPL CALC-MCNC: 66.4 MG/DL (ref 0–100)
MCH RBC QN AUTO: 30.4 PG (ref 26–34)
MCHC RBC AUTO-ENTMCNC: 32.1 G/DL (ref 30–36.5)
MCV RBC AUTO: 94.8 FL (ref 80–99)
NRBC # BLD: 0 K/UL (ref 0–0.01)
NRBC BLD-RTO: 0 PER 100 WBC
PLATELET # BLD AUTO: 384 K/UL (ref 150–400)
PMV BLD AUTO: 9.5 FL (ref 8.9–12.9)
POTASSIUM SERPL-SCNC: 4.5 MMOL/L (ref 3.5–5.1)
PROT SERPL-MCNC: 7.8 G/DL (ref 6.4–8.2)
PROTHROMBIN TIME: 10.4 SEC (ref 9.2–11.2)
RBC # BLD AUTO: 4.44 M/UL (ref 3.8–5.2)
SODIUM SERPL-SCNC: 138 MMOL/L (ref 136–145)
T4 FREE SERPL-MCNC: 1.5 NG/DL (ref 0.8–1.5)
TRIGL SERPL-MCNC: 198 MG/DL
TSH SERPL DL<=0.05 MIU/L-ACNC: 0.02 UIU/ML (ref 0.36–3.74)
VLDLC SERPL CALC-MCNC: 39.6 MG/DL
WBC # BLD AUTO: 6.2 K/UL (ref 3.6–11)

## 2025-07-17 ASSESSMENT — ENCOUNTER SYMPTOMS
COUGH: 0
SHORTNESS OF BREATH: 0

## 2025-07-19 ENCOUNTER — HOSPITAL ENCOUNTER (OUTPATIENT)
Facility: HOSPITAL | Age: 75
Discharge: HOME | End: 2025-07-22
Payer: MEDICARE

## 2025-07-19 DIAGNOSIS — K76.0 HEPATIC STEATOSIS: ICD-10-CM

## 2025-07-19 PROCEDURE — 76705 ECHO EXAM OF ABDOMEN: CPT

## 2025-08-04 DIAGNOSIS — E78.00 PURE HYPERCHOLESTEROLEMIA: ICD-10-CM

## 2025-08-04 RX ORDER — ROSUVASTATIN CALCIUM 10 MG/1
10 TABLET, COATED ORAL DAILY
Qty: 90 TABLET | Refills: 3 | Status: SHIPPED | OUTPATIENT
Start: 2025-08-04

## 2025-08-12 ENCOUNTER — HOSPITAL ENCOUNTER (OUTPATIENT)
Facility: HOSPITAL | Age: 75
Discharge: HOME | End: 2025-08-15
Attending: INTERNAL MEDICINE
Payer: MEDICARE

## 2025-08-12 ENCOUNTER — HOSPITAL ENCOUNTER (OUTPATIENT)
Facility: HOSPITAL | Age: 75
Discharge: HOME | End: 2025-08-14
Attending: INTERNAL MEDICINE
Payer: MEDICARE

## 2025-08-12 VITALS
DIASTOLIC BLOOD PRESSURE: 59 MMHG | HEART RATE: 64 BPM | WEIGHT: 202 LBS | RESPIRATION RATE: 18 BRPM | HEIGHT: 64 IN | SYSTOLIC BLOOD PRESSURE: 138 MMHG | BODY MASS INDEX: 34.49 KG/M2

## 2025-08-12 DIAGNOSIS — R07.9 CHEST PAIN, UNSPECIFIED TYPE: ICD-10-CM

## 2025-08-12 LAB
ECHO BSA: 2.03 M2
NUC STRESS EJECTION FRACTION: 68 %
STRESS BASELINE DIAS BP: 59 MMHG
STRESS BASELINE HR: 64 BPM
STRESS BASELINE SYS BP: 138 MMHG
STRESS ESTIMATED WORKLOAD: 1 METS
STRESS PEAK DIAS BP: 60 MMHG
STRESS PEAK SYS BP: 148 MMHG
STRESS PERCENT HR ACHIEVED: 61 %
STRESS POST PEAK HR: 89 BPM
STRESS RATE PRESSURE PRODUCT: NORMAL BPM*MMHG
STRESS TARGET HR: 145 BPM

## 2025-08-12 PROCEDURE — 78452 HT MUSCLE IMAGE SPECT MULT: CPT

## 2025-08-12 PROCEDURE — A9500 TC99M SESTAMIBI: HCPCS | Performed by: INTERNAL MEDICINE

## 2025-08-12 PROCEDURE — 6360000002 HC RX W HCPCS: Performed by: SPECIALIST

## 2025-08-12 PROCEDURE — 93018 CV STRESS TEST I&R ONLY: CPT | Performed by: SPECIALIST

## 2025-08-12 PROCEDURE — 78452 HT MUSCLE IMAGE SPECT MULT: CPT | Performed by: SPECIALIST

## 2025-08-12 PROCEDURE — 93017 CV STRESS TEST TRACING ONLY: CPT

## 2025-08-12 PROCEDURE — 3430000000 HC RX DIAGNOSTIC RADIOPHARMACEUTICAL: Performed by: INTERNAL MEDICINE

## 2025-08-12 PROCEDURE — 93016 CV STRESS TEST SUPVJ ONLY: CPT | Performed by: SPECIALIST

## 2025-08-12 RX ORDER — REGADENOSON 0.08 MG/ML
0.4 INJECTION, SOLUTION INTRAVENOUS
Status: COMPLETED | OUTPATIENT
Start: 2025-08-12 | End: 2025-08-12

## 2025-08-12 RX ORDER — TETRAKIS(2-METHOXYISOBUTYLISOCYANIDE)COPPER(I) TETRAFLUOROBORATE 1 MG/ML
10 INJECTION, POWDER, LYOPHILIZED, FOR SOLUTION INTRAVENOUS ONCE
Status: COMPLETED | OUTPATIENT
Start: 2025-08-12 | End: 2025-08-12

## 2025-08-12 RX ORDER — TETRAKIS(2-METHOXYISOBUTYLISOCYANIDE)COPPER(I) TETRAFLUOROBORATE 1 MG/ML
30 INJECTION, POWDER, LYOPHILIZED, FOR SOLUTION INTRAVENOUS
Status: COMPLETED | OUTPATIENT
Start: 2025-08-12 | End: 2025-08-12

## 2025-08-12 RX ADMIN — TETRAKIS(2-METHOXYISOBUTYLISOCYANIDE)COPPER(I) TETRAFLUOROBORATE 30 MILLICURIE: 1 INJECTION, POWDER, LYOPHILIZED, FOR SOLUTION INTRAVENOUS at 11:51

## 2025-08-12 RX ADMIN — TETRAKIS(2-METHOXYISOBUTYLISOCYANIDE)COPPER(I) TETRAFLUOROBORATE 10 MILLICURIE: 1 INJECTION, POWDER, LYOPHILIZED, FOR SOLUTION INTRAVENOUS at 09:44

## 2025-08-12 RX ADMIN — REGADENOSON 0.4 MG: 0.08 INJECTION, SOLUTION INTRAVENOUS at 10:24

## (undated) DEVICE — GAUZE SPONGES,12 PLY: Brand: CURITY

## (undated) DEVICE — SYR 10ML LUER LOK 1/5ML GRAD --

## (undated) DEVICE — SURGICAL PROCEDURE PACK BASIN MAJ SET CUST NO CAUT

## (undated) DEVICE — SUT SLK 2-0SH 30IN BLK --

## (undated) DEVICE — PACK,BASIC,SIRUS,V: Brand: MEDLINE

## (undated) DEVICE — MEDI-VAC YANK SUCT HNDL W/TPRD BULBOUS TIP: Brand: CARDINAL HEALTH

## (undated) DEVICE — NEEDLE HYPO 25GA L1.5IN BVL ORIENTED ECLIPSE

## (undated) DEVICE — (D)PREP SKN CHLRAPRP APPL 26ML -- CONVERT TO ITEM 371833

## (undated) DEVICE — INFECTION CONTROL KIT SYS

## (undated) DEVICE — PROBE DETECTION F/LUMPECTOMY -- ORDER IN MULTIPLES OF 5 EA ..MARGINPROBE

## (undated) DEVICE — DRAPE,REIN 53X77,STERILE: Brand: MEDLINE

## (undated) DEVICE — SUTURE MCRYL SZ 4-0 L27IN ABSRB UD L19MM PS-2 1/2 CIR PRIM Y426H

## (undated) DEVICE — BRA COMPR MAMM SILKY 3XL BGE

## (undated) DEVICE — 1010 S-DRAPE TOWEL DRAPE 10/BX: Brand: STERI-DRAPE™

## (undated) DEVICE — CURVED, SMALL JAW, OPEN SEALER/DIVIDER: Brand: LIGASURE

## (undated) DEVICE — COVER US PRB W12XL244CM FLD IORT STR TIP

## (undated) DEVICE — TOWEL SURG W17XL27IN STD BLU COT NONFENESTRATED PREWASHED

## (undated) DEVICE — DRAPE,CHEST,FENES,15X10,STERIL: Brand: MEDLINE

## (undated) DEVICE — KENDALL SCD EXPRESS SLEEVES, KNEE LENGTH, MEDIUM: Brand: KENDALL SCD

## (undated) DEVICE — SOLUTION IV 1000ML 0.9% SOD CHL

## (undated) DEVICE — STERILE POLYISOPRENE POWDER-FREE SURGICAL GLOVES WITH EMOLLIENT COATING: Brand: PROTEXIS

## (undated) DEVICE — SUTURE VCRL SZ 3-0 L27IN ABSRB VLT L26MM SH 1/2 CIR J316H

## (undated) DEVICE — Z INACTIVE USE 2535481 MARKER SURG W2XH1XL2CM BIOZORB LO PROF

## (undated) DEVICE — REM POLYHESIVE ADULT PATIENT RETURN ELECTRODE: Brand: VALLEYLAB

## (undated) DEVICE — MEDI-VAC NON-CONDUCTIVE SUCTION TUBING: Brand: CARDINAL HEALTH

## (undated) DEVICE — SMOKE EVACUATION PENCIL: Brand: VALLEYLAB

## (undated) DEVICE — APPLICATOR BNDG 1MM ADH PREMIERPRO EXOFIN

## (undated) DEVICE — 3M™ IOBAN™ 2 ANTIMICROBIAL INCISE DRAPE 6640EZ: Brand: IOBAN™ 2

## (undated) DEVICE — INSULATED BLADE ELECTRODE: Brand: EDGE